# Patient Record
Sex: FEMALE | Race: WHITE | NOT HISPANIC OR LATINO | URBAN - METROPOLITAN AREA
[De-identification: names, ages, dates, MRNs, and addresses within clinical notes are randomized per-mention and may not be internally consistent; named-entity substitution may affect disease eponyms.]

---

## 2017-05-25 ENCOUNTER — OUTPATIENT (OUTPATIENT)
Dept: OUTPATIENT SERVICES | Facility: HOSPITAL | Age: 52
LOS: 1 days | Discharge: HOME | End: 2017-05-25

## 2017-06-28 DIAGNOSIS — Z01.21 ENCOUNTER FOR DENTAL EXAMINATION AND CLEANING WITH ABNORMAL FINDINGS: ICD-10-CM

## 2017-07-11 ENCOUNTER — OUTPATIENT (OUTPATIENT)
Dept: OUTPATIENT SERVICES | Facility: HOSPITAL | Age: 52
LOS: 1 days | Discharge: HOME | End: 2017-07-11

## 2017-07-11 DIAGNOSIS — K02.63 DENTAL CARIES ON SMOOTH SURFACE PENETRATING INTO PULP: ICD-10-CM

## 2017-07-13 ENCOUNTER — EMERGENCY (EMERGENCY)
Facility: HOSPITAL | Age: 52
LOS: 0 days | Discharge: HOME | End: 2017-07-13
Admitting: FAMILY MEDICINE

## 2017-07-13 DIAGNOSIS — R00.1 BRADYCARDIA, UNSPECIFIED: ICD-10-CM

## 2017-07-13 DIAGNOSIS — E78.5 HYPERLIPIDEMIA, UNSPECIFIED: ICD-10-CM

## 2017-07-13 DIAGNOSIS — Z79.899 OTHER LONG TERM (CURRENT) DRUG THERAPY: ICD-10-CM

## 2017-07-13 DIAGNOSIS — E03.9 HYPOTHYROIDISM, UNSPECIFIED: ICD-10-CM

## 2017-07-15 ENCOUNTER — EMERGENCY (EMERGENCY)
Facility: HOSPITAL | Age: 52
LOS: 0 days | Discharge: HOME | End: 2017-07-15
Admitting: FAMILY MEDICINE

## 2017-07-15 DIAGNOSIS — R00.1 BRADYCARDIA, UNSPECIFIED: ICD-10-CM

## 2017-07-15 DIAGNOSIS — R53.83 OTHER FATIGUE: ICD-10-CM

## 2017-07-15 DIAGNOSIS — R41.82 ALTERED MENTAL STATUS, UNSPECIFIED: ICD-10-CM

## 2017-07-15 DIAGNOSIS — E78.00 PURE HYPERCHOLESTEROLEMIA, UNSPECIFIED: ICD-10-CM

## 2017-07-15 DIAGNOSIS — Z79.899 OTHER LONG TERM (CURRENT) DRUG THERAPY: ICD-10-CM

## 2017-07-15 DIAGNOSIS — E03.9 HYPOTHYROIDISM, UNSPECIFIED: ICD-10-CM

## 2017-07-26 ENCOUNTER — OUTPATIENT (OUTPATIENT)
Dept: OUTPATIENT SERVICES | Facility: HOSPITAL | Age: 52
LOS: 1 days | Discharge: HOME | End: 2017-07-26

## 2017-07-26 DIAGNOSIS — E07.9 DISORDER OF THYROID, UNSPECIFIED: ICD-10-CM

## 2017-08-18 PROBLEM — Z00.00 ENCOUNTER FOR PREVENTIVE HEALTH EXAMINATION: Status: ACTIVE | Noted: 2017-08-18

## 2017-09-21 ENCOUNTER — OUTPATIENT (OUTPATIENT)
Dept: OUTPATIENT SERVICES | Facility: HOSPITAL | Age: 52
LOS: 1 days | Discharge: HOME | End: 2017-09-21

## 2017-09-21 DIAGNOSIS — E78.00 PURE HYPERCHOLESTEROLEMIA, UNSPECIFIED: ICD-10-CM

## 2017-09-21 DIAGNOSIS — N18.3 CHRONIC KIDNEY DISEASE, STAGE 3 (MODERATE): ICD-10-CM

## 2017-09-21 DIAGNOSIS — E83.52 HYPERCALCEMIA: ICD-10-CM

## 2017-09-21 DIAGNOSIS — D64.9 ANEMIA, UNSPECIFIED: ICD-10-CM

## 2017-09-21 DIAGNOSIS — R80.0 ISOLATED PROTEINURIA: ICD-10-CM

## 2017-10-12 ENCOUNTER — OUTPATIENT (OUTPATIENT)
Dept: OUTPATIENT SERVICES | Facility: HOSPITAL | Age: 52
LOS: 1 days | Discharge: HOME | End: 2017-10-12

## 2017-10-12 DIAGNOSIS — E78.5 HYPERLIPIDEMIA, UNSPECIFIED: ICD-10-CM

## 2017-10-12 DIAGNOSIS — Z00.00 ENCOUNTER FOR GENERAL ADULT MEDICAL EXAMINATION WITHOUT ABNORMAL FINDINGS: ICD-10-CM

## 2017-10-12 DIAGNOSIS — E03.9 HYPOTHYROIDISM, UNSPECIFIED: ICD-10-CM

## 2017-11-02 ENCOUNTER — OUTPATIENT (OUTPATIENT)
Dept: OUTPATIENT SERVICES | Facility: HOSPITAL | Age: 52
LOS: 1 days | Discharge: HOME | End: 2017-11-02

## 2017-11-02 DIAGNOSIS — R10.2 PELVIC AND PERINEAL PAIN: ICD-10-CM

## 2017-11-17 ENCOUNTER — OUTPATIENT (OUTPATIENT)
Dept: OUTPATIENT SERVICES | Facility: HOSPITAL | Age: 52
LOS: 1 days | Discharge: HOME | End: 2017-11-17

## 2017-11-17 DIAGNOSIS — R31.9 HEMATURIA, UNSPECIFIED: ICD-10-CM

## 2017-12-22 ENCOUNTER — OUTPATIENT (OUTPATIENT)
Dept: OUTPATIENT SERVICES | Facility: HOSPITAL | Age: 52
LOS: 1 days | Discharge: HOME | End: 2017-12-22

## 2017-12-22 DIAGNOSIS — N39.0 URINARY TRACT INFECTION, SITE NOT SPECIFIED: ICD-10-CM

## 2018-01-09 ENCOUNTER — TRANSCRIPTION ENCOUNTER (OUTPATIENT)
Age: 53
End: 2018-01-09

## 2018-01-11 ENCOUNTER — APPOINTMENT (OUTPATIENT)
Dept: OTOLARYNGOLOGY | Facility: CLINIC | Age: 53
End: 2018-01-11
Payer: MEDICARE

## 2018-01-11 DIAGNOSIS — F41.9 ANXIETY DISORDER, UNSPECIFIED: ICD-10-CM

## 2018-01-11 DIAGNOSIS — Z87.09 PERSONAL HISTORY OF OTHER DISEASES OF THE RESPIRATORY SYSTEM: ICD-10-CM

## 2018-01-11 DIAGNOSIS — E03.9 HYPOTHYROIDISM, UNSPECIFIED: ICD-10-CM

## 2018-01-11 DIAGNOSIS — Q90.9 DOWN SYNDROME, UNSPECIFIED: ICD-10-CM

## 2018-01-11 PROCEDURE — 99203 OFFICE O/P NEW LOW 30 MIN: CPT

## 2018-01-11 RX ORDER — AMMONIUM LACTATE 12 %
12 CREAM (GRAM) TOPICAL
Refills: 0 | Status: ACTIVE | COMMUNITY

## 2018-01-11 RX ORDER — RANITIDINE HYDROCHLORIDE 150 MG/1
150 CAPSULE ORAL
Refills: 0 | Status: ACTIVE | COMMUNITY

## 2018-01-11 RX ORDER — SIMVASTATIN 20 MG/1
20 TABLET, FILM COATED ORAL
Refills: 0 | Status: ACTIVE | COMMUNITY

## 2018-01-11 RX ORDER — ZIPRASIDONE HYDROCHLORIDE 60 MG/1
60 CAPSULE ORAL
Refills: 0 | Status: ACTIVE | COMMUNITY

## 2018-01-11 RX ORDER — SODIUM FLUORIDE AND POTASSIUM NITRATE 5.8; 57.5 MG/ML; MG/ML
1.1-5 GEL, DENTIFRICE DENTAL
Refills: 0 | Status: ACTIVE | COMMUNITY

## 2018-01-11 RX ORDER — LEVOTHYROXINE SODIUM 0.11 MG/1
112 TABLET ORAL
Refills: 0 | Status: ACTIVE | COMMUNITY

## 2018-01-11 RX ORDER — CIPROFLOXACIN HYDROCHLORIDE 500 MG/1
500 TABLET, FILM COATED ORAL
Refills: 0 | Status: ACTIVE | COMMUNITY

## 2018-01-12 ENCOUNTER — OTHER (OUTPATIENT)
Age: 53
End: 2018-01-12

## 2018-01-12 RX ORDER — AMOXICILLIN AND CLAVULANATE POTASSIUM 875; 125 MG/1; MG/1
875-125 TABLET, COATED ORAL
Qty: 20 | Refills: 0 | Status: DISCONTINUED | COMMUNITY
Start: 2018-01-11 | End: 2018-01-12

## 2018-01-23 ENCOUNTER — OUTPATIENT (OUTPATIENT)
Dept: OUTPATIENT SERVICES | Facility: HOSPITAL | Age: 53
LOS: 1 days | Discharge: HOME | End: 2018-01-23

## 2018-01-23 DIAGNOSIS — E55.9 VITAMIN D DEFICIENCY, UNSPECIFIED: ICD-10-CM

## 2018-01-23 DIAGNOSIS — E78.00 PURE HYPERCHOLESTEROLEMIA, UNSPECIFIED: ICD-10-CM

## 2018-01-23 DIAGNOSIS — D64.9 ANEMIA, UNSPECIFIED: ICD-10-CM

## 2018-01-23 DIAGNOSIS — N18.3 CHRONIC KIDNEY DISEASE, STAGE 3 (MODERATE): ICD-10-CM

## 2018-01-29 ENCOUNTER — APPOINTMENT (OUTPATIENT)
Dept: OTOLARYNGOLOGY | Facility: CLINIC | Age: 53
End: 2018-01-29
Payer: MEDICARE

## 2018-01-29 VITALS — BODY MASS INDEX: 49.09 KG/M2 | WEIGHT: 260 LBS | HEIGHT: 61 IN

## 2018-01-29 DIAGNOSIS — H61.23 IMPACTED CERUMEN, BILATERAL: ICD-10-CM

## 2018-01-29 PROCEDURE — 69210 REMOVE IMPACTED EAR WAX UNI: CPT

## 2018-01-29 RX ORDER — ASPIRIN 81 MG
6.5 TABLET, DELAYED RELEASE (ENTERIC COATED) ORAL
Qty: 5 | Refills: 0 | Status: ACTIVE | COMMUNITY
Start: 2018-01-29 | End: 1900-01-01

## 2018-03-07 ENCOUNTER — OTHER (OUTPATIENT)
Age: 53
End: 2018-03-07

## 2018-03-07 RX ORDER — ASPIRIN 81 MG
6.5 TABLET, DELAYED RELEASE (ENTERIC COATED) ORAL
Qty: 1 | Refills: 1 | Status: ACTIVE | COMMUNITY
Start: 2018-01-11 | End: 1900-01-01

## 2018-03-28 ENCOUNTER — OUTPATIENT (OUTPATIENT)
Dept: OUTPATIENT SERVICES | Facility: HOSPITAL | Age: 53
LOS: 1 days | Discharge: HOME | End: 2018-03-28

## 2018-03-28 DIAGNOSIS — Z01.20 ENCOUNTER FOR DENTAL EXAMINATION AND CLEANING WITHOUT ABNORMAL FINDINGS: ICD-10-CM

## 2018-08-02 ENCOUNTER — OUTPATIENT (OUTPATIENT)
Dept: OUTPATIENT SERVICES | Facility: HOSPITAL | Age: 53
LOS: 1 days | Discharge: HOME | End: 2018-08-02

## 2018-08-13 DIAGNOSIS — Z01.21 ENCOUNTER FOR DENTAL EXAMINATION AND CLEANING WITH ABNORMAL FINDINGS: ICD-10-CM

## 2018-08-14 ENCOUNTER — TRANSCRIPTION ENCOUNTER (OUTPATIENT)
Age: 53
End: 2018-08-14

## 2018-10-05 ENCOUNTER — OUTPATIENT (OUTPATIENT)
Dept: OUTPATIENT SERVICES | Facility: HOSPITAL | Age: 53
LOS: 1 days | Discharge: HOME | End: 2018-10-05

## 2018-11-12 ENCOUNTER — TRANSCRIPTION ENCOUNTER (OUTPATIENT)
Age: 53
End: 2018-11-12

## 2018-11-30 ENCOUNTER — EMERGENCY (EMERGENCY)
Facility: HOSPITAL | Age: 53
LOS: 0 days | Discharge: HOME | End: 2018-12-01
Attending: EMERGENCY MEDICINE | Admitting: EMERGENCY MEDICINE

## 2018-11-30 VITALS
DIASTOLIC BLOOD PRESSURE: 70 MMHG | SYSTOLIC BLOOD PRESSURE: 159 MMHG | TEMPERATURE: 97 F | OXYGEN SATURATION: 95 % | HEIGHT: 59 IN | HEART RATE: 64 BPM | WEIGHT: 210.1 LBS | RESPIRATION RATE: 20 BRPM
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
## 2018-11-30 DIAGNOSIS — Q90.9 DOWN SYNDROME, UNSPECIFIED: ICD-10-CM

## 2018-11-30 DIAGNOSIS — S99.921A UNSPECIFIED INJURY OF RIGHT FOOT, INITIAL ENCOUNTER: ICD-10-CM

## 2018-11-30 DIAGNOSIS — S92.811A OTHER FRACTURE OF RIGHT FOOT, INITIAL ENCOUNTER FOR CLOSED FRACTURE: ICD-10-CM

## 2018-11-30 DIAGNOSIS — S09.8XXA OTHER SPECIFIED INJURIES OF HEAD, INITIAL ENCOUNTER: ICD-10-CM
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
## 2018-11-30 DIAGNOSIS — E03.9 HYPOTHYROIDISM, UNSPECIFIED: ICD-10-CM

## 2018-11-30 NOTE — ED ADULT NURSE NOTE - OBJECTIVE STATEMENT
as per family and resident at assisted facility state patient fell down stairs unwitnessed and now complaining of R ankle pain and L head abrasion.

## 2018-11-30 NOTE — ED PROVIDER NOTE - NS ED ROS FT
Review of Systems:  	•	CONSTITUTIONAL - no fever, no diaphoresis, no chills  	•	SKIN - +abrasion, no rash  	•	HEMATOLOGIC - no bleeding, no bruising  	•	EYES - no eye pain, no blurry vision  	•	ENT - no congestion  	•	RESPIRATORY - no shortness of breath, no cough  	•	CARDIAC - no chest pain, no palpitations  	•	GI - no abd pain, no nausea, no vomiting  	•	GENITO-URINARY - no hematuria  	•	MUSCULOSKELETAL - +foot pain, +ankle pain, no swelling, no redness  	•	NEUROLOGIC - +head injury, no weakness, no headache, no paresthesias  	All other ROS are negative except as documented in HPI.

## 2018-11-30 NOTE — ED PROVIDER NOTE - ATTENDING CONTRIBUTION TO CARE
52 yo f with pmh of mr/cp, down's syndrome, hypothyroidism, no ac or asa, presents with right foot pain and head injury s/p fall.  pt was walking up stairs and had a mechanical fall.  likely fell down 4 steps.  was heard immediately by caretaker and was found awake.  pt is able to walk but with pain.  exam: nad, left frontoparietal linear abrasion, shallow, left scapular abrasion, mildly ttp, perrl, eomi, mmm, rrr, ctab, abd soft, nt,nd aox2, right foot with ttp at the base of the 5th metatarsal imp: pt with fall, foot pain and head injury, will ct head and xray foot

## 2018-11-30 NOTE — ED PROVIDER NOTE - OBJECTIVE STATEMENT
54 yo F with pmhx of Down's Syndrome, hypothyroidism presenting s/p mechanical fall today now c/o right foot/ankle pain. Patient was going up the stairs and fell. Fall was unwitnessed. +Head injury. No blood thinners. No cp, sob, fever, chills, abdominal pain, nausea, vomiting, diarrhea, back pain, urinary symptoms, headache, dizziness, paresthesias, or weakness.

## 2018-11-30 NOTE — ED PROVIDER NOTE - MEDICAL DECISION MAKING DETAILS
pt with base of 5th metatarsal fx, placed in obs for rehab consult due to nonweightbearing restrictions

## 2018-11-30 NOTE — ED PROVIDER NOTE - PHYSICAL EXAMINATION
VITAL SIGNS: I have reviewed nursing notes and confirm.  CONSTITUTIONAL: Well-developed; well-nourished; in no acute distress.  SKIN: +Abrasion to frontal scalp. +Abrasion to upper back. Remainder of skin exam is warm and dry, no acute rash.  HEAD: Normocephalic; atraumatic.  EYES: PERRL, EOM intact; conjunctiva and sclera clear.  ENT: No nasal discharge; airway clear.   NECK: Supple; non tender. No C-spine tenderness.   CARD: S1, S2 normal; no murmurs, gallops, or rubs. Regular rate and rhythm.  RESP: Clear to auscultation bilaterally. No wheezes, rales or rhonchi.  ABD: Normal bowel sounds; soft; non-distended; non-tender.   EXT/MSK: +Diffuse tenderness to ankle and foot. No bony deformities. Normal ROM. No edema. No midline tenderness.   NEURO: Alert, oriented. Grossly unremarkable. No focal deficits.  PSYCH: Cooperative.

## 2018-11-30 NOTE — ED ADULT TRIAGE NOTE - CHIEF COMPLAINT QUOTE
She fell 4 steps and hit her head and her right ankle hurts as per family. Denies LOC, no blood thinners

## 2018-11-30 NOTE — ED PROVIDER NOTE - PROGRESS NOTE DETAILS
Will place in OBS for PT/Rehab Discussed results with Nurse Patton from group home family and nurse unsure of tetanus status will verify in morning.

## 2018-12-01 VITALS — HEART RATE: 55 BPM | DIASTOLIC BLOOD PRESSURE: 60 MMHG | SYSTOLIC BLOOD PRESSURE: 113 MMHG | TEMPERATURE: 98 F

## 2018-12-01 RX ORDER — ZIPRASIDONE HYDROCHLORIDE 20 MG/1
60 CAPSULE ORAL ONCE
Qty: 0 | Refills: 0 | Status: DISCONTINUED | OUTPATIENT
Start: 2018-12-01 | End: 2018-12-01

## 2018-12-01 RX ORDER — IBUPROFEN 200 MG
600 TABLET ORAL ONCE
Qty: 0 | Refills: 0 | Status: COMPLETED | OUTPATIENT
Start: 2018-12-01 | End: 2018-12-01

## 2018-12-01 RX ORDER — ZOLPIDEM TARTRATE 10 MG/1
5 TABLET ORAL AT BEDTIME
Qty: 0 | Refills: 0 | Status: DISCONTINUED | OUTPATIENT
Start: 2018-12-01 | End: 2018-12-01

## 2018-12-01 RX ORDER — FAMOTIDINE 10 MG/ML
20 INJECTION INTRAVENOUS
Qty: 0 | Refills: 0 | Status: DISCONTINUED | OUTPATIENT
Start: 2018-12-01 | End: 2018-12-01

## 2018-12-01 RX ORDER — SIMVASTATIN 20 MG/1
20 TABLET, FILM COATED ORAL AT BEDTIME
Qty: 0 | Refills: 0 | Status: DISCONTINUED | OUTPATIENT
Start: 2018-12-01 | End: 2018-12-01

## 2018-12-01 RX ORDER — LEVOTHYROXINE SODIUM 125 MCG
88 TABLET ORAL DAILY
Qty: 0 | Refills: 0 | Status: DISCONTINUED | OUTPATIENT
Start: 2018-12-01 | End: 2018-12-01

## 2018-12-01 RX ADMIN — Medication 600 MILLIGRAM(S): at 01:02

## 2018-12-01 RX ADMIN — FAMOTIDINE 20 MILLIGRAM(S): 10 INJECTION INTRAVENOUS at 06:43

## 2018-12-01 RX ADMIN — Medication 88 MICROGRAM(S): at 06:43

## 2018-12-01 RX ADMIN — ZOLPIDEM TARTRATE 5 MILLIGRAM(S): 10 TABLET ORAL at 01:40

## 2018-12-01 NOTE — ED CDU PROVIDER INITIAL DAY NOTE - NS ED ROS FT
Constitutional: No fever, chills.  Eyes: No visual changes.  ENT: No hearing changes.  Neck: No neck pain or stiffness.  Cardiovascular: No chest pain, palpitations, edema.  Pulmonary: No SOB, cough. No hemoptysis.  Abdominal:  No nausea, vomiting, diarrhea.  : No dysuria, frequency.  Neuro: No headache, syncope, dizziness.  MS: + Head injury; right foot pain  Psych: No suicidal ideations.

## 2018-12-01 NOTE — ED CDU PROVIDER INITIAL DAY NOTE - ATTENDING CONTRIBUTION TO CARE
Pt placed into observation for rehab evaluation and ortho evaluation. Presented after tripping and fx of the base of the fifth metatarsal.

## 2018-12-01 NOTE — ED ADULT NURSE REASSESSMENT NOTE - NS ED NURSE REASSESS COMMENT FT1
Pt assessed, vss. Aide at bedside. comfort and safety measures in place. no c/o pain or discomfort at this time. Pt mentating at baseline

## 2018-12-01 NOTE — ED CDU PROVIDER DISPOSITION NOTE - CARE PROVIDER_API CALL
Fredis Markham), Orthopaedic Surgery  [facility] [place] [address]  Phone: [phone]  Fax: [phone]

## 2018-12-01 NOTE — ED ADULT NURSE REASSESSMENT NOTE - NS ED NURSE REASSESS COMMENT FT1
VSS, Pt has 1/1 from agency and family at bedside, Pt currently sleeping no s/s distress, CB in [facility], [place] on. will cont to monitor

## 2018-12-01 NOTE — ED CDU PROVIDER INITIAL DAY NOTE - OBJECTIVE STATEMENT
53 yold female to ED Pmhx Down's syndrome, Hypothyroidism s/p trip and fall hitting head wihle going up stairs; pt seen in main ED - head ct negative; right foot xray + Serna fracture; pt place in obs because pt currently lives in group home on second floor - unable to use crutches and ambulate nwb right leg; pt in obs for Rehab consult in am;

## 2018-12-01 NOTE — ED CDU PROVIDER INITIAL DAY NOTE - PHYSICAL EXAMINATION
Constitutional: Well developed, well nourished. NAD  Head: + scalp abrasion; no hematoma;   Eyes: PERRL, EOMI.  ENT: No nasal discharge. Mucous membranes dry.  Neck: Supple. Painless ROM.  Cardiovascular:  Regular rate and rhythm.   Pulmonary:  Lungs clear to auscultation bilaterally.   Abdominal: Soft. Nondistended. No rebound, guarding, rigidity.  Extremities. Pelvis stable. + right ankle/foot swelling and tenderness;   Skin: No rashes, cyanosis.  Neuro: AAOx2. No focal neurological deficits.  Psych: Normal mood. Normal affect.

## 2018-12-01 NOTE — ED CDU PROVIDER INITIAL DAY NOTE - PROGRESS NOTE DETAILS
We spoke to rehab. Will try to see pt. Pt however would do good with walker and partial wt bearing , ortho follow. patient signed out from yves jarquin, famil;donald at bedside, informed of ed plan and management will continue to monitor

## 2018-12-04 ENCOUNTER — INPATIENT (INPATIENT)
Facility: HOSPITAL | Age: 53
LOS: 2 days | Discharge: NURSING, REHAB OR LONG-TERM CARE | End: 2018-12-07
Attending: INTERNAL MEDICINE | Admitting: INTERNAL MEDICINE

## 2018-12-04 VITALS
RESPIRATION RATE: 16 BRPM | OXYGEN SATURATION: 100 % | TEMPERATURE: 98 F | DIASTOLIC BLOOD PRESSURE: 64 MMHG | HEART RATE: 53 BPM | SYSTOLIC BLOOD PRESSURE: 103 MMHG

## 2018-12-04 DIAGNOSIS — E03.9 HYPOTHYROIDISM, UNSPECIFIED: ICD-10-CM

## 2018-12-04 DIAGNOSIS — S82.891A OTHER FRACTURE OF RIGHT LOWER LEG, INITIAL ENCOUNTER FOR CLOSED FRACTURE: ICD-10-CM

## 2018-12-04 DIAGNOSIS — R25.2 CRAMP AND SPASM: ICD-10-CM

## 2018-12-04 DIAGNOSIS — Q90.9 DOWN SYNDROME, UNSPECIFIED: ICD-10-CM

## 2018-12-04 DIAGNOSIS — N39.0 URINARY TRACT INFECTION, SITE NOT SPECIFIED: ICD-10-CM

## 2018-12-04 PROBLEM — F79 UNSPECIFIED INTELLECTUAL DISABILITIES: Chronic | Status: ACTIVE | Noted: 2018-11-30

## 2018-12-04 PROBLEM — F63.9 IMPULSE DISORDER, UNSPECIFIED: Chronic | Status: ACTIVE | Noted: 2018-11-30

## 2018-12-04 LAB
ALBUMIN SERPL ELPH-MCNC: 3.8 G/DL — SIGNIFICANT CHANGE UP (ref 3.5–5.2)
ALBUMIN SERPL ELPH-MCNC: 4.4 G/DL — SIGNIFICANT CHANGE UP (ref 3.5–5.2)
ALP SERPL-CCNC: 80 U/L — SIGNIFICANT CHANGE UP (ref 30–115)
ALP SERPL-CCNC: 95 U/L — SIGNIFICANT CHANGE UP (ref 30–115)
ALT FLD-CCNC: 16 U/L — SIGNIFICANT CHANGE UP (ref 0–41)
ALT FLD-CCNC: 18 U/L — SIGNIFICANT CHANGE UP (ref 0–41)
ANION GAP SERPL CALC-SCNC: 11 MMOL/L — SIGNIFICANT CHANGE UP (ref 7–14)
ANION GAP SERPL CALC-SCNC: 16 MMOL/L — HIGH (ref 7–14)
APPEARANCE UR: ABNORMAL
AST SERPL-CCNC: 22 U/L — SIGNIFICANT CHANGE UP (ref 0–41)
AST SERPL-CCNC: 26 U/L — SIGNIFICANT CHANGE UP (ref 0–41)
BASOPHILS # BLD AUTO: 0.08 K/UL — SIGNIFICANT CHANGE UP (ref 0–0.2)
BASOPHILS # BLD AUTO: 0.08 K/UL — SIGNIFICANT CHANGE UP (ref 0–0.2)
BASOPHILS NFR BLD AUTO: 1 % — SIGNIFICANT CHANGE UP (ref 0–1)
BASOPHILS NFR BLD AUTO: 1.3 % — HIGH (ref 0–1)
BILIRUB SERPL-MCNC: 0.4 MG/DL — SIGNIFICANT CHANGE UP (ref 0.2–1.2)
BILIRUB SERPL-MCNC: 0.8 MG/DL — SIGNIFICANT CHANGE UP (ref 0.2–1.2)
BILIRUB UR-MCNC: NEGATIVE — SIGNIFICANT CHANGE UP
BUN SERPL-MCNC: 23 MG/DL — HIGH (ref 10–20)
BUN SERPL-MCNC: 25 MG/DL — HIGH (ref 10–20)
CALCIUM SERPL-MCNC: 9 MG/DL — SIGNIFICANT CHANGE UP (ref 8.5–10.1)
CALCIUM SERPL-MCNC: 9.8 MG/DL — SIGNIFICANT CHANGE UP (ref 8.5–10.1)
CHLORIDE SERPL-SCNC: 105 MMOL/L — SIGNIFICANT CHANGE UP (ref 98–110)
CHLORIDE SERPL-SCNC: 99 MMOL/L — SIGNIFICANT CHANGE UP (ref 98–110)
CO2 SERPL-SCNC: 27 MMOL/L — SIGNIFICANT CHANGE UP (ref 17–32)
CO2 SERPL-SCNC: 29 MMOL/L — SIGNIFICANT CHANGE UP (ref 17–32)
COLOR SPEC: YELLOW — SIGNIFICANT CHANGE UP
CREAT SERPL-MCNC: 1.4 MG/DL — SIGNIFICANT CHANGE UP (ref 0.7–1.5)
CREAT SERPL-MCNC: 1.4 MG/DL — SIGNIFICANT CHANGE UP (ref 0.7–1.5)
DIFF PNL FLD: NEGATIVE — SIGNIFICANT CHANGE UP
EOSINOPHIL # BLD AUTO: 0.09 K/UL — SIGNIFICANT CHANGE UP (ref 0–0.7)
EOSINOPHIL # BLD AUTO: 0.1 K/UL — SIGNIFICANT CHANGE UP (ref 0–0.7)
EOSINOPHIL NFR BLD AUTO: 1.2 % — SIGNIFICANT CHANGE UP (ref 0–8)
EOSINOPHIL NFR BLD AUTO: 1.6 % — SIGNIFICANT CHANGE UP (ref 0–8)
GLUCOSE SERPL-MCNC: 85 MG/DL — SIGNIFICANT CHANGE UP (ref 70–99)
GLUCOSE SERPL-MCNC: 88 MG/DL — SIGNIFICANT CHANGE UP (ref 70–99)
GLUCOSE UR QL: NEGATIVE MG/DL — SIGNIFICANT CHANGE UP
HCT VFR BLD CALC: 39.6 % — SIGNIFICANT CHANGE UP (ref 37–47)
HCT VFR BLD CALC: 45.6 % — SIGNIFICANT CHANGE UP (ref 37–47)
HGB BLD-MCNC: 13.1 G/DL — SIGNIFICANT CHANGE UP (ref 12–16)
HGB BLD-MCNC: 15.2 G/DL — SIGNIFICANT CHANGE UP (ref 12–16)
IMM GRANULOCYTES NFR BLD AUTO: 0.3 % — SIGNIFICANT CHANGE UP (ref 0.1–0.3)
IMM GRANULOCYTES NFR BLD AUTO: 0.4 % — HIGH (ref 0.1–0.3)
KETONES UR-MCNC: NEGATIVE — SIGNIFICANT CHANGE UP
LACTATE SERPL-SCNC: 0.9 MMOL/L — SIGNIFICANT CHANGE UP (ref 0.5–2.2)
LEUKOCYTE ESTERASE UR-ACNC: ABNORMAL
LIDOCAIN IGE QN: 52 U/L — SIGNIFICANT CHANGE UP (ref 7–60)
LYMPHOCYTES # BLD AUTO: 0.96 K/UL — LOW (ref 1.2–3.4)
LYMPHOCYTES # BLD AUTO: 1.16 K/UL — LOW (ref 1.2–3.4)
LYMPHOCYTES # BLD AUTO: 12.5 % — LOW (ref 20.5–51.1)
LYMPHOCYTES # BLD AUTO: 18.2 % — LOW (ref 20.5–51.1)
MAGNESIUM SERPL-MCNC: 2 MG/DL — SIGNIFICANT CHANGE UP (ref 1.8–2.4)
MAGNESIUM SERPL-MCNC: 2 MG/DL — SIGNIFICANT CHANGE UP (ref 1.8–2.4)
MCHC RBC-ENTMCNC: 31.3 PG — HIGH (ref 27–31)
MCHC RBC-ENTMCNC: 31.3 PG — HIGH (ref 27–31)
MCHC RBC-ENTMCNC: 33.1 G/DL — SIGNIFICANT CHANGE UP (ref 32–37)
MCHC RBC-ENTMCNC: 33.3 G/DL — SIGNIFICANT CHANGE UP (ref 32–37)
MCV RBC AUTO: 93.8 FL — SIGNIFICANT CHANGE UP (ref 81–99)
MCV RBC AUTO: 94.7 FL — SIGNIFICANT CHANGE UP (ref 81–99)
MONOCYTES # BLD AUTO: 0.5 K/UL — SIGNIFICANT CHANGE UP (ref 0.1–0.6)
MONOCYTES # BLD AUTO: 0.55 K/UL — SIGNIFICANT CHANGE UP (ref 0.1–0.6)
MONOCYTES NFR BLD AUTO: 6.5 % — SIGNIFICANT CHANGE UP (ref 1.7–9.3)
MONOCYTES NFR BLD AUTO: 8.6 % — SIGNIFICANT CHANGE UP (ref 1.7–9.3)
NEUTROPHILS # BLD AUTO: 4.47 K/UL — SIGNIFICANT CHANGE UP (ref 1.4–6.5)
NEUTROPHILS # BLD AUTO: 6.04 K/UL — SIGNIFICANT CHANGE UP (ref 1.4–6.5)
NEUTROPHILS NFR BLD AUTO: 70 % — SIGNIFICANT CHANGE UP (ref 42.2–75.2)
NEUTROPHILS NFR BLD AUTO: 78.4 % — HIGH (ref 42.2–75.2)
NITRITE UR-MCNC: POSITIVE
NRBC # BLD: 0 /100 WBCS — SIGNIFICANT CHANGE UP (ref 0–0)
NRBC # BLD: 0 /100 WBCS — SIGNIFICANT CHANGE UP (ref 0–0)
PH UR: 6.5 — SIGNIFICANT CHANGE UP (ref 5–8)
PLATELET # BLD AUTO: 199 K/UL — SIGNIFICANT CHANGE UP (ref 130–400)
PLATELET # BLD AUTO: 203 K/UL — SIGNIFICANT CHANGE UP (ref 130–400)
POTASSIUM SERPL-MCNC: 4.3 MMOL/L — SIGNIFICANT CHANGE UP (ref 3.5–5)
POTASSIUM SERPL-MCNC: 4.5 MMOL/L — SIGNIFICANT CHANGE UP (ref 3.5–5)
POTASSIUM SERPL-SCNC: 4.3 MMOL/L — SIGNIFICANT CHANGE UP (ref 3.5–5)
POTASSIUM SERPL-SCNC: 4.5 MMOL/L — SIGNIFICANT CHANGE UP (ref 3.5–5)
PROT SERPL-MCNC: 6.4 G/DL — SIGNIFICANT CHANGE UP (ref 6–8)
PROT SERPL-MCNC: 7.8 G/DL — SIGNIFICANT CHANGE UP (ref 6–8)
PROT UR-MCNC: 30 MG/DL
RBC # BLD: 4.18 M/UL — LOW (ref 4.2–5.4)
RBC # BLD: 4.86 M/UL — SIGNIFICANT CHANGE UP (ref 4.2–5.4)
RBC # FLD: 13.5 % — SIGNIFICANT CHANGE UP (ref 11.5–14.5)
RBC # FLD: 13.6 % — SIGNIFICANT CHANGE UP (ref 11.5–14.5)
SODIUM SERPL-SCNC: 142 MMOL/L — SIGNIFICANT CHANGE UP (ref 135–146)
SODIUM SERPL-SCNC: 145 MMOL/L — SIGNIFICANT CHANGE UP (ref 135–146)
SP GR SPEC: 1.02 — SIGNIFICANT CHANGE UP (ref 1.01–1.03)
UROBILINOGEN FLD QL: 0.2 MG/DL — SIGNIFICANT CHANGE UP (ref 0.2–0.2)
WBC # BLD: 6.38 K/UL — SIGNIFICANT CHANGE UP (ref 4.8–10.8)
WBC # BLD: 7.7 K/UL — SIGNIFICANT CHANGE UP (ref 4.8–10.8)
WBC # FLD AUTO: 6.38 K/UL — SIGNIFICANT CHANGE UP (ref 4.8–10.8)
WBC # FLD AUTO: 7.7 K/UL — SIGNIFICANT CHANGE UP (ref 4.8–10.8)
WBC UR QL: ABNORMAL /HPF

## 2018-12-04 RX ORDER — LEVOTHYROXINE SODIUM 125 MCG
88 TABLET ORAL DAILY
Qty: 0 | Refills: 0 | Status: DISCONTINUED | OUTPATIENT
Start: 2018-12-04 | End: 2018-12-07

## 2018-12-04 RX ORDER — FAMOTIDINE 10 MG/ML
20 INJECTION INTRAVENOUS EVERY 12 HOURS
Qty: 0 | Refills: 0 | Status: DISCONTINUED | OUTPATIENT
Start: 2018-12-04 | End: 2018-12-07

## 2018-12-04 RX ORDER — CEFTRIAXONE 500 MG/1
1 INJECTION, POWDER, FOR SOLUTION INTRAMUSCULAR; INTRAVENOUS ONCE
Qty: 0 | Refills: 0 | Status: COMPLETED | OUTPATIENT
Start: 2018-12-04 | End: 2018-12-04

## 2018-12-04 RX ORDER — LANOLIN ALCOHOL/MO/W.PET/CERES
3 CREAM (GRAM) TOPICAL AT BEDTIME
Qty: 0 | Refills: 0 | Status: DISCONTINUED | OUTPATIENT
Start: 2018-12-04 | End: 2018-12-07

## 2018-12-04 RX ORDER — ZIPRASIDONE HYDROCHLORIDE 20 MG/1
60 CAPSULE ORAL
Qty: 0 | Refills: 0 | Status: DISCONTINUED | OUTPATIENT
Start: 2018-12-04 | End: 2018-12-07

## 2018-12-04 RX ORDER — CEFTRIAXONE 500 MG/1
1 INJECTION, POWDER, FOR SOLUTION INTRAMUSCULAR; INTRAVENOUS EVERY 24 HOURS
Qty: 0 | Refills: 0 | Status: DISCONTINUED | OUTPATIENT
Start: 2018-12-04 | End: 2018-12-07

## 2018-12-04 RX ORDER — HEPARIN SODIUM 5000 [USP'U]/ML
5000 INJECTION INTRAVENOUS; SUBCUTANEOUS EVERY 12 HOURS
Qty: 0 | Refills: 0 | Status: DISCONTINUED | OUTPATIENT
Start: 2018-12-04 | End: 2018-12-07

## 2018-12-04 RX ORDER — ACETAMINOPHEN 500 MG
650 TABLET ORAL EVERY 6 HOURS
Qty: 0 | Refills: 0 | Status: DISCONTINUED | OUTPATIENT
Start: 2018-12-04 | End: 2018-12-07

## 2018-12-04 RX ORDER — SIMVASTATIN 20 MG/1
20 TABLET, FILM COATED ORAL AT BEDTIME
Qty: 0 | Refills: 0 | Status: DISCONTINUED | OUTPATIENT
Start: 2018-12-04 | End: 2018-12-07

## 2018-12-04 RX ADMIN — CEFTRIAXONE 100 GRAM(S): 500 INJECTION, POWDER, FOR SOLUTION INTRAMUSCULAR; INTRAVENOUS at 15:30

## 2018-12-04 RX ADMIN — SIMVASTATIN 20 MILLIGRAM(S): 20 TABLET, FILM COATED ORAL at 22:48

## 2018-12-04 RX ADMIN — Medication 3 MILLIGRAM(S): at 22:48

## 2018-12-04 NOTE — ED ADULT TRIAGE NOTE - CHIEF COMPLAINT QUOTE
Right foot broken on Friday s/p fall down stairs, pt jumping up this morning and sent in for eval because she did hit her head when she fell on Friday

## 2018-12-04 NOTE — ED PROVIDER NOTE - PHYSICAL EXAMINATION
VITAL SIGNS: I have reviewed nursing notes and confirm.  CONSTITUTIONAL:  well-nourished; in no acute distress. pt comfortable and interacting. happy.  SKIN: skin exam is warm and dry, no acute rash.   HEAD: Normocephalic; atraumatic.  EYES:  EOM intact; conjunctiva and sclera clear.  ENT: No nasal discharge; airway clear. moist oral mucosa; uvula at midline.   NECK: Supple; non tender.  CARD: S1, S2 normal; no murmurs, gallops, or rubs.   RESP: No wheezes, rales or rhonchi. cta b/l. no use of accessory muscles. no retractions  ABD: Normal bowel sounds; soft; non-distended; non-tender; no rebound  EXT: No  cyanosis or edema.  BACK: No cva tenderness  LYMPH: No acute cervical adenopathy.  NEURO: Alert,  grossly unremarkable at baseline as per aid  PSYCH: Cooperative, appropriate.

## 2018-12-04 NOTE — ED PROVIDER NOTE - ATTENDING CONTRIBUTION TO CARE
53 F to ED with h/o Downs Syndrome presents for spasms of her R arm lasting sec x 2 episodes.   No prior h/o sx but + trauma last friday with neg CT head.    AVSS, exam as noted, CTAB, RRR, abdomen soft NTND, (+) bowel sounds, neuro nonfocal

## 2018-12-04 NOTE — H&P ADULT - HISTORY OF PRESENT ILLNESS
54y/o F w/ hx of down syndrome, anxiety, hypothyroidism is brought by aid for repetitive spasms of right arm this morning kelly lasted seconds. Pt has never had episodes like this and no hx of seizures. Pt did however, have a fall on friday - trauma to head, no loc and no blood thinners;  CT head friday which was negative.  As per aid no fevers, cough, congestion, runny nose, no vomiting or diarrhea.

## 2018-12-04 NOTE — ED PROVIDER NOTE - OBJECTIVE STATEMENT
54y/o F w/ hx of down syndrome, anxiety, hypothyroidism is brought by aid for repetitive spasms of r. arm this morning that lasted secs.  pt has never had episodes like this and no hx of seizures.   pt had a fall on friday- trauma to head, no loc and no blood thinners, pt had CT head friday which was negative.  As per aid no fevers, cough, congestion, runny nose, no vomiting or diarrhea.

## 2018-12-04 NOTE — ED PROVIDER NOTE - CARE PLAN
Principal Discharge DX:	Jerking movements of extremities  Secondary Diagnosis:	UTI (urinary tract infection)

## 2018-12-04 NOTE — H&P ADULT - ASSESSMENT
54y/o F w/ hx of down syndrome, anxiety, hypothyroidism is brought by aid for repetitive spasms of right arm this morning kelly lasted seconds. Pt has never had episodes like this and no hx of seizures. Pt did however, have a fall on friday - trauma to head, no loc and no blood thinners;  CT head friday which was negative.  As per aid no fevers, cough, congestion, runny nose, no vomiting or diarrhea.  Additional work up in ED revealed UTI.

## 2018-12-04 NOTE — H&P ADULT - ATTENDING COMMENTS
52yo female is brought to the ER (Due to chronic condition- Down's syndrome, patient cannot provide any details, family no longer present so ER chart is source of information) 54yo female is brought to the ER because of repetitive spasms to right arm which lasted for seconds.  Apparently patient did have a fall 4 days ago but CAT of head done at that time was negative. No reports of fever. Physical shows happy patient smiling and shaking my hand several times during interview and exam. Has "Down's syndrome" facies and appearance. Lungs - cta CV - regular ABDOMEN is soft. EXT - no c/c/e NEURO - no gross focal defects. Labs reviewed, shows evidence of UTI. Assessment is 1)right arm spasmatic movements - concern for seizure (partial?) admit to epilepsy unit 2) UTI - rocephin (Due to chronic condition- Down's syndrome, patient cannot provide any details, family no longer present so ER chart is source of information) 52yo female is brought to the ER because of repetitive spasms to right arm which lasted for seconds.  Apparently patient did have a fall 4 days ago but CAT of head done at that time was negative. No reports of fever. Physical shows happy patient smiling and shaking my hand several times during interview and exam. Has "Down's syndrome" facies and appearance. Lungs - cta CV - regular ABDOMEN is soft. EXT - no c/c/e NEURO - no gross focal defects. Labs reviewed, shows evidence of UTI (positive nitrites, large leuk). Assessment is 1)right arm spasmatic movements - concern for seizure (partial?) admit to epilepsy unit 2) UTI - rocephin

## 2018-12-05 LAB
ALBUMIN SERPL ELPH-MCNC: 3.7 G/DL — SIGNIFICANT CHANGE UP (ref 3.5–5.2)
ALP SERPL-CCNC: 81 U/L — SIGNIFICANT CHANGE UP (ref 30–115)
ALT FLD-CCNC: 20 U/L — SIGNIFICANT CHANGE UP (ref 0–41)
ANION GAP SERPL CALC-SCNC: 13 MMOL/L — SIGNIFICANT CHANGE UP (ref 7–14)
AST SERPL-CCNC: 27 U/L — SIGNIFICANT CHANGE UP (ref 0–41)
BILIRUB SERPL-MCNC: 0.5 MG/DL — SIGNIFICANT CHANGE UP (ref 0.2–1.2)
BUN SERPL-MCNC: 26 MG/DL — HIGH (ref 10–20)
CALCIUM SERPL-MCNC: 8.6 MG/DL — SIGNIFICANT CHANGE UP (ref 8.5–10.1)
CHLORIDE SERPL-SCNC: 105 MMOL/L — SIGNIFICANT CHANGE UP (ref 98–110)
CO2 SERPL-SCNC: 26 MMOL/L — SIGNIFICANT CHANGE UP (ref 17–32)
CREAT SERPL-MCNC: 1.2 MG/DL — SIGNIFICANT CHANGE UP (ref 0.7–1.5)
GLUCOSE SERPL-MCNC: 102 MG/DL — HIGH (ref 70–99)
HCT VFR BLD CALC: 41.1 % — SIGNIFICANT CHANGE UP (ref 37–47)
HGB BLD-MCNC: 13.6 G/DL — SIGNIFICANT CHANGE UP (ref 12–16)
MCHC RBC-ENTMCNC: 31.8 PG — HIGH (ref 27–31)
MCHC RBC-ENTMCNC: 33.1 G/DL — SIGNIFICANT CHANGE UP (ref 32–37)
MCV RBC AUTO: 96 FL — SIGNIFICANT CHANGE UP (ref 81–99)
NRBC # BLD: 0 /100 WBCS — SIGNIFICANT CHANGE UP (ref 0–0)
PLATELET # BLD AUTO: 185 K/UL — SIGNIFICANT CHANGE UP (ref 130–400)
POTASSIUM SERPL-MCNC: 4.5 MMOL/L — SIGNIFICANT CHANGE UP (ref 3.5–5)
POTASSIUM SERPL-SCNC: 4.5 MMOL/L — SIGNIFICANT CHANGE UP (ref 3.5–5)
PROT SERPL-MCNC: 6.6 G/DL — SIGNIFICANT CHANGE UP (ref 6–8)
RBC # BLD: 4.28 M/UL — SIGNIFICANT CHANGE UP (ref 4.2–5.4)
RBC # FLD: 13.5 % — SIGNIFICANT CHANGE UP (ref 11.5–14.5)
SODIUM SERPL-SCNC: 144 MMOL/L — SIGNIFICANT CHANGE UP (ref 135–146)
T3 SERPL-MCNC: 81 NG/DL — SIGNIFICANT CHANGE UP (ref 80–200)
T4 AB SER-ACNC: 7.1 UG/DL — SIGNIFICANT CHANGE UP (ref 4.6–12)
TSH SERPL-MCNC: 4.11 UIU/ML — SIGNIFICANT CHANGE UP (ref 0.27–4.2)
WBC # BLD: 3.65 K/UL — LOW (ref 4.8–10.8)
WBC # FLD AUTO: 3.65 K/UL — LOW (ref 4.8–10.8)

## 2018-12-05 RX ORDER — CHLORHEXIDINE GLUCONATE 213 G/1000ML
1 SOLUTION TOPICAL
Qty: 0 | Refills: 0 | Status: DISCONTINUED | OUTPATIENT
Start: 2018-12-05 | End: 2018-12-07

## 2018-12-05 RX ORDER — LEVETIRACETAM 250 MG/1
250 TABLET, FILM COATED ORAL EVERY 12 HOURS
Qty: 0 | Refills: 0 | Status: DISCONTINUED | OUTPATIENT
Start: 2018-12-05 | End: 2018-12-06

## 2018-12-05 RX ORDER — LEVETIRACETAM 250 MG/1
125 TABLET, FILM COATED ORAL EVERY 12 HOURS
Qty: 0 | Refills: 0 | Status: DISCONTINUED | OUTPATIENT
Start: 2018-12-05 | End: 2018-12-05

## 2018-12-05 RX ORDER — SODIUM CHLORIDE 9 MG/ML
1000 INJECTION, SOLUTION INTRAVENOUS
Qty: 0 | Refills: 0 | Status: DISCONTINUED | OUTPATIENT
Start: 2018-12-05 | End: 2018-12-06

## 2018-12-05 RX ORDER — LEVETIRACETAM 250 MG/1
125 TABLET, FILM COATED ORAL ONCE
Qty: 0 | Refills: 0 | Status: COMPLETED | OUTPATIENT
Start: 2018-12-05 | End: 2018-12-05

## 2018-12-05 RX ADMIN — CEFTRIAXONE 100 GRAM(S): 500 INJECTION, POWDER, FOR SOLUTION INTRAMUSCULAR; INTRAVENOUS at 15:12

## 2018-12-05 RX ADMIN — Medication 1 TABLET(S): at 21:28

## 2018-12-05 RX ADMIN — SIMVASTATIN 20 MILLIGRAM(S): 20 TABLET, FILM COATED ORAL at 21:29

## 2018-12-05 RX ADMIN — FAMOTIDINE 20 MILLIGRAM(S): 10 INJECTION INTRAVENOUS at 06:03

## 2018-12-05 RX ADMIN — SODIUM CHLORIDE 75 MILLILITER(S): 9 INJECTION, SOLUTION INTRAVENOUS at 15:11

## 2018-12-05 RX ADMIN — HEPARIN SODIUM 5000 UNIT(S): 5000 INJECTION INTRAVENOUS; SUBCUTANEOUS at 17:58

## 2018-12-05 RX ADMIN — HEPARIN SODIUM 5000 UNIT(S): 5000 INJECTION INTRAVENOUS; SUBCUTANEOUS at 06:03

## 2018-12-05 RX ADMIN — Medication 88 MICROGRAM(S): at 06:03

## 2018-12-05 RX ADMIN — LEVETIRACETAM 400 MILLIGRAM(S): 250 TABLET, FILM COATED ORAL at 21:28

## 2018-12-05 RX ADMIN — FAMOTIDINE 20 MILLIGRAM(S): 10 INJECTION INTRAVENOUS at 17:52

## 2018-12-05 RX ADMIN — ZIPRASIDONE HYDROCHLORIDE 60 MILLIGRAM(S): 20 CAPSULE ORAL at 06:04

## 2018-12-05 RX ADMIN — Medication 1 TABLET(S): at 15:12

## 2018-12-05 RX ADMIN — LEVETIRACETAM 405 MILLIGRAM(S): 250 TABLET, FILM COATED ORAL at 14:25

## 2018-12-05 NOTE — CONSULT NOTE ADULT - ATTENDING COMMENTS
Agree with the history   Sister also reports decline in cognitive function for the last 18 months  and she also reports excessive daytime sleepy ness  She does have higher risk for seizure/epileps and also sleep disorder  will start the monitoring  Seizure precaution

## 2018-12-05 NOTE — PROGRESS NOTE ADULT - SUBJECTIVE AND OBJECTIVE BOX
SALVATORE STEELE  53y  Female    Patient is a 53y old  Female who presents with a chief complaint of Spasms of right upper arm (04 Dec 2018 21:13)      INTERVAL HPI/OVERNIGHT EVENTS:  No interval events but RN concerned that patient appears more lethargic.      REVIEW OF SYSTEMS: UTO due to MR  Per Family and Aide  CONSTITUTIONAL: No fever, weight loss, or fatigue  EYES: No eye pain, visual disturbances, or discharge  ENMT:  No difficulty hearing, tinnitus, vertigo; No sinus or throat pain  RESPIRATORY: No cough, wheezing, shortness of breath  GASTROINTESTINAL: No nausea, vomiting, or hematemesis; No diarrhea or constipation. No melena or hematochezia.  GENITOURINARY: No hematuria  NEUROLOGICAL: No loss of strength, numbness, or tremors  SKIN: No itching, rashes, or lesions   MUSCULOSKELETAL: No joint pain or swelling; No muscle, back, or extremity pain      VITALS:  T(F): 96.7 (18 @ 05:40), Max: 97.6 (18 @ 19:02)  HR: 53 (18 @ 05:40) (50 - 59)  BP: 126/55 (18 @ 05:40) (120/57 - 162/67)  RR: 18 (18 @ 05:40) (18 - 18)  SpO2: 100% (18 @ 16:51) (100% - 100%)      PHYSICAL EXAM:  GENERAL: NAD, facial dysmorphic features  HEAD:  Atraumatic  EYES: conjunctiva and sclera clear  ENMT: No tonsillar erythema, exudates, or enlargement; Moist mucous membranes  NECK: Supple, Normal thyroid  NERVOUS SYSTEM:  Alert & Awake, Moves all extremities  CHEST/LUNG: Clear to auscultation bilaterally; No rales, rhonchi, wheezing, or rubs  HEART: Regular rate and rhythm; No murmurs, rubs, or gallops  ABDOMEN: Soft, Nontender, Nondistended; Bowel sounds present  EXTREMITIES:  2+ Peripheral Pulses, No clubbing, cyanosis, or edema  LYMPH: No lymphadenopathy noted  SKIN: No rashes or lesions    Consultant(s) Notes Reviewed:  [x ] YES  [ ] NO  Care Discussed with Consultants/Other Providers [ x] YES  [ ] NO    LABS:                        13.1   6.38  )-----------( 199      ( 04 Dec 2018 21:46 )             39.6     12-    145  |  105  |  25<H>  ----------------------------<  88  4.3   |  29  |  1.4    Ca    9.0      04 Dec 2018 21:46  Mg     2.0     12-    TPro  6.4  /  Alb  3.8  /  TBili  0.4  /  DBili  x   /  AST  22  /  ALT  16  /  AlkPhos  80  12      Urinalysis Basic - ( 04 Dec 2018 13:25 )    Color: Yellow / Appearance: Turbid / S.025 / pH: x  Gluc: x / Ketone: Negative  / Bili: Negative / Urobili: 0.2 mg/dL   Blood: x / Protein: 30 mg/dL / Nitrite: Positive   Leuk Esterase: Large / RBC: x / WBC 10-25 /HPF   Sq Epi: x / Non Sq Epi: x / Bacteria: x      MICROBIOLOGY: None      RADIOLOGY & ADDITIONAL TESTS:  CT Head No Cont (18 @ 12:06)   The ventricles and cortical sulci are mildly widening compatible mild   parenchymal volume loss, stable.    Gray-white matter differentiation is maintained.     There is no acute intracranial hemorrhage, extra-axial fluid collection   or midline shift.      The osseous structures are unremarkable. The visualized paranasal sinuses   and mastoids are well-aerated.    IMPRESSION:     No evidence of acute intracranial pathology, stable exam.    Xray Chest 2 Views PA/Lat (18 @ 11:27)   No radiographic evidence of acute cardiopulmonary disease.    Unchanged.    Imaging Personally Reviewed:  [x] YES  [ ] NO    MEDICATIONS  (STANDING):  calcium carbonate 1250 mG  + Vitamin D (OsCal 500 + D) 1 Tablet(s) Oral <User Schedule>  cefTRIAXone   IVPB 1 Gram(s) IV Intermittent every 24 hours  chlorhexidine 4% Liquid 1 Application(s) Topical <User Schedule>  famotidine Injectable 20 milliGRAM(s) IV Push every 12 hours  heparin  Injectable 5000 Unit(s) SubCutaneous every 12 hours  levothyroxine 88 MICROGram(s) Oral daily  simvastatin 20 milliGRAM(s) Oral at bedtime  ziprasidone 60 milliGRAM(s) Oral <User Schedule>    MEDICATIONS  (PRN):  acetaminophen   Tablet .. 650 milliGRAM(s) Oral every 6 hours PRN Temp greater or equal to 38C (100.4F), Mild Pain (1 - 3)  LORazepam   Injectable 2 milliGRAM(s) IV Push once PRN generalized tonic-clonic seizure lasting longer than 2 minutes  melatonin 3 milliGRAM(s) Oral at bedtime PRN Insomnia      HEALTH ISSUES - PROBLEM Dx:  Ankle fracture, right  Hypothyroid  Down's syndrome  UTI (urinary tract infection)  Jerking movements of extremities

## 2018-12-05 NOTE — CONSULT NOTE ADULT - SUBJECTIVE AND OBJECTIVE BOX
Neurology/Epilepsy Consult:    SALVATORE STEELE 53y Female  MRN-[id number]    Patient is a 53y old  Female who presents with a chief complaint of Spasms of possible new onset seizure      HPI: History obtained from group home and sister at the bedside. EMR reviewed.  Patient was brought to ED yesterday morning from group home for possible seizure. Reportedly while sitting for breakfast patient had 2 episodes of rhythmic RUE jerking movements about 5 minutes apart, each lasting few seconds. There was no alteration in the level of consciousness, and patient was back to baseline after each episode. Patient has no prior history of seizures. Not being followed by neuro as out-patient.  In the ED patient was found to have UTI, started on IV Abx prior to being [facility].  On 2018 patient was evaluated in the ED after a fall. Reportedly while walking up the stairs patient tripped and fell 4 steps, hit her head, but no LOC. CT head was negative, found to have R foot 5th metatarsal Fx. Since the fall uses walker. Patient's sister reports more noticeable cognitive decline and unsteadiness in the last year.      PAST MEDICAL & SURGICAL HISTORY:  Intellectual disability  Hypothyroid  Impulse control disorder   Down syndrome  DLD  No significant past surgical history        FAMILY HISTORY:  No pertinent family history in first degree relatives        Social History:   Group home resident        Allergy:  No Known Allergies        Home Medications:  levothyroxine 88 mcg (0.088 mg) oral tablet: 1 tab(s) orally once a day (2018 21:39)  raNITIdine 150 mg oral capsule: 1 cap(s) orally 2 times a day (2018 21:39)  simvastatin 20 mg oral tablet: 1 tab(s) orally once a day (at bedtime) (2018 21:39)  ziprasidone 60 mg oral capsule: 1 cap(s) orally once a day in the evening  Ca with vitamin D twice daily  Naproxen 500mg every 12 hrs as needed      MEDICATIONS  (STANDING):  calcium carbonate 1250 mG  + Vitamin D (OsCal 500 + D) 1 Tablet(s) Oral <User Schedule>  cefTRIAXone   IVPB 1 Gram(s) IV Intermittent every 24 hours  chlorhexidine 4% Liquid 1 Application(s) Topical <User Schedule>  famotidine Injectable 20 milliGRAM(s) IV Push every 12 hours  heparin  Injectable 5000 Unit(s) SubCutaneous every 12 hours  lactated ringers. 1000 milliLiter(s) (75 mL/Hr) IV Continuous <Continuous>  levETIRAcetam  IVPB 125 milliGRAM(s) IV Intermittent every 12 hours  levothyroxine 88 MICROGram(s) Oral daily  simvastatin 20 milliGRAM(s) Oral at bedtime  ziprasidone 60 milliGRAM(s) Oral <User Schedule>    MEDICATIONS  (PRN):  acetaminophen   Tablet .. 650 milliGRAM(s) Oral every 6 hours PRN Temp greater or equal to 38C (100.4F), Mild Pain (1 - 3)  LORazepam   Injectable 2 milliGRAM(s) IV Push once PRN generalized tonic-clonic seizure lasting longer than 2 minutes  melatonin 3 milliGRAM(s) Oral at bedtime PRN Insomnia          T(F): 97 (18 @ 13:00), Max: 97.6 (18 @ 19:02)  HR: 57 (18 @ 13:00) (50 - 59)  BP: 135/105 (18 @ 13:00) (120/57 - 162/67)  RR: 18 (18 @ 13:00) (18 - 18)  SpO2: 100% (18 @ 16:51) (100% - 100%)      Neurologic Examination:   General: The patient is currently awake and alert.  Follows some 1-step directions. Makes spontaneous eye contact. Uses single words. + dysarthria.    Cranial nerves: EOMI w/o nystagmus. PERRL.  No ptosis/weakness of eyelid closure. No facial asymmetry. Tongue midline.  Motor examination:   Moves all extremities purposely.  No tenderness, twitching, tremors or involuntary movements.  Formal Muscle Strength Testing: No noticeable focal weakness.  Reflexes:   2+ b/l pectoralis, biceps, triceps, brachioradialis  Sensory examination: unable to assess  Cerebellum:  Ambulating to the bathroom with walker, needs 1-person assistance with turns      Labs:                         13.1   6.38  )-----------( 199      ( 04 Dec 2018 21:46 )             39.6         145  |  105  |  25<H>  ----------------------------<  88  4.3   |  29  |  1.4    Ca    9.0      04 Dec 2018 21:46  Mg     2.0     -    TPro  6.4  /  Alb  3.8  /  TBili  0.4  /  DBili  x   /  AST  22  /  ALT  16  /  AlkPhos  80      LIVER FUNCTIONS - ( 04 Dec 2018 21:46 )  Alb: 3.8 g/dL / Pro: 6.4 g/dL / ALK PHOS: 80 U/L / ALT: 16 U/L / AST: 22 U/L / GGT: x             Urinalysis Basic - ( 04 Dec 2018 13:25 )    Color: Yellow / Appearance: Turbid / S.025 / pH: x  Gluc: x / Ketone: Negative  / Bili: Negative / Urobili: 0.2 mg/dL   Blood: x / Protein: 30 mg/dL / Nitrite: Positive   Leuk Esterase: Large / RBC: x / WBC 10-25 /HPF   Sq Epi: x / Non Sq Epi: x / Bacteria: x          Neuroimaging:  NCHCT: CT Head No Cont:   EXAM:  CT BRAIN            PROCEDURE DATE:  2018      INTERPRETATION:  Clinical History / Reason for exam: Jurking movement.   Questionable seizure.    Technique: Noncontrast head CT.  Contiguous unenhanced CT axial images of   the headfrom the base to the vertex.    Comparison:  Head CT 2018.    Findings:    The ventricles and cortical sulci are mildly widening compatible mild   parenchymal volume loss, stable.    Gray-white matter differentiation is maintained.     There isno acute intracranial hemorrhage, extra-axial fluid collection   or midline shift.      The osseous structures are unremarkable. The visualized paranasal sinuses   and mastoids are well-aerated.    IMPRESSION:     No evidence of acute intracranial pathology, stable exam.    TY BRADSHAW M.D., ATTENDING RADIOLOGIST  This document has been electronically signed. Dec  4 2018 12:15PM  (18 @ 12:06)          Assessment:  This is a 53y Female with h/o Down syndrome, hypothyroidism, mood disorder, recent fall with head trauma, admitted for 2 episodes of rhythmic RUE jerking without LOC. Patient found to have UTI, started on IV Abx. VEEG so far is abnormal, suggestive of left temporal focus.      Discussed with Dr. Marie      Plan:   - Continue VEEG monitoring for better characterization and assessment (started this morning)  - Seizure precautions  - Start Keppra 125mg q12hrs (ordered)  - Ativan 2mg IV PRN for generalized tonic-clonic seizure lasting longer than 2 minutes, or two consecutive seizures without return to baseline in-between (ordered)  - CBC, CMP, Mg  - Keep Mg above 2    Plan discussed with patient's sister in details, all questions answered    Discussed with hospitalist Dr. Otero Neurology/Epilepsy Consult:    SALVATORE STEELE 53y Female  MRN-[id number]    Patient is a 53y old  Female who presents with a chief complaint of possible new onset seizure      HPI: History obtained from group home and sister at the bedside. EMR reviewed.  Patient was brought to ED yesterday morning from group [place] for possible seizure. Reportedly while sitting for breakfast patient had 2 episodes of rhythmic RUE jerking movements about 5 minutes apart, each lasting few seconds. There was no alteration in the level of consciousness, and patient was back to baseline after each episode. Patient has no prior history of seizures. Not being followed by neuro as out-patient.  In the ED patient was found to have UTI, started on IV Abx prior to being transferred to [facility].  On 2018 patient was evaluated in the ED after a fall. Reportedly while walking up the stairs patient tripped and fell 4 steps, hit her head, but no LOC. CT head was negative, found to have R foot 5th metatarsal Fx. Since the fall uses walker. Patient's sister reports more noticeable cognitive decline and unsteadiness in the last year.      PAST MEDICAL & SURGICAL HISTORY:  Intellectual disability  Hypothyroid  Impulse control disorder   Down syndrome  DLD  No significant past surgical history        FAMILY HISTORY:  No pertinent family history in first degree relatives        Social History:   Group home resident        Allergy:  No Known Allergies        Home Medications:  levothyroxine 88 mcg (0.088 mg) oral tablet: 1 tab(s) orally once a day (2018 21:39)  raNITIdine 150 mg oral capsule: 1 cap(s) orally 2 times a day (2018 21:39)  simvastatin 20 mg oral tablet: 1 tab(s) orally once a day (at bedtime) (2018 21:39)  ziprasidone 60 mg oral capsule: 1 cap(s) orally once a day in the evening  Ca with vitamin D twice daily  Naproxen 500mg every 12 hrs as needed      MEDICATIONS  (STANDING):  calcium carbonate 1250 mG  + Vitamin D (OsCal 500 + D) 1 Tablet(s) Oral <User Schedule>  cefTRIAXone   IVPB 1 Gram(s) IV Intermittent every 24 hours  chlorhexidine 4% Liquid 1 Application(s) Topical <User Schedule>  famotidine Injectable 20 milliGRAM(s) IV Push every 12 hours  heparin  Injectable 5000 Unit(s) SubCutaneous every 12 hours  lactated ringers. 1000 milliLiter(s) (75 mL/Hr) IV Continuous <Continuous>  levETIRAcetam  IVPB 125 milliGRAM(s) IV Intermittent every 12 hours  levothyroxine 88 MICROGram(s) Oral daily  simvastatin 20 milliGRAM(s) Oral at bedtime  ziprasidone 60 milliGRAM(s) Oral <User Schedule>    MEDICATIONS  (PRN):  acetaminophen   Tablet .. 650 milliGRAM(s) Oral every 6 hours PRN Temp greater or equal to 38C (100.4F), Mild Pain (1 - 3)  LORazepam   Injectable 2 milliGRAM(s) IV Push once PRN generalized tonic-clonic seizure lasting longer than 2 minutes  melatonin 3 milliGRAM(s) Oral at bedtime PRN Insomnia          T(F): 97 (18 @ 13:00), Max: 97.6 (18 @ 19:02)  HR: 57 (18 @ 13:00) (50 - 59)  BP: 135/105 (18 @ 13:00) (120/57 - 162/67)  RR: 18 (18 @ 13:00) (18 - 18)  SpO2: 100% (18 @ 16:51) (100% - 100%)      Neurologic Examination:   General: The patient is currently awake and alert.  Follows some 1-step directions. Makes spontaneous eye contact. Uses single words. + dysarthria.    Cranial nerves: EOMI w/o nystagmus. PERRL.  No ptosis/weakness of eyelid closure. No facial asymmetry. Tongue midline.  Motor examination:   Moves all extremities purposely.  No tenderness, twitching, tremors or involuntary movements.  Formal Muscle Strength Testing: No noticeable focal weakness.  Reflexes:   2+ b/l pectoralis, biceps, triceps, brachioradialis  Sensory examination: unable to assess  Cerebellum:  Ambulating to the bathroom with walker, needs 1-person assistance with turns      Labs:                         13.1   6.38  )-----------( 199      ( 04 Dec 2018 21:46 )             39.6         145  |  105  |  25<H>  ----------------------------<  88  4.3   |  29  |  1.4    Ca    9.0      04 Dec 2018 21:46  Mg     2.0     -    TPro  6.4  /  Alb  3.8  /  TBili  0.4  /  DBili  x   /  AST  22  /  ALT  16  /  AlkPhos  80  -    LIVER FUNCTIONS - ( 04 Dec 2018 21:46 )  Alb: 3.8 g/dL / Pro: 6.4 g/dL / ALK PHOS: 80 U/L / ALT: 16 U/L / AST: 22 U/L / GGT: x             Urinalysis Basic - ( 04 Dec 2018 13:25 )    Color: Yellow / Appearance: Turbid / S.025 / pH: x  Gluc: x / Ketone: Negative  / Bili: Negative / Urobili: 0.2 mg/dL   Blood: x / Protein: 30 mg/dL / Nitrite: Positive   Leuk Esterase: Large / RBC: x / WBC 10-25 /HPF   Sq Epi: x / Non Sq Epi: x / Bacteria: x          Neuroimaging:  NCHCT: CT Head No Cont:   EXAM:  CT BRAIN            PROCEDURE DATE:  2018      INTERPRETATION:  Clinical History / Reason for exam: Jurking movement.   Questionable seizure.    Technique: Noncontrast head CT.  Contiguous unenhanced CT axial images of   the headfrom the base to the vertex.    Comparison:  Head CT 2018.    Findings:    The ventricles and cortical sulci are mildly widening compatible mild   parenchymal volume loss, stable.    Gray-white matter differentiation is maintained.     There isno acute intracranial hemorrhage, extra-axial fluid collection   or midline shift.      The osseous structures are unremarkable. The visualized paranasal sinuses   and mastoids are well-aerated.    IMPRESSION:     No evidence of acute intracranial pathology, stable exam.    TY BRADSHAW M.D., ATTENDING RADIOLOGIST  This document has been electronically signed. Dec  4 2018 12:15PM  (18 @ 12:06)          Assessment:  This is a 53y Female with h/o Down syndrome, hypothyroidism, mood disorder, recent fall with head trauma, admitted for 2 episodes of rhythmic RUE jerking without LOC. Patient found to have UTI, started on IV Abx. VEEG so far is abnormal, suggestive of left temporal focus.      Discussed with Dr. Marie      Plan:   - Continue VEEG monitoring for better characterization and assessment (started this morning)  - Seizure precautions  - Start Keppra 125mg q12hrs (ordered)  - Ativan 2mg IV PRN for generalized tonic-clonic seizure lasting longer than 2 minutes, or two consecutive seizures without return to baseline in-between (ordered)  - CBC, CMP, Mg  - Keep Mg above 2    Plan discussed with patient's sister in details, all questions answered    Discussed with hospitalist Dr. Otero Neurology/Epilepsy Consult:    SALVATORE STEELE 53y Female  MRN-[id number]    Patient is a 53y old  Female who presents with a chief complaint of possible new onset seizure      HPI: History obtained from group home and sister at the bedside. EMR reviewed.  Patient was brought to ED yesterday morning from group [place] for possible seizure. Reportedly while sitting for breakfast patient had 2 episodes of rhythmic RUE jerking movements about 5 minutes apart, each lasting few seconds. There was no alteration in the level of consciousness, and patient was back to baseline after each episode. Patient has no prior history of seizures. Not being followed by neuro as out-patient.  In the ED patient was found to have UTI, started on IV Abx prior to being transferred to [facility].  On 2018 patient was evaluated in the ED after a fall. Reportedly while walking up the stairs patient tripped and fell 4 steps, hit her head, but no LOC. CT head was negative, found to have R foot 5th metatarsal Fx. Since the fall uses walker. Patient's sister reports more noticeable cognitive decline and unsteadiness in the last year.      PAST MEDICAL & SURGICAL HISTORY:  Intellectual disability  Hypothyroid  Impulse control disorder   Down syndrome  DLD  No significant past surgical history        FAMILY HISTORY:  No pertinent family history in first degree relatives        Social History:   Group home resident        Allergy:  No Known Allergies        Home Medications:  levothyroxine 88 mcg (0.088 mg) oral tablet: 1 tab(s) orally once a day (2018 21:39)  raNITIdine 150 mg oral capsule: 1 cap(s) orally 2 times a day (2018 21:39)  simvastatin 20 mg oral tablet: 1 tab(s) orally once a day (at bedtime) (2018 21:39)  ziprasidone 60 mg oral capsule: 1 cap(s) orally once a day in the evening  Ca with vitamin D twice daily  Naproxen 500mg every 12 hrs as needed      MEDICATIONS  (STANDING):  calcium carbonate 1250 mG  + Vitamin D (OsCal 500 + D) 1 Tablet(s) Oral <User Schedule>  cefTRIAXone   IVPB 1 Gram(s) IV Intermittent every 24 hours  chlorhexidine 4% Liquid 1 Application(s) Topical <User Schedule>  famotidine Injectable 20 milliGRAM(s) IV Push every 12 hours  heparin  Injectable 5000 Unit(s) SubCutaneous every 12 hours  lactated ringers. 1000 milliLiter(s) (75 mL/Hr) IV Continuous <Continuous>  levETIRAcetam  IVPB 125 milliGRAM(s) IV Intermittent every 12 hours  levothyroxine 88 MICROGram(s) Oral daily  simvastatin 20 milliGRAM(s) Oral at bedtime  ziprasidone 60 milliGRAM(s) Oral <User Schedule>    MEDICATIONS  (PRN):  acetaminophen   Tablet .. 650 milliGRAM(s) Oral every 6 hours PRN Temp greater or equal to 38C (100.4F), Mild Pain (1 - 3)  LORazepam   Injectable 2 milliGRAM(s) IV Push once PRN generalized tonic-clonic seizure lasting longer than 2 minutes  melatonin 3 milliGRAM(s) Oral at bedtime PRN Insomnia          T(F): 97 (18 @ 13:00), Max: 97.6 (18 @ 19:02)  HR: 57 (18 @ 13:00) (50 - 59)  BP: 135/105 (18 @ 13:00) (120/57 - 162/67)  RR: 18 (18 @ 13:00) (18 - 18)  SpO2: 100% (18 @ 16:51) (100% - 100%)      Neurologic Examination:   General: The patient is currently awake and alert.  Follows some 1-step directions. Makes spontaneous eye contact. Uses single words. + dysarthria.    Cranial nerves: EOMI w/o nystagmus. PERRL.  No ptosis/weakness of eyelid closure. No facial asymmetry. Tongue midline.  Motor examination:   Moves all extremities purposely.  No tenderness, twitching, tremors or involuntary movements.  Formal Muscle Strength Testing: No noticeable focal weakness.  Reflexes:   2+ b/l pectoralis, biceps, triceps, brachioradialis  Sensory examination: unable to assess  Cerebellum:  Ambulating to the bathroom with walker, needs 1-person assistance with turns      Labs:                         13.1   6.38  )-----------( 199      ( 04 Dec 2018 21:46 )             39.6         145  |  105  |  25<H>  ----------------------------<  88  4.3   |  29  |  1.4    Ca    9.0      04 Dec 2018 21:46  Mg     2.0     -    TPro  6.4  /  Alb  3.8  /  TBili  0.4  /  DBili  x   /  AST  22  /  ALT  16  /  AlkPhos  80      LIVER FUNCTIONS - ( 04 Dec 2018 21:46 )  Alb: 3.8 g/dL / Pro: 6.4 g/dL / ALK PHOS: 80 U/L / ALT: 16 U/L / AST: 22 U/L / GGT: x             Urinalysis Basic - ( 04 Dec 2018 13:25 )    Color: Yellow / Appearance: Turbid / S.025 / pH: x  Gluc: x / Ketone: Negative  / Bili: Negative / Urobili: 0.2 mg/dL   Blood: x / Protein: 30 mg/dL / Nitrite: Positive   Leuk Esterase: Large / RBC: x / WBC 10-25 /HPF   Sq Epi: x / Non Sq Epi: x / Bacteria: x          Neuroimaging:  NCHCT: CT Head No Cont:   EXAM:  CT BRAIN            PROCEDURE DATE:  2018      INTERPRETATION:  Clinical History / Reason for exam: Jurking movement.   Questionable seizure.    Technique: Noncontrast head CT.  Contiguous unenhanced CT axial images of   the headfrom the base to the vertex.    Comparison:  Head CT 2018.    Findings:    The ventricles and cortical sulci are mildly widening compatible mild   parenchymal volume loss, stable.    Gray-white matter differentiation is maintained.     There isno acute intracranial hemorrhage, extra-axial fluid collection   or midline shift.      The osseous structures are unremarkable. The visualized paranasal sinuses   and mastoids are well-aerated.    IMPRESSION:     No evidence of acute intracranial pathology, stable exam.    TY BRADSHAW M.D., ATTENDING RADIOLOGIST  This document has been electronically signed. Dec  4 2018 12:15PM  (18 @ 12:06)          Assessment:  This is a 53y Female with h/o Down syndrome, hypothyroidism, mood disorder, recent fall with head trauma, admitted for 2 episodes of rhythmic RUE jerking without LOC. Patient found to have UTI, started on IV Abx. VEEG so far is abnormal, suggestive of left temporal focus.      Discussed with Dr. Marie      Plan:   - Continue VEEG monitoring for better characterization and assessment (started this morning)  - Seizure precautions  - Start Keppra 125mg x1 (was ordered and given)  - Keppra 250mg q12hrs starting tonight  - Ativan 2mg IV PRN for generalized tonic-clonic seizure lasting longer than 2 minutes, or two consecutive seizures without return to baseline in-between (ordered)  - CBC, CMP, Mg  - Keep Mg above 2    Plan discussed with patient's sister in details, all questions answered    Discussed with hospitalist Dr. Otero

## 2018-12-05 NOTE — PROGRESS NOTE ADULT - ASSESSMENT
Patient is a 54 y/o Female with h/o Down's syndrome, anxiety, hypothyroidism is brought by aid for repetitive spasms/jerking movements of right arm the morning of presentation which lasted seconds. Patient has no history of seizures and never had any such episodes in the past. Patient however did however, have a fall on Friday with head Trauma but no LOC. CT head done was negative and as per there had not been any other related symptoms. Patient was admitted for VEEG to r/o seizures and further  work up in ED revealed UTI.        Assessment and Plan:    1. Jerking movements of upper extremities:  r/o seizures.  Neurology following: continue VEEG. Started on Keppra due to abnormal VEEG.  Seizure precautions. Ativan prn for GTC seizures.        2. UTI (urinary tract infection):  Continue Rocephin. Follow up cultures.        3. Hypothyroid:  Continue Synthroid.  Follow up TSH.        4. Ankle fracture, right:  .  Plan: right ankle fracture  continue with boot and walker.         DVT prophylaxis: Heparin  Disposition: Discharge to Group Home following completion of VEEG. Patient is a 54 y/o Female with h/o Down's syndrome, anxiety, hypothyroidism is brought by aid for repetitive spasms/jerking movements of right arm the morning of presentation which lasted seconds. Patient has no history of seizures and never had any such episodes in the past. Patient however did however, have a fall on Friday with head Trauma but no LOC. CT head done was negative and as per there had not been any other related symptoms. Patient was admitted for VEEG to r/o seizures and further  work up in ED revealed UTI.        Assessment and Plan:    1. Jerking movements of upper extremities:  r/o seizures.  Neurology following: continue VEEG. Started on Keppra due to abnormal VEEG.  Seizure precautions. Ativan prn for GTC seizures.        2. UTI (urinary tract infection):  Continue Rocephin. Follow up cultures.        3. Hypothyroid:  Continue Synthroid.  Follow up TSH.        4. Ankle fracture, right:  Continue with boot and walker.   PT evaluation. Follow up with Orthopedics out patient.      5. CKD 3: vs. REENA (POA)  Follow up BMP.  IV hydration.        DVT prophylaxis: Heparin  Disposition: Discharge to Group Home following completion of VEEG.

## 2018-12-06 LAB
-  AMIKACIN: SIGNIFICANT CHANGE UP
-  AMOXICILLIN/CLAVULANIC ACID: SIGNIFICANT CHANGE UP
-  AMPICILLIN/SULBACTAM: SIGNIFICANT CHANGE UP
-  AMPICILLIN: SIGNIFICANT CHANGE UP
-  AZTREONAM: SIGNIFICANT CHANGE UP
-  CEFAZOLIN: SIGNIFICANT CHANGE UP
-  CEFEPIME: SIGNIFICANT CHANGE UP
-  CEFOXITIN: SIGNIFICANT CHANGE UP
-  CEFTRIAXONE: SIGNIFICANT CHANGE UP
-  CIPROFLOXACIN: SIGNIFICANT CHANGE UP
-  ERTAPENEM: SIGNIFICANT CHANGE UP
-  GENTAMICIN: SIGNIFICANT CHANGE UP
-  IMIPENEM: SIGNIFICANT CHANGE UP
-  LEVOFLOXACIN: SIGNIFICANT CHANGE UP
-  MEROPENEM: SIGNIFICANT CHANGE UP
-  NITROFURANTOIN: SIGNIFICANT CHANGE UP
-  PIPERACILLIN/TAZOBACTAM: SIGNIFICANT CHANGE UP
-  TIGECYCLINE: SIGNIFICANT CHANGE UP
-  TOBRAMYCIN: SIGNIFICANT CHANGE UP
-  TRIMETHOPRIM/SULFAMETHOXAZOLE: SIGNIFICANT CHANGE UP
ALBUMIN SERPL ELPH-MCNC: 3.4 G/DL — LOW (ref 3.5–5.2)
ALP SERPL-CCNC: 84 U/L — SIGNIFICANT CHANGE UP (ref 30–115)
ALT FLD-CCNC: 21 U/L — SIGNIFICANT CHANGE UP (ref 0–41)
ANION GAP SERPL CALC-SCNC: 9 MMOL/L — SIGNIFICANT CHANGE UP (ref 7–14)
AST SERPL-CCNC: 24 U/L — SIGNIFICANT CHANGE UP (ref 0–41)
BASOPHILS # BLD AUTO: 0.07 K/UL — SIGNIFICANT CHANGE UP (ref 0–0.2)
BASOPHILS NFR BLD AUTO: 2.1 % — HIGH (ref 0–1)
BILIRUB SERPL-MCNC: 0.5 MG/DL — SIGNIFICANT CHANGE UP (ref 0.2–1.2)
BUN SERPL-MCNC: 21 MG/DL — HIGH (ref 10–20)
CALCIUM SERPL-MCNC: 9.2 MG/DL — SIGNIFICANT CHANGE UP (ref 8.5–10.1)
CHLORIDE SERPL-SCNC: 105 MMOL/L — SIGNIFICANT CHANGE UP (ref 98–110)
CO2 SERPL-SCNC: 29 MMOL/L — SIGNIFICANT CHANGE UP (ref 17–32)
CREAT SERPL-MCNC: 1.2 MG/DL — SIGNIFICANT CHANGE UP (ref 0.7–1.5)
CULTURE RESULTS: SIGNIFICANT CHANGE UP
EOSINOPHIL # BLD AUTO: 0.11 K/UL — SIGNIFICANT CHANGE UP (ref 0–0.7)
EOSINOPHIL NFR BLD AUTO: 3.2 % — SIGNIFICANT CHANGE UP (ref 0–8)
GLUCOSE BLDC GLUCOMTR-MCNC: 85 MG/DL — SIGNIFICANT CHANGE UP (ref 70–99)
GLUCOSE SERPL-MCNC: 88 MG/DL — SIGNIFICANT CHANGE UP (ref 70–99)
HCT VFR BLD CALC: 39.8 % — SIGNIFICANT CHANGE UP (ref 37–47)
HGB BLD-MCNC: 13.3 G/DL — SIGNIFICANT CHANGE UP (ref 12–16)
IMM GRANULOCYTES NFR BLD AUTO: 0.3 % — SIGNIFICANT CHANGE UP (ref 0.1–0.3)
LYMPHOCYTES # BLD AUTO: 1.13 K/UL — LOW (ref 1.2–3.4)
LYMPHOCYTES # BLD AUTO: 33.2 % — SIGNIFICANT CHANGE UP (ref 20.5–51.1)
MAGNESIUM SERPL-MCNC: 2.2 MG/DL — SIGNIFICANT CHANGE UP (ref 1.8–2.4)
MCHC RBC-ENTMCNC: 31.4 PG — HIGH (ref 27–31)
MCHC RBC-ENTMCNC: 33.4 G/DL — SIGNIFICANT CHANGE UP (ref 32–37)
MCV RBC AUTO: 93.9 FL — SIGNIFICANT CHANGE UP (ref 81–99)
METHOD TYPE: SIGNIFICANT CHANGE UP
MONOCYTES # BLD AUTO: 0.39 K/UL — SIGNIFICANT CHANGE UP (ref 0.1–0.6)
MONOCYTES NFR BLD AUTO: 11.5 % — HIGH (ref 1.7–9.3)
NEUTROPHILS # BLD AUTO: 1.69 K/UL — SIGNIFICANT CHANGE UP (ref 1.4–6.5)
NEUTROPHILS NFR BLD AUTO: 49.7 % — SIGNIFICANT CHANGE UP (ref 42.2–75.2)
NRBC # BLD: 0 /100 WBCS — SIGNIFICANT CHANGE UP (ref 0–0)
ORGANISM # SPEC MICROSCOPIC CNT: SIGNIFICANT CHANGE UP
ORGANISM # SPEC MICROSCOPIC CNT: SIGNIFICANT CHANGE UP
PHOSPHATE SERPL-MCNC: 3.1 MG/DL — SIGNIFICANT CHANGE UP (ref 2.1–4.9)
PLATELET # BLD AUTO: 177 K/UL — SIGNIFICANT CHANGE UP (ref 130–400)
POTASSIUM SERPL-MCNC: 4.6 MMOL/L — SIGNIFICANT CHANGE UP (ref 3.5–5)
POTASSIUM SERPL-SCNC: 4.6 MMOL/L — SIGNIFICANT CHANGE UP (ref 3.5–5)
PROT SERPL-MCNC: 6.4 G/DL — SIGNIFICANT CHANGE UP (ref 6–8)
RBC # BLD: 4.24 M/UL — SIGNIFICANT CHANGE UP (ref 4.2–5.4)
RBC # FLD: 13.3 % — SIGNIFICANT CHANGE UP (ref 11.5–14.5)
SODIUM SERPL-SCNC: 143 MMOL/L — SIGNIFICANT CHANGE UP (ref 135–146)
SPECIMEN SOURCE: SIGNIFICANT CHANGE UP
WBC # BLD: 3.4 K/UL — LOW (ref 4.8–10.8)
WBC # FLD AUTO: 3.4 K/UL — LOW (ref 4.8–10.8)

## 2018-12-06 RX ORDER — LEVETIRACETAM 250 MG/1
250 TABLET, FILM COATED ORAL EVERY 12 HOURS
Qty: 0 | Refills: 0 | Status: DISCONTINUED | OUTPATIENT
Start: 2018-12-06 | End: 2018-12-07

## 2018-12-06 RX ADMIN — LEVETIRACETAM 400 MILLIGRAM(S): 250 TABLET, FILM COATED ORAL at 09:21

## 2018-12-06 RX ADMIN — Medication 88 MICROGRAM(S): at 05:24

## 2018-12-06 RX ADMIN — LEVETIRACETAM 250 MILLIGRAM(S): 250 TABLET, FILM COATED ORAL at 21:06

## 2018-12-06 RX ADMIN — ZIPRASIDONE HYDROCHLORIDE 60 MILLIGRAM(S): 20 CAPSULE ORAL at 05:23

## 2018-12-06 RX ADMIN — Medication 1 TABLET(S): at 21:06

## 2018-12-06 RX ADMIN — SIMVASTATIN 20 MILLIGRAM(S): 20 TABLET, FILM COATED ORAL at 21:06

## 2018-12-06 RX ADMIN — CEFTRIAXONE 100 GRAM(S): 500 INJECTION, POWDER, FOR SOLUTION INTRAMUSCULAR; INTRAVENOUS at 14:17

## 2018-12-06 RX ADMIN — FAMOTIDINE 20 MILLIGRAM(S): 10 INJECTION INTRAVENOUS at 06:45

## 2018-12-06 RX ADMIN — HEPARIN SODIUM 5000 UNIT(S): 5000 INJECTION INTRAVENOUS; SUBCUTANEOUS at 05:23

## 2018-12-06 RX ADMIN — Medication 1 TABLET(S): at 17:29

## 2018-12-06 RX ADMIN — FAMOTIDINE 20 MILLIGRAM(S): 10 INJECTION INTRAVENOUS at 21:06

## 2018-12-06 NOTE — PROGRESS NOTE ADULT - SUBJECTIVE AND OBJECTIVE BOX
Neurology/Epilepsy NP:    EEG findings so far and recommendations discussed by me with group home nurse Pino [phone].    Patient pharmacy is [address]    As a part of seizure precautions, we suggested a body pillow, to prevent patient from facing the pillow/rolling on her stomach during sleep. The group home will get the pillow by tomorrow.

## 2018-12-06 NOTE — PROGRESS NOTE ADULT - SUBJECTIVE AND OBJECTIVE BOX
SALVATORE STEELE  53y  Female    Patient is a 53y old  Female who presents with a chief complaint of Spasms of right upper arm (04 Dec 2018 21:13)      INTERVAL HPI/OVERNIGHT EVENTS:  No interval events.  Family and Aide at bedside with no complaints.      REVIEW OF SYSTEMS: UTO due to MR  Per Family and Aide  CONSTITUTIONAL: No fever, weight loss, or fatigue  EYES: No eye pain, visual disturbances, or discharge  ENMT:  No difficulty hearing, tinnitus, vertigo; No sinus or throat pain  RESPIRATORY: No cough, wheezing, shortness of breath  GASTROINTESTINAL: No nausea, vomiting, or hematemesis; No diarrhea or constipation. No melena or hematochezia.  GENITOURINARY: No hematuria  NEUROLOGICAL: No loss of strength, numbness, or tremors  SKIN: No itching, rashes, or lesions   MUSCULOSKELETAL: No joint pain or swelling; No muscle, back, or extremity pain      VITALS:  T(C): 36 (06 Dec 2018 05:14), Max: 36.1 (05 Dec 2018 13:00)  T(F): 96.8 (06 Dec 2018 05:14), Max: 97 (05 Dec 2018 13:00)  HR: 52 (06 Dec 2018 05:14) (44 - 57)  BP: 127/60 (06 Dec 2018 05:14) (123/53 - 138/57)  BP(mean): --  RR: 18 (06 Dec 2018 05:14) (18 - 18)  SpO2: --      PHYSICAL EXAM:  GENERAL: NAD, facial dysmorphic features  HEAD:  Atraumatic  EYES: conjunctiva and sclera clear  ENMT: No tonsillar erythema, exudates, or enlargement; Moist mucous membranes  NECK: Supple, Normal thyroid  NERVOUS SYSTEM:  Alert & Awake, Moves all extremities  CHEST/LUNG: Clear to auscultation bilaterally; No rales, rhonchi, wheezing, or rubs  HEART: Regular rate and rhythm; No murmurs, rubs, or gallops  ABDOMEN: Soft, Nontender, Nondistended; Bowel sounds present  EXTREMITIES:  2+ Peripheral Pulses, No clubbing, cyanosis, or edema  LYMPH: No lymphadenopathy noted  SKIN: No rashes or lesions    Consultant(s) Notes Reviewed:  [x ] YES  [ ] NO  Care Discussed with Consultants/Other Providers [ x] YES  [ ] NO    LABS:                                   13.3   3.40  )-----------( 177      ( 06 Dec 2018 07:34 )             39.8     12-    143  |  105  |  21<H>  ----------------------------<  88  4.6   |  29  |  1.2    Ca    9.2      06 Dec 2018 07:34  Phos  3.1       Mg     2.2         TPro  6.4  /  Alb  3.4<L>  /  TBili  0.5  /  DBili  x   /  AST  24  /  ALT  21  /  AlkPhos  84          Urinalysis Basic - ( 04 Dec 2018 13:25 )    Color: Yellow / Appearance: Turbid / S.025 / pH: x  Gluc: x / Ketone: Negative  / Bili: Negative / Urobili: 0.2 mg/dL   Blood: x / Protein: 30 mg/dL / Nitrite: Positive   Leuk Esterase: Large / RBC: x / WBC 10-25 /HPF   Sq Epi: x / Non Sq Epi: x / Bacteria: x      MICROBIOLOGY:   Culture - Urine (18 @ 13:25)    Specimen Source: .Urine Clean Catch (Midstream)    Culture Results:   >100,000 CFU/ml Escherichia coli        RADIOLOGY & ADDITIONAL TESTS:  CT Head No Cont (18 @ 12:06)   The ventricles and cortical sulci are mildly widening compatible mild   parenchymal volume loss, stable.    Gray-white matter differentiation is maintained.     There is no acute intracranial hemorrhage, extra-axial fluid collection   or midline shift.      The osseous structures are unremarkable. The visualized paranasal sinuses   and mastoids are well-aerated.    IMPRESSION:     No evidence of acute intracranial pathology, stable exam.    X-ray Chest 2 Views PA/Lat (18 @ 11:27)   No radiographic evidence of acute cardiopulmonary disease.    Unchanged.      VEEG in the last 24 hours:    Background------7-8 hz . reactive and less than optimally organized    Focal and generalized slowing-------1) bifrontal slowing                                                       2) left posterior  quadrants(TO) focal slowing    Interictal activity::  small number of generalized spikes that appear epileptic in nature                            Events:  no events reported or recorded    Seizures------none    Impression: abnormal due to the above    Imaging Personally Reviewed:  [x] YES  [ ] NO      MEDICATIONS  (STANDING):  calcium carbonate 1250 mG  + Vitamin D (OsCal 500 + D) 1 Tablet(s) Oral <User Schedule>  cefTRIAXone   IVPB 1 Gram(s) IV Intermittent every 24 hours  chlorhexidine 4% Liquid 1 Application(s) Topical <User Schedule>  famotidine Injectable 20 milliGRAM(s) IV Push every 12 hours  heparin  Injectable 5000 Unit(s) SubCutaneous every 12 hours  levETIRAcetam  IVPB 250 milliGRAM(s) IV Intermittent every 12 hours  levothyroxine 88 MICROGram(s) Oral daily  simvastatin 20 milliGRAM(s) Oral at bedtime  ziprasidone 60 milliGRAM(s) Oral <User Schedule>    MEDICATIONS  (PRN):  acetaminophen   Tablet .. 650 milliGRAM(s) Oral every 6 hours PRN Temp greater or equal to 38C (100.4F), Mild Pain (1 - 3)  LORazepam   Injectable 2 milliGRAM(s) IV Push once PRN generalized tonic-clonic seizure lasting longer than 2 minutes  melatonin 3 milliGRAM(s) Oral at bedtime PRN Insomnia        HEALTH ISSUES - PROBLEM Dx:  Ankle fracture, right  Hypothyroid  Down's syndrome  UTI (urinary tract infection)  Jerking movements of extremities

## 2018-12-06 NOTE — CONSULT NOTE ADULT - SUBJECTIVE AND OBJECTIVE BOX
HPI:  52y/o F w/ hx of down syndrome, anxiety, hypothyroidism is brought by aid for repetitive spasms of right arm this morning that lasted seconds. Pt has never had episodes like this and no hx of seizures. Pt did however, have a fall on friday - trauma to head, no loc and no blood thinners;  CT head friday which was negative.  As per aid no fevers, cough, congestion, runny nose, no vomiting or diarrhea.   PTN  REFERRED TO ACUTE  REHAB  FOR  EVAL AND  TX   PAST MEDICAL & SURGICAL HISTORY:  Intellectual disability mr  Hypothyroid  Impulse control disorder in adult  Down's syndrome  No significant past surgical history      Hospital Course:    TODAY'S SUBJECTIVE & REVIEW OF SYMPTOMS:     Constitutional WNL   Cardio WNL   Resp WNL   GI WNL  Heme WNL  Endo WNL  Skin WNL  MSK WNL  Neuro WNL  Cognitive WNL  Psych WNL      MEDICATIONS  (STANDING):  calcium carbonate 1250 mG  + Vitamin D (OsCal 500 + D) 1 Tablet(s) Oral <User Schedule>  cefTRIAXone   IVPB 1 Gram(s) IV Intermittent every 24 hours  chlorhexidine 4% Liquid 1 Application(s) Topical <User Schedule>  famotidine Injectable 20 milliGRAM(s) IV Push every 12 hours  heparin  Injectable 5000 Unit(s) SubCutaneous every 12 hours  levETIRAcetam 250 milliGRAM(s) Oral every 12 hours  levothyroxine 88 MICROGram(s) Oral daily  simvastatin 20 milliGRAM(s) Oral at bedtime  ziprasidone 60 milliGRAM(s) Oral <User Schedule>    MEDICATIONS  (PRN):  acetaminophen   Tablet .. 650 milliGRAM(s) Oral every 6 hours PRN Temp greater or equal to 38C (100.4F), Mild Pain (1 - 3)  LORazepam   Injectable 2 milliGRAM(s) IV Push once PRN generalized tonic-clonic seizure lasting longer than 2 minutes  melatonin 3 milliGRAM(s) Oral at bedtime PRN Insomnia      FAMILY HISTORY:  No pertinent family history in first degree relatives      Allergies    No Known Allergies    Intolerances        SOCIAL HISTORY:    [  ] Etoh  [  ] Smoking  [  ] Substance abuse     Home Environment:  [  ] Home Alone  [  ] Lives with Family  [  x] Home Health Aid gp  home     Dwelling:  [  ] Apartment  [  ] Private House  [ xx ] Adult Home  [  ] Skilled Nursing Facility      [  ] Short Term  [  ] Long Term  [  ] Stairs       Elevator [  ]    FUNCTIONAL STATUS PTA: (Check all that apply)  Ambulation: [ x  ]Independent    [  ] Dependent     [  ] Non-Ambulatory  Assistive Device: [  ] SA Cane  [  ]  Q Cane  [  ] Walker  [  ]  Wheelchair  ADL : [ x ] Independent  [  ]  Dependent     need some asst    Vital Signs Last 24 Hrs  T(C): 35.2 (06 Dec 2018 14:20), Max: 36.1 (05 Dec 2018 22:09)  T(F): 95.3 (06 Dec 2018 14:20), Max: 96.9 (05 Dec 2018 22:09)  HR: 48 (06 Dec 2018 14:20) (44 - 52)  BP: 115/57 (06 Dec 2018 14:20) (115/57 - 138/57)  BP(mean): --  RR: 16 (06 Dec 2018 14:20) (16 - 18)  SpO2: --      PHYSICAL EXAM: Alert & Oriented X1  GENERAL: NAD, well-groomed, well-developed  HEAD:  Atraumatic, Normocephalic  EYES: EOMI, PERRLA, conjunctiva and sclera clear  NECK: Supple, No JVD, Normal thyroid  CHEST/LUNG: Clear to percussion bilaterally; No rales, rhonchi, wheezing, or rubs  HEART: Regular rate and rhythm; No murmurs, rubs, or gallops  ABDOMEN: Soft, Nontender, Nondistended; Bowel sounds present  EXTREMITIES:  2+ Peripheral Pulses, No clubbing, cyanosis, or edema    NERVOUS SYSTEM:  Cranial Nerves 2-12 intact [  ] Abnormal  [ x]  ROM: WFL all extremities [  ]  Abnormal [x  ]  Motor Strength: WFL all extremities  [  ]  Abnormal [x  ]  Sensation: intact to light touch [  ] Abnormal [ x ]  Reflexes: Symmetric [  ]  Abnormal [ x ]    FUNCTIONAL STATUS:  Bed Mobility: Independent [  ]  Supervision [  ]  Needs Assistance [ x ]  N/A [  ]  Transfers: Independent [  ]  Supervision [  ]  Needs Assistance [ x ]  N/A [  ]   Ambulation: Independent [  ]  Supervision [  ]  Needs Assistance [x  ]  N/A [  ]  ADL: Independent [ x ] Requires Assistance [  ] N/A [  ]    see  pt ie  note    LABS:                        13.3   3.40  )-----------( 177      ( 06 Dec 2018 07:34 )             39.8     12-06    143  |  105  |  21<H>  ----------------------------<  88  4.6   |  29  |  1.2    Ca    9.2      06 Dec 2018 07:34  Phos  3.1     12-06  Mg     2.2     12-06    TPro  6.4  /  Alb  3.4<L>  /  TBili  0.5  /  DBili  x   /  AST  24  /  ALT  21  /  AlkPhos  84  12-06          RADIOLOGY & ADDITIONAL STUDIES:    Assesment:

## 2018-12-06 NOTE — CONSULT NOTE ADULT - ASSESSMENT
IMPRESSION: Rehab of 52 y/o  f  down syndrome  gd      PRECAUTIONS: [  ] Cardiac  [  ] Respiratory  [  ] Seizures [  ] Contact Isolation  [  ] Droplet Isolation  [fall  ] Other    Weight Bearing Status:     RECOMMENDATION:    Out of Bed to Chair     DVT/Decubiti Prophylaxis    REHAB PLAN:     [ xxx  ] Bedside P/T 3-5 times a week   [   ]   Bedside O/T  2-3 times a week             [   ] No Rehab Therapy Indicated                   [   ]  Speech Therapy   Conditioning/ROM                                    ADL  Bed Mobility                                               Conditioning/ROM  Transfers                                                     Bed Mobility  Sitting /Standing Balance                         Transfers                                        Gait Training                                               Sitting/Standing Balance  Stair Training [   ]Applicable                    Home equipment Eval                                                                        Splinting  [   ] Only      GOALS:   ADL   [ x  ]   Independent                    Transfers  [  x ] Independent                          Ambulation  [ x  ] Independent     [ x   ] With device                            [   ]  CG                                                         [   ]  CG                                                                  [   ] CG                            [    ] Min A                                                   [   ] Min A                                                              [   ] Min  A          DISCHARGE PLAN:   [   ]  Good candidate for Intensive Rehabilitation/Hospital based-[facility]A [facility]                                             Will tolerate 3hrs Intensive Rehab Daily                                       [    ]  Short Term Rehab in Skilled Nursing Facility                                       [ xx  ]  Home with Outpatient or VN services as  per family  request                                          [    ]  Possible Candidate for Intensive Hospital based Rehab

## 2018-12-06 NOTE — PROGRESS NOTE ADULT - SUBJECTIVE AND OBJECTIVE BOX
Epilepsy Attending Note:     SALVATORE STEELE    53y Female  MRN [id number]    Vital Signs Last 24 Hrs  T(C): 36 (06 Dec 2018 05:14), Max: 36.1 (05 Dec 2018 13:00)  T(F): 96.8 (06 Dec 2018 05:14), Max: 97 (05 Dec 2018 13:00)  HR: 52 (06 Dec 2018 05:14) (44 - 57)  BP: 127/60 (06 Dec 2018 05:14) (123/53 - 138/57)  BP(mean): --  RR: 18 (06 Dec 2018 05:14) (18 - 18)  SpO2: --                          13.3   3.40  )-----------( 177      ( 06 Dec 2018 07:34 )             39.8       12-06    143  |  105  |  21<H>  ----------------------------<  88  4.6   |  29  |  1.2    Ca    9.2      06 Dec 2018 07:34  Phos  3.1     12-06  Mg     2.2     12-06    TPro  6.4  /  Alb  3.4<L>  /  TBili  0.5  /  DBili  x   /  AST  24  /  ALT  21  /  AlkPhos  84  12-06      MEDICATIONS  (STANDING):  calcium carbonate 1250 mG  + Vitamin D (OsCal 500 + D) 1 Tablet(s) Oral <User Schedule>  cefTRIAXone   IVPB 1 Gram(s) IV Intermittent every 24 hours  chlorhexidine 4% Liquid 1 Application(s) Topical <User Schedule>  famotidine Injectable 20 milliGRAM(s) IV Push every 12 hours  heparin  Injectable 5000 Unit(s) SubCutaneous every 12 hours  levETIRAcetam  IVPB 250 milliGRAM(s) IV Intermittent every 12 hours  levothyroxine 88 MICROGram(s) Oral daily  simvastatin 20 milliGRAM(s) Oral at bedtime  ziprasidone 60 milliGRAM(s) Oral <User Schedule>    MEDICATIONS  (PRN):  acetaminophen   Tablet .. 650 milliGRAM(s) Oral every 6 hours PRN Temp greater or equal to 38C (100.4F), Mild Pain (1 - 3)  LORazepam   Injectable 2 milliGRAM(s) IV Push once PRN generalized tonic-clonic seizure lasting longer than 2 minutes  melatonin 3 milliGRAM(s) Oral at bedtime PRN Insomnia            VEEG in the last 24 hours:    Background------7-8 hz . reactive and less than optimally organized    Focal and generalized slowing-------1) bifrontal slowing                                                       2) left posterior  quadrants(TO) focal slowing    Interictal activity::  small number of generalized spikes that appear epileptic in nature                                  Events:  no events reported or recorded    Seizures------none    Impression: abnormal due to the above    Plan - discussed with the sister            will start Keppra at 125 am and 250 hs            The sleep issues were also discussed             He should not be sleeping on her face            seizure precaution

## 2018-12-06 NOTE — PROGRESS NOTE ADULT - ASSESSMENT
Patient is a 54 y/o Female with h/o Down's syndrome, anxiety, hypothyroidism is brought by aid for repetitive spasms/jerking movements of right arm the morning of presentation which lasted seconds. Patient has no history of seizures and never had any such episodes in the past. Patient however did however, have a fall on Friday with head Trauma but no LOC. CT head done was negative and as per there had not been any other related symptoms. Patient was admitted for VEEG to r/o seizures and further  work up in ED revealed UTI.        Assessment and Plan:    1. Jerking movements of upper extremities:  r/o seizures.  Neurology following: continue VEEG. Started on Keppra due to abnormal VEEG.  Seizure precautions. Ativan prn for GTC seizures.        2. E. coli UTI (urinary tract infection):  Continue Rocephin.   Sensitivities pending.        3. Hypothyroid:  Continue Synthroid.  Thyroid Stimulating Hormone, Serum: 4.11 uIU/mL (12.04.18 @ 21:46)      4. Ankle fracture, right:  Continue with boot and walker. Patient seen by a Podiatrist outpatient prior to admission.  No need for inpatient follow up.  PT evaluation. Follow up with Orthopedics out patient.      5. CKD 3: vs. REENA (POA)  ? Baseline Creatinine. Follow up BMP.  Ensure adequate PO fluid intake.        DVT prophylaxis: Heparin  Disposition: Discharge to Group Home following completion of VEEG.

## 2018-12-07 ENCOUNTER — TRANSCRIPTION ENCOUNTER (OUTPATIENT)
Age: 53
End: 2018-12-07

## 2018-12-07 VITALS
RESPIRATION RATE: 16 BRPM | DIASTOLIC BLOOD PRESSURE: 64 MMHG | TEMPERATURE: 96 F | SYSTOLIC BLOOD PRESSURE: 146 MMHG | HEART RATE: 52 BPM

## 2018-12-07 LAB — TSH SERPL-MCNC: 4.7 UIU/ML — HIGH (ref 0.27–4.2)

## 2018-12-07 RX ORDER — LEVETIRACETAM 250 MG/1
1 TABLET, FILM COATED ORAL
Qty: 60 | Refills: 2
Start: 2018-12-07 | End: 2019-03-06

## 2018-12-07 RX ORDER — ACETAMINOPHEN 500 MG
2 TABLET ORAL
Qty: 0 | Refills: 0 | COMMUNITY
Start: 2018-12-07

## 2018-12-07 RX ORDER — CEFPODOXIME PROXETIL 100 MG
1 TABLET ORAL
Qty: 6 | Refills: 0 | OUTPATIENT
Start: 2018-12-07 | End: 2018-12-09

## 2018-12-07 RX ADMIN — Medication 88 MICROGRAM(S): at 05:42

## 2018-12-07 RX ADMIN — FAMOTIDINE 20 MILLIGRAM(S): 10 INJECTION INTRAVENOUS at 05:42

## 2018-12-07 RX ADMIN — LEVETIRACETAM 250 MILLIGRAM(S): 250 TABLET, FILM COATED ORAL at 11:38

## 2018-12-07 RX ADMIN — ZIPRASIDONE HYDROCHLORIDE 60 MILLIGRAM(S): 20 CAPSULE ORAL at 05:42

## 2018-12-07 RX ADMIN — CEFTRIAXONE 100 GRAM(S): 500 INJECTION, POWDER, FOR SOLUTION INTRAMUSCULAR; INTRAVENOUS at 11:41

## 2018-12-07 NOTE — PROGRESS NOTE ADULT - SUBJECTIVE AND OBJECTIVE BOX
Neurology/Epilepsy NP:    VEEG monitoring completed.  Patient is cleared for discharge from neurology standpoint.     Discharge plan was discussed with patient's sister and group home RN.    Rx for Keppra 250mg q12hrs sent to the pharmacy.    Patient will see group home neurologist Dr. Brooke in 1-2 weeks.    Body pillow for sleep will be available for patient at the group home today.    Discussed with Dr. Otero.

## 2018-12-07 NOTE — DISCHARGE NOTE ADULT - MEDICATION SUMMARY - MEDICATIONS TO TAKE
I will START or STAY ON the medications listed below when I get home from the hospital:    acetaminophen 325 mg oral tablet  -- 2 tab(s) by mouth every 6 hours, As needed, Temp greater or equal to 38C (100.4F), Mild Pain (1 - 3)  -- Indication: For Pain or fever    Keppra 250 mg oral tablet  -- 1 tab(s) by mouth every 12 hours x 30 days   -- Check with your doctor before becoming pregnant.  It is very important that you take or use this exactly as directed.  Do not skip doses or discontinue unless directed by your doctor.  May cause drowsiness or dizziness.  Obtain medical advice before taking any non-prescription drugs as some may affect the action of this medication.  Swallow whole.  Do not crush.  This drug may impair the ability to drive or operate machinery.  Use care until you become familiar with its effects.    -- Indication: For Seizures    simvastatin 20 mg oral tablet  -- 1 tab(s) by mouth once a day (at bedtime)  -- Indication: For Hyperlipidemia    ziprasidone 60 mg oral capsule  -- 1 cap(s) by mouth once a day  -- Indication: For Agitation/Bipolar    cefpodoxime 100 mg oral tablet  -- 1 tab(s) by mouth every 12 hours   -- Finish all this medication unless otherwise directed by prescriber.  Take with food or milk.    -- Indication: For UTI (urinary tract infection)    raNITIdine 150 mg oral capsule  -- 1 cap(s) by mouth 2 times a day  -- Indication: For GERD    levothyroxine 88 mcg (0.088 mg) oral tablet  -- 1 tab(s) by mouth once a day  -- Indication: For Hypothyroidism

## 2018-12-07 NOTE — DISCHARGE NOTE ADULT - PROVIDER TOKENS
TOKERICK:'[id number]',TOKEN:'[id number]' TOKEN:'[id number]:'[id number]',FREE:[LAST:[Dr. Brooke],PHONE:[(   )    -],FAX:[(   )    -]]

## 2018-12-07 NOTE — PHYSICAL THERAPY INITIAL EVALUATION ADULT - MANUAL MUSCLE TESTING RESULTS, REHAB EVAL
Both upper extremites/Both lower extremities muscle strength grossly graded 3 to 3+/5 except for (R) foot/ankle kept  in boot

## 2018-12-07 NOTE — PROGRESS NOTE ADULT - REASON FOR ADMISSION
Spasms of right upper arm

## 2018-12-07 NOTE — DISCHARGE NOTE ADULT - OTHER SIGNIFICANT FINDINGS
LABS:                                   13.3   3.40  )-----------( 177      ( 06 Dec 2018 07:34 )             39.8     12-    143  |  105  |  21<H>  ----------------------------<  88  4.6   |  29  |  1.2    Ca    9.2      06 Dec 2018 07:34  Phos  3.1     12  Mg     2.2         TPro  6.4  /  Alb  3.4<L>  /  TBili  0.5  /  DBili  x   /  AST  24  /  ALT  21  /  AlkPhos  84  12               Urinalysis Basic - ( 04 Dec 2018 13:25 )    Color: Yellow / Appearance: Turbid / S.025 / pH: x  Gluc: x / Ketone: Negative  / Bili: Negative / Urobili: 0.2 mg/dL   Blood: x / Protein: 30 mg/dL / Nitrite: Positive   Leuk Esterase: Large / RBC: x / WBC 10-25 /HPF   Sq Epi: x / Non Sq Epi: x / Bacteria: x      MICROBIOLOGY: Culture - Urine (18 @ 13:25)    -  Amikacin: S <=8    -  Amoxicillin/Clavulanic Acid: S <=8/4    -  Ampicillin: S <=2 These ampicillin results predict results for amoxicillin    -  Ampicillin/Sulbactam: S <=4/2    -  Aztreonam: S <=4    -  Cefazolin: S <=2 For uncomplicated UTI with K. pneumoniae, E. coli, or P. mirablis: DREW <=16 is sensitive and DREW >=32 is resistant. This also predicts results for oral agents cefaclor, cefdinir, cefpodoxime, cefprozil, cefuroxime axetil, cephalexin and locarbef for uncomplicated UTI. Note that some isolates may be susceptible to these agents while testing resistant to cefazolin.    -  Cefepime: S <=2    -  Cefoxitin: S <=4    -  Ceftriaxone: S <=1 Enterobacter, Citrobacter, and Serratia may develop resistance during prolonged therapy    -  Ciprofloxacin: R >2    -  Ertapenem: S <=0.5    -  Gentamicin: S <=1    -  Imipenem: S <=1    -  Levofloxacin: R >4    -  Meropenem: S <=1    -  Nitrofurantoin: S <=32 Should not be used to treat pyelonephritis    -  Piperacillin/Tazobactam: S <=8    -  Tigecycline: S <=1    -  Tobramycin: S <=2    -  Trimethoprim/Sulfamethoxazole: S <=0.5/9.5    Specimen Source: .Urine Clean Catch (Midstream)    Culture Results:   >100,000 CFU/ml Escherichia coli    Organism Identification: Escherichia coli    Organism: Escherichia coli    Method Type: [facility]      RADIOLOGY & ADDITIONAL TESTS:  CT Head No Cont (18 @ 12:06)   The ventricles and cortical sulci are mildly widening compatible mild   parenchymal volume loss, stable.    Gray-white matter differentiation is maintained.     There is no acute intracranial hemorrhage, extra-axial fluid collection   or midline shift.      The osseous structures are unremarkable. The visualized paranasal sinuses   and mastoids are well-aerated.    IMPRESSION:     No evidence of acute intracranial pathology, stable exam.    X-ray Chest 2 Views PA/Lat (18 @ 11:27)   No radiographic evidence of acute cardiopulmonary disease.    Unchanged.      VEEG in the last 24 hours:    Background------7-8 hz . reactive and less than optimally organized    Focal and generalized slowing-------1) bifrontal slowing                                                       2) left posterior  quadrants(TO) focal slowing    Interictal activity::  small number of generalized spikes that appear epileptic in nature                            Events:  no events reported or recorded    Seizures------none    Impression: abnormal due to the above

## 2018-12-07 NOTE — DISCHARGE NOTE ADULT - CARE PROVIDER_API CALL
Fredis Markham), Orthopaedic Surgery  [address] [place] [address]  Phone: [phone]  Fax: [phone]    Chan, Yeoman K (MD), Family Medicine  [address] [place] [address]  [address]  [place] [address]  Phone: [phone]  Fax: [phone] Fredis Markham), Orthopaedic Surgery  [address] [place] [address]  Phone: [phone]  Fax: [phone]    Chan, Yeoman K (MD), Family Medicine  [address] [place] [address]  [address] 1  [place] [address]  Phone: [phone]  Fax: [phone]    Dr. Brooke,   Phone: (   )    -  Fax: (   )    -

## 2018-12-07 NOTE — PHYSICAL THERAPY INITIAL EVALUATION ADULT - GENERAL OBSERVATIONS, REHAB EVAL
10:28-10:58 Chart reviewed. Pt encountered sitting in chair, may be seen by Physical Therapist as confirmed with Nurse. Patient denied  pain at rest and would like to walk, donned RLE Pneumatic Boot

## 2018-12-07 NOTE — PROGRESS NOTE ADULT - SUBJECTIVE AND OBJECTIVE BOX
SALVATORE STEELE  53y  Female    Patient is a 53y old  Female who presents with a chief complaint of Spasms of right upper arm (04 Dec 2018 21:13)      INTERVAL HPI/OVERNIGHT EVENTS:  No interval events.  Family and Aide at bedside with no complaints.      REVIEW OF SYSTEMS: UTO due to MR  Per Family and Aide  CONSTITUTIONAL: No fever, weight loss, or fatigue  EYES: No eye pain, visual disturbances, or discharge  ENMT:  No difficulty hearing, tinnitus, vertigo; No sinus or throat pain  RESPIRATORY: No cough, wheezing, shortness of breath  GASTROINTESTINAL: No nausea, vomiting, or hematemesis; No diarrhea or constipation. No melena or hematochezia.  GENITOURINARY: No hematuria  NEUROLOGICAL: No loss of strength, numbness, or tremors  SKIN: No itching, rashes, or lesions   MUSCULOSKELETAL: No joint pain or swelling; No muscle, back, or extremity pain      VITALS:  T(C): 35.6 (07 Dec 2018 05:09), Max: 35.7 (06 Dec 2018 22:06)  T(F): 96 (07 Dec 2018 05:09), Max: 96.2 (06 Dec 2018 22:06)  HR: 52 (07 Dec 2018 05:09) (48 - 52)  BP: 146/64 (07 Dec 2018 05:09) (115/57 - 146/64)  BP(mean): --  RR: 16 (07 Dec 2018 05:09) (16 - 16)  SpO2: --      PHYSICAL EXAM:  GENERAL: NAD, facial dysmorphic features  HEAD:  Atraumatic  EYES: conjunctiva and sclera clear  ENMT: No tonsillar erythema, exudates, or enlargement; Moist mucous membranes  NECK: Supple, Normal thyroid  NERVOUS SYSTEM:  Alert & Awake, Moves all extremities  CHEST/LUNG: Clear to auscultation bilaterally; No rales, rhonchi, wheezing, or rubs  HEART: Regular rate and rhythm; No murmurs, rubs, or gallops  ABDOMEN: Soft, Nontender, Nondistended; Bowel sounds present  EXTREMITIES:  2+ Peripheral Pulses, No clubbing, cyanosis, or edema  LYMPH: No lymphadenopathy noted  SKIN: No rashes or lesions    Consultant(s) Notes Reviewed:  [x ] YES  [ ] NO  Care Discussed with Consultants/Other Providers [ x] YES  [ ] NO    LABS:                                   13.3   3.40  )-----------( 177      ( 06 Dec 2018 07:34 )             39.8     12-    143  |  105  |  21<H>  ----------------------------<  88  4.6   |  29  |  1.2    Ca    9.2      06 Dec 2018 07:34  Phos  3.1     12  Mg     2.2         TPro  6.4  /  Alb  3.4<L>  /  TBili  0.5  /  DBili  x   /  AST  24  /  ALT  21  /  AlkPhos  84  12               Urinalysis Basic - ( 04 Dec 2018 13:25 )    Color: Yellow / Appearance: Turbid / S.025 / pH: x  Gluc: x / Ketone: Negative  / Bili: Negative / Urobili: 0.2 mg/dL   Blood: x / Protein: 30 mg/dL / Nitrite: Positive   Leuk Esterase: Large / RBC: x / WBC 10-25 /HPF   Sq Epi: x / Non Sq Epi: x / Bacteria: x      MICROBIOLOGY: Culture - Urine (18 @ 13:25)    -  Amikacin: S <=8    -  Amoxicillin/Clavulanic Acid: S <=8/4    -  Ampicillin: S <=2 These ampicillin results predict results for amoxicillin    -  Ampicillin/Sulbactam: S <=4/2    -  Aztreonam: S <=4    -  Cefazolin: S <=2 For uncomplicated UTI with K. pneumoniae, E. coli, or P. mirablis: DREW <=16 is sensitive and DREW >=32 is resistant. This also predicts results for oral agents cefaclor, cefdinir, cefpodoxime, cefprozil, cefuroxime axetil, cephalexin and locarbef for uncomplicated UTI. Note that some isolates may be susceptible to these agents while testing resistant to cefazolin.    -  Cefepime: S <=2    -  Cefoxitin: S <=4    -  Ceftriaxone: S <=1 Enterobacter, Citrobacter, and Serratia may develop resistance during prolonged therapy    -  Ciprofloxacin: R >2    -  Ertapenem: S <=0.5    -  Gentamicin: S <=1    -  Imipenem: S <=1    -  Levofloxacin: R >4    -  Meropenem: S <=1    -  Nitrofurantoin: S <=32 Should not be used to treat pyelonephritis    -  Piperacillin/Tazobactam: S <=8    -  Tigecycline: S <=1    -  Tobramycin: S <=2    -  Trimethoprim/Sulfamethoxazole: S <=0.5/9.5    Specimen Source: .Urine Clean Catch (Midstream)    Culture Results:   >100,000 CFU/ml Escherichia coli    Organism Identification: Escherichia coli    Organism: Escherichia coli    Method Type: DREW      RADIOLOGY & ADDITIONAL TESTS:  CT Head No Cont (18 @ 12:06)   The ventricles and cortical sulci are mildly widening compatible mild   parenchymal volume loss, stable.    Gray-white matter differentiation is maintained.     There is no acute intracranial hemorrhage, extra-axial fluid collection   or midline shift.      The osseous structures are unremarkable. The visualized paranasal sinuses   and mastoids are well-aerated.    IMPRESSION:     No evidence of acute intracranial pathology, stable exam.    X-ray Chest 2 Views PA/Lat (18 @ 11:27)   No radiographic evidence of acute cardiopulmonary disease.    Unchanged.      VEEG in the last 24 hours:    Background------7-8 hz . reactive and less than optimally organized    Focal and generalized slowing-------1) bifrontal slowing                                                       2) left posterior  quadrants(TO) focal slowing    Interictal activity::  small number of generalized spikes that appear epileptic in nature                            Events:  no events reported or recorded    Seizures------none    Impression: abnormal due to the above    Imaging Personally Reviewed:  [x] YES  [ ] NO      MEDICATIONS  (STANDING):  calcium carbonate 1250 mG  + Vitamin D (OsCal 500 + D) 1 Tablet(s) Oral <User Schedule>  cefTRIAXone   IVPB 1 Gram(s) IV Intermittent every 24 hours  chlorhexidine 4% Liquid 1 Application(s) Topical <User Schedule>  famotidine Injectable 20 milliGRAM(s) IV Push every 12 hours  heparin  Injectable 5000 Unit(s) SubCutaneous every 12 hours  levETIRAcetam 250 milliGRAM(s) Oral every 12 hours  levothyroxine 88 MICROGram(s) Oral daily  simvastatin 20 milliGRAM(s) Oral at bedtime  ziprasidone 60 milliGRAM(s) Oral <User Schedule>    MEDICATIONS  (PRN):  acetaminophen   Tablet .. 650 milliGRAM(s) Oral every 6 hours PRN Temp greater or equal to 38C (100.4F), Mild Pain (1 - 3)  LORazepam   Injectable 2 milliGRAM(s) IV Push once PRN generalized tonic-clonic seizure lasting longer than 2 minutes  melatonin 3 milliGRAM(s) Oral at bedtime PRN Insomnia          HEALTH ISSUES - PROBLEM Dx:  Ankle fracture, right  Hypothyroid  Down's syndrome  UTI (urinary tract infection)  Jerking movements of extremities

## 2018-12-07 NOTE — DISCHARGE NOTE ADULT - HOSPITAL COURSE
Patient is a 52 y/o Female with h/o Down's syndrome, anxiety, hypothyroidism is brought by aid for repetitive spasms/jerking movements of right arm the morning of presentation which lasted seconds. Patient has no history of seizures and never had any such episodes in the past. Patient however did however, have a fall on Friday with head Trauma but no LOC. CT head done was negative and as per there had not been any other related symptoms. Patient was admitted for VEEG to r/o seizures and further  work up in ED revealed UTI.        Assessment and Plan:    1. Jerking movements of upper extremities:  r/o seizures.  Neurology following: continued VEEG until 12/7/18.   Plan is to continue Keppra with titration and follow up with Neurologist outpatient.   Seizure precautions. Ativan prn for GTC seizures.        2. E. coli UTI (urinary tract infection):  Continued Rocephin.   Will be discharged home on Vantin for 3 more days.        3. Hypothyroid:  Continued Synthroid.  Thyroid Stimulating Hormone, Serum: 4.11 uIU/mL (12.04.18 @ 21:46)      4. Ankle fracture, right:  Continue with boot and walker. Patient seen by a Podiatrist outpatient prior to admission.  No need for inpatient follow up.  PT evaluation. Follow up with Orthopedics out patient.  X-ray Ankle Complete 3 Views, Right (11.30.18 @ 22:05): Nondisplaced fracture, base of fifth metatarsal bone, right foot.        5. CKD 3: vs. REENA (POA)  ? Baseline Creatinine. Followed up BMP with stable SCr.  Ensure adequate PO fluid intake.

## 2018-12-07 NOTE — DISCHARGE NOTE ADULT - CARE PROVIDERS DIRECT ADDRESSES
,[email or web address],DirectAddress_Unknown ,[email or web address],DirectAddress_Unknown,DirectAddress_Unknown

## 2018-12-07 NOTE — PROGRESS NOTE ADULT - SUBJECTIVE AND OBJECTIVE BOX
Epilepsy Attending Note:     SALVATORE STEELE    53y Female  MRN [id number]    Vital Signs Last 24 Hrs  T(C): 35.6 (07 Dec 2018 05:09), Max: 35.7 (06 Dec 2018 22:06)  T(F): 96 (07 Dec 2018 05:09), Max: 96.2 (06 Dec 2018 22:06)  HR: 52 (07 Dec 2018 05:09) (48 - 52)  BP: 146/64 (07 Dec 2018 05:09) (115/57 - 146/64)  BP(mean): --  RR: 16 (07 Dec 2018 05:09) (16 - 16)  SpO2: --                          13.3   3.40  )-----------( 177      ( 06 Dec 2018 07:34 )             39.8       12-06    143  |  105  |  21<H>  ----------------------------<  88  4.6   |  29  |  1.2    Ca    9.2      06 Dec 2018 07:34  Phos  3.1     12-06  Mg     2.2     12-06    TPro  6.4  /  Alb  3.4<L>  /  TBili  0.5  /  DBili  x   /  AST  24  /  ALT  21  /  AlkPhos  84  12-06      MEDICATIONS  (STANDING):  calcium carbonate 1250 mG  + Vitamin D (OsCal 500 + D) 1 Tablet(s) Oral <User Schedule>  cefTRIAXone   IVPB 1 Gram(s) IV Intermittent every 24 hours  chlorhexidine 4% Liquid 1 Application(s) Topical <User Schedule>  famotidine Injectable 20 milliGRAM(s) IV Push every 12 hours  heparin  Injectable 5000 Unit(s) SubCutaneous every 12 hours  levETIRAcetam 250 milliGRAM(s) Oral every 12 hours  levothyroxine 88 MICROGram(s) Oral daily  simvastatin 20 milliGRAM(s) Oral at bedtime  ziprasidone 60 milliGRAM(s) Oral <User Schedule>    MEDICATIONS  (PRN):  acetaminophen   Tablet .. 650 milliGRAM(s) Oral every 6 hours PRN Temp greater or equal to 38C (100.4F), Mild Pain (1 - 3)  LORazepam   Injectable 2 milliGRAM(s) IV Push once PRN generalized tonic-clonic seizure lasting longer than 2 minutes  melatonin 3 milliGRAM(s) Oral at bedtime PRN Insomnia            VEEG in the last 24 hours:    Background------reaching frequencies in the range of 7-8 Hz superimposed by moderate amount of  lower theta activity    Focal and generalized slowing   as/Yesterday    Interictal activity------no generalized spikes                                 left posterior quadrants sharps    Events------none    Seizures--none    Impression:  abnormal BG and potential for partial myoclonic seizure  Plan - continue Keppra at250 bid  F/U with neurology      Dc the monitoring

## 2018-12-07 NOTE — PHYSICAL THERAPY INITIAL EVALUATION ADULT - GAIT DEVIATIONS NOTED, PT EVAL
decreased weight-shifting ability/decreased kristen/decreased step length/stooped posture, dec heel strike/pushoff

## 2018-12-07 NOTE — PHYSICAL THERAPY INITIAL EVALUATION ADULT - NS ASR WT BEARING DETAIL RLE
partial weight-bearing/Per Podiatrist Aamir Hilton,"limited/partial weightbearing on (R) foot "with Pneumatic boot only

## 2018-12-07 NOTE — DISCHARGE NOTE ADULT - PLAN OF CARE
Prevent recurrence Please Take all medications as prescribed. Adequate healing Follow up with Dr. Markham as recommended. Resolution Complete prescribed antibiotics. Please Take all medications as prescribed.  Follow up with Dr. YANEZ in 1 -2 weeks.

## 2018-12-07 NOTE — DISCHARGE NOTE ADULT - PATIENT PORTAL LINK FT
You can access the [facility], offered by [facility], by registering with the following website: [email or web address]

## 2018-12-07 NOTE — PROGRESS NOTE ADULT - ASSESSMENT
Patient is a 52 y/o Female with h/o Down's syndrome, anxiety, hypothyroidism is brought by aid for repetitive spasms/jerking movements of right arm the morning of presentation which lasted seconds. Patient has no history of seizures and never had any such episodes in the past. Patient however did however, have a fall on Friday with head Trauma but no LOC. CT head done was negative and as per there had not been any other related symptoms. Patient was admitted for VEEG to r/o seizures and further  work up in ED revealed UTI.        Assessment and Plan:    1. Jerking movements of upper extremities:  r/o seizures.  Neurology following: continued VEEG until 12/7/18.   Plan is to continue Keppra with titration and follow up with Neurologist outpatient.   Seizure precautions. Ativan prn for GTC seizures.        2. E. coli UTI (urinary tract infection):  Continued Rocephin.   Will be discharged home on Vantin for 3 more days.        3. Hypothyroid:  Continued Synthroid.  Thyroid Stimulating Hormone, Serum: 4.11 uIU/mL (12.04.18 @ 21:46)      4. Ankle fracture, right:  Continue with boot and walker. Patient seen by a Podiatrist outpatient prior to admission.  No need for inpatient follow up.  PT evaluation. Follow up with Orthopedics out patient.      5. CKD 3: vs. REENA (POA)  ? Baseline Creatinine. Followed up BMP with stable SCr.  Ensure adequate PO fluid intake.        DVT prophylaxis: Heparin  Disposition: Discharge to Group Home today.

## 2018-12-07 NOTE — DISCHARGE NOTE ADULT - CARE PLAN
Principal Discharge DX:	Seizure  Goal:	Prevent recurrence  Assessment and plan of treatment:	Please Take all medications as prescribed.  Secondary Diagnosis:	Ankle fracture, right  Goal:	Adequate healing  Assessment and plan of treatment:	Follow up with Dr. Markham as recommended.  Secondary Diagnosis:	UTI (urinary tract infection)  Goal:	Resolution  Assessment and plan of treatment:	Complete prescribed antibiotics. Principal Discharge DX:	Seizure  Goal:	Prevent recurrence  Assessment and plan of treatment:	Please Take all medications as prescribed.  Follow up with Dr. YANEZ in 1 -2 weeks.  Secondary Diagnosis:	Ankle fracture, right  Goal:	Adequate healing  Assessment and plan of treatment:	Follow up with Dr. Markham as recommended.  Secondary Diagnosis:	UTI (urinary tract infection)  Goal:	Resolution  Assessment and plan of treatment:	Complete prescribed antibiotics.

## 2018-12-11 DIAGNOSIS — F41.1 GENERALIZED ANXIETY DISORDER: ICD-10-CM

## 2018-12-11 DIAGNOSIS — G40.909 EPILEPSY, UNSPECIFIED, NOT INTRACTABLE, WITHOUT STATUS EPILEPTICUS: ICD-10-CM

## 2018-12-11 DIAGNOSIS — F63.9 IMPULSE DISORDER, UNSPECIFIED: ICD-10-CM

## 2018-12-11 DIAGNOSIS — Z91.81 HISTORY OF FALLING: ICD-10-CM

## 2018-12-11 DIAGNOSIS — E03.9 HYPOTHYROIDISM, UNSPECIFIED: ICD-10-CM

## 2018-12-11 DIAGNOSIS — Z28.82 IMMUNIZATION NOT CARRIED OUT BECAUSE OF CAREGIVER REFUSAL: ICD-10-CM

## 2018-12-11 DIAGNOSIS — N39.0 URINARY TRACT INFECTION, SITE NOT SPECIFIED: ICD-10-CM

## 2018-12-11 DIAGNOSIS — B96.20 UNSPECIFIED ESCHERICHIA COLI [E. COLI] AS THE CAUSE OF DISEASES CLASSIFIED ELSEWHERE: ICD-10-CM

## 2018-12-11 DIAGNOSIS — Q90.9 DOWN SYNDROME, UNSPECIFIED: ICD-10-CM

## 2018-12-11 DIAGNOSIS — S82.891D OTHER FRACTURE OF RIGHT LOWER LEG, SUBSEQUENT ENCOUNTER FOR CLOSED FRACTURE WITH ROUTINE HEALING: ICD-10-CM

## 2018-12-11 DIAGNOSIS — R56.9 UNSPECIFIED CONVULSIONS: ICD-10-CM

## 2018-12-11 DIAGNOSIS — N18.3 CHRONIC KIDNEY DISEASE, STAGE 3 (MODERATE): ICD-10-CM

## 2018-12-11 DIAGNOSIS — F79 UNSPECIFIED INTELLECTUAL DISABILITIES: ICD-10-CM
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
## 2018-12-11 LAB
CULTURE RESULTS: SIGNIFICANT CHANGE UP
SPECIMEN SOURCE: SIGNIFICANT CHANGE UP

## 2019-03-05 ENCOUNTER — EMERGENCY (EMERGENCY)
Facility: HOSPITAL | Age: 54
LOS: 0 days | Discharge: HOME | End: 2019-03-05
Attending: EMERGENCY MEDICINE | Admitting: EMERGENCY MEDICINE
Payer: MEDICARE

## 2019-03-05 VITALS
TEMPERATURE: 97 F | SYSTOLIC BLOOD PRESSURE: 124 MMHG | RESPIRATION RATE: 18 BRPM | DIASTOLIC BLOOD PRESSURE: 60 MMHG | WEIGHT: 199.96 LBS | OXYGEN SATURATION: 99 % | HEART RATE: 65 BPM

## 2019-03-05 DIAGNOSIS — S00.01XA ABRASION OF SCALP, INITIAL ENCOUNTER: ICD-10-CM

## 2019-03-05 DIAGNOSIS — S00.212A ABRASION OF LEFT EYELID AND PERIOCULAR AREA, INITIAL ENCOUNTER: ICD-10-CM

## 2019-03-05 DIAGNOSIS — Z79.1 LONG TERM (CURRENT) USE OF NON-STEROIDAL ANTI-INFLAMMATORIES (NSAID): ICD-10-CM
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
## 2019-03-05 DIAGNOSIS — Z79.2 LONG TERM (CURRENT) USE OF ANTIBIOTICS: ICD-10-CM
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
## 2019-03-05 DIAGNOSIS — E03.9 HYPOTHYROIDISM, UNSPECIFIED: ICD-10-CM

## 2019-03-05 DIAGNOSIS — Z79.3 LONG TERM (CURRENT) USE OF HORMONAL CONTRACEPTIVES: ICD-10-CM

## 2019-03-05 DIAGNOSIS — Z79.899 OTHER LONG TERM (CURRENT) DRUG THERAPY: ICD-10-CM

## 2019-03-05 PROCEDURE — 93010 ELECTROCARDIOGRAM REPORT: CPT

## 2019-03-05 NOTE — ED ADULT NURSE NOTE - OBJECTIVE STATEMENT
Pt aox3, mental status and speech at baseline, pt fell to ground while speaking to someone, no LOC, no use fo blood thinners, pt denies pain, aide at bedside.

## 2019-03-05 NOTE — ED PROVIDER NOTE - OBJECTIVE STATEMENT
54 y.o. F with PMH of seizure disorder diagnosed in December of last year for a fall 2 hours ago. Fall was whitnessed, no blood thinners, no LOC, no seizure activity. They said it was a sudden fall from ground height and she was talking to someone, took a deep breathe and fell. She denies urinary retention, any mid line tenderness, no recent infection.

## 2019-03-05 NOTE — ED PROVIDER NOTE - NSFOLLOWUPINSTRUCTIONS_ED_ALL_ED_FT
WHAT YOU NEED TO KNOW:    Fall prevention includes ways to make your home and other areas safer. It also includes ways you can move more carefully to prevent a fall. Health conditions that cause changes in your blood pressure, vision, or muscle strength and coordination may increase your risk for falls. Medicines may also increase your risk for falls if they make you dizzy, weak, or sleepy.    DISCHARGE INSTRUCTIONS:  Call 911 or have someone else call if:  You have fallen and are unconscious.  You have fallen and cannot move part of your body.  Contact your healthcare provider if:  You have fallen and have pain or a headache.  You have questions or concerns about your condition or care.  Fall prevention tips:  Stand or sit up slowly. This may help you keep your balance and prevent falls.  Use assistive devices as directed. Your healthcare provider may suggest that you use a cane or walker to help you keep your balance. You may need to have grab bars put in your bathroom near the toilet or in the shower.  Wear shoes that fit well and have soles that . Wear shoes both inside and outside. Use slippers with good . Do not wear shoes with high heels.  Wear a personal alarm. This is a device that allows you to call 911 if you fall and need help. Ask your healthcare provider for more information.  Stay active. Exercise can help strengthen your muscles and improve your balance. Your healthcare provider may recommend water aerobics or walking. He or she may also recommend physical therapy to improve your coordination. Never start an exercise program without talking to your healthcare provider first.  Manage your medical conditions. Keep all appointments with your healthcare providers. Visit your eye doctor as directed.

## 2019-03-05 NOTE — ED PROVIDER NOTE - NS ED ROS FT
Eyes:  No visual changes, eye pain or discharge.  ENMT:  No hearing changes, pain, no sore throat or runny nose, no difficulty swallowing  Cardiac:  No chest pain, SOB or edema. No chest pain with exertion.  Respiratory:  No cough or respiratory distress. No hemoptysis. No history of asthma or RAD.  GI:  No nausea, vomiting, diarrhea or abdominal pain.  :  No dysuria, frequency or burning.  MS:  No myalgia, muscle weakness, joint pain or back pain.  Neuro:  +fall, No headache or weakness.  No LOC.  Skin: +scalp abrasian, No skin rash.   Endocrine: No history of thyroid disease or diabetes.

## 2019-03-05 NOTE — ED PROVIDER NOTE - CLINICAL SUMMARY MEDICAL DECISION MAKING FREE TEXT BOX
55 yo woman with Down's syndrome with slip and fall.  No LOC.  No evidence of injury.  At baseline.  Ambulating without difficulty.  No tenderness anywhere noted.  No complaints.  EKG normal.  Stable for discharge.

## 2019-03-05 NOTE — ED PROVIDER NOTE - PROGRESS NOTE DETAILS
54F pmh MR p/f group home after having witness fall, pt struck side of face on table. Per staff pt was having a conversation with another resident when she took a deep breath and fell. Staff denies LOC, AMS, pt currently at baseline with no complaints. Minor abrasion to L orbital area, nttp, no cspine ttp, exam otherwise wnl. Plan EKG and d/c. Pt baseline, EKG reviewed, Pt agrees and understands plan, Pt ready for DC.

## 2019-03-05 NOTE — ED PROVIDER NOTE - ATTENDING CONTRIBUTION TO CARE
I personally evaluated the patient. I reviewed the Resident’s or Physician Assistant’s note (as assigned above), and agree with the findings and plan except as documented in my note.  53 yo woman with Down's Syndrome.   As per aid who witnessed the entire event, she took a deep breath and then fell to the ground.  No LOC at any point.  Immediately attempted to stand back to her feet.  Without any complaints.  Ambulating without difficulty. No evidence of trauma.  EKG normal.  Stable for discharge.

## 2019-03-05 NOTE — ED ADULT TRIAGE NOTE - CHIEF COMPLAINT QUOTE
s/p witnessed fall in group home, aide denies any LOC. small abrasion noted tot he left eye and top of the head. patient at baseline in triage as per aide at bedside

## 2019-03-05 NOTE — ED ADULT NURSE NOTE - NSIMPLEMENTINTERV_GEN_ALL_ED
Implemented All Fall Risk Interventions:  [place] to call system. Call bell, personal items and telephone within reach. Instruct patient to call for assistance. Room bathroom lighting operational. Non-slip footwear when patient is off stretcher. Physically safe environment: no spills, clutter or unnecessary equipment. Stretcher in lowest position, wheels locked, appropriate side rails in place. Provide visual cue, wrist band, yellow gown, etc. Monitor gait and stability. Monitor for mental status changes and reorient to person, place, and time. Review medications for side effects contributing to fall risk. Reinforce activity limits and safety measures with patient and family.

## 2019-03-05 NOTE — ED PROVIDER NOTE - PHYSICAL EXAMINATION
CONSTITUTIONAL: Well-developed; well-nourished; in no acute distress.   SKIN: warm, dry  HEAD: Normocephalic; atraumatic.  EYES: PERRL, EOMI, no conjunctival erythema  ENT: No nasal discharge; airway clear.  NECK: Supple; non tender.  CARD: S1, S2 normal; no murmurs, gallops, or rubs. Regular rate and rhythm.   RESP: No wheezes, rales or rhonchi.  ABD: soft ntnd  EXT: Normal ROM.  No clubbing, cyanosis or edema.   LYMPH: No acute cervical adenopathy.  NEURO: Alert, oriented, grossly unremarkable, Muscle strenght 5/5, no nystagmus, able to ambulate  PSYCH: Cooperative, appropriate.

## 2019-03-07 ENCOUNTER — OUTPATIENT (OUTPATIENT)
Dept: OUTPATIENT SERVICES | Facility: HOSPITAL | Age: 54
LOS: 1 days | Discharge: HOME | End: 2019-03-07

## 2019-04-09 ENCOUNTER — EMERGENCY (EMERGENCY)
Facility: HOSPITAL | Age: 54
LOS: 0 days | Discharge: HOME | End: 2019-04-09
Attending: EMERGENCY MEDICINE
Payer: MEDICARE

## 2019-04-09 VITALS
SYSTOLIC BLOOD PRESSURE: 124 MMHG | DIASTOLIC BLOOD PRESSURE: 60 MMHG | RESPIRATION RATE: 18 BRPM | OXYGEN SATURATION: 96 % | HEART RATE: 48 BPM | TEMPERATURE: 97 F

## 2019-04-09 DIAGNOSIS — Z02.9 ENCOUNTER FOR ADMINISTRATIVE EXAMINATIONS, UNSPECIFIED: ICD-10-CM

## 2019-04-09 LAB
ALBUMIN SERPL ELPH-MCNC: 3.6 G/DL — SIGNIFICANT CHANGE UP (ref 3.5–5.2)
ALP SERPL-CCNC: 77 U/L — SIGNIFICANT CHANGE UP (ref 30–115)
ALT FLD-CCNC: 21 U/L — SIGNIFICANT CHANGE UP (ref 0–41)
ANION GAP SERPL CALC-SCNC: 10 MMOL/L — SIGNIFICANT CHANGE UP (ref 7–14)
AST SERPL-CCNC: 60 U/L — HIGH (ref 0–41)
BASOPHILS # BLD AUTO: 0.07 K/UL — SIGNIFICANT CHANGE UP (ref 0–0.2)
BASOPHILS NFR BLD AUTO: 1.2 % — HIGH (ref 0–1)
BILIRUB DIRECT SERPL-MCNC: <0.2 MG/DL — SIGNIFICANT CHANGE UP (ref 0–0.2)
BILIRUB INDIRECT FLD-MCNC: >0.5 MG/DL — SIGNIFICANT CHANGE UP (ref 0.2–1.2)
BILIRUB SERPL-MCNC: 0.7 MG/DL — SIGNIFICANT CHANGE UP (ref 0.2–1.2)
BUN SERPL-MCNC: 20 MG/DL — SIGNIFICANT CHANGE UP (ref 10–20)
CALCIUM SERPL-MCNC: 9.4 MG/DL — SIGNIFICANT CHANGE UP (ref 8.5–10.1)
CHLORIDE SERPL-SCNC: 105 MMOL/L — SIGNIFICANT CHANGE UP (ref 98–110)
CO2 SERPL-SCNC: 27 MMOL/L — SIGNIFICANT CHANGE UP (ref 17–32)
CREAT SERPL-MCNC: 1.4 MG/DL — SIGNIFICANT CHANGE UP (ref 0.7–1.5)
EOSINOPHIL # BLD AUTO: 0.01 K/UL — SIGNIFICANT CHANGE UP (ref 0–0.7)
EOSINOPHIL NFR BLD AUTO: 0.2 % — SIGNIFICANT CHANGE UP (ref 0–8)
GLUCOSE SERPL-MCNC: 124 MG/DL — HIGH (ref 70–99)
HCT VFR BLD CALC: 37.6 % — SIGNIFICANT CHANGE UP (ref 37–47)
HGB BLD-MCNC: 12.5 G/DL — SIGNIFICANT CHANGE UP (ref 12–16)
IMM GRANULOCYTES NFR BLD AUTO: 0.2 % — SIGNIFICANT CHANGE UP (ref 0.1–0.3)
LYMPHOCYTES # BLD AUTO: 0.97 K/UL — LOW (ref 1.2–3.4)
LYMPHOCYTES # BLD AUTO: 16.4 % — LOW (ref 20.5–51.1)
MCHC RBC-ENTMCNC: 31.8 PG — HIGH (ref 27–31)
MCHC RBC-ENTMCNC: 33.2 G/DL — SIGNIFICANT CHANGE UP (ref 32–37)
MCV RBC AUTO: 95.7 FL — SIGNIFICANT CHANGE UP (ref 81–99)
MONOCYTES # BLD AUTO: 0.43 K/UL — SIGNIFICANT CHANGE UP (ref 0.1–0.6)
MONOCYTES NFR BLD AUTO: 7.3 % — SIGNIFICANT CHANGE UP (ref 1.7–9.3)
NEUTROPHILS # BLD AUTO: 4.41 K/UL — SIGNIFICANT CHANGE UP (ref 1.4–6.5)
NEUTROPHILS NFR BLD AUTO: 74.7 % — SIGNIFICANT CHANGE UP (ref 42.2–75.2)
NRBC # BLD: 0 /100 WBCS — SIGNIFICANT CHANGE UP (ref 0–0)
PLATELET # BLD AUTO: 175 K/UL — SIGNIFICANT CHANGE UP (ref 130–400)
POTASSIUM SERPL-MCNC: 4.1 MMOL/L — SIGNIFICANT CHANGE UP (ref 3.5–5)
POTASSIUM SERPL-SCNC: 4.1 MMOL/L — SIGNIFICANT CHANGE UP (ref 3.5–5)
PROT SERPL-MCNC: 6.3 G/DL — SIGNIFICANT CHANGE UP (ref 6–8)
RBC # BLD: 3.93 M/UL — LOW (ref 4.2–5.4)
RBC # FLD: 13.1 % — SIGNIFICANT CHANGE UP (ref 11.5–14.5)
SODIUM SERPL-SCNC: 142 MMOL/L — SIGNIFICANT CHANGE UP (ref 135–146)
WBC # BLD: 5.9 K/UL — SIGNIFICANT CHANGE UP (ref 4.8–10.8)
WBC # FLD AUTO: 5.9 K/UL — SIGNIFICANT CHANGE UP (ref 4.8–10.8)

## 2019-04-09 PROCEDURE — 70450 CT HEAD/BRAIN W/O DYE: CPT | Mod: 26

## 2019-04-09 PROCEDURE — 99284 EMERGENCY DEPT VISIT MOD MDM: CPT

## 2019-04-09 RX ORDER — SODIUM CHLORIDE 9 MG/ML
1000 INJECTION INTRAMUSCULAR; INTRAVENOUS; SUBCUTANEOUS
Qty: 0 | Refills: 0 | Status: DISCONTINUED | OUTPATIENT
Start: 2019-04-09 | End: 2019-04-09

## 2019-04-09 RX ORDER — SODIUM CHLORIDE 9 MG/ML
3 INJECTION INTRAMUSCULAR; INTRAVENOUS; SUBCUTANEOUS ONCE
Qty: 0 | Refills: 0 | Status: COMPLETED | OUTPATIENT
Start: 2019-04-09 | End: 2019-04-09

## 2019-04-09 RX ORDER — DIPHENHYDRAMINE HCL 50 MG
1 CAPSULE ORAL
Qty: 12 | Refills: 0 | OUTPATIENT
Start: 2019-04-09 | End: 2019-04-11

## 2019-04-09 RX ORDER — MIDAZOLAM HYDROCHLORIDE 1 MG/ML
1 INJECTION, SOLUTION INTRAMUSCULAR; INTRAVENOUS ONCE
Qty: 0 | Refills: 0 | Status: DISCONTINUED | OUTPATIENT
Start: 2019-04-09 | End: 2019-04-09

## 2019-04-09 RX ADMIN — SODIUM CHLORIDE 125 MILLILITER(S): 9 INJECTION INTRAMUSCULAR; INTRAVENOUS; SUBCUTANEOUS at 22:38

## 2019-04-09 RX ADMIN — SODIUM CHLORIDE 3 MILLILITER(S): 9 INJECTION INTRAMUSCULAR; INTRAVENOUS; SUBCUTANEOUS at 21:57

## 2019-04-09 RX ADMIN — SODIUM CHLORIDE 1000 MILLILITER(S): 9 INJECTION INTRAMUSCULAR; INTRAVENOUS; SUBCUTANEOUS at 23:23

## 2019-04-09 RX ADMIN — MIDAZOLAM HYDROCHLORIDE 1 MILLIGRAM(S): 1 INJECTION, SOLUTION INTRAMUSCULAR; INTRAVENOUS at 21:57

## 2019-04-09 RX ADMIN — Medication 2 MILLIGRAM(S): at 19:20

## 2019-04-09 NOTE — ED ADULT NURSE NOTE - NSIMPLEMENTINTERV_GEN_ALL_ED
Implemented All Universal Safety Interventions:  [place] to call system. Call bell, personal items and telephone within reach. Instruct patient to call for assistance. Room bathroom lighting operational. Non-slip footwear when patient is off stretcher. Physically safe environment: no spills, clutter or unnecessary equipment. Stretcher in lowest position, wheels locked, appropriate side rails in place.

## 2019-04-09 NOTE — ED PROVIDER NOTE - OBJECTIVE STATEMENT
53yo F h/o down syndrome, sz in the pst. recently had her dose from 500 to 1000mg of Kepra after breakthrough tonic clonic sz. neurologist Dr. Torres. Pt sister states over the last few weeks, pt having increasing anxiety, agitation and being more withdrawn (not wanting to eat, shower or got to program). Pt today started to scream, shake and became stiff, staff became concerned for a sz and brought to ER 53yo F h/o down syndrome, sz in the pst. recently had her dose from 500 to 1000mg of Kepra after breakthrough tonic clonic sz. neurologist Dr. Torres. Pt sister states over the last few weeks, pt having increasing anxiety, agitation and being more withdrawn (not wanting to eat, shower or got to program). Pt today started to scream, shake and became stiff, staff became concerned for a sz and brought to ER.

## 2019-04-09 NOTE — ED ADULT TRIAGE NOTE - CHIEF COMPLAINT QUOTE
pt from group home, as per staff patient began shaking but was responsive, did not lose consciousness. reporting a change in mental status.

## 2019-04-09 NOTE — ED BEHAVIORAL HEALTH NOTE - BEHAVIORAL HEALTH NOTE
CC: Pt is unable to verbalise. Had an anxiety attack which was the reason for the ED visit.    HPI: 53yo WW with Down syndrome BIBA from "[facility]" accompanied by staff member and sister, after she had an episode with screaming, shaking and "getting stiff" at her residence during the dinner. Pt is minimally verbal due to cognitive deficit, and therefore all Hx was obtained from collaterals: sister and legal guardian Iris, and "[facility]" worker Leanna. Since about 12/2018, when Pt was hospitalized to [facility] for SZs and her Keppra dose was increased, Pt has been doing progressively worse- not getting showers, not interested in eating, with episodes when she looked frightened for no obvious reason. Pt never was self-mutilating or overtly aggressive towards others, although she can push a person back at times while highly anxious.    PPH: no h/o SAs, no IPP. Followed up by a psychiatrist DR. Das at "[facility]", has been on Geodon "for years" which "does not do much" as per her sister. Next appointment is on 4/15.    PMH: SZ d/o.     FH: no FH of mental illness    Drug Hx: none    SH: a resident of "[facility]" for 11 years.    MSE: Not oriented, fairly groomed, minimally verbal, slightly restless, speech dysarthric and not understandable, unable to evaluate mood or T/C due to poverty of thought.    A/P 53yo with intellectual disability, Down syndrome, SZ d/o, BIBA due to increase in agitation, likely anxiety. Increase in agitation coincided with the increase in Keppra dose and can be attributed to it. Recommend Benadryl 25mg q6h PRN for anxiety or agitation, follow up with Dr. Das, consider switch from Geodone to Seroquel (more sedating).  Discuss with neuro the alternative anticonvulsant options as Keppra likely increases agitation and anxiety.  No need for IPP.    Dx intellectual disability, medication-induced anxiety disorder.    Case discussed with MERYL Dennis and Dr Dougherty.

## 2019-04-09 NOTE — ED PROVIDER NOTE - CLINICAL SUMMARY MEDICAL DECISION MAKING FREE TEXT BOX
pt  here for increased agitation  hx of seizures on keppra  recently  increased to 500mg BID  -  seen by neuro for agitation - plan d/w family by neuro was to  decrease keppra to once a day starting today - pt had another  episod eof   panic and  anxiety agitation -  -  labs in ED wnl  no leukocytosis,   labs wnl,  ct head negative  for acute pathology -  family refused  urine sample - given no fever and leukocytosis will forego urine sampling.  Pt  received ativan IM for iv placement and CT - pt responded well, was alert  but calm , cooperative -   psychiatry was consulted  who recommends  benadryl  at   until pts  psychiatry appointment this week .  etiology of agitation likely from keppra -  family and  aware of decrease in  Keppra  to once a day -   and follow up with psychiatry outpt . pt dcd in stable condition well appearing,  Patient to be discharged from ED. Any available test results were discussed with and printed  for patient.  Verbal instructions given, including instructions to return to ED immediately for any new, worsening, or concerning symptoms. Patient endorsed understanding. Written discharge instructions additionally given, including follow-up plan. pt  here for increased agitation  hx of seizures on keppra  recently  increased to 500mg BID  -  seen by neuro for agitation - plan d/w family by neuro was to  decrease keppra to once a day starting today - pt had another  episod eof   panic and  anxiety agitation -  -  labs in ED wnl  no leukocytosis,   labs wnl,  ct head negative  for acute pathology -  family refused  urine sample - given no fever and leukocytosis will forego urine sampling.  Pt  received ativan IM for iv placement and CT - pt responded well, was alert  but calm , cooperative -   psychiatry was consulted  who recommends  benadryl  at   until pts  psychiatry appointment this week .  etiology of agitation likely from keppra -  family and  aware of decrease in  Keppra  to once a day -   and follow up with psychiatry outpt . pt dcd in stable condition well appearing,   discussed all above with  staff at    [facility]  ( [phone])  Patient to be discharged from ED. Any available test results were discussed with and printed  for patient.  Verbal instructions given, including instructions to return to ED immediately for any new, worsening, or concerning symptoms. Patient endorsed understanding. Written discharge instructions additionally given, including follow-up plan.

## 2019-04-09 NOTE — ED PROVIDER NOTE - ATTENDING CONTRIBUTION TO CARE
I personally evaluated the patient. I reviewed the Resident’s or Physician Assistant’s note (as assigned above), and agree with the findings and plan except as documented in my note.  54yF  pmhx seizures dxd this year - followed by Dr erazo,  hypothyroid,   behavior problems on geodon,  with recent  change in  seizures meds   Dec keppra  250 BID, increased to 500BID a few weeks ago,  pt with increased agitation per sister  over past few weeks -  seen by neuro and plan to  decrease keppra  to  500 once  a day  -  starting today - brought in  today  for  increased agitation and "panic attacks",    not leaving house no showering,   shaky, alert,  anxious -  occurs in episodes.   no fall no trauma.  pe Alert nontoxic, perrl eomi, no pallor  no jaundice  CVS RRR, Resp CTA no tachypnea no hyperpnea, Abd soft nontender   nondistended , No cva tenderness, No le edema, no skin lesions.

## 2019-04-11 ENCOUNTER — INPATIENT (INPATIENT)
Facility: HOSPITAL | Age: 54
LOS: 3 days | Discharge: NURSING, REHAB OR LONG-TERM CARE | End: 2019-04-15
Attending: HOSPITALIST | Admitting: HOSPITALIST
Payer: MEDICARE

## 2019-04-11 VITALS
OXYGEN SATURATION: 99 % | TEMPERATURE: 99 F | DIASTOLIC BLOOD PRESSURE: 90 MMHG | SYSTOLIC BLOOD PRESSURE: 130 MMHG | HEART RATE: 76 BPM | RESPIRATION RATE: 18 BRPM

## 2019-04-11 DIAGNOSIS — F63.9 IMPULSE DISORDER, UNSPECIFIED: ICD-10-CM

## 2019-04-11 DIAGNOSIS — Q90.9 DOWN SYNDROME, UNSPECIFIED: ICD-10-CM

## 2019-04-11 LAB
ALBUMIN SERPL ELPH-MCNC: 4 G/DL — SIGNIFICANT CHANGE UP (ref 3.5–5.2)
ALP SERPL-CCNC: 88 U/L — SIGNIFICANT CHANGE UP (ref 30–115)
ALT FLD-CCNC: 24 U/L — SIGNIFICANT CHANGE UP (ref 0–41)
ANION GAP SERPL CALC-SCNC: 12 MMOL/L — SIGNIFICANT CHANGE UP (ref 7–14)
AST SERPL-CCNC: 56 U/L — HIGH (ref 0–41)
BILIRUB SERPL-MCNC: 0.6 MG/DL — SIGNIFICANT CHANGE UP (ref 0.2–1.2)
BUN SERPL-MCNC: 17 MG/DL — SIGNIFICANT CHANGE UP (ref 10–20)
CALCIUM SERPL-MCNC: 9.1 MG/DL — SIGNIFICANT CHANGE UP (ref 8.5–10.1)
CHLORIDE SERPL-SCNC: 107 MMOL/L — SIGNIFICANT CHANGE UP (ref 98–110)
CO2 SERPL-SCNC: 24 MMOL/L — SIGNIFICANT CHANGE UP (ref 17–32)
CREAT SERPL-MCNC: 1.2 MG/DL — SIGNIFICANT CHANGE UP (ref 0.7–1.5)
GLUCOSE SERPL-MCNC: 113 MG/DL — HIGH (ref 70–99)
HCT VFR BLD CALC: 42.8 % — SIGNIFICANT CHANGE UP (ref 37–47)
HGB BLD-MCNC: 14.3 G/DL — SIGNIFICANT CHANGE UP (ref 12–16)
MCHC RBC-ENTMCNC: 31.8 PG — HIGH (ref 27–31)
MCHC RBC-ENTMCNC: 33.4 G/DL — SIGNIFICANT CHANGE UP (ref 32–37)
MCV RBC AUTO: 95.3 FL — SIGNIFICANT CHANGE UP (ref 81–99)
NRBC # BLD: 0 /100 WBCS — SIGNIFICANT CHANGE UP (ref 0–0)
PLATELET # BLD AUTO: 195 K/UL — SIGNIFICANT CHANGE UP (ref 130–400)
POTASSIUM SERPL-MCNC: 5.1 MMOL/L — HIGH (ref 3.5–5)
POTASSIUM SERPL-SCNC: 5.1 MMOL/L — HIGH (ref 3.5–5)
PROT SERPL-MCNC: 7.1 G/DL — SIGNIFICANT CHANGE UP (ref 6–8)
RBC # BLD: 4.49 M/UL — SIGNIFICANT CHANGE UP (ref 4.2–5.4)
RBC # FLD: 13.2 % — SIGNIFICANT CHANGE UP (ref 11.5–14.5)
SODIUM SERPL-SCNC: 143 MMOL/L — SIGNIFICANT CHANGE UP (ref 135–146)
WBC # BLD: 6.42 K/UL — SIGNIFICANT CHANGE UP (ref 4.8–10.8)
WBC # FLD AUTO: 6.42 K/UL — SIGNIFICANT CHANGE UP (ref 4.8–10.8)

## 2019-04-11 PROCEDURE — 71045 X-RAY EXAM CHEST 1 VIEW: CPT | Mod: 26

## 2019-04-11 PROCEDURE — 99285 EMERGENCY DEPT VISIT HI MDM: CPT | Mod: GC

## 2019-04-11 RX ORDER — ZIPRASIDONE HYDROCHLORIDE 20 MG/1
60 CAPSULE ORAL
Qty: 0 | Refills: 0 | Status: DISCONTINUED | OUTPATIENT
Start: 2019-04-11 | End: 2019-04-15

## 2019-04-11 RX ORDER — HEPARIN SODIUM 5000 [USP'U]/ML
5000 INJECTION INTRAVENOUS; SUBCUTANEOUS EVERY 8 HOURS
Qty: 0 | Refills: 0 | Status: DISCONTINUED | OUTPATIENT
Start: 2019-04-11 | End: 2019-04-15

## 2019-04-11 RX ORDER — LEVETIRACETAM 250 MG/1
250 TABLET, FILM COATED ORAL
Qty: 0 | Refills: 0 | Status: DISCONTINUED | OUTPATIENT
Start: 2019-04-11 | End: 2019-04-15

## 2019-04-11 RX ORDER — SIMVASTATIN 20 MG/1
20 TABLET, FILM COATED ORAL AT BEDTIME
Qty: 0 | Refills: 0 | Status: DISCONTINUED | OUTPATIENT
Start: 2019-04-11 | End: 2019-04-15

## 2019-04-11 RX ORDER — LEVOTHYROXINE SODIUM 125 MCG
112 TABLET ORAL DAILY
Qty: 0 | Refills: 0 | Status: DISCONTINUED | OUTPATIENT
Start: 2019-04-11 | End: 2019-04-15

## 2019-04-11 RX ADMIN — SIMVASTATIN 20 MILLIGRAM(S): 20 TABLET, FILM COATED ORAL at 21:58

## 2019-04-11 RX ADMIN — HEPARIN SODIUM 5000 UNIT(S): 5000 INJECTION INTRAVENOUS; SUBCUTANEOUS at 21:58

## 2019-04-11 NOTE — ED PROVIDER NOTE - CLINICAL SUMMARY MEDICAL DECISION MAKING FREE TEXT BOX
Labs ok, seen by psych, no acute intervention. As pt can not do ADL's with facility staff, a departure from baseline, will admit for neuro eval.

## 2019-04-11 NOTE — ED ADULT NURSE NOTE - OBJECTIVE STATEMENT
pt sent from group home [facility] for change in behavior x 1 week. As per staff at bed side, pt hasn't been cooperative at the group home, refusing ton go to programs, and sleep at night stating she was very agitated last night and didn't sleep until 4 am , refusing to sleep in her room stating that she "scared". Staff unable to know the reason for behaviors, pt takes her prescribed meds, no recent fall or head trauma. pt is calm and cooperative in the ED.

## 2019-04-11 NOTE — ED PROVIDER NOTE - CARE PLAN
Principal Discharge DX:	Inability to perform activities of daily living  Secondary Diagnosis:	Impulse control disorder in adult

## 2019-04-11 NOTE — ED BEHAVIORAL HEALTH ASSESSMENT NOTE - RISK ASSESSMENT
Chronic elevated risk for aggression/self injury due to hx of impulse control disorder, anxiety and intellectual impairment from down's syndrome, which would not be modified by IPP admission at this time. Chronic risk is also mitigated by the fact that patient denies sucidality, has no prior suicide attempts, has not demonstrated aggressive behavior or self injurious behavior, has outpatient psychiatrist, lives in a structured facility and has access to ER should she demonstrate dangerous behavior.

## 2019-04-11 NOTE — ED PROVIDER NOTE - PHYSICAL EXAMINATION
Down syndrome patient, Non toxic appearing, NAD, watching moving on iPad.  Skin - warm and dry, no acute rash.   Head - normocephalic, atraumatic.   Eyes - PERRLA/EOMI, conjunctiva and sclera clear.   ENT- MM moist, no nasal discharge.  Pharynx unremarkable.  No mastoid or temporal ttp.   Neck - supple nt, no meningeal signs.   Heart - RRR s1s2 nl, no rub/murmur.   Lungs- No retractions, BS equal, CTAB.   Abdomen - soft ntnd no r/g.   Extremities- moves all, +equal distal pulses, brisk cap refill, sensation wnl, normal ROM. No LE edema, calves nttp b/l.

## 2019-04-11 NOTE — ED ADULT NURSE NOTE - CHPI ED NUR SYMPTOMS NEG
no change in level of consciousness/no confusion/no vomiting/no weakness/no dizziness/no blurred vision/no fever/no nausea/no numbness/no loss of consciousness

## 2019-04-11 NOTE — ED PROVIDER NOTE - OBJECTIVE STATEMENT
55 yo F with a hx of down syndrome, seizure disorder on Keppra (neurologist Dr. Torres), presents with changes in behavior. Over the last few weeks, pt having increasing anxiety, agitation and being more withdrawn (not wanting to eat, shower or got to program). Brought in by aid at group home who confirms story by sister, who brought patient here 2 days ago for the same complaint and was seen by psych at that time and recommend to f/u with outpatient psych and neuro. According to aid, no significant changes since 2 days ago. No concerns for self harm or harm to others.

## 2019-04-11 NOTE — ED BEHAVIORAL HEALTH ASSESSMENT NOTE - OTHER PAST PSYCHIATRIC HISTORY (INCLUDE DETAILS REGARDING ONSET, COURSE OF ILLNESS, INPATIENT/OUTPATIENT TREATMENT)
Patient has been diagnosed with Down's syndrome and impulse control disorder. NO prior IPP admission. NO prior suicide attempts per collateral. Currently prescribed Ziprasidone 60mg daily for impulse control disorder. No known history of psychosis or arleth

## 2019-04-11 NOTE — H&P ADULT - HISTORY OF PRESENT ILLNESS
54 Year Old  Female with a PMH of down syndrome, seizure disorder on Keppra (neurologist Dr. Torres), presents with changes in behavior. Over the last few weeks, pt having increasing anxiety, agitation and being more withdrawn (not wanting to eat, shower or got to program). Brought in by aid at group home who confirms story by sister, who brought patient here 2 days ago for the same complaint and was seen by psych at that time and recommend to f/u with outpatient psych and neuro. According to aid, no significant changes since 2 days ago. No concerns for self harm or harm to others.

## 2019-04-11 NOTE — H&P ADULT - NSHPPHYSICALEXAM_GEN_ALL_CORE
PHYSICAL EXAM:  GENERAL: NAD, no signs of respiratory distress  HEAD:  Atraumatic, Normocephalic  EYES: EOMI, PERRLA, conjunctiva and sclera clear  NECK: Supple, No JVD  CHEST/LUNG: Clear to auscultation bilaterally; No wheeze; No crackles; No accessory muscles used  HEART: Regular rate and rhythm; No murmurs;  ABDOMEN: Soft, Nontender, Nondistended; Bowel sounds present; No guarding  EXTREMITIES:  2+ Peripheral Pulses, No cyanosis or edema  PSYCH: awake and alert. she has down syndrome and cannot be assessed fully as she was uncooperative.   NEUROLOGY: non-focal

## 2019-04-11 NOTE — ED ADULT TRIAGE NOTE - CHIEF COMPLAINT QUOTE
pt bibems for behavioral change frrom group home , hx of down syndrom. at baseline pt is ax0x3 & verbal

## 2019-04-11 NOTE — ED BEHAVIORAL HEALTH ASSESSMENT NOTE - OTHER
unable to assess 2/2 intellectual impairment resulting in poor cognition/ communication Outpatient psychiatrist,  at [facility]  [facility] Domiciled in group home.

## 2019-04-11 NOTE — H&P ADULT - ASSESSMENT
54 Year Old  Female with a PMH of down syndrome, seizure disorder on Keppra (neurologist Dr. Torres), presents with changes in behavior. Over the last few weeks, pt having increasing anxiety, agitation and being more withdrawn (not wanting to eat, shower or got to program). Brought in by aid at group home who confirms story by sister, who brought patient here 2 days ago for the same complaint and was seen by psych at that time and recommend to f/u with outpatient psych and neuro. According to aid, no significant changes since 2 days ago. No concerns for self harm or harm to others.       1) Down Syndrome With new Personality changes and aggression  Likely secondary to Progression of Dementia vs Medication adjustment   Continue with Current Rx regimen   Follow with Psych on further recommendations and medication adjustment   Patient needs placement as the facility she is currently residing at cannot tend to her increasing needs.     2) Seizure Disorder   Stable at this time   Continue with Keppra 250 twice daily     3) Hypothyroidism   Continue with Synthroid 112 was taking 88 in the past.   Follow with TSH     4) DVT Prophylaxis   Heparin Sub Q     5) Disposition   Admitted to medicine for new onset personality and aggressive behavior changes.   Patient needs placement   Full Code    Contact me at [phone] for any further questions or concerns

## 2019-04-11 NOTE — H&P ADULT - NSICDXPASTMEDICALHX_GEN_ALL_CORE_FT
PAST MEDICAL HISTORY:  Down's syndrome     Hypothyroid     Impulse control disorder in adult     Intellectual disability

## 2019-04-11 NOTE — ED BEHAVIORAL HEALTH ASSESSMENT NOTE - SUMMARY
54F with down's syndrome, impulse control disorder and anxiety, in outpatient treatment, domiciled at group home presents in ED BIB EMS due to behavioral change at group home. Patient was seen by behavioral health on 4/9/2019 for verbal agitaition and discharged with recommendation to follow up with outpatient psychiatrist. Her behavior was thought to have possibly changed to Keppra.    Patient's behavior is likely secondary to intellectual impairment from down's syndrome. Although she has a history of impulse control disorder, she has maintained good impulse control in ED and since visit to ED 2 days ago. Agree with prior recommendation to consult neurology and reconsider Keppra as anticonvulsant due to its potential for inciting irritability. Agree with Dr. Das's recommendation to create behavioral plan to address patient's behavior.

## 2019-04-11 NOTE — ED PROVIDER NOTE - ATTENDING CONTRIBUTION TO CARE
55yo woman h/o Down Syndrome, sz d/o on Keppra BIB sister and staff from living facility [facility] where pt has lived for 10+ years, due to change in behavior. Pt is minimally verbal at baseline, has some impulse control issues but is usually engaged, cheerful, and compliant with meds and ADLs, some with assistance. She had a sz a few weeks ago and keppra was increased, and snce then pt has had a dramatic change in behavior; she has been irritable and withdrawn, refusing to bath, eat, walk, use the bathroom (she has been urinating in pullup which is unusual for her). No trauma, fever, chills, vomiting but pt can not verbalize what is wrong. She was seen in the ED a few days ago, labs and head CT were normal, psychiatry was consulted and rec med changes but no effects yet. VS, exam as noted, pt is well appearing and alert, but flat affect which is atypical per sister. Lungs CTA, CVS1S2 RRR abd soft, NT, neuro grossly intact/baseline. As pt is having difficulty with compliance and not doing adls at residence, will likely need admission; uncertain if sx are due to behavior, meds, neuro issue. Family and agency comfortable with plan.

## 2019-04-11 NOTE — H&P ADULT - ATTENDING COMMENTS
Agree with resident's note, HPI, PE, assessment and plan.  Pt was seen and examined independently.   Hx obtained form sister and aid from group home.  54 Year Old  Female with a PMH of Down syndrome, seizure disorder on Keppra (started in December 2018, dose decreased to 250 BID a month ago), presents with changes in behavior. Over the last few weeks, pt having increasing anxiety, agitation and being more withdrawn (not wanting to eat, shower or got to program). Pt was brought here 2 days ago for the same complaint and was seen by psych at that time and recommend to f/u with outpatient psych and neuro. According to aid, no significant changes since 2 days ago. No concerns for self harm or harm to others.  Pt does not have complaints. Denies pain anywhere.    ROS: unable to obtain, pt has cognitive decline  T(C): 36.4  HR: 58  BP: 114/73  RR: 19  SpO2: 99%    Physical exam:  NAD, pt is intermittently agitated, Down syndrome facial features  HEENT: PERRL  NECK: supple, no JVD  RESP: CTA b/l, no crackles, rhonchi  CVS: S1S2, RRR  GI: abdomen soft NT, ND  Extremities: no c/c/edema  NEURO: awake, follows simple commands  H/L: no enlarged LN noted     LABS:                       14.5   5.43  )-----------( 191      ( 12 Apr 2019 06:16 )             43.8   04-12    144  |  105  |  19  ----------------------------<  101<H>  4.6   |  26  |  1.2    Ca    8.9      12 Apr 2019 06:16    TPro  6.9  /  Alb  4.0  /  TBili  0.5  /  DBili  x   /  AST  33  /  ALT  21  /  AlkPhos  88  04-12    CXR: No radiographic evidence of acute pulmonary disease.    CT head: IMPRESSION:  Motion degraded exam without definite large intracranial hemorrhage or   midline shift    EKG: NSR @66    A/P: # Down syndrome with behavioral changes.  # Seizure disorder  # Hypothyroidism  - check Keppra level  - TSH 0.68 << 4.70  - will clarify if Synthroid was increased recently  - Neuro eval  - psychiatry eval appreciated  - Ativan 1 mg PO Q 6 hrs PRN  - Haldol 0.5 mg PO Q 8 hrs PRN if pt is very agitated, monitor QTC if Haldol given    DVT ppx

## 2019-04-11 NOTE — ED BEHAVIORAL HEALTH ASSESSMENT NOTE - HPI (INCLUDE ILLNESS QUALITY, SEVERITY, DURATION, TIMING, CONTEXT, MODIFYING FACTORS, ASSOCIATED SIGNS AND SYMPTOMS)
54F with down's syndrome, impulse control disorder and anxiety, in outpatient treatment, domiciled at group home presents in ED BIB EMS due to behavioral change at group home. Patient was seen by behavioral health on 4/9/2019 for verbal agitaition and discharged with recommendation to follow up with outpatient psychiatrist. Her behavior was thought to have possibly changed to Keppra.    ON interview, patient acknowledges her name. She does not communicate well and is poorly related. She states, "no" when asked about depression or anxiety symptoms. States, "no" when asked about suicidality. Does not answer questions about orientation and instead grabs writer's badge and states, "you're the nga in the picture"     Spoke to : Patient has not been engaging in ADLs as she once was marked by refusing to bathe, also refusing to go to day program and follow instruction. This has been ongoing for a few weeks. Patient has not been aggressive. Patient has not engaged in self injurious behavior    Spoke with Dr. Das (outpatient psychiatrist): She states that she is unaware of patient's current presentation. States that patient would benefit from behavioral plan and continue meds. No safety concerns elicited. She will follow up with patient.,

## 2019-04-11 NOTE — ED ADULT NURSE NOTE - NSIMPLEMENTINTERV_GEN_ALL_ED
Implemented All Universal Safety Interventions:  Girdler to call system. Call bell, personal items and telephone within reach. Instruct patient to call for assistance. Room bathroom lighting operational. Non-slip footwear when patient is off stretcher. Physically safe environment: no spills, clutter or unnecessary equipment. Stretcher in lowest position, wheels locked, appropriate side rails in place.

## 2019-04-12 LAB
ALBUMIN SERPL ELPH-MCNC: 4 G/DL — SIGNIFICANT CHANGE UP (ref 3.5–5.2)
ALP SERPL-CCNC: 88 U/L — SIGNIFICANT CHANGE UP (ref 30–115)
ALT FLD-CCNC: 21 U/L — SIGNIFICANT CHANGE UP (ref 0–41)
ANION GAP SERPL CALC-SCNC: 13 MMOL/L — SIGNIFICANT CHANGE UP (ref 7–14)
AST SERPL-CCNC: 33 U/L — SIGNIFICANT CHANGE UP (ref 0–41)
BASOPHILS # BLD AUTO: 0.08 K/UL — SIGNIFICANT CHANGE UP (ref 0–0.2)
BASOPHILS NFR BLD AUTO: 1.5 % — HIGH (ref 0–1)
BILIRUB SERPL-MCNC: 0.5 MG/DL — SIGNIFICANT CHANGE UP (ref 0.2–1.2)
BUN SERPL-MCNC: 19 MG/DL — SIGNIFICANT CHANGE UP (ref 10–20)
CALCIUM SERPL-MCNC: 8.9 MG/DL — SIGNIFICANT CHANGE UP (ref 8.5–10.1)
CHLORIDE SERPL-SCNC: 105 MMOL/L — SIGNIFICANT CHANGE UP (ref 98–110)
CO2 SERPL-SCNC: 26 MMOL/L — SIGNIFICANT CHANGE UP (ref 17–32)
CREAT SERPL-MCNC: 1.2 MG/DL — SIGNIFICANT CHANGE UP (ref 0.7–1.5)
EOSINOPHIL # BLD AUTO: 0.06 K/UL — SIGNIFICANT CHANGE UP (ref 0–0.7)
EOSINOPHIL NFR BLD AUTO: 1.1 % — SIGNIFICANT CHANGE UP (ref 0–8)
GLUCOSE SERPL-MCNC: 101 MG/DL — HIGH (ref 70–99)
HCT VFR BLD CALC: 43.8 % — SIGNIFICANT CHANGE UP (ref 37–47)
HGB BLD-MCNC: 14.5 G/DL — SIGNIFICANT CHANGE UP (ref 12–16)
IMM GRANULOCYTES NFR BLD AUTO: 0.4 % — HIGH (ref 0.1–0.3)
LEVETIRACETAM SERPL-MCNC: 16.8 MCG/ML — SIGNIFICANT CHANGE UP (ref 12–46)
LYMPHOCYTES # BLD AUTO: 1.72 K/UL — SIGNIFICANT CHANGE UP (ref 1.2–3.4)
LYMPHOCYTES # BLD AUTO: 31.7 % — SIGNIFICANT CHANGE UP (ref 20.5–51.1)
MCHC RBC-ENTMCNC: 31.5 PG — HIGH (ref 27–31)
MCHC RBC-ENTMCNC: 33.1 G/DL — SIGNIFICANT CHANGE UP (ref 32–37)
MCV RBC AUTO: 95.2 FL — SIGNIFICANT CHANGE UP (ref 81–99)
MONOCYTES # BLD AUTO: 0.56 K/UL — SIGNIFICANT CHANGE UP (ref 0.1–0.6)
MONOCYTES NFR BLD AUTO: 10.3 % — HIGH (ref 1.7–9.3)
NEUTROPHILS # BLD AUTO: 2.99 K/UL — SIGNIFICANT CHANGE UP (ref 1.4–6.5)
NEUTROPHILS NFR BLD AUTO: 55 % — SIGNIFICANT CHANGE UP (ref 42.2–75.2)
NRBC # BLD: 0 /100 WBCS — SIGNIFICANT CHANGE UP (ref 0–0)
PLATELET # BLD AUTO: 191 K/UL — SIGNIFICANT CHANGE UP (ref 130–400)
POTASSIUM SERPL-MCNC: 4.6 MMOL/L — SIGNIFICANT CHANGE UP (ref 3.5–5)
POTASSIUM SERPL-SCNC: 4.6 MMOL/L — SIGNIFICANT CHANGE UP (ref 3.5–5)
PROT SERPL-MCNC: 6.9 G/DL — SIGNIFICANT CHANGE UP (ref 6–8)
RBC # BLD: 4.6 M/UL — SIGNIFICANT CHANGE UP (ref 4.2–5.4)
RBC # FLD: 13.3 % — SIGNIFICANT CHANGE UP (ref 11.5–14.5)
SODIUM SERPL-SCNC: 144 MMOL/L — SIGNIFICANT CHANGE UP (ref 135–146)
TSH SERPL-MCNC: 0.68 UIU/ML — SIGNIFICANT CHANGE UP (ref 0.27–4.2)
WBC # BLD: 5.43 K/UL — SIGNIFICANT CHANGE UP (ref 4.8–10.8)
WBC # FLD AUTO: 5.43 K/UL — SIGNIFICANT CHANGE UP (ref 4.8–10.8)

## 2019-04-12 RX ORDER — HALOPERIDOL DECANOATE 100 MG/ML
0.5 INJECTION INTRAMUSCULAR EVERY 8 HOURS
Qty: 0 | Refills: 0 | Status: DISCONTINUED | OUTPATIENT
Start: 2019-04-12 | End: 2019-04-15

## 2019-04-12 RX ORDER — CHLORHEXIDINE GLUCONATE 213 G/1000ML
1 SOLUTION TOPICAL
Qty: 0 | Refills: 0 | Status: DISCONTINUED | OUTPATIENT
Start: 2019-04-12 | End: 2019-04-15

## 2019-04-12 RX ORDER — PETROLATUM,WHITE
1 JELLY (GRAM) TOPICAL
Qty: 0 | Refills: 0 | Status: DISCONTINUED | OUTPATIENT
Start: 2019-04-12 | End: 2019-04-15

## 2019-04-12 RX ADMIN — Medication 112 MICROGRAM(S): at 05:47

## 2019-04-12 RX ADMIN — ZIPRASIDONE HYDROCHLORIDE 60 MILLIGRAM(S): 20 CAPSULE ORAL at 05:49

## 2019-04-12 RX ADMIN — HEPARIN SODIUM 5000 UNIT(S): 5000 INJECTION INTRAVENOUS; SUBCUTANEOUS at 05:48

## 2019-04-12 RX ADMIN — HEPARIN SODIUM 5000 UNIT(S): 5000 INJECTION INTRAVENOUS; SUBCUTANEOUS at 14:31

## 2019-04-12 RX ADMIN — LEVETIRACETAM 250 MILLIGRAM(S): 250 TABLET, FILM COATED ORAL at 18:19

## 2019-04-12 RX ADMIN — HEPARIN SODIUM 5000 UNIT(S): 5000 INJECTION INTRAVENOUS; SUBCUTANEOUS at 21:57

## 2019-04-12 RX ADMIN — Medication 1 MILLIGRAM(S): at 14:31

## 2019-04-12 RX ADMIN — LEVETIRACETAM 250 MILLIGRAM(S): 250 TABLET, FILM COATED ORAL at 05:48

## 2019-04-12 RX ADMIN — Medication 1 APPLICATION(S): at 18:19

## 2019-04-12 RX ADMIN — SIMVASTATIN 20 MILLIGRAM(S): 20 TABLET, FILM COATED ORAL at 21:57

## 2019-04-13 LAB
ANION GAP SERPL CALC-SCNC: 9 MMOL/L — SIGNIFICANT CHANGE UP (ref 7–14)
BASOPHILS # BLD AUTO: 0.08 K/UL — SIGNIFICANT CHANGE UP (ref 0–0.2)
BASOPHILS NFR BLD AUTO: 2.4 % — HIGH (ref 0–1)
BUN SERPL-MCNC: 16 MG/DL — SIGNIFICANT CHANGE UP (ref 10–20)
CALCIUM SERPL-MCNC: 8.8 MG/DL — SIGNIFICANT CHANGE UP (ref 8.5–10.1)
CHLORIDE SERPL-SCNC: 107 MMOL/L — SIGNIFICANT CHANGE UP (ref 98–110)
CO2 SERPL-SCNC: 26 MMOL/L — SIGNIFICANT CHANGE UP (ref 17–32)
CREAT SERPL-MCNC: 1.2 MG/DL — SIGNIFICANT CHANGE UP (ref 0.7–1.5)
EOSINOPHIL # BLD AUTO: 0.04 K/UL — SIGNIFICANT CHANGE UP (ref 0–0.7)
EOSINOPHIL NFR BLD AUTO: 1.2 % — SIGNIFICANT CHANGE UP (ref 0–8)
FOLATE SERPL-MCNC: 9.3 NG/ML — SIGNIFICANT CHANGE UP
GLUCOSE SERPL-MCNC: 90 MG/DL — SIGNIFICANT CHANGE UP (ref 70–99)
HCT VFR BLD CALC: 41.7 % — SIGNIFICANT CHANGE UP (ref 37–47)
HGB BLD-MCNC: 13.8 G/DL — SIGNIFICANT CHANGE UP (ref 12–16)
IMM GRANULOCYTES NFR BLD AUTO: 0.3 % — SIGNIFICANT CHANGE UP (ref 0.1–0.3)
LYMPHOCYTES # BLD AUTO: 0.97 K/UL — LOW (ref 1.2–3.4)
LYMPHOCYTES # BLD AUTO: 28.9 % — SIGNIFICANT CHANGE UP (ref 20.5–51.1)
MAGNESIUM SERPL-MCNC: 2.1 MG/DL — SIGNIFICANT CHANGE UP (ref 1.8–2.4)
MCHC RBC-ENTMCNC: 31.6 PG — HIGH (ref 27–31)
MCHC RBC-ENTMCNC: 33.1 G/DL — SIGNIFICANT CHANGE UP (ref 32–37)
MCV RBC AUTO: 95.4 FL — SIGNIFICANT CHANGE UP (ref 81–99)
MONOCYTES # BLD AUTO: 0.33 K/UL — SIGNIFICANT CHANGE UP (ref 0.1–0.6)
MONOCYTES NFR BLD AUTO: 9.8 % — HIGH (ref 1.7–9.3)
NEUTROPHILS # BLD AUTO: 1.93 K/UL — SIGNIFICANT CHANGE UP (ref 1.4–6.5)
NEUTROPHILS NFR BLD AUTO: 57.4 % — SIGNIFICANT CHANGE UP (ref 42.2–75.2)
NRBC # BLD: 0 /100 WBCS — SIGNIFICANT CHANGE UP (ref 0–0)
PLATELET # BLD AUTO: 175 K/UL — SIGNIFICANT CHANGE UP (ref 130–400)
POTASSIUM SERPL-MCNC: 4.4 MMOL/L — SIGNIFICANT CHANGE UP (ref 3.5–5)
POTASSIUM SERPL-SCNC: 4.4 MMOL/L — SIGNIFICANT CHANGE UP (ref 3.5–5)
RBC # BLD: 4.37 M/UL — SIGNIFICANT CHANGE UP (ref 4.2–5.4)
RBC # FLD: 13.3 % — SIGNIFICANT CHANGE UP (ref 11.5–14.5)
SODIUM SERPL-SCNC: 142 MMOL/L — SIGNIFICANT CHANGE UP (ref 135–146)
VIT B12 SERPL-MCNC: 353 PG/ML — SIGNIFICANT CHANGE UP (ref 232–1245)
WBC # BLD: 3.36 K/UL — LOW (ref 4.8–10.8)
WBC # FLD AUTO: 3.36 K/UL — LOW (ref 4.8–10.8)

## 2019-04-13 PROCEDURE — 93010 ELECTROCARDIOGRAM REPORT: CPT

## 2019-04-13 RX ORDER — ALPRAZOLAM 0.25 MG
0.25 TABLET ORAL
Qty: 0 | Refills: 0 | Status: DISCONTINUED | OUTPATIENT
Start: 2019-04-13 | End: 2019-04-15

## 2019-04-13 RX ADMIN — HEPARIN SODIUM 5000 UNIT(S): 5000 INJECTION INTRAVENOUS; SUBCUTANEOUS at 21:12

## 2019-04-13 RX ADMIN — SIMVASTATIN 20 MILLIGRAM(S): 20 TABLET, FILM COATED ORAL at 21:12

## 2019-04-13 RX ADMIN — Medication 0.25 MILLIGRAM(S): at 18:47

## 2019-04-13 RX ADMIN — Medication 1 APPLICATION(S): at 18:48

## 2019-04-13 RX ADMIN — LEVETIRACETAM 250 MILLIGRAM(S): 250 TABLET, FILM COATED ORAL at 18:47

## 2019-04-13 RX ADMIN — HEPARIN SODIUM 5000 UNIT(S): 5000 INJECTION INTRAVENOUS; SUBCUTANEOUS at 06:22

## 2019-04-13 RX ADMIN — CHLORHEXIDINE GLUCONATE 1 APPLICATION(S): 213 SOLUTION TOPICAL at 06:23

## 2019-04-13 RX ADMIN — Medication 1 APPLICATION(S): at 06:22

## 2019-04-13 RX ADMIN — ZIPRASIDONE HYDROCHLORIDE 60 MILLIGRAM(S): 20 CAPSULE ORAL at 06:22

## 2019-04-13 RX ADMIN — Medication 112 MICROGRAM(S): at 06:22

## 2019-04-13 RX ADMIN — Medication 1 MILLIGRAM(S): at 10:22

## 2019-04-13 RX ADMIN — HEPARIN SODIUM 5000 UNIT(S): 5000 INJECTION INTRAVENOUS; SUBCUTANEOUS at 14:09

## 2019-04-13 RX ADMIN — LEVETIRACETAM 250 MILLIGRAM(S): 250 TABLET, FILM COATED ORAL at 06:22

## 2019-04-13 NOTE — PROGRESS NOTE ADULT - SUBJECTIVE AND OBJECTIVE BOX
SALVATORE STEELE    Patient is a 54y old  Female who presents with a chief complaint of Placement social admission (12 Apr 2019 13:22)    HPI:  54 Year Old  Female with a PMH of down syndrome, seizure disorder on Keppra (neurologist Dr. Torres), presents with changes in behavior. Over the last few weeks, pt having increasing anxiety, agitation and being more withdrawn (not wanting to eat, shower or got to program). Brought in by aid at group home who confirms story by sister, who brought patient here 2 days ago for the same complaint and was seen by psych at that time and recommend to f/u with outpatient psych and neuro. According to aid, no significant changes since 2 days ago. No concerns for self harm or harm to others. (11 Apr 2019 18:42)    INTERVAL HPI/OVERNIGHT EVENTS: no events overnight, pt was given Ativan overnight and this morning, reported by nurse and also sister states pt is much calmer now, was taking a shower and walked in a hallway with a walker.     ROS: unable to obtain    PHYSICAL EXAM:  T(C): 35.8, Max: 36.6 (04-13-19 @ 08:33)  HR: 50 (42 - 51)  BP: 110/53 (102/46 - 123/58)  RR: 18 (18 - 18)  SpO2: --    NAD, pt is calm, NAD, Down syndrome facial features  HEENT: PERRL  NECK: supple, no JVD  RESP: CTA b/l, no crackles, rhonchi  CVS: S1S2, RRR  GI: abdomen soft NT, ND  Extremities: no c/c/edema  NEURO: awake, follows simple commands  H/L: no enlarged LN noted     LABS:                        13.8   3.36  )-----------( 175      ( 13 Apr 2019 08:02 )             41.7     04-13    142  |  107  |  16  ----------------------------<  90  4.4   |  26  |  1.2    Ca    8.8      13 Apr 2019 08:02  Mg     2.1     04-13    TPro  6.9  /  Alb  4.0  /  TBili  0.5  /  DBili  x   /  AST  33  /  ALT  21  /  AlkPhos  88  04-12      MEDICATIONS  (STANDING):  ALPRAZolam 0.25 milliGRAM(s) Oral two times a day  chlorhexidine 4% Liquid 1 Application(s) Topical <User Schedule>  heparin  Injectable 5000 Unit(s) SubCutaneous every 8 hours  levETIRAcetam 250 milliGRAM(s) Oral two times a day  levothyroxine 112 MICROGram(s) Oral daily  petrolatum white Ointment 1 Application(s) Topical two times a day  simvastatin 20 milliGRAM(s) Oral at bedtime  ziprasidone 60 milliGRAM(s) Oral <User Schedule>    MEDICATIONS  (PRN):  haloperidol     Tablet 0.5 milliGRAM(s) Oral every 8 hours PRN agitation

## 2019-04-14 LAB
ANION GAP SERPL CALC-SCNC: 13 MMOL/L — SIGNIFICANT CHANGE UP (ref 7–14)
BASOPHILS # BLD AUTO: 0.09 K/UL — SIGNIFICANT CHANGE UP (ref 0–0.2)
BASOPHILS NFR BLD AUTO: 2.3 % — HIGH (ref 0–1)
BUN SERPL-MCNC: 19 MG/DL — SIGNIFICANT CHANGE UP (ref 10–20)
CALCIUM SERPL-MCNC: 8.8 MG/DL — SIGNIFICANT CHANGE UP (ref 8.5–10.1)
CHLORIDE SERPL-SCNC: 105 MMOL/L — SIGNIFICANT CHANGE UP (ref 98–110)
CO2 SERPL-SCNC: 24 MMOL/L — SIGNIFICANT CHANGE UP (ref 17–32)
CREAT SERPL-MCNC: 1.1 MG/DL — SIGNIFICANT CHANGE UP (ref 0.7–1.5)
EOSINOPHIL # BLD AUTO: 0.06 K/UL — SIGNIFICANT CHANGE UP (ref 0–0.7)
EOSINOPHIL NFR BLD AUTO: 1.5 % — SIGNIFICANT CHANGE UP (ref 0–8)
GLUCOSE SERPL-MCNC: 92 MG/DL — SIGNIFICANT CHANGE UP (ref 70–99)
HCT VFR BLD CALC: 39.6 % — SIGNIFICANT CHANGE UP (ref 37–47)
HGB BLD-MCNC: 13.3 G/DL — SIGNIFICANT CHANGE UP (ref 12–16)
IMM GRANULOCYTES NFR BLD AUTO: 0.3 % — SIGNIFICANT CHANGE UP (ref 0.1–0.3)
LYMPHOCYTES # BLD AUTO: 1.22 K/UL — SIGNIFICANT CHANGE UP (ref 1.2–3.4)
LYMPHOCYTES # BLD AUTO: 30.6 % — SIGNIFICANT CHANGE UP (ref 20.5–51.1)
MAGNESIUM SERPL-MCNC: 2 MG/DL — SIGNIFICANT CHANGE UP (ref 1.8–2.4)
MCHC RBC-ENTMCNC: 31.6 PG — HIGH (ref 27–31)
MCHC RBC-ENTMCNC: 33.6 G/DL — SIGNIFICANT CHANGE UP (ref 32–37)
MCV RBC AUTO: 94.1 FL — SIGNIFICANT CHANGE UP (ref 81–99)
MONOCYTES # BLD AUTO: 0.38 K/UL — SIGNIFICANT CHANGE UP (ref 0.1–0.6)
MONOCYTES NFR BLD AUTO: 9.5 % — HIGH (ref 1.7–9.3)
NEUTROPHILS # BLD AUTO: 2.23 K/UL — SIGNIFICANT CHANGE UP (ref 1.4–6.5)
NEUTROPHILS NFR BLD AUTO: 55.8 % — SIGNIFICANT CHANGE UP (ref 42.2–75.2)
NRBC # BLD: 0 /100 WBCS — SIGNIFICANT CHANGE UP (ref 0–0)
PLATELET # BLD AUTO: 187 K/UL — SIGNIFICANT CHANGE UP (ref 130–400)
POTASSIUM SERPL-MCNC: 4.4 MMOL/L — SIGNIFICANT CHANGE UP (ref 3.5–5)
POTASSIUM SERPL-SCNC: 4.4 MMOL/L — SIGNIFICANT CHANGE UP (ref 3.5–5)
RBC # BLD: 4.21 M/UL — SIGNIFICANT CHANGE UP (ref 4.2–5.4)
RBC # FLD: 13.2 % — SIGNIFICANT CHANGE UP (ref 11.5–14.5)
SODIUM SERPL-SCNC: 142 MMOL/L — SIGNIFICANT CHANGE UP (ref 135–146)
WBC # BLD: 3.99 K/UL — LOW (ref 4.8–10.8)
WBC # FLD AUTO: 3.99 K/UL — LOW (ref 4.8–10.8)

## 2019-04-14 RX ADMIN — Medication 0.25 MILLIGRAM(S): at 07:25

## 2019-04-14 RX ADMIN — Medication 1 APPLICATION(S): at 07:26

## 2019-04-14 RX ADMIN — LEVETIRACETAM 250 MILLIGRAM(S): 250 TABLET, FILM COATED ORAL at 19:48

## 2019-04-14 RX ADMIN — ZIPRASIDONE HYDROCHLORIDE 60 MILLIGRAM(S): 20 CAPSULE ORAL at 08:53

## 2019-04-14 RX ADMIN — HEPARIN SODIUM 5000 UNIT(S): 5000 INJECTION INTRAVENOUS; SUBCUTANEOUS at 07:25

## 2019-04-14 RX ADMIN — Medication 0.25 MILLIGRAM(S): at 19:48

## 2019-04-14 RX ADMIN — CHLORHEXIDINE GLUCONATE 1 APPLICATION(S): 213 SOLUTION TOPICAL at 07:26

## 2019-04-14 RX ADMIN — HEPARIN SODIUM 5000 UNIT(S): 5000 INJECTION INTRAVENOUS; SUBCUTANEOUS at 21:20

## 2019-04-14 RX ADMIN — HEPARIN SODIUM 5000 UNIT(S): 5000 INJECTION INTRAVENOUS; SUBCUTANEOUS at 14:28

## 2019-04-14 RX ADMIN — SIMVASTATIN 20 MILLIGRAM(S): 20 TABLET, FILM COATED ORAL at 21:20

## 2019-04-14 RX ADMIN — Medication 112 MICROGRAM(S): at 07:25

## 2019-04-14 RX ADMIN — LEVETIRACETAM 250 MILLIGRAM(S): 250 TABLET, FILM COATED ORAL at 07:25

## 2019-04-14 RX ADMIN — Medication 1 APPLICATION(S): at 19:48

## 2019-04-15 ENCOUNTER — TRANSCRIPTION ENCOUNTER (OUTPATIENT)
Age: 54
End: 2019-04-15

## 2019-04-15 VITALS
RESPIRATION RATE: 18 BRPM | DIASTOLIC BLOOD PRESSURE: 76 MMHG | SYSTOLIC BLOOD PRESSURE: 132 MMHG | TEMPERATURE: 98 F | HEART RATE: 78 BPM

## 2019-04-15 LAB
ANION GAP SERPL CALC-SCNC: 14 MMOL/L — SIGNIFICANT CHANGE UP (ref 7–14)
BASOPHILS # BLD AUTO: 0.05 K/UL — SIGNIFICANT CHANGE UP (ref 0–0.2)
BASOPHILS NFR BLD AUTO: 1.4 % — HIGH (ref 0–1)
BUN SERPL-MCNC: 18 MG/DL — SIGNIFICANT CHANGE UP (ref 10–20)
CALCIUM SERPL-MCNC: 9.1 MG/DL — SIGNIFICANT CHANGE UP (ref 8.5–10.1)
CHLORIDE SERPL-SCNC: 107 MMOL/L — SIGNIFICANT CHANGE UP (ref 98–110)
CO2 SERPL-SCNC: 26 MMOL/L — SIGNIFICANT CHANGE UP (ref 17–32)
CREAT SERPL-MCNC: 1.2 MG/DL — SIGNIFICANT CHANGE UP (ref 0.7–1.5)
EOSINOPHIL # BLD AUTO: 0.06 K/UL — SIGNIFICANT CHANGE UP (ref 0–0.7)
EOSINOPHIL NFR BLD AUTO: 1.7 % — SIGNIFICANT CHANGE UP (ref 0–8)
GLUCOSE SERPL-MCNC: 90 MG/DL — SIGNIFICANT CHANGE UP (ref 70–99)
HCT VFR BLD CALC: 43.5 % — SIGNIFICANT CHANGE UP (ref 37–47)
HGB BLD-MCNC: 14.4 G/DL — SIGNIFICANT CHANGE UP (ref 12–16)
IMM GRANULOCYTES NFR BLD AUTO: 0.3 % — SIGNIFICANT CHANGE UP (ref 0.1–0.3)
LEVETIRACETAM SERPL-MCNC: 9.6 MCG/ML — LOW (ref 12–46)
LYMPHOCYTES # BLD AUTO: 0.52 K/UL — LOW (ref 1.2–3.4)
LYMPHOCYTES # BLD AUTO: 14.7 % — LOW (ref 20.5–51.1)
MAGNESIUM SERPL-MCNC: 2.1 MG/DL — SIGNIFICANT CHANGE UP (ref 1.8–2.4)
MCHC RBC-ENTMCNC: 31.9 PG — HIGH (ref 27–31)
MCHC RBC-ENTMCNC: 33.1 G/DL — SIGNIFICANT CHANGE UP (ref 32–37)
MCV RBC AUTO: 96.2 FL — SIGNIFICANT CHANGE UP (ref 81–99)
MONOCYTES # BLD AUTO: 0.36 K/UL — SIGNIFICANT CHANGE UP (ref 0.1–0.6)
MONOCYTES NFR BLD AUTO: 10.2 % — HIGH (ref 1.7–9.3)
NEUTROPHILS # BLD AUTO: 2.54 K/UL — SIGNIFICANT CHANGE UP (ref 1.4–6.5)
NEUTROPHILS NFR BLD AUTO: 71.7 % — SIGNIFICANT CHANGE UP (ref 42.2–75.2)
NRBC # BLD: 0 /100 WBCS — SIGNIFICANT CHANGE UP (ref 0–0)
PLATELET # BLD AUTO: 164 K/UL — SIGNIFICANT CHANGE UP (ref 130–400)
POTASSIUM SERPL-MCNC: 4.5 MMOL/L — SIGNIFICANT CHANGE UP (ref 3.5–5)
POTASSIUM SERPL-SCNC: 4.5 MMOL/L — SIGNIFICANT CHANGE UP (ref 3.5–5)
RBC # BLD: 4.52 M/UL — SIGNIFICANT CHANGE UP (ref 4.2–5.4)
RBC # FLD: 13.5 % — SIGNIFICANT CHANGE UP (ref 11.5–14.5)
SODIUM SERPL-SCNC: 147 MMOL/L — HIGH (ref 135–146)
WBC # BLD: 3.54 K/UL — LOW (ref 4.8–10.8)
WBC # FLD AUTO: 3.54 K/UL — LOW (ref 4.8–10.8)

## 2019-04-15 RX ORDER — DIPHENHYDRAMINE HCL 50 MG
1 CAPSULE ORAL
Qty: 30 | Refills: 0
Start: 2019-04-15 | End: 2019-05-14

## 2019-04-15 RX ORDER — LEVOTHYROXINE SODIUM 125 MCG
1 TABLET ORAL
Qty: 0 | Refills: 0 | COMMUNITY

## 2019-04-15 RX ORDER — ALPRAZOLAM 0.25 MG
1 TABLET ORAL
Qty: 20 | Refills: 0
Start: 2019-04-15 | End: 2019-04-24

## 2019-04-15 RX ORDER — DIPHENHYDRAMINE HCL 50 MG
1 CAPSULE ORAL
Qty: 30 | Refills: 0 | OUTPATIENT
Start: 2019-04-15 | End: 2019-05-14

## 2019-04-15 RX ORDER — LEVOTHYROXINE SODIUM 125 MCG
1 TABLET ORAL
Qty: 30 | Refills: 0
Start: 2019-04-15 | End: 2019-05-14

## 2019-04-15 RX ORDER — HALOPERIDOL DECANOATE 100 MG/ML
1 INJECTION INTRAMUSCULAR
Qty: 0 | Refills: 0 | COMMUNITY
Start: 2019-04-15

## 2019-04-15 RX ORDER — ALPRAZOLAM 0.25 MG
1 TABLET ORAL
Qty: 0 | Refills: 0 | COMMUNITY
Start: 2019-04-15

## 2019-04-15 RX ORDER — ACETAMINOPHEN 500 MG
1 TABLET ORAL
Qty: 120 | Refills: 0
Start: 2019-04-15 | End: 2019-05-14

## 2019-04-15 RX ADMIN — Medication 112 MICROGRAM(S): at 07:22

## 2019-04-15 RX ADMIN — LEVETIRACETAM 250 MILLIGRAM(S): 250 TABLET, FILM COATED ORAL at 07:23

## 2019-04-15 RX ADMIN — Medication 0.25 MILLIGRAM(S): at 07:23

## 2019-04-15 RX ADMIN — ZIPRASIDONE HYDROCHLORIDE 60 MILLIGRAM(S): 20 CAPSULE ORAL at 10:02

## 2019-04-15 RX ADMIN — CHLORHEXIDINE GLUCONATE 1 APPLICATION(S): 213 SOLUTION TOPICAL at 07:24

## 2019-04-15 RX ADMIN — HEPARIN SODIUM 5000 UNIT(S): 5000 INJECTION INTRAVENOUS; SUBCUTANEOUS at 14:00

## 2019-04-15 RX ADMIN — HEPARIN SODIUM 5000 UNIT(S): 5000 INJECTION INTRAVENOUS; SUBCUTANEOUS at 07:23

## 2019-04-15 RX ADMIN — Medication 1 APPLICATION(S): at 07:24

## 2019-04-15 NOTE — DISCHARGE NOTE PROVIDER - PROVIDER TOKENS
FREE:[LAST:[Dr. Berg],PHONE:[(   )    -],FAX:[(   )    -]],FREE:[LAST:[Dr. Brooke],PHONE:[(   )    -],FAX:[(   )    -]]

## 2019-04-15 NOTE — DISCHARGE NOTE PROVIDER - CARE PROVIDER_API CALL
Dr. Berg,   Phone: (   )    -  Fax: (   )    -  Follow Up Time:     Dr. Brooke,   Phone: (   )    -  Fax: (   )    -  Follow Up Time:

## 2019-04-15 NOTE — PROGRESS NOTE ADULT - ASSESSMENT
SUBJECTIVE:    Patient is a 54y old  Female who presents with a chief complaint of Placement social admission (11 Apr 2019 18:42)    Currently admitted to medicine with the primary diagnosis of Inability to perform activities of daily living     Today is hospital day 1d. This morning she is resting comfortably in bed and reports no new issues or overnight events.     PAST MEDICAL & SURGICAL HISTORY  PAST MEDICAL & SURGICAL HISTORY:  Intellectual disability  Hypothyroid  Impulse control disorder in adult  Down's syndrome  No significant past surgical history      ALLERGIES:  No Known Allergies    MEDICATIONS:  STANDING MEDICATIONS  chlorhexidine 4% Liquid 1 Application(s) Topical <User Schedule>  heparin  Injectable 5000 Unit(s) SubCutaneous every 8 hours  levETIRAcetam 250 milliGRAM(s) Oral two times a day  levothyroxine 112 MICROGram(s) Oral daily  petrolatum white Ointment 1 Application(s) Topical two times a day  simvastatin 20 milliGRAM(s) Oral at bedtime  ziprasidone 60 milliGRAM(s) Oral <User Schedule>    PRN MEDICATIONS    VITALS:   T(F): 97.5  HR: 58  BP: 114/73  RR: 19  SpO2: 99%    LABS:                        14.5   5.43  )-----------( 191      ( 12 Apr 2019 06:16 )             43.8     04-12    144  |  105  |  19  ----------------------------<  101<H>  4.6   |  26  |  1.2    Ca    8.9      12 Apr 2019 06:16    TPro  6.9  /  Alb  4.0  /  TBili  0.5  /  DBili  x   /  AST  33  /  ALT  21  /  AlkPhos  88  04-12                  RADIOLOGY:    PHYSICAL EXAM:  GEN: No acute distress  HEENT: NCAT. down syndrome appearance  LUNGS: Clear to auscultation bilaterally   HEART: RRR. no murmurs  ABD: Soft, non-tender, non-distended  EXT: no edema  NEURO: AAOX1    Assessment and Plan:  54 Year Old  Female with a PMH of down syndrome, seizure disorder on Keppra (neurologist Dr. Torres), presents with changes in behavior.     #) Down Syndrome With new Personality changes and aggression  - progression of down syndrome vs. metabolic causes vs. drug induced  - psych saw pt in ED but did not give any recommendations to change medication regimen  - TSH normal, check folate and B12 (were normal in January)  - consider neuro consult? group home staff requesting  - continue ziprasidone 60 qd    #) Seizure Disorder   - continue with Keppra 250 twice daily   - check keppra level (may cause agitation, dose was recently adjusted)    #) Hypothyroidism   - TSH normal  - continue synthroid 112 mcg    #) DVT Prophylaxis   - Heparin Sub Q     Full Code
# Down syndrome with behavioral changes - anxiety well controlled on Benzo's, will start Xanax low dose, discussed with sister, pt will need Op follow up next week with primary psych for medications adjustment, sister agreed  - Keppra level pending  - TSH 0.68 << 4.70, Synthroid was increased about a month ago, recommended to f/u with PMD.for repeat TSH in couple of weeks, if trending down to decrease dose of Synthroid.  - Neuro eval pending, if not done today, can be done as OP  - psychiatry eval appreciated  - Haldol 0.5 mg PO Q 8 hrs PRN if pt is very agitated, monitor QTC if Haldol given    # Dementia - continue Ziprasidone 60 mg QD  # Seizure disorder - cont Keppra 250 mg BID  # Hypothyroidism - OP f/u with PMD     DVT ppx    #Progress Note Handoff  Pending  Consults: neuro  Test Results: Keppra level  Family Discussion: sister wants to send pt back to group home, CM has to find out if  accepts pt on weekend.  Future Disposition: , anticipated DC tomorrow if  accepts pt over the weekend
55yo F with Past Medical History Down Syndrome and Seizure Disorder on Keppra (neurologist Dr. Torres) admitted for changes in behavior/increased agitation    Down's Syndrome/increased agitation: pt was brought to [facility] to undergo evaluation for increased agitation.  She was evaluated by Psychiatry who advised continuation of Xanax 0.25mg PO q12h.  Continue Haldol PRN for increased agitation (not required last night).  Seizure Disorder: continue Keppra 250mg PO q12  Hypothyroidism: continue Synthroid 112mcg q24h  GI/DVT prophylaxis    #Progress Note Handoff    Pending:  Placement at Group Home vs. SNF    Family Discussion: case discussed with sister (HC proxy)  Future Disposition: meeting with group home nurse scheduled for this morning; Hospitalist & family prefer that patient return to group home

## 2019-04-15 NOTE — DISCHARGE NOTE PROVIDER - NSDCCPCAREPLAN_GEN_ALL_CORE_FT
PRINCIPAL DISCHARGE DIAGNOSIS  Diagnosis: Inability to perform activities of daily living  Assessment and Plan of Treatment:       SECONDARY DISCHARGE DIAGNOSES  Diagnosis: Impulse control disorder in adult  Assessment and Plan of Treatment:

## 2019-04-15 NOTE — DISCHARGE NOTE PROVIDER - HOSPITAL COURSE
53yo F with Past Medical History Down Syndrome and Seizure Disorder on Keppra (neurologist Dr. Torres) admitted for changes in behavior/increased agitation pt was brought to [facility] to undergo evaluation for increased agitation.  She was evaluated by Psychiatry who advised continuation of Xanax 0.25mg PO q12h.  Continue Haldol PRN for increased agitation  For Seizure Disorder: continue Keppra 250mg PO q12 snd for her Hypothyroidism: continue Synthroid 112mcg q24h    This case was discussed with sister (HC proxy) and agree would like to be dc back to group home. medically stable for discharge. 55yo F with Past Medical History Down Syndrome and Seizure Disorder on Keppra (neurologist Dr. Torres) admitted for changes in behavior/increased agitation pt was brought to [facility] to undergo evaluation for increased agitation.  She was evaluated by Psychiatry who advised continuation of Xanax 0.25mg PO q12h.  For Seizure Disorder: continue Keppra 250mg PO q12 and close follow up with her Neuroplogist. snd for her Hypothyroidism: continue Synthroid 112mcg q24h    This case was discussed with sister (HC proxy) and agree would like to be dc back to group home. medically stable for discharge. Benadryl 25 QHS for anxiety agitation and sleep.

## 2019-04-15 NOTE — PROGRESS NOTE ADULT - SUBJECTIVE AND OBJECTIVE BOX
SALVATORE STEELE MRN-[id number]    Hospitalist Note  55yo F with Past Medical History Down Syndrome and Seizure Disorder on Keppra (neurologist Dr. Torres) admitted for changes in behavior/increased agitation    Overnight events/Updates: No acute events reported overnight.  No additional doses of BZD or antipsychotics were administered.    Vital Signs Last 24 Hrs  T(C): 36.6 (15 Apr 2019 07:50), Max: 36.6 (15 Apr 2019 07:50)  T(F): 97.8 (15 Apr 2019 07:50), Max: 97.8 (15 Apr 2019 07:50)  HR: 78 (15 Apr 2019 07:50) (42 - 78)  BP: 132/76 (15 Apr 2019 07:50) (91/46 - 138/60)  BP(mean): --  RR: 18 (15 Apr 2019 07:50) (18 - 19)  SpO2: --    Physical Examination:  General: AAO x 1, pleasant, at baseline  HEENT: PERRLA, EOMI, hyperglossia  CV= S1 & S2 appreciated  Lungs= CTA BL  Abdominal Examination= + BS, Soft, NT/ND  Extremity Examination= No C/C/E    ROS: No chest pain, no shortness of breath.  All other systems reviewed and are within normal limits except for the complaints in the HPI.    MEDICATIONS  (STANDING):  ALPRAZolam 0.25 milliGRAM(s) Oral two times a day  chlorhexidine 4% Liquid 1 Application(s) Topical <User Schedule>  heparin  Injectable 5000 Unit(s) SubCutaneous every 8 hours  levETIRAcetam 250 milliGRAM(s) Oral two times a day  levothyroxine 112 MICROGram(s) Oral daily  petrolatum white Ointment 1 Application(s) Topical two times a day  simvastatin 20 milliGRAM(s) Oral at bedtime  ziprasidone 60 milliGRAM(s) Oral <User Schedule>    MEDICATIONS  (PRN):  haloperidol     Tablet 0.5 milliGRAM(s) Oral every 8 hours PRN agitation                            14.4   3.54  )-----------( 164      ( 15 Apr 2019 07:32 )             43.5     04-15    147<H>  |  107  |  18  ----------------------------<  90  4.5   |  26  |  1.2    Ca    9.1      15 Apr 2019 07:32  Mg     2.1     04-15        Case discussed with housestaff & family  LEXIE Vyas [phone]

## 2019-04-15 NOTE — DISCHARGE NOTE NURSING/CASE MANAGEMENT/SOCIAL WORK - NSDCDPATPORTLINK_GEN_ALL_CORE
You can access the [facility] Patient Portal, offered by [facility], by registering with the following website: [email or web address]

## 2019-04-17 DIAGNOSIS — F41.9 ANXIETY DISORDER, UNSPECIFIED: ICD-10-CM

## 2019-04-17 DIAGNOSIS — G47.09 OTHER INSOMNIA: ICD-10-CM

## 2019-04-17 DIAGNOSIS — F07.89 OTHER PERSONALITY AND BEHAVIORAL DISORDERS DUE TO KNOWN PHYSIOLOGICAL CONDITION: ICD-10-CM

## 2019-04-17 DIAGNOSIS — F63.9 IMPULSE DISORDER, UNSPECIFIED: ICD-10-CM

## 2019-04-17 DIAGNOSIS — F79 UNSPECIFIED INTELLECTUAL DISABILITIES: ICD-10-CM

## 2019-04-17 DIAGNOSIS — F03.91 UNSPECIFIED DEMENTIA WITH BEHAVIORAL DISTURBANCE: ICD-10-CM

## 2019-04-17 DIAGNOSIS — G40.909 EPILEPSY, UNSPECIFIED, NOT INTRACTABLE, WITHOUT STATUS EPILEPTICUS: ICD-10-CM

## 2019-04-17 DIAGNOSIS — Z73.6 LIMITATION OF ACTIVITIES DUE TO DISABILITY: ICD-10-CM

## 2019-04-17 DIAGNOSIS — E03.9 HYPOTHYROIDISM, UNSPECIFIED: ICD-10-CM

## 2019-04-17 DIAGNOSIS — Z79.52 LONG TERM (CURRENT) USE OF SYSTEMIC STEROIDS: ICD-10-CM

## 2019-04-17 DIAGNOSIS — Q90.9 DOWN SYNDROME, UNSPECIFIED: ICD-10-CM

## 2019-06-27 ENCOUNTER — APPOINTMENT (OUTPATIENT)
Dept: VASCULAR SURGERY | Facility: CLINIC | Age: 54
End: 2019-06-27
Payer: MEDICARE

## 2019-06-27 DIAGNOSIS — L97.529 NON-PRESSURE CHRONIC ULCER OF OTHER PART OF LEFT FOOT WITH UNSPECIFIED SEVERITY: ICD-10-CM

## 2019-06-27 PROCEDURE — 93926 LOWER EXTREMITY STUDY: CPT

## 2019-06-27 PROCEDURE — 99203 OFFICE O/P NEW LOW 30 MIN: CPT

## 2019-06-27 NOTE — DATA REVIEWED
[FreeTextEntry1] : I performed an arterial duplex which was medically necessary to evaluate for arterial disease. It showed patent femoral, popliteal and tibial arteries in the left lower extremity, incidental finding of superficial phlebitis in the GSV.\par

## 2019-06-27 NOTE — HISTORY OF PRESENT ILLNESS
[FreeTextEntry1] : 53 y/o female with h/o Down's syndrome presents for evaluation of a left heel wound. As per group home staff, she has a thumb sucking behavior and first developed blister like wounds on thumbs and second digits bilaterally and then on left heel.

## 2019-06-27 NOTE — ASSESSMENT
[FreeTextEntry1] : 53 y/o female with h/o Down's syndrome presents for evaluation of a left heel wound. As per group home staff, she has a thumb sucking behavior and first developed blister like wounds on thumbs and second digits bilaterally and then on left heel.\par I performed an arterial duplex which was medically necessary to evaluate for arterial disease. It showed patent femoral, popliteal and tibial arteries in the left lower extremity, incidental finding of superficial phlebitis in the GSV.\par No surgical intervention is needed. If she develops pain over the left GSV, she should do perform warm compresses and NSAIDs. She should also seek a Dermatology consultation.

## 2019-07-13 ENCOUNTER — EMERGENCY (EMERGENCY)
Facility: HOSPITAL | Age: 54
LOS: 0 days | Discharge: HOME | End: 2019-07-13
Attending: EMERGENCY MEDICINE | Admitting: EMERGENCY MEDICINE
Payer: MEDICARE

## 2019-07-13 VITALS
TEMPERATURE: 98 F | RESPIRATION RATE: 18 BRPM | HEART RATE: 62 BPM | DIASTOLIC BLOOD PRESSURE: 60 MMHG | OXYGEN SATURATION: 100 % | SYSTOLIC BLOOD PRESSURE: 153 MMHG

## 2019-07-13 DIAGNOSIS — R56.9 UNSPECIFIED CONVULSIONS: ICD-10-CM

## 2019-07-13 DIAGNOSIS — Z79.899 OTHER LONG TERM (CURRENT) DRUG THERAPY: ICD-10-CM

## 2019-07-13 DIAGNOSIS — E03.9 HYPOTHYROIDISM, UNSPECIFIED: ICD-10-CM

## 2019-07-13 DIAGNOSIS — R35.8 OTHER POLYURIA: ICD-10-CM

## 2019-07-13 DIAGNOSIS — G40.909 EPILEPSY, UNSPECIFIED, NOT INTRACTABLE, WITHOUT STATUS EPILEPTICUS: ICD-10-CM

## 2019-07-13 LAB
ALBUMIN SERPL ELPH-MCNC: 3.8 G/DL — SIGNIFICANT CHANGE UP (ref 3.5–5.2)
ALP SERPL-CCNC: 113 U/L — SIGNIFICANT CHANGE UP (ref 30–115)
ALT FLD-CCNC: 37 U/L — SIGNIFICANT CHANGE UP (ref 0–41)
ANION GAP SERPL CALC-SCNC: 9 MMOL/L — SIGNIFICANT CHANGE UP (ref 7–14)
APPEARANCE UR: ABNORMAL
AST SERPL-CCNC: 34 U/L — SIGNIFICANT CHANGE UP (ref 0–41)
BACTERIA # UR AUTO: NEGATIVE — SIGNIFICANT CHANGE UP
BASOPHILS # BLD AUTO: 0.09 K/UL — SIGNIFICANT CHANGE UP (ref 0–0.2)
BASOPHILS NFR BLD AUTO: 2.5 % — HIGH (ref 0–1)
BILIRUB SERPL-MCNC: 0.4 MG/DL — SIGNIFICANT CHANGE UP (ref 0.2–1.2)
BILIRUB UR-MCNC: NEGATIVE — SIGNIFICANT CHANGE UP
BUN SERPL-MCNC: 23 MG/DL — HIGH (ref 10–20)
CALCIUM SERPL-MCNC: 8.9 MG/DL — SIGNIFICANT CHANGE UP (ref 8.5–10.1)
CHLORIDE SERPL-SCNC: 105 MMOL/L — SIGNIFICANT CHANGE UP (ref 98–110)
CO2 SERPL-SCNC: 29 MMOL/L — SIGNIFICANT CHANGE UP (ref 17–32)
COLOR SPEC: SIGNIFICANT CHANGE UP
CREAT SERPL-MCNC: 1.2 MG/DL — SIGNIFICANT CHANGE UP (ref 0.7–1.5)
DIFF PNL FLD: NEGATIVE — SIGNIFICANT CHANGE UP
EOSINOPHIL # BLD AUTO: 0.06 K/UL — SIGNIFICANT CHANGE UP (ref 0–0.7)
EOSINOPHIL NFR BLD AUTO: 1.7 % — SIGNIFICANT CHANGE UP (ref 0–8)
EPI CELLS # UR: 2 /HPF — SIGNIFICANT CHANGE UP (ref 0–5)
GLUCOSE SERPL-MCNC: 106 MG/DL — HIGH (ref 70–99)
GLUCOSE UR QL: NEGATIVE — SIGNIFICANT CHANGE UP
HCT VFR BLD CALC: 42.4 % — SIGNIFICANT CHANGE UP (ref 37–47)
HGB BLD-MCNC: 13.9 G/DL — SIGNIFICANT CHANGE UP (ref 12–16)
HYALINE CASTS # UR AUTO: 0 /LPF — SIGNIFICANT CHANGE UP (ref 0–7)
IMM GRANULOCYTES NFR BLD AUTO: 0.6 % — HIGH (ref 0.1–0.3)
KETONES UR-MCNC: NEGATIVE — SIGNIFICANT CHANGE UP
LEUKOCYTE ESTERASE UR-ACNC: NEGATIVE — SIGNIFICANT CHANGE UP
LYMPHOCYTES # BLD AUTO: 0.99 K/UL — LOW (ref 1.2–3.4)
LYMPHOCYTES # BLD AUTO: 27.7 % — SIGNIFICANT CHANGE UP (ref 20.5–51.1)
MAGNESIUM SERPL-MCNC: 2.1 MG/DL — SIGNIFICANT CHANGE UP (ref 1.8–2.4)
MCHC RBC-ENTMCNC: 32.2 PG — HIGH (ref 27–31)
MCHC RBC-ENTMCNC: 32.8 G/DL — SIGNIFICANT CHANGE UP (ref 32–37)
MCV RBC AUTO: 98.1 FL — SIGNIFICANT CHANGE UP (ref 81–99)
MONOCYTES # BLD AUTO: 0.38 K/UL — SIGNIFICANT CHANGE UP (ref 0.1–0.6)
MONOCYTES NFR BLD AUTO: 10.6 % — HIGH (ref 1.7–9.3)
NEUTROPHILS # BLD AUTO: 2.04 K/UL — SIGNIFICANT CHANGE UP (ref 1.4–6.5)
NEUTROPHILS NFR BLD AUTO: 56.9 % — SIGNIFICANT CHANGE UP (ref 42.2–75.2)
NITRITE UR-MCNC: NEGATIVE — SIGNIFICANT CHANGE UP
NRBC # BLD: 0 /100 WBCS — SIGNIFICANT CHANGE UP (ref 0–0)
PH UR: 7.5 — SIGNIFICANT CHANGE UP (ref 5–8)
PLATELET # BLD AUTO: 185 K/UL — SIGNIFICANT CHANGE UP (ref 130–400)
POTASSIUM SERPL-MCNC: 4.8 MMOL/L — SIGNIFICANT CHANGE UP (ref 3.5–5)
POTASSIUM SERPL-SCNC: 4.8 MMOL/L — SIGNIFICANT CHANGE UP (ref 3.5–5)
PROT SERPL-MCNC: 7 G/DL — SIGNIFICANT CHANGE UP (ref 6–8)
PROT UR-MCNC: NEGATIVE — SIGNIFICANT CHANGE UP
RBC # BLD: 4.32 M/UL — SIGNIFICANT CHANGE UP (ref 4.2–5.4)
RBC # FLD: 14.2 % — SIGNIFICANT CHANGE UP (ref 11.5–14.5)
RBC CASTS # UR COMP ASSIST: 22 /HPF — HIGH (ref 0–4)
SODIUM SERPL-SCNC: 143 MMOL/L — SIGNIFICANT CHANGE UP (ref 135–146)
SP GR SPEC: 1.01 — SIGNIFICANT CHANGE UP (ref 1.01–1.02)
UROBILINOGEN FLD QL: SIGNIFICANT CHANGE UP
WBC # BLD: 3.58 K/UL — LOW (ref 4.8–10.8)
WBC # FLD AUTO: 3.58 K/UL — LOW (ref 4.8–10.8)
WBC UR QL: 2 /HPF — SIGNIFICANT CHANGE UP (ref 0–5)

## 2019-07-13 PROCEDURE — 99284 EMERGENCY DEPT VISIT MOD MDM: CPT

## 2019-07-13 NOTE — ED PROVIDER NOTE - OBJECTIVE STATEMENT
54 year old female, pmhx dementia, Down's syndrome, seizure disorder on brovia 100mg QD (with breakthrough seizures at baseline), presenting s/p seizure. Per aid and sister at bedside, patient lives in group home and has episodic breakthrough seizures which is typical of most prior episode - she stares into space and then twitches diffusely with arching her back. Per aid, episode lasted a few minutes and then resolved and patient is now at baseline. Otherwise aid states patient has been in usual state of health except for polyuria over the past few days. Patient denies pain or any current complaints. Denies fevers, headache, vision changes, weakness/numbness, confusion, URI symptoms, neck pain, chest pain, back pain, dyspnea, cough, palpitations, nausea, vomiting, abdominal pain, diarrhea, constipation, blood in stool/dark stools, vaginal bleeding/discharge, leg swelling, rash, recent travel or sick contacts.

## 2019-07-13 NOTE — ED ADULT TRIAGE NOTE - CHIEF COMPLAINT QUOTE
[facility]IBA from [facility] , as per aide pt. arched her back and starred straight ahead but was responsive during the episode, hx of seizures compliant with meds

## 2019-07-13 NOTE — ED PROVIDER NOTE - PMH
Down's syndrome    Hypothyroid    Impulse control disorder in adult    Intellectual disability    Seizures

## 2019-07-13 NOTE — ED PROVIDER NOTE - NSFOLLOWUPINSTRUCTIONS_ED_ALL_ED_FT
Seizure, Adult  A seizure is a sudden burst of abnormal electrical activity in the brain. The abnormal activity temporarily interrupts normal brain function, causing a person to experience any of the following:    Involuntary movements.  Changes in awareness or consciousness.  Uncontrollable shaking (convulsions).    Seizures usually last from 30 seconds to 2 minutes. They usually do not cause permanent brain damage unless they are prolonged.    What can cause a seizure to happen?     Seizures can happen for many reasons including:    A fever.  Low blood sugar.  A medicine.  An illnesses.  A brain injury.    Some people who have a seizure never have another one. People who have repeated seizures have a condition called epilepsy.    What are the symptoms of a seizure?     Symptoms of a seizure vary greatly from person to person. They include:    Convulsions.  Stiffening of the body.  Involuntary movements of the arms or legs.  Loss of consciousness.  Breathing problems.  Falling suddenly.  Confusion.  Head nodding.  Eye blinking or fluttering.  Lip smacking.  Drooling.  Rapid eye movements.  Grunting.  Loss of bladder control and bowel control.  Staring.  Unresponsiveness.    Some people have symptoms right before a seizure happens (aura) and right after a seizure happens. Symptoms of an aura include:    Fear or anxiety.  Nausea.  Feeling like the room is spinning (vertigo).  A feeling of having seen or heard something before (eric vu).  Odd tastes or smells.  Changes in vision, such as seeing flashing lights or spots.    Symptoms that may follow a seizure include:    Confusion.  Sleepiness.  Headache.  Weakness of one side of the body.    Follow these instructions at home:  Medicines     Image   Take over-the-counter and prescription medicines only as told by your health care provider.  Avoid any substances that may prevent your medicine from working properly, such as alcohol.  Activity     Do not drive, swim, or do any other activities that would be dangerous if you had another seizure. Wait until your health care provider approves.  If you live in the [place] check with your local DMV (department of motor vehicles) to find out about the local driving laws. Each state has specific rules about when you can legally return to driving.  Get enough rest. Lack of sleep can make seizures more likely to occur.  Educating others     Teach friends and family what to do if you have a seizure. They should:    Lay you on the ground to prevent a fall.  Cushion your head and body.  Loosen any tight clothing around your neck.  Turn you on your side. If vomiting occurs, this helps keep your airway clear.  Stay with you until you recover.  Not hold you down. Holding you down will not stop the seizure.  Not put anything in your mouth.  Know whether or not you need emergency care.    General instructions     Contact your health care provider each time you have a seizure.  Avoid anything that has ever triggered a seizure for you.  Keep a seizure diary. Record what you remember about each seizure, especially anything that might have triggered the seizure.  Keep all follow-up visits as told by your health care provider. This is important.  Contact a health care provider if:  You have another seizure.  You have seizures more often.  Your seizure symptoms change.  You continue to have seizures with treatment.  You have symptoms of an infection or illness. They might increase your risk of having a seizure.  Get help right away if:  You have a seizure:    That lasts longer than 5 minutes.  That is different than previous seizures.  That leaves you unable to speak or use a part of your body.  That makes it harder to breathe.  After a head injury.    You have:    Multiple seizures in a row.  Confusion or a severe headache right after a seizure.    You are having seizures more often.  You do not wake up immediately after a seizure.  You injure yourself during a seizure.  These symptoms may represent a serious problem that is an emergency. Do not wait to see if the symptoms will go away. Get medical help right away. Call your local emergency services (911 in the [place] Do not drive yourself to the hospital.     This information is not intended to replace advice given to you by your health care provider. Make sure you discuss any questions you have with your health care provider.

## 2019-07-13 NOTE — ED PROVIDER NOTE - PROGRESS NOTE DETAILS
Patient seen and evaluated by me. Labs ordered. Will check labs, r/o UTI, re-eval. Patient with good outpatient neuro follow up if work up negative. Labs back and results discussed with aid and sister at bedside. No recurrence of seizure activity in ED. Will discharge home with upcoming outpatient neuro follow up. Agreeable with plan. Spoke with RN at [facility] - agrees with plan to discharge.

## 2019-07-13 NOTE — ED PROVIDER NOTE - PHYSICAL EXAMINATION
Vital Signs: I have reviewed the initial vital signs.  Constitutional: NAD, well-nourished, appears stated age, no acute distress.  HEENT: Airway patent, moist MM, no erythema/swelling/deformity of oral structures. EOMI, PERRLA.  CV: regular rate, regular rhythm, well-perfused extremities, 2+ b/l DP and radial pulses equal.  Lungs: BCTA, no increased WOB.  ABD: NTND, no guarding or rebound, no pulsatile mass, no hernias.   MSK: Neck supple, nontender, nl ROM, no stepoff. Chest nontender. Back nontender in TLS spine or to b/l bony structures or flanks. Ext nontender, nl rom, no deformity.   INTEG: Skin warm, dry, no rash.  NEURO: A&Ox2 to person and place but not time (baseline), normal strength, nl sensation throughout, normal speech.   PSYCH: Calm, cooperative, normal affect and interaction.

## 2019-07-13 NOTE — ED ADULT NURSE NOTE - OBJECTIVE STATEMENT
pt special needs from group home c.o. seizures. as per staff "pt had 3 seizures while sitting in the chair her eyes became dilated, did not fall"

## 2019-07-13 NOTE — ED PROVIDER NOTE - CLINICAL SUMMARY MEDICAL DECISION MAKING FREE TEXT BOX
Patient presented with breakthrough seizure x 1 today (has these at baseline), otherwise afebrile, HD stable, at baseline mental status during ED eval. Fully neuro intact. Also c/o polyuria x several days as well. Obtained labs which were grossly unremarkable. UA negative for infection. Patient observed in ED without recurrence of symptoms. Has upcoming neurology follow up and aid and sister agree to have patient follow up. Patient ambulatory and tolerates PO. Agrees to return to ED for any new or worsening symptoms.

## 2019-07-14 LAB
CULTURE RESULTS: SIGNIFICANT CHANGE UP
SPECIMEN SOURCE: SIGNIFICANT CHANGE UP

## 2019-08-08 ENCOUNTER — TRANSCRIPTION ENCOUNTER (OUTPATIENT)
Age: 54
End: 2019-08-08

## 2019-09-26 NOTE — ED PROVIDER NOTE - PSH
Patient:   ROGELIO KATZ            MRN: [id number]            FIN: [id number]               Age:   81 years     Sex:  FEMALE     :  36   Associated Diagnoses:   None   Author:   RONAL, PADMA      Hospitalist Discharge Summary    DISCHARGE DIAGNOSIS: Right distal femur fracture, KEYANA on CKD4, Nausea, vomiting, leukocytosis, anemia, metabolic acidosis, hyperparathyroidism    HOSPITAL COURSE: Patient admitted aftr a fall at home with right distal femur fracture. She underwent intramedullary pinning with orthopedics. Post op course c/b KEYANA on CKD with creatinine peak at 4.26. She had urinary retention likely from anesthesia/narcotic/immobility which improved.     # Femur fracture right, distal  -ortho consulted, s/p IMN  - PT/OT    # KEYANA on CKD 4  - creatinine up to 4.26 and stable, suspect due to urinary retention, medications  - renal consulted, sees Dr. Calderon as an outpatient  - will have BMP recheck on Monday  - d/w vero Mcbride for DC with BMP on Monday    # Nausea and vomiting - resolved  - suspect due to Norco use and urinary retention, DCed and started tramadol as patient reports pain isn't that bad  - Zofran, Reglan    # Urinary retention - resolved  - likely from narcotic use and immobility  - bladder scan an dstraight cath    # Leukocytosis: resolved, likely reactive from fracture     # Anemia of chronic kidney disease and acute blood loss  - hgb 8.7 pre op, down to 7.0 following  - 6.1 today, s/p 1 UPRBC    # Metabolic acidosis  - increased sodium bicarb tablets    # Hyperparathyroidism  - restart D3 and calcium    # osteopenia  - on vitamin D, calcium    Vitals:   Vitals between:   10-MAY-2018 13:08:43   TO   11-MAY-2018 13:08:43                   LAST RESULT MINIMUM MAXIMUM  Temperature 37.1 36.9 37.1  Heart Rate 102 102 107  Respiratory Rate 18 18 18  NISBP           110 108 111  NIDBP           65 63 65  SpO2                    95 95 96    GENERAL:  in no apparent distress.  CHEST:  Chest  with clear breath sounds bilaterally. No wheezes, rales, or rhonchi.  CARDIAC:  Regular rate and rhythm. S1 and S2, without murmurs, gallops, or rubs.  ABDOMEN:  Soft, without detectable tenderness.  No sign of distention.  No rebound or guarding, and no masses palpated. Bowel Sounds normal.  MUSCULOSKELETAL:  right knee in ACE wrap  PSYCH: Normal mood and affect.    Labs:   Labs between:  10-MAY-2018 13:08 to 11-MAY-2018 13:08    CBC:                 WBC  HgB  Hct  Plt  MCV  RDW   11-MAY-2018 7.7  (L) 7.6  (L) 23.0  165  95.8  (H) 16.4   10-MAY-2018   (L) 7.5  (L) 23.2           BMP:                 Na  Cl  BUN  Glu   11-MAY-2018 136  105  (H) 45  (H) 129                              K  CO2  Cr  Ca                              4.0  21  (H) 4.26  (L) 8.1   BMP:                 Na  Cl  BUN  Glu   10-MAY-2018 (L) 134  103  (H) 48  (H) 193                              K  CO2  Cr  Ca                              4.9  22  (H) 4.28  (L) 8.1                    Radiology results:  right knee XR post op  1.   No acute fracture.  2.   Mild osteopenia with mild degenerative change.    XR right knee  Comminuted, impacted distal right femoral metadiaphyseal fracture with significant angulation of fracture fragments.  Hemorrhagic suprapatellar effusion noted.    Echocardiogram Results    No Results Have Been Found    PROCEDURE HISTORY:  No qualifying data available.    Pending results: none     DISCHARGE MEDICATION LIST   Allergies: No known allergies     MEDICATION  DOSE  ROUTE  FREQUENCY  SPECIAL INSTRUCTIONS   apixaBAN (apixaBAN oral 2.5 mg tablet)  2.5 mg=1 tab  Oral  Every 12 hours     calcitriol (calcitriol (vitamin D3) oral 0.25 mcg capsule)  0.25 mcg=1 cap  Oral  Every Sunday and Wednesday     calcitriol (Rocaltrol oral 0.25 mcg capsule)  0.25 mcg=1 cap  Oral  Every Friday     cholecalciferol (Vitamin D3 oral 1,000 unit tablet)  5,000 unit=5 tab  Oral  Daily     docusate-senna (docusate-senna oral 50-8.6 mg tablet)  1 tab   Oral  Nightly at bedtime     sodium bicarbonate (sodium bicarbonate (Na Bicarb) oral 650 mg tablet)  1,300 mg=2 tab  Oral  Twice daily     traMADol (traMADol oral 50 mg tablet)  25 mg=0.5 tab  Oral  Every 4 hours As Needed: pain moderate           Primary Care Physician      Physician Name:  JERAMIE, PHYSICIAN [facility]  Specialty :  NONE    Consulting Physicians     Physician Name:  WILFRED BOOTHE Speciality:  NEPHROLOGY Consult Reason:  KEYANA on CKD post op   Physician Name:  EBEN PALOMINO Speciality:  ORTHOPEDIC SURGERY Consult Reason:  distal femur fx   Physician Name:  JANIS JAIMES Speciality:  SURGERY Consult Reason:  odontoid fx   Physician Name:  CHANDA CASILLAS Speciality:  SURGERY Consult Reason:  odontoid fx   Physician Name:  GWENDOLYN RUBIO Speciality:  INTERNAL MEDICINE Consult Reason:  femur fx   Physician Name:  MENDEZ FIELDS Speciality:  ORTHOPEDIC SURGERY Consult Reason:  R femur fx     Follow-up instructions:  [facility] Dr. Sharma in 1-2 weeks (notified via PS), Dr. Calderon in 2 weeks, Dr. Berman in 3 weeks.    I spent 35 minutes completing this patient's discharge.                  Electronically Signed On 05/11/2018 13:18  __________________________________________________   PADMA VILLEDA     No significant past surgical history

## 2019-10-04 PROBLEM — R56.9 UNSPECIFIED CONVULSIONS: Chronic | Status: ACTIVE | Noted: 2019-07-13

## 2019-10-05 ENCOUNTER — EMERGENCY (EMERGENCY)
Facility: HOSPITAL | Age: 54
LOS: 0 days | Discharge: HOME | End: 2019-10-05
Attending: EMERGENCY MEDICINE | Admitting: EMERGENCY MEDICINE
Payer: MEDICARE

## 2019-10-05 VITALS
HEART RATE: 76 BPM | RESPIRATION RATE: 18 BRPM | DIASTOLIC BLOOD PRESSURE: 74 MMHG | SYSTOLIC BLOOD PRESSURE: 176 MMHG | OXYGEN SATURATION: 96 % | TEMPERATURE: 98 F

## 2019-10-05 VITALS — WEIGHT: 164.91 LBS | HEIGHT: 60 IN

## 2019-10-05 DIAGNOSIS — R25.8 OTHER ABNORMAL INVOLUNTARY MOVEMENTS: ICD-10-CM

## 2019-10-05 LAB
ALBUMIN SERPL ELPH-MCNC: 4.1 G/DL — SIGNIFICANT CHANGE UP (ref 3.5–5.2)
ALP SERPL-CCNC: 107 U/L — SIGNIFICANT CHANGE UP (ref 30–115)
ALT FLD-CCNC: 53 U/L — HIGH (ref 0–41)
ANION GAP SERPL CALC-SCNC: 9 MMOL/L — SIGNIFICANT CHANGE UP (ref 7–14)
AST SERPL-CCNC: 43 U/L — HIGH (ref 0–41)
BASOPHILS # BLD AUTO: 0.08 K/UL — SIGNIFICANT CHANGE UP (ref 0–0.2)
BASOPHILS NFR BLD AUTO: 1.4 % — HIGH (ref 0–1)
BILIRUB SERPL-MCNC: 0.3 MG/DL — SIGNIFICANT CHANGE UP (ref 0.2–1.2)
BUN SERPL-MCNC: 21 MG/DL — HIGH (ref 10–20)
CALCIUM SERPL-MCNC: 9.4 MG/DL — SIGNIFICANT CHANGE UP (ref 8.5–10.1)
CHLORIDE SERPL-SCNC: 103 MMOL/L — SIGNIFICANT CHANGE UP (ref 98–110)
CO2 SERPL-SCNC: 29 MMOL/L — SIGNIFICANT CHANGE UP (ref 17–32)
CREAT SERPL-MCNC: 1.4 MG/DL — SIGNIFICANT CHANGE UP (ref 0.7–1.5)
EOSINOPHIL # BLD AUTO: 0.02 K/UL — SIGNIFICANT CHANGE UP (ref 0–0.7)
EOSINOPHIL NFR BLD AUTO: 0.3 % — SIGNIFICANT CHANGE UP (ref 0–8)
GLUCOSE SERPL-MCNC: 108 MG/DL — HIGH (ref 70–99)
HCT VFR BLD CALC: 41.3 % — SIGNIFICANT CHANGE UP (ref 37–47)
HGB BLD-MCNC: 13.5 G/DL — SIGNIFICANT CHANGE UP (ref 12–16)
IMM GRANULOCYTES NFR BLD AUTO: 0.5 % — HIGH (ref 0.1–0.3)
LYMPHOCYTES # BLD AUTO: 0.74 K/UL — LOW (ref 1.2–3.4)
LYMPHOCYTES # BLD AUTO: 12.5 % — LOW (ref 20.5–51.1)
MAGNESIUM SERPL-MCNC: 2.1 MG/DL — SIGNIFICANT CHANGE UP (ref 1.8–2.4)
MCHC RBC-ENTMCNC: 31.4 PG — HIGH (ref 27–31)
MCHC RBC-ENTMCNC: 32.7 G/DL — SIGNIFICANT CHANGE UP (ref 32–37)
MCV RBC AUTO: 96 FL — SIGNIFICANT CHANGE UP (ref 81–99)
MONOCYTES # BLD AUTO: 0.35 K/UL — SIGNIFICANT CHANGE UP (ref 0.1–0.6)
MONOCYTES NFR BLD AUTO: 5.9 % — SIGNIFICANT CHANGE UP (ref 1.7–9.3)
NEUTROPHILS # BLD AUTO: 4.7 K/UL — SIGNIFICANT CHANGE UP (ref 1.4–6.5)
NEUTROPHILS NFR BLD AUTO: 79.4 % — HIGH (ref 42.2–75.2)
NRBC # BLD: 0 /100 WBCS — SIGNIFICANT CHANGE UP (ref 0–0)
PLATELET # BLD AUTO: 196 K/UL — SIGNIFICANT CHANGE UP (ref 130–400)
POTASSIUM SERPL-MCNC: 5 MMOL/L — SIGNIFICANT CHANGE UP (ref 3.5–5)
POTASSIUM SERPL-SCNC: 5 MMOL/L — SIGNIFICANT CHANGE UP (ref 3.5–5)
PROT SERPL-MCNC: 7 G/DL — SIGNIFICANT CHANGE UP (ref 6–8)
RBC # BLD: 4.3 M/UL — SIGNIFICANT CHANGE UP (ref 4.2–5.4)
RBC # FLD: 13.4 % — SIGNIFICANT CHANGE UP (ref 11.5–14.5)
SODIUM SERPL-SCNC: 141 MMOL/L — SIGNIFICANT CHANGE UP (ref 135–146)
WBC # BLD: 5.92 K/UL — SIGNIFICANT CHANGE UP (ref 4.8–10.8)
WBC # FLD AUTO: 5.92 K/UL — SIGNIFICANT CHANGE UP (ref 4.8–10.8)

## 2019-10-05 PROCEDURE — 99284 EMERGENCY DEPT VISIT MOD MDM: CPT

## 2019-10-05 RX ORDER — ALPRAZOLAM 0.25 MG
0.5 TABLET ORAL ONCE
Refills: 0 | Status: DISCONTINUED | OUTPATIENT
Start: 2019-10-05 | End: 2019-10-05

## 2019-10-05 RX ADMIN — Medication 0.5 MILLIGRAM(S): at 11:41

## 2019-10-05 NOTE — ED PROVIDER NOTE - CLINICAL SUMMARY MEDICAL DECISION MAKING FREE TEXT BOX
Labs ok. No jerking since xanax given. Pt alert and cooperative now but could not tolerate CT scan. I think the risk of sedation far outweighs the likelihood that there will be a finding on CT given pt's essentially nonfocal neuro exam. I spoke with RN covering the facility where pt lives. We went over results and agreed that next step would be to decrease zyprexa dose to prior and observe, and reassess if sx persist. Of not, "jerking" is much more prominent when pt is agigated, almost nonexistent when she is calm; after xanax, no further jerks were noted. RN understands that pt needs close observation for behavior patterns and will send her back for reassessment should anything in her presentation change.

## 2019-10-05 NOTE — ED PROVIDER NOTE - PATIENT PORTAL LINK FT
You can access the FollowMyHealth Patient Portal offered by [facility] by registering at the following website: [email or web address]. By joining [employer]’s FollowMyHealth portal, you will also be able to view your health information using other applications (apps) compatible with our system.

## 2019-10-05 NOTE — ED PROVIDER NOTE - NS ED ROS FT
Review of Systems:  	•	CONSTITUTIONAL - no fever, no diaphoresis  	•	SKIN - no rash, no lesions  	•	HEMATOLOGIC - no bleeding, no bruising  	•	EYES - no discharge, no injection  	•	ENT - no otorrhea, no rhinorrhea  	•	RESPIRATORY - no shortness of breath, no cough  	•	CARDIAC - no chest pain, no palpitations  	•	GI - no abd pain, no nausea, no vomiting, no diarrhea  	•	GENITO-URINARY - no discharge, no dysuria, no hematuria  	•	MUSCULOSKELETAL - no joint paint, no swelling, no redness  	•	NEUROLOGIC - +jerky movements, no headache, no anesthesia, no paresthesias

## 2019-10-05 NOTE — ED ADULT NURSE NOTE - OBJECTIVE STATEMENT
patient denies pain, [facility] and staff states she has been more jerky since medication changes from seizures new dx seizure 4 months ago. Pt is having jerking motions and jumpy motions. Pt alert

## 2019-10-05 NOTE — ED PROVIDER NOTE - PROGRESS NOTE DETAILS
No jerking since xanax given. Pt alert and cooperative now but could not tolerate CT scan. I think the risk of sedation far outweighs the liklihood that there will be a finding on CT given pt's essentially nonfocal neuro exam. I spoke with RN covering the facility where pt lives. We went over results and agreed that next step would be to decrease zyprexa dose to prior and observe, and reassess if sx persist. Of not, "jerking" is much more prominent when pt is agigated, almost nonexistent when she is calm; after xanax, no further jerks were noted. RN understands that pt needs close observation for behavior patterns and will send her back for reassessment should anything in her presentation change.

## 2019-10-05 NOTE — ED ADULT NURSE NOTE - NSIMPLEMENTINTERV_GEN_ALL_ED
Implemented All Fall with Harm Risk Interventions:  Parks to call system. Call bell, personal items and telephone within reach. Instruct patient to call for assistance. Room bathroom lighting operational. Non-slip footwear when patient is off stretcher. Physically safe environment: no spills, clutter or unnecessary equipment. Stretcher in lowest position, wheels locked, appropriate side rails in place. Provide visual cue, wrist band, yellow gown, etc. Monitor gait and stability. Monitor for mental status changes and reorient to person, place, and time. Review medications for side effects contributing to fall risk. Reinforce activity limits and safety measures with patient and family. Provide visual clues: red socks.

## 2019-10-05 NOTE — ED PROVIDER NOTE - ATTENDING CONTRIBUTION TO CARE
53yo h/o Down Syndrome, dementia, anxiety, sz d/o sent in from [facility] due to jerking movements of her arms and legs over the past day. Normally ambulates without assistance but seems to have difficulty today. No constitutional sx, not typical of her prior sz, otherwise she is at baseline mental/behavioral status. Of note pt recently started on zyprexa, and dose was increased a few days ago from 5mg to 7.5mg. On exam, pt is alert, minimally verbal, but responds to some simple questions. She does get agitated easily and screams, which per aide is normal for her. She is nontoxic appearing and afebrile, lungs CTA, CVS1S2 RRR abd soft, NT, no peripheral edema. Neuro exam is difficult due to pt's variable cooperation but she is able to walk on my eval. There are occasional jerking movements, not clearly myoclonic, of either arm, which seem to increase when pt is agitated, but otherwise she looks very well. Will check basic labs, Head CT if pt tolerates. Will try PO anxiolytic (pt has taken alprazolam in the past per MAR aide has with her), reassess.

## 2019-10-05 NOTE — ED PROVIDER NOTE - PHYSICAL EXAMINATION
Vital Signs: Reviewed  GEN: alert, NAD, intermittently follows commands  HEAD:  normocephalic, atraumatic  EYES:  PERRLA; conjunctivae without injection, drainage or discharge  ENMT:  nasal mucosa moist; mouth moist without ulcerations or lesions; throat moist without erythema, exudate, ulcerations or lesions  NECK:  supple, no masses  CARDIAC:  regular rate, normal S1 and S2, no murmurs, rubs or gallops  RESP:  respiratory rate and effort appear normal for age; lungs are clear to auscultation bilaterally; no rales or wheezes  ABDOMEN:  soft, nontender, nondistended, no masses  MUSCULOSKELETAL/NEURO: no tremor, ambulates w/ assistance; normal tone  SKIN:  normal skin color for age and race, well-perfused; warm and dry

## 2019-10-05 NOTE — ED PROVIDER NOTE - CCCP TRG CHIEF CMPLNT
HPI:    Patient ID: Gail Starks is a 9year old female. Urinary   This is a chronic problem. The current episode started more than 1 year ago. The problem has been waxing and waning. Associated symptoms include urinary symptoms.  The symptoms are agg
medical evaluation

## 2019-10-05 NOTE — ED PROVIDER NOTE - OBJECTIVE STATEMENT
54yF pmhx Down syndrome, alzheimer's, anxiety, seizures accompanied by nursing home aid who states pt has been experiencing jerky motions for the past day; pt ambulates independently, but also has had problems walking the past day; has been having seizures the past 4mos but current sx are different. Aide reports pt was recently started on olanzapine, and zyprexa was recently increased from 5-7.5mg. Denies fever/chills, n/v/d.

## 2019-10-23 ENCOUNTER — EMERGENCY (EMERGENCY)
Facility: HOSPITAL | Age: 54
LOS: 0 days | Discharge: HOME | End: 2019-10-23
Attending: EMERGENCY MEDICINE | Admitting: EMERGENCY MEDICINE
Payer: MEDICARE

## 2019-10-23 VITALS
SYSTOLIC BLOOD PRESSURE: 142 MMHG | TEMPERATURE: 96 F | OXYGEN SATURATION: 97 % | WEIGHT: 149.91 LBS | DIASTOLIC BLOOD PRESSURE: 62 MMHG | RESPIRATION RATE: 20 BRPM | HEART RATE: 64 BPM

## 2019-10-23 VITALS
RESPIRATION RATE: 16 BRPM | DIASTOLIC BLOOD PRESSURE: 60 MMHG | HEART RATE: 59 BPM | SYSTOLIC BLOOD PRESSURE: 115 MMHG | TEMPERATURE: 97 F | OXYGEN SATURATION: 99 %
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
## 2019-10-23 DIAGNOSIS — S09.90XA UNSPECIFIED INJURY OF HEAD, INITIAL ENCOUNTER: ICD-10-CM
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
## 2019-10-23 DIAGNOSIS — S00.81XA ABRASION OF OTHER PART OF HEAD, INITIAL ENCOUNTER: ICD-10-CM
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
## 2019-10-23 DIAGNOSIS — F02.80 DEMENTIA IN OTHER DISEASES CLASSIFIED ELSEWHERE, UNSPECIFIED SEVERITY, WITHOUT BEHAVIORAL DISTURBANCE, PSYCHOTIC DISTURBANCE, MOOD DISTURBANCE, AND ANXIETY: ICD-10-CM

## 2019-10-23 PROCEDURE — 74176 CT ABD & PELVIS W/O CONTRAST: CPT | Mod: 26

## 2019-10-23 PROCEDURE — 71250 CT THORAX DX C-: CPT | Mod: 26

## 2019-10-23 PROCEDURE — 70450 CT HEAD/BRAIN W/O DYE: CPT | Mod: 26

## 2019-10-23 PROCEDURE — 70486 CT MAXILLOFACIAL W/O DYE: CPT | Mod: 26

## 2019-10-23 PROCEDURE — 99284 EMERGENCY DEPT VISIT MOD MDM: CPT

## 2019-10-23 PROCEDURE — 72125 CT NECK SPINE W/O DYE: CPT | Mod: 26

## 2019-10-23 RX ORDER — MIDAZOLAM HYDROCHLORIDE 1 MG/ML
2 INJECTION, SOLUTION INTRAMUSCULAR; INTRAVENOUS ONCE
Refills: 0 | Status: DISCONTINUED | OUTPATIENT
Start: 2019-10-23 | End: 2019-10-23

## 2019-10-23 RX ADMIN — MIDAZOLAM HYDROCHLORIDE 2 MILLIGRAM(S): 1 INJECTION, SOLUTION INTRAMUSCULAR; INTRAVENOUS at 04:34

## 2019-10-23 NOTE — ED PROVIDER NOTE - PATIENT PORTAL LINK FT
You can access the FollowMyHealth Patient Portal offered by [facility] by registering at the following website: [email or web address]. By joining [facility]’s FollowMyHealth portal, you will also be able to view your health information using other applications (apps) compatible with our system.

## 2019-10-23 NOTE — ED ADULT NURSE NOTE - NSIMPLEMENTINTERV_GEN_ALL_ED
Implemented All Universal Safety Interventions:  [place] to call system. Call bell, personal items and telephone within reach. Instruct patient to call for assistance. Room bathroom lighting operational. Non-slip footwear when patient is off stretcher. Physically safe environment: no spills, clutter or unnecessary equipment. Stretcher in lowest position, wheels locked, appropriate side rails in place.

## 2019-10-23 NOTE — ED ADULT NURSE NOTE - CHIEF COMPLAINT QUOTE
pt gloria from [facility] home with aide; pt had unwitnessed fall out of bed; pt with abrasions to face, no indication of other injuries or pain; as per aide, pt is at her baseline

## 2019-10-23 NOTE — ED ADULT NURSE NOTE - OBJECTIVE STATEMENT
pt gl[facility] from [facility] with abrasions to the face. as per group home attendant patient was sitting on the floor next to her bed.  patient has down syndrome, is alert but has very limited verbal communication , therefore unable to state whether she fell out of the bed  or sat herself down.

## 2019-10-23 NOTE — ED PROVIDER NOTE - ATTENDING CONTRIBUTION TO CARE
55 yo F, history of Down's syndrome, seizure disorder, hypothyroidism here for assessment sp unwitnessed fall. Patient was found by group home aide sitting on the side of her bed with abrasions to L side of face. The patient is unable to provide any history.    VS normal. Patient has abrasions to L side of face, no other traumatic injuries notes, PERRL, EOMI, moving limbs and speaking spontaneously and in response to questions and simple commands. Patient is intermittently yelling, which, per aide, is her response to being in concerning situations. RRR, clear lungs, stable pelvis.     Patient appears to have isolated facial injuries however, she is unable to provide any history and is high risk for occult injury. Will give anxiolytic dose of benzo, get pan CT scan and reassess

## 2019-10-23 NOTE — ED PROVIDER NOTE - OBJECTIVE STATEMENT
55 yo F with PMHx of Down's syndrome, hypothyroidism, and seizure disorder presents to the ED for evaluation of facial injury. Pt was found by aid sitting on the floor by her bed with left sided facial injury. Pt herself unable to provided good hx. According to aid pt is acting at baseline. Pt is up to date with tetanus.

## 2019-10-23 NOTE — ED ADULT TRIAGE NOTE - CHIEF COMPLAINT QUOTE
pt gloria from [facility] home with aide; pt had unwitnessed fall out of bed; pt with abrasions to face, no indication of other injuries or pain; as per aide, pt is at her baseline

## 2019-10-23 NOTE — ED PROVIDER NOTE - CLINICAL SUMMARY MEDICAL DECISION MAKING FREE TEXT BOX
Imaging negative for acute injuries, wounds cleaned, no closure required, is UTD on Tdap.     Patient recovered from anxiolytic dose of medication well with no complications and per aide is still at baseline.    Will dc back home with wound care instructions, return precautions and close follow up.

## 2019-10-23 NOTE — ED PROVIDER NOTE - CARE PROVIDER_API CALL
Mitchell Christian)  Internal Medicine; Nephrology  [address] [place] [address]  Phone: [phone]  Fax: [phone]  Follow Up Time: 1-3 Days

## 2019-10-23 NOTE — ED ADULT NURSE NOTE - CAS DISCH BELONGINGS RETURNED
"Diabetes Education    Received consult request to see this 61 year old male for diabetes education; reason for request states \"Pt with longstanding diabetes, poor control and poor insight to this, would greatly benefit from additional education/reinforcement\".    Attempted to meet with patient, but he was busy with other providers.    If unable to see before discharge, would recommend referral to an outpatient diabetes educator.    Kristel Pierre MS, APRN, CNS, CDE, CDTC  [phone]          " Not applicable

## 2019-10-23 NOTE — ED PROVIDER NOTE - PHYSICAL EXAMINATION
VITAL SIGNS: I have reviewed nursing notes and confirm.  CONSTITUTIONAL: Well-developed; well-nourished; in no acute distress.  SKIN: Skin exam is warm and dry, no acute rash.  HEAD: Normocephalic; (+) abrasion to left frontal region and maxillary region with no active bleeding. No crepitus.   EYES: PERRL, EOM intact; conjunctiva and sclera clear.  ENT: No nasal discharge; airway clear.   NECK: Supple; non tender.  CARD: S1, S2 normal; no murmurs, gallops, or rubs. Regular rate and rhythm.  RESP: No wheezes, rales or rhonchi. Speaking in full sentences.   ABD: Normal bowel sounds; soft; non-distended; non-tender; No rebound or guarding. No CVA tenderness.  BACK: No midline or paraspinal TTP.   EXT: Pelvis stable. Radial and DP 2+ B/L. No joint deformity or TTP.   NEURO: Alert. Grossly unremarkable. No focal deficits.

## 2019-11-25 ENCOUNTER — INPATIENT (INPATIENT)
Facility: HOSPITAL | Age: 54
LOS: 20 days | Discharge: NURSING, REHAB OR LONG-TERM CARE | End: 2019-12-16
Attending: HOSPITALIST | Admitting: HOSPITALIST
Payer: MEDICARE

## 2019-11-25 VITALS
DIASTOLIC BLOOD PRESSURE: 72 MMHG | HEART RATE: 78 BPM | RESPIRATION RATE: 18 BRPM | OXYGEN SATURATION: 98 % | SYSTOLIC BLOOD PRESSURE: 126 MMHG | TEMPERATURE: 99 F

## 2019-11-25 LAB
ALBUMIN SERPL ELPH-MCNC: 3.9 G/DL — SIGNIFICANT CHANGE UP (ref 3.5–5.2)
ALP SERPL-CCNC: 107 U/L — SIGNIFICANT CHANGE UP (ref 30–115)
ALT FLD-CCNC: 50 U/L — HIGH (ref 0–41)
ANION GAP SERPL CALC-SCNC: 15 MMOL/L — HIGH (ref 7–14)
APPEARANCE UR: ABNORMAL
AST SERPL-CCNC: 36 U/L — SIGNIFICANT CHANGE UP (ref 0–41)
BACTERIA # UR AUTO: NEGATIVE — SIGNIFICANT CHANGE UP
BASOPHILS # BLD AUTO: 0.09 K/UL — SIGNIFICANT CHANGE UP (ref 0–0.2)
BASOPHILS NFR BLD AUTO: 2.3 % — HIGH (ref 0–1)
BILIRUB SERPL-MCNC: 0.5 MG/DL — SIGNIFICANT CHANGE UP (ref 0.2–1.2)
BILIRUB UR-MCNC: NEGATIVE — SIGNIFICANT CHANGE UP
BUN SERPL-MCNC: 26 MG/DL — HIGH (ref 10–20)
CALCIUM SERPL-MCNC: 9.3 MG/DL — SIGNIFICANT CHANGE UP (ref 8.5–10.1)
CHLORIDE SERPL-SCNC: 102 MMOL/L — SIGNIFICANT CHANGE UP (ref 98–110)
CO2 SERPL-SCNC: 25 MMOL/L — SIGNIFICANT CHANGE UP (ref 17–32)
COLOR SPEC: YELLOW — SIGNIFICANT CHANGE UP
CREAT SERPL-MCNC: 1.4 MG/DL — SIGNIFICANT CHANGE UP (ref 0.7–1.5)
DIFF PNL FLD: NEGATIVE — SIGNIFICANT CHANGE UP
EOSINOPHIL # BLD AUTO: 0.05 K/UL — SIGNIFICANT CHANGE UP (ref 0–0.7)
EOSINOPHIL NFR BLD AUTO: 1.3 % — SIGNIFICANT CHANGE UP (ref 0–8)
EPI CELLS # UR: 1 /HPF — SIGNIFICANT CHANGE UP (ref 0–5)
FLU A RESULT: NEGATIVE — SIGNIFICANT CHANGE UP
FLU A RESULT: NEGATIVE — SIGNIFICANT CHANGE UP
FLUAV AG NPH QL: NEGATIVE — SIGNIFICANT CHANGE UP
FLUBV AG NPH QL: NEGATIVE — SIGNIFICANT CHANGE UP
GLUCOSE SERPL-MCNC: 92 MG/DL — SIGNIFICANT CHANGE UP (ref 70–99)
GLUCOSE UR QL: NEGATIVE — SIGNIFICANT CHANGE UP
HCT VFR BLD CALC: 41.3 % — SIGNIFICANT CHANGE UP (ref 37–47)
HGB BLD-MCNC: 13.7 G/DL — SIGNIFICANT CHANGE UP (ref 12–16)
HYALINE CASTS # UR AUTO: 4 /LPF — SIGNIFICANT CHANGE UP (ref 0–7)
IMM GRANULOCYTES NFR BLD AUTO: 0.5 % — HIGH (ref 0.1–0.3)
KETONES UR-MCNC: NEGATIVE — SIGNIFICANT CHANGE UP
LACTATE SERPL-SCNC: 1.4 MMOL/L — SIGNIFICANT CHANGE UP (ref 0.5–2.2)
LEUKOCYTE ESTERASE UR-ACNC: NEGATIVE — SIGNIFICANT CHANGE UP
LIDOCAIN IGE QN: 63 U/L — HIGH (ref 7–60)
LYMPHOCYTES # BLD AUTO: 0.77 K/UL — LOW (ref 1.2–3.4)
LYMPHOCYTES # BLD AUTO: 19.3 % — LOW (ref 20.5–51.1)
MAGNESIUM SERPL-MCNC: 2 MG/DL — SIGNIFICANT CHANGE UP (ref 1.8–2.4)
MCHC RBC-ENTMCNC: 31.6 PG — HIGH (ref 27–31)
MCHC RBC-ENTMCNC: 33.2 G/DL — SIGNIFICANT CHANGE UP (ref 32–37)
MCV RBC AUTO: 95.4 FL — SIGNIFICANT CHANGE UP (ref 81–99)
MONOCYTES # BLD AUTO: 0.3 K/UL — SIGNIFICANT CHANGE UP (ref 0.1–0.6)
MONOCYTES NFR BLD AUTO: 7.5 % — SIGNIFICANT CHANGE UP (ref 1.7–9.3)
NEUTROPHILS # BLD AUTO: 2.75 K/UL — SIGNIFICANT CHANGE UP (ref 1.4–6.5)
NEUTROPHILS NFR BLD AUTO: 69.1 % — SIGNIFICANT CHANGE UP (ref 42.2–75.2)
NITRITE UR-MCNC: NEGATIVE — SIGNIFICANT CHANGE UP
NRBC # BLD: 0 /100 WBCS — SIGNIFICANT CHANGE UP (ref 0–0)
NT-PROBNP SERPL-SCNC: 13 PG/ML — SIGNIFICANT CHANGE UP (ref 0–300)
PH UR: 6 — SIGNIFICANT CHANGE UP (ref 5–8)
PLATELET # BLD AUTO: 205 K/UL — SIGNIFICANT CHANGE UP (ref 130–400)
POTASSIUM SERPL-MCNC: 4.5 MMOL/L — SIGNIFICANT CHANGE UP (ref 3.5–5)
POTASSIUM SERPL-SCNC: 4.5 MMOL/L — SIGNIFICANT CHANGE UP (ref 3.5–5)
PROT SERPL-MCNC: 7.1 G/DL — SIGNIFICANT CHANGE UP (ref 6–8)
PROT UR-MCNC: SIGNIFICANT CHANGE UP
RBC # BLD: 4.33 M/UL — SIGNIFICANT CHANGE UP (ref 4.2–5.4)
RBC # FLD: 13.9 % — SIGNIFICANT CHANGE UP (ref 11.5–14.5)
RBC CASTS # UR COMP ASSIST: 28 /HPF — HIGH (ref 0–4)
RSV RESULT: NEGATIVE — SIGNIFICANT CHANGE UP
RSV RNA RESP QL NAA+PROBE: NEGATIVE — SIGNIFICANT CHANGE UP
SODIUM SERPL-SCNC: 142 MMOL/L — SIGNIFICANT CHANGE UP (ref 135–146)
SP GR SPEC: 1.03 — HIGH (ref 1.01–1.02)
TROPONIN T SERPL-MCNC: <0.01 NG/ML — SIGNIFICANT CHANGE UP
UROBILINOGEN FLD QL: SIGNIFICANT CHANGE UP
WBC # BLD: 3.98 K/UL — LOW (ref 4.8–10.8)
WBC # FLD AUTO: 3.98 K/UL — LOW (ref 4.8–10.8)
WBC UR QL: 1 /HPF — SIGNIFICANT CHANGE UP (ref 0–5)

## 2019-11-25 PROCEDURE — 93010 ELECTROCARDIOGRAM REPORT: CPT

## 2019-11-25 PROCEDURE — 71045 X-RAY EXAM CHEST 1 VIEW: CPT | Mod: 26

## 2019-11-25 PROCEDURE — 99285 EMERGENCY DEPT VISIT HI MDM: CPT | Mod: GC

## 2019-11-25 RX ORDER — RANITIDINE HYDROCHLORIDE 150 MG/1
1 TABLET, FILM COATED ORAL
Qty: 0 | Refills: 0 | DISCHARGE

## 2019-11-25 RX ORDER — LAMOTRIGINE 25 MG/1
100 TABLET, ORALLY DISINTEGRATING ORAL
Refills: 0 | Status: DISCONTINUED | OUTPATIENT
Start: 2019-11-25 | End: 2019-11-26

## 2019-11-25 RX ORDER — OLANZAPINE 15 MG/1
2.5 TABLET, FILM COATED ORAL DAILY
Refills: 0 | Status: DISCONTINUED | OUTPATIENT
Start: 2019-11-25 | End: 2019-11-26

## 2019-11-25 RX ORDER — LAMOTRIGINE 25 MG/1
75 TABLET, ORALLY DISINTEGRATING ORAL
Refills: 0 | Status: DISCONTINUED | OUTPATIENT
Start: 2019-11-25 | End: 2019-11-26

## 2019-11-25 RX ORDER — LANOLIN ALCOHOL/MO/W.PET/CERES
3 CREAM (GRAM) TOPICAL AT BEDTIME
Refills: 0 | Status: DISCONTINUED | OUTPATIENT
Start: 2019-11-25 | End: 2019-11-26

## 2019-11-25 RX ORDER — CHLORHEXIDINE GLUCONATE 213 G/1000ML
1 SOLUTION TOPICAL
Refills: 0 | Status: DISCONTINUED | OUTPATIENT
Start: 2019-11-25 | End: 2019-12-16

## 2019-11-25 RX ORDER — SENNA PLUS 8.6 MG/1
1 TABLET ORAL AT BEDTIME
Refills: 0 | Status: DISCONTINUED | OUTPATIENT
Start: 2019-11-25 | End: 2019-12-16

## 2019-11-25 RX ORDER — LEVOTHYROXINE SODIUM 125 MCG
112 TABLET ORAL DAILY
Refills: 0 | Status: DISCONTINUED | OUTPATIENT
Start: 2019-11-25 | End: 2019-12-16

## 2019-11-25 RX ORDER — ALPRAZOLAM 0.25 MG
0.5 TABLET ORAL
Refills: 0 | Status: DISCONTINUED | OUTPATIENT
Start: 2019-11-25 | End: 2019-11-26

## 2019-11-25 RX ORDER — SIMVASTATIN 20 MG/1
20 TABLET, FILM COATED ORAL AT BEDTIME
Refills: 0 | Status: DISCONTINUED | OUTPATIENT
Start: 2019-11-25 | End: 2019-12-16

## 2019-11-25 RX ORDER — SODIUM CHLORIDE 9 MG/ML
1000 INJECTION, SOLUTION INTRAVENOUS ONCE
Refills: 0 | Status: COMPLETED | OUTPATIENT
Start: 2019-11-25 | End: 2019-11-25

## 2019-11-25 RX ORDER — ENOXAPARIN SODIUM 100 MG/ML
40 INJECTION SUBCUTANEOUS DAILY
Refills: 0 | Status: DISCONTINUED | OUTPATIENT
Start: 2019-11-25 | End: 2019-12-16

## 2019-11-25 RX ORDER — ZIPRASIDONE HYDROCHLORIDE 20 MG/1
1 CAPSULE ORAL
Qty: 0 | Refills: 0 | DISCHARGE

## 2019-11-25 RX ADMIN — SENNA PLUS 1 TABLET(S): 8.6 TABLET ORAL at 22:03

## 2019-11-25 RX ADMIN — SIMVASTATIN 20 MILLIGRAM(S): 20 TABLET, FILM COATED ORAL at 22:03

## 2019-11-25 RX ADMIN — LAMOTRIGINE 100 MILLIGRAM(S): 25 TABLET, ORALLY DISINTEGRATING ORAL at 22:08

## 2019-11-25 RX ADMIN — SODIUM CHLORIDE 1000 MILLILITER(S): 9 INJECTION, SOLUTION INTRAVENOUS at 19:45

## 2019-11-25 RX ADMIN — SODIUM CHLORIDE 1000 MILLILITER(S): 9 INJECTION, SOLUTION INTRAVENOUS at 15:04

## 2019-11-25 RX ADMIN — Medication 3 MILLIGRAM(S): at 22:03

## 2019-11-25 RX ADMIN — Medication 0.5 MILLIGRAM(S): at 16:32

## 2019-11-25 NOTE — H&P ADULT - ATTENDING COMMENTS
Patient seen and examined. Sister and group home aide at bedside to provide history. Patient does not provide any history. She was sent for failure to thrive due to decrease PO intake. Patient has had a rapid decline in functional status according to the sister. She has essentially become nonverbal and spends most of her time screaming. No recent fever, chills.   Physical exam limited due to patient's condition, however is unremarkable.   Neuro consult for medication adjustment. Speech and swallow eval to rule out oropharyngeal dysphagia. If dysphagia is ruled out, monitor caloric intake with 1:1 feed to see patient meets caloric requirements. If she does not meet her needs she might require a feeding tube. C/w doherty for acute urinary retention.     #Progress Note Handoff  Pending (specify):  speech/swallow eval, calorie count, neuro consult   Family discussion: dw sister at bedside plan of care   Disposition: Group home

## 2019-11-25 NOTE — ED PROVIDER NOTE - ATTENDING CONTRIBUTION TO CARE
54F PMH Down's nonverbal, hypothyroid, sz on lamictal, HL, bedbound/unable to feed  herself in the last 1mo per sister at bedside since last sz, sent from [facility] po, no urinary output, rising/toxic lamictal level, cough (especially when being fed). no fever. no vomiting. no trauma.     on exam, AFVSS, well tristen nad, ncat, eomi, perrla, mmm, lctab, rrr nl s1s2 no mrg, abd soft ntnd, aaox3, no focal deficits, no le edema or calf ttp, a/p; 54F PMH Down's nonverbal, hypothyroid, sz on lamictal, HL, bedbound/unable to feed  herself in the last 1mo per sister at bedside since last sz, sent from [facility] po, no urinary output, rising/toxic lamictal level, cough (especially when being fed). no fever. no vomiting. no trauma.     on exam, AFVSS, well tristen nad, ncat, eomi, perrla, DRY mm, lctab, rrr nl s1s2 no mrg, abd soft ntnd, alert, moaning with IV placement, moving all extremities w good strength, not speaking or following commands, consolable w sister, no focal deficits, no le edema or calf ttp,     a/p;  Dec po/uop, cough, rising lamictal level- appears dehydrated, r/o belinda, uti, pna, electrolyte abnl, acs, lacmictal toxicity, will do labs, ekg/trop, CXR, UA, ivf, re-eval.

## 2019-11-25 NOTE — H&P ADULT - HISTORY OF PRESENT ILLNESS
54 Year Old  Female with a PMH of down syndrome, seizure disorder on Keppra (neurologist Dr. Torres), presents with changes in behavior. Over the last few weeks, pt having increasing anxiety, agitation and being more withdrawn (not wanting to eat, shower or got to program). Brought in by aid at group home who confirms story by sister, who brought patient here 2 days ago for the same complaint and was seen by psych at that time and recommend to f/u with outpatient psych and neuro. According to aid, no significant changes since 2 days ago. No concerns for self harm or harm to others. 54 Year Old Female with a PMH of down syndrome, dementia ( non verbal at baseline) and seizure disorder (neurologist Dr. Torres presents from the group home for evaluation of failure of thrive and decrease po intake. collateral history was obtained from sister / Aid at the bedside. also from Hernan on call RN at the group home at [phone] who reported decreased po intake, decreased urine output and dehydration. rest of ROS is negative.     in the ED, pt is hemodynamically stable.

## 2019-11-25 NOTE — ED PROVIDER NOTE - PHYSICAL EXAMINATION
m: GENERAL: NAD, lying in bed comfortably, non verbal at baseline as per family.  	CHEST/LUNG: Clear to auscultation bilaterally; No rales, rhonchi, wheezing, or rubs.   HEART: Regular rate and rhythm; No murmurs, rubs, or gallops  	ABDOMEN: Bowel sounds present; Soft, Nontender, Nondistended.   	EXTREMITIES:  2+ Peripheral Pulses, brisk capillary refill. No clubbing, cyanosis, or edema  NERVOUS SYSTEM:  No deficits , mental status can not be assessed at baseline as per family.   MSK: FROM all 4 extremities, full and equal strength

## 2019-11-25 NOTE — PATIENT PROFILE ADULT - INFORMATION COULD NOT BE OBTAINED DETAILS
----- Message from Elida Reese MD sent at 9/2/2018  9:27 AM EDT -----  Let patient know her vaginal culture was negative for infection   pt non verbal

## 2019-11-25 NOTE — ED ADULT NURSE NOTE - OBJECTIVE STATEMENT
Pt presents to ER from group home with sister at bedside as historian, c/o inadequate po intake, decreased hydration and dry diapers. Pt poor historian, iv inserted. Pt sister denies any fever or cough or shorntess of breath, states she has mild cough after eating puree diet. Safety maintained and hourly rounding performed. Will continue with plan of care.

## 2019-11-25 NOTE — H&P ADULT - NSHPPHYSICALEXAM_GEN_ALL_CORE
PHYSICAL EXAM:  GENERAL: NAD, lying in bed comfortably  HEAD:  Atraumatic, Normocephalic  EYES: EOMI, PERRLA, conjunctiva and sclera clear  ENT: Moist mucous membranes  NECK: Supple, No JVD  CHEST/LUNG: Clear to auscultation bilaterally; No rales, rhonchi, wheezing, or rubs. Unlabored respirations  HEART: Regular rate and rhythm; No murmurs, rubs, or gallops  ABDOMEN: Bowel sounds present; Soft, Nontender, Nondistended. No hepatomegally  EXTREMITIES:  2+ Peripheral Pulses, brisk capillary refill. No clubbing, cyanosis, or edema  NERVOUS SYSTEM:  Alert & Oriented X3, speech clear. No deficits   MSK: FROM all 4 extremities, full and equal strength  SKIN: No rashes or lesions GENERAL: NAD, lying in bed comfortably, non verbal   CHEST/LUNG: Clear to auscultation bilaterally; No rales, rhonchi, wheezing, or rubs.   HEART: Regular rate and rhythm; No murmurs, rubs, or gallops  ABDOMEN: Bowel sounds present; Soft, Nontender, Nondistended.   EXTREMITIES:  2+ Peripheral Pulses, brisk capillary refill. No clubbing, cyanosis, or edema  NERVOUS SYSTEM:  No deficits , mental status can not be assessed   MSK: FROM all 4 extremities, full and equal strength

## 2019-11-25 NOTE — H&P ADULT - NSICDXPASTMEDICALHX_GEN_ALL_CORE_FT
PAST MEDICAL HISTORY:  Down's syndrome     Hypothyroid     Impulse control disorder in adult     Intellectual disability     Seizures

## 2019-11-25 NOTE — H&P ADULT - NSHPOUTPATIENTPROVIDERS_GEN_ALL_CORE
Chart(s), Home Health Aide or Other Non-Family Caregiver  PCP: Dr. Berg   Neurologist: Dr. Torres  Pharmacy: [facility]

## 2019-11-25 NOTE — ED PROVIDER NOTE - CLINICAL SUMMARY MEDICAL DECISION MAKING FREE TEXT BOX
ed work up negative, pt stil unable to tolerate po, mental status has been declining rapidly x 1mo per sister and has been aspirating food, pending lamictal level, admit for further work up /treatment FTT

## 2019-11-25 NOTE — ED PROVIDER NOTE - OBJECTIVE STATEMENT
54 Year Old Female with a PMH of down syndrome, dementia ( non verbal at baseline) and seizure disorder (neurologist Dr. Torres presents from the group home for evaluation of failure of thrive and decrease po intake. collateral history was obtained from sister / Aid at the bedside. also from Hernan on call RN at the group home at [phone] who reported decreased po intake, decreased urine output and dehydration.

## 2019-11-25 NOTE — H&P ADULT - ASSESSMENT
54 Year Old  Female with a PMH of down syndrome, seizure disorder on Keppra (neurologist Dr. Torres), presents with changes in behavior. Over the last few weeks, pt having increasing anxiety, agitation and being more withdrawn (not wanting to eat, shower or got to program). Brought in by aid at group home who confirms story by sister, who brought patient here 2 days ago for the same complaint and was seen by psych at that time and recommend to f/u with outpatient psych and neuro. According to aid, no significant changes since 2 days ago. No concerns for self harm or harm to others.       1) Down Syndrome With new Personality changes and aggression  Likely secondary to Progression of Dementia vs Medication adjustment   Continue with Current Rx regimen   Follow with Psych on further recommendations and medication adjustment   Patient needs placement as the facility she is currently residing at cannot tend to her increasing needs.     2) Seizure Disorder   Stable at this time   Continue with Keppra 250 twice daily     3) Hypothyroidism   Continue with Synthroid 112 was taking 88 in the past.   Follow with TSH     4) DVT Prophylaxis   Heparin Sub Q     5) Disposition   Admitted to medicine for new onset personality and aggressive behavior changes.   Patient needs placement   Full Code    Contact me at [phone] for any further questions or concerns 54 Year Old Female with a PMH of down syndrome, dementia ( non verbal at baseline) and seizure disorder (neurologist Dr. Torres presents from the group home for evaluation of failure of thrive and decrease po intake.    # Decreased po intake:  - no apparent metabolic encephalopathy  - blood work is unremarkable, cxr no pathology.  - received 2 L of LR in the ED   - UA is negative, will monitor urine out put, straight cath if needed   - follow up urine and blood cultures     # Down Syndrome With Dementia, seziure disroder:  - non verbal at baseline  - Continue with Lamictal, olanzapine, and ativan   - f/u lamictal level    # Hypothyroidism:- Continue with Synthroid 112 , Follow with TSH     DVT Prophylaxis: lovenox  GI ppx: not indicated  Soft diet   Disposition: from group home  HCP and legal guardian [phone] Madisyn pearl (Sister) 54 Year Old Female with a PMH of down syndrome, dementia ( non verbal at baseline) and seizure disorder (neurologist Dr. Torres presents from the group home for evaluation of failure of thrive and decrease po intake.    # Decreased po intake:  - no apparent metabolic encephalopathy  - blood work is unremarkable, cxr no pathology, FLU swab is negative  - received 2 L of LR in the ED   - UA is negative, will monitor urine out put, straight cath if needed   - follow up urine and blood cultures     # Down Syndrome With Dementia, seziure disroder:  - non verbal at baseline  - Continue with Lamictal, olanzapine, and ativan   - f/u lamictal level    # Hypothyroidism:- Continue with Synthroid 112 , Follow with TSH     DVT Prophylaxis: lovenox  GI ppx: not indicated  Soft diet   Disposition: from group home  HCP and legal guardian [phone] Madisyn pearl (Sister)

## 2019-11-26 LAB
ANION GAP SERPL CALC-SCNC: 12 MMOL/L — SIGNIFICANT CHANGE UP (ref 7–14)
BASOPHILS # BLD AUTO: 0.08 K/UL — SIGNIFICANT CHANGE UP (ref 0–0.2)
BASOPHILS NFR BLD AUTO: 2.3 % — HIGH (ref 0–1)
BUN SERPL-MCNC: 23 MG/DL — HIGH (ref 10–20)
CALCIUM SERPL-MCNC: 8.8 MG/DL — SIGNIFICANT CHANGE UP (ref 8.5–10.1)
CHLORIDE SERPL-SCNC: 108 MMOL/L — SIGNIFICANT CHANGE UP (ref 98–110)
CO2 SERPL-SCNC: 25 MMOL/L — SIGNIFICANT CHANGE UP (ref 17–32)
CREAT SERPL-MCNC: 1.4 MG/DL — SIGNIFICANT CHANGE UP (ref 0.7–1.5)
EOSINOPHIL # BLD AUTO: 0.06 K/UL — SIGNIFICANT CHANGE UP (ref 0–0.7)
EOSINOPHIL NFR BLD AUTO: 1.7 % — SIGNIFICANT CHANGE UP (ref 0–8)
GLUCOSE SERPL-MCNC: 107 MG/DL — HIGH (ref 70–99)
HCT VFR BLD CALC: 38.8 % — SIGNIFICANT CHANGE UP (ref 37–47)
HCV AB S/CO SERPL IA: 0.25 S/CO — SIGNIFICANT CHANGE UP (ref 0–0.99)
HCV AB SERPL-IMP: SIGNIFICANT CHANGE UP
HGB BLD-MCNC: 12.5 G/DL — SIGNIFICANT CHANGE UP (ref 12–16)
IMM GRANULOCYTES NFR BLD AUTO: 0.6 % — HIGH (ref 0.1–0.3)
LYMPHOCYTES # BLD AUTO: 0.59 K/UL — LOW (ref 1.2–3.4)
LYMPHOCYTES # BLD AUTO: 17.1 % — LOW (ref 20.5–51.1)
MAGNESIUM SERPL-MCNC: 1.9 MG/DL — SIGNIFICANT CHANGE UP (ref 1.8–2.4)
MCHC RBC-ENTMCNC: 31.6 PG — HIGH (ref 27–31)
MCHC RBC-ENTMCNC: 32.2 G/DL — SIGNIFICANT CHANGE UP (ref 32–37)
MCV RBC AUTO: 98.2 FL — SIGNIFICANT CHANGE UP (ref 81–99)
MONOCYTES # BLD AUTO: 0.3 K/UL — SIGNIFICANT CHANGE UP (ref 0.1–0.6)
MONOCYTES NFR BLD AUTO: 8.7 % — SIGNIFICANT CHANGE UP (ref 1.7–9.3)
NEUTROPHILS # BLD AUTO: 2.4 K/UL — SIGNIFICANT CHANGE UP (ref 1.4–6.5)
NEUTROPHILS NFR BLD AUTO: 69.6 % — SIGNIFICANT CHANGE UP (ref 42.2–75.2)
NRBC # BLD: 0 /100 WBCS — SIGNIFICANT CHANGE UP (ref 0–0)
PLATELET # BLD AUTO: 161 K/UL — SIGNIFICANT CHANGE UP (ref 130–400)
POTASSIUM SERPL-MCNC: 4.2 MMOL/L — SIGNIFICANT CHANGE UP (ref 3.5–5)
POTASSIUM SERPL-SCNC: 4.2 MMOL/L — SIGNIFICANT CHANGE UP (ref 3.5–5)
RBC # BLD: 3.95 M/UL — LOW (ref 4.2–5.4)
RBC # FLD: 13.7 % — SIGNIFICANT CHANGE UP (ref 11.5–14.5)
SODIUM SERPL-SCNC: 145 MMOL/L — SIGNIFICANT CHANGE UP (ref 135–146)
TSH SERPL-MCNC: 0.25 UIU/ML — LOW (ref 0.27–4.2)
WBC # BLD: 3.45 K/UL — LOW (ref 4.8–10.8)
WBC # FLD AUTO: 3.45 K/UL — LOW (ref 4.8–10.8)

## 2019-11-26 PROCEDURE — 99222 1ST HOSP IP/OBS MODERATE 55: CPT

## 2019-11-26 PROCEDURE — 99232 SBSQ HOSP IP/OBS MODERATE 35: CPT

## 2019-11-26 RX ORDER — QUETIAPINE FUMARATE 200 MG/1
25 TABLET, FILM COATED ORAL AT BEDTIME
Refills: 0 | Status: DISCONTINUED | OUTPATIENT
Start: 2019-11-26 | End: 2019-12-07

## 2019-11-26 RX ORDER — POLYETHYLENE GLYCOL 3350 17 G/17G
17 POWDER, FOR SOLUTION ORAL DAILY
Refills: 0 | Status: DISCONTINUED | OUTPATIENT
Start: 2019-11-26 | End: 2019-12-10

## 2019-11-26 RX ORDER — LAMOTRIGINE 25 MG/1
75 TABLET, ORALLY DISINTEGRATING ORAL
Refills: 0 | Status: DISCONTINUED | OUTPATIENT
Start: 2019-11-26 | End: 2019-11-26

## 2019-11-26 RX ORDER — SOD,AMMONIUM,POTASSIUM LACTATE
1 CREAM (GRAM) TOPICAL
Refills: 0 | Status: DISCONTINUED | OUTPATIENT
Start: 2019-11-26 | End: 2019-12-16

## 2019-11-26 RX ORDER — LAMOTRIGINE 25 MG/1
100 TABLET, ORALLY DISINTEGRATING ORAL
Refills: 0 | Status: DISCONTINUED | OUTPATIENT
Start: 2019-11-26 | End: 2019-11-27

## 2019-11-26 RX ORDER — SODIUM CHLORIDE 9 MG/ML
1000 INJECTION, SOLUTION INTRAVENOUS
Refills: 0 | Status: DISCONTINUED | OUTPATIENT
Start: 2019-11-26 | End: 2019-11-27

## 2019-11-26 RX ADMIN — CHLORHEXIDINE GLUCONATE 1 APPLICATION(S): 213 SOLUTION TOPICAL at 06:26

## 2019-11-26 RX ADMIN — Medication 1 APPLICATION(S): at 18:02

## 2019-11-26 RX ADMIN — QUETIAPINE FUMARATE 25 MILLIGRAM(S): 200 TABLET, FILM COATED ORAL at 22:19

## 2019-11-26 RX ADMIN — Medication 0.5 MILLIGRAM(S): at 06:25

## 2019-11-26 RX ADMIN — LAMOTRIGINE 100 MILLIGRAM(S): 25 TABLET, ORALLY DISINTEGRATING ORAL at 18:03

## 2019-11-26 RX ADMIN — SODIUM CHLORIDE 75 MILLILITER(S): 9 INJECTION, SOLUTION INTRAVENOUS at 10:25

## 2019-11-26 RX ADMIN — LAMOTRIGINE 75 MILLIGRAM(S): 25 TABLET, ORALLY DISINTEGRATING ORAL at 06:27

## 2019-11-26 RX ADMIN — POLYETHYLENE GLYCOL 3350 17 GRAM(S): 17 POWDER, FOR SOLUTION ORAL at 12:16

## 2019-11-26 RX ADMIN — Medication 1 TABLET(S): at 12:16

## 2019-11-26 RX ADMIN — OLANZAPINE 2.5 MILLIGRAM(S): 15 TABLET, FILM COATED ORAL at 12:16

## 2019-11-26 RX ADMIN — Medication 112 MICROGRAM(S): at 06:26

## 2019-11-26 RX ADMIN — SENNA PLUS 1 TABLET(S): 8.6 TABLET ORAL at 22:19

## 2019-11-26 RX ADMIN — SIMVASTATIN 20 MILLIGRAM(S): 20 TABLET, FILM COATED ORAL at 22:19

## 2019-11-26 RX ADMIN — ENOXAPARIN SODIUM 40 MILLIGRAM(S): 100 INJECTION SUBCUTANEOUS at 12:15

## 2019-11-26 NOTE — SWALLOW BEDSIDE ASSESSMENT ADULT - SWALLOW EVAL: DIAGNOSIS
+ toleration observed without overt symptoms of penetration/aspiration for thins; mod oral dysphagia for thins; minimally acceptance of trials however sister and caregiver at bedside report positive tolerance this morning; pt may benefit from thickened liquids, pt and caregiver in agreement for the temporary change in modified diet + toleration observed without overt symptoms of penetration/aspiration for thins; mod oral dysphagia for thins; minimal acceptance of trials however sister and caregiver at bedside report positive tolerance this morning; pt may benefit from thickened liquids, pt and caregiver in agreement for the temporary change in modified diet

## 2019-11-26 NOTE — PROGRESS NOTE ADULT - SUBJECTIVE AND OBJECTIVE BOX
SALVATORE STEELE 54y Female  MRN#: [id number]     SUBJECTIVE  Patient is a 54y old Female who presents with a chief complaint of decreased po intake (2019 15:47)      Today is hospital day 1d, and this morning she is lying in bed without distress. Spoke with caregiver at bedside, spoke with sister/legal guardian, also called outside neurologist Dr Hood and spoke with him.   No acute overnight events.     OBJECTIVE  PAST MEDICAL & SURGICAL HISTORY  Seizures  Intellectual disability  Hypothyroid  Impulse control disorder in adult  Down's syndrome  No significant past surgical history    ALLERGIES:  No Known Allergies    MEDICATIONS:  STANDING MEDICATIONS  ammonium lactate 12% Lotion 1 Application(s) Topical two times a day  calcium carbonate 1250 mG  + Vitamin D (OsCal 500 + D) 1 Tablet(s) Oral daily  chlorhexidine 4% Liquid 1 Application(s) Topical <User Schedule>  clotrimazole 1% Cream 1 Application(s) Topical two times a day  enoxaparin Injectable 40 milliGRAM(s) SubCutaneous daily  lactated ringers. 1000 milliLiter(s) IV Continuous <Continuous>  lamoTRIgine 100 milliGRAM(s) Oral two times a day  levothyroxine 112 MICROGram(s) Oral daily  polyethylene glycol 3350 17 Gram(s) Oral daily  QUEtiapine 25 milliGRAM(s) Oral at bedtime  senna 8.6 milliGRAM(s) Oral Tablet - Peds 1 Tablet(s) Oral at bedtime  simvastatin 20 milliGRAM(s) Oral at bedtime    PRN MEDICATIONS    HOME MEDICATIONS  Home Medications:  ALPRAZolam 0.5 mg oral tablet: 1 tab(s) orally 2 times a day (2019 20:09)  LaMICtal 100 mg oral tablet: 1 tab(s) orally once a day (2019 20:10)  LaMICtal 150 mg oral tablet: 0.5 tab(s) orally once a day (2019 20:10)  Melatonin 3 mg oral tablet: 1 tab(s) orally once a day (at bedtime) (2019 20:11)  OLANZapine 2.5 mg oral tablet: 1 tab(s) orally once a day (2019 20:11)  Senna 8.6 mg oral tablet: 1 tab(s) orally once a day (at bedtime) (2019 20:10)  simvastatin 20 mg oral tablet: 1 tab(s) orally once a day (at bedtime) (2018 21:39)      LABS:                        12.5   3.45  )-----------( 161      ( 2019 07:49 )             38.8         145  |  108  |  23<H>  ----------------------------<  107<H>  4.2   |  25  |  1.4    Ca    8.8      2019 07:49  Mg     1.9         TPro  7.1  /  Alb  3.9  /  TBili  0.5  /  DBili  x   /  AST  36  /  ALT  50<H>  /  AlkPhos  107      LIVER FUNCTIONS - ( 2019 14:24 )  Alb: 3.9 g/dL / Pro: 7.1 g/dL / ALK PHOS: 107 U/L / ALT: 50 U/L / AST: 36 U/L / GGT: x             Urinalysis Basic - ( 2019 14:24 )    Color: Yellow / Appearance: Slightly Turbid / S.027 / pH: x  Gluc: x / Ketone: Negative  / Bili: Negative / Urobili: <2 mg/dL   Blood: x / Protein: Trace / Nitrite: Negative   Leuk Esterase: Negative / RBC: 28 /HPF / WBC 1 /HPF   Sq Epi: x / Non Sq Epi: 1 /HPF / Bacteria: Negative            CARDIAC MARKERS ( 2019 14:24 )  x     / <0.01 ng/mL / x     / x     / x          PHYSICAL EXAM:  T(C): 36.1 (19 @ 16:31), Max: 36.1 (19 @ 16:31)  HR: 58 (19 @ 16:31) (46 - 59)  BP: 135/63 (19 @ 16:31) (116/56 - 135/63)  RR: 18 (19 @ 05:12) (16 - 18)  SpO2: 97% (19 @ 16:31) (97% - 97%)    GENERAL: NAD, well-developed, 54y lying in bed, lethargic  EENT: EOMI, conjunctiva and sclera clear, No nasal obstruction or discharge  RESPIRATORY: Anterior fields Clear to auscultation bilaterally; No wheeze or crackles  CARDIOVASCULAR: Regular rate and rhythm; No murmurs, rubs, or gallops, no LE edema  GASTROINTESTINAL: Abdomen Soft, Nontender, Nondistended; Bowel sounds present  MUSCULOSKELETAL:  No cyanosis, extremities grossly symmetrical. +erythematous rash on bilateral anterior thighs, no discharge  PSYCH: Non-verbal, affect appropriate  NEUROLOGY: non-focal, MAEE and withdraws to pain    ADMISSION SUMMARY  Patient is a 54y old Female who presents with a chief complaint of decreased po intake (2019 15:47)

## 2019-11-26 NOTE — CONSULT NOTE ADULT - SUBJECTIVE AND OBJECTIVE BOX
Neurology Consult    Patient is a 54y old  Female who presents with a chief complaint of decreased po intake.      HPI:  54 Year Old Female with a PMH of down syndrome, dementia ( non verbal at baseline) and seizure disorder (neurologist Dr. Torres presents from the group home for evaluation of failure of thrive and decrease po intake. collateral history was obtained from sister / Aid at the bedside. also from Cazy on call RN at the group home at [phone] who reported decreased po intake, decreased urine output and dehydration. rest of ROS is negative.     PAST MEDICAL & SURGICAL HISTORY:  Seizures  Intellectual disability  Hypothyroid  Impulse control disorder in adult  Down's syndrome  No significant past surgical history      FAMILY HISTORY:      Social History: (-) x 3    Allergies    No Known Allergies    Intolerances        MEDICATIONS  (STANDING):  ammonium lactate 12% Lotion 1 Application(s) Topical two times a day  calcium carbonate 1250 mG  + Vitamin D (OsCal 500 + D) 1 Tablet(s) Oral daily  chlorhexidine 4% Liquid 1 Application(s) Topical <User Schedule>  clotrimazole 1% Cream 1 Application(s) Topical two times a day  enoxaparin Injectable 40 milliGRAM(s) SubCutaneous daily  lactated ringers. 1000 milliLiter(s) (75 mL/Hr) IV Continuous <Continuous>  lamoTRIgine 100 milliGRAM(s) Oral two times a day  levothyroxine 112 MICROGram(s) Oral daily  polyethylene glycol 3350 17 Gram(s) Oral daily  QUEtiapine 25 milliGRAM(s) Oral at bedtime  senna 8.6 milliGRAM(s) Oral Tablet - Peds 1 Tablet(s) Oral at bedtime  simvastatin 20 milliGRAM(s) Oral at bedtime    MEDICATIONS  (PRN):      Review of systems:    Constitutional: as per HPI  Eyes: No eye pain or discharge  ENMT:  No difficulty hearing; No sinus or throat pain  Neck: No pain or stiffness  Respiratory: No cough, wheezing, chills or hemoptysis  Cardiovascular: No chest pain, palpitations, shortness of breath, dyspnea on exertion  Gastrointestinal: No abdominal pain, nausea, vomiting or hematemesis; No diarrhea or constipation.   Genitourinary: No dysuria, frequency, hematuria or incontinence  Neurological: As per HPI  Skin: No rashes or lesions   Endocrine: No heat or cold intolerance; No hair loss  Musculoskeletal: No joint pain or swelling  Psychiatric: No depression, anxiety, mood swings  Heme/Lymph: No easy bruising or bleeding gums    Vital Signs Last 24 Hrs  T(C): 35.2 (2019 05:12), Max: 36.4 (2019 16:51)  T(F): 95.3 (2019 05:12), Max: 97.5 (2019 16:51)  HR: 46 (2019 05:12) (46 - 59)  BP: 135/62 (2019 05:12) (116/56 - 135/62)  BP(mean): --  RR: 18 (2019 05:12) (16 - 18)  SpO2: 97% (2019 22:29) (97% - 97%)    Examination:  At baseline she is minimally verbal.   Now is less verbal than baseline.  Moves all extremities weakly but spontaneous and symmetric.   Not following commands.     Labs:   CBC Full  -  ( 2019 07:49 )  WBC Count : 3.45 K/uL  RBC Count : 3.95 M/uL  Hemoglobin : 12.5 g/dL  Hematocrit : 38.8 %  Platelet Count - Automated : 161 K/uL  Mean Cell Volume : 98.2 fL  Mean Cell Hemoglobin : 31.6 pg  Mean Cell Hemoglobin Concentration : 32.2 g/dL  Auto Neutrophil # : 2.40 K/uL  Auto Lymphocyte # : 0.59 K/uL  Auto Monocyte # : 0.30 K/uL  Auto Eosinophil # : 0.06 K/uL  Auto Basophil # : 0.08 K/uL  Auto Neutrophil % : 69.6 %  Auto Lymphocyte % : 17.1 %  Auto Monocyte % : 8.7 %  Auto Eosinophil % : 1.7 %  Auto Basophil % : 2.3 %        145  |  108  |  23<H>  ----------------------------<  107<H>  4.2   |  25  |  1.4    Ca    8.8      2019 07:49  Mg     1.9         TPro  7.1  /  Alb  3.9  /  TBili  0.5  /  DBili  x   /  AST  36  /  ALT  50<H>  /  AlkPhos  107      LIVER FUNCTIONS - ( 2019 14:24 )  Alb: 3.9 g/dL / Pro: 7.1 g/dL / ALK PHOS: 107 U/L / ALT: 50 U/L / AST: 36 U/L / GGT: x             Urinalysis Basic - ( 2019 14:24 )    Color: Yellow / Appearance: Slightly Turbid / S.027 / pH: x  Gluc: x / Ketone: Negative  / Bili: Negative / Urobili: <2 mg/dL   Blood: x / Protein: Trace / Nitrite: Negative   Leuk Esterase: Negative / RBC: 28 /HPF / WBC 1 /HPF   Sq Epi: x / Non Sq Epi: 1 /HPF / Bacteria: Negative Neurology Consult    Patient is a 54y old  Female who presents with a chief complaint of decreased po intake.    HPI:  54 Year Old Female with a PMH of down syndrome, dementia ( non verbal at baseline) and seizure disorder (neurologist Dr. Torres presents from the group home for evaluation of failure of thrive and decrease po intake. collateral history was obtained from sister / Aid at the bedside. also from Cazy on call RN at the group home at [phone] who reported decreased po intake, decreased urine output and dehydration. rest of ROS is negative.  Per family had been tapering down on olanzapine due to excessive drowsiness.  also tapering lamictal due to "lamictal toxicity" that was described as low levels but no rash or organ damage.  Takes ativan 1mg BID for agitation in addition to lamictal and olanzapine which has made her more calm but also lethargic.      PAST MEDICAL & SURGICAL HISTORY:  Seizures  Intellectual disability  Hypothyroid  Impulse control disorder in adult  Down's syndrome  No significant past surgical history    FAMILY HISTORY    Social History: (-) x 3    Allergies    No Known Allergies    Intolerances    MEDICATIONS  (STANDING):  ammonium lactate 12% Lotion 1 Application(s) Topical two times a day  calcium carbonate 1250 mG  + Vitamin D (OsCal 500 + D) 1 Tablet(s) Oral daily  chlorhexidine 4% Liquid 1 Application(s) Topical <User Schedule>  clotrimazole 1% Cream 1 Application(s) Topical two times a day  enoxaparin Injectable 40 milliGRAM(s) SubCutaneous daily  lactated ringers. 1000 milliLiter(s) (75 mL/Hr) IV Continuous <Continuous>  lamoTRIgine 100 milliGRAM(s) Oral two times a day  levothyroxine 112 MICROGram(s) Oral daily  polyethylene glycol 3350 17 Gram(s) Oral daily  QUEtiapine 25 milliGRAM(s) Oral at bedtime  senna 8.6 milliGRAM(s) Oral Tablet - Peds 1 Tablet(s) Oral at bedtime  simvastatin 20 milliGRAM(s) Oral at bedtime    MEDICATIONS  (PRN):    Review of systems:    Constitutional: as per HPI  Eyes: No eye pain or discharge  ENMT:  No difficulty hearing; No sinus or throat pain  Neck: No pain or stiffness  Respiratory: No cough, wheezing, chills or hemoptysis  Cardiovascular: No chest pain, palpitations, shortness of breath, dyspnea on exertion  Gastrointestinal: No abdominal pain, nausea, vomiting or hematemesis; No diarrhea or constipation.   Genitourinary: No dysuria, frequency, hematuria or incontinence  Neurological: As per HPI  Skin: No rashes or lesions   Endocrine: No heat or cold intolerance; No hair loss  Musculoskeletal: No joint pain or swelling  Psychiatric: No depression, anxiety, mood swings  Heme/Lymph: No easy bruising or bleeding gums    Vital Signs Last 24 Hrs  T(C): 35.2 (2019 05:12), Max: 36.4 (2019 16:51)  T(F): 95.3 (2019 05:12), Max: 97.5 (2019 16:51)  HR: 46 (2019 05:12) (46 - 59)  BP: 135/62 (2019 05:12) (116/56 - 135/62)  BP(mean): --  RR: 18 (2019 05:12) (16 - 18)  SpO2: 97% (2019 22:29) (97% - 97%)    Examination:  At baseline she is minimally verbal.  yelling and uncooperative at times.  alert but not oriented, nonverbal currently.  baseline per sister verbalizes 1 - 2 words with good remote recall.  poor recent memory.  Moves all extremities spontaneous and symmetric.   Not following commands.   no stereotyped movements or rigidity.  No convulsions.     Labs:   CBC Full  -  ( 2019 07:49 )  WBC Count : 3.45 K/uL  RBC Count : 3.95 M/uL  Hemoglobin : 12.5 g/dL  Hematocrit : 38.8 %  Platelet Count - Automated : 161 K/uL  Mean Cell Volume : 98.2 fL  Mean Cell Hemoglobin : 31.6 pg  Mean Cell Hemoglobin Concentration : 32.2 g/dL  Auto Neutrophil # : 2.40 K/uL  Auto Lymphocyte # : 0.59 K/uL  Auto Monocyte # : 0.30 K/uL  Auto Eosinophil # : 0.06 K/uL  Auto Basophil # : 0.08 K/uL  Auto Neutrophil % : 69.6 %  Auto Lymphocyte % : 17.1 %  Auto Monocyte % : 8.7 %  Auto Eosinophil % : 1.7 %  Auto Basophil % : 2.3 %        145  |  108  |  23<H>  ----------------------------<  107<H>  4.2   |  25  |  1.4    Ca    8.8      2019 07:49  Mg     1.9         TPro  7.1  /  Alb  3.9  /  TBili  0.5  /  DBili  x   /  AST  36  /  ALT  50<H>  /  AlkPhos  107      LIVER FUNCTIONS - ( 2019 14:24 )  Alb: 3.9 g/dL / Pro: 7.1 g/dL / ALK PHOS: 107 U/L / ALT: 50 U/L / AST: 36 U/L / GGT: x             Urinalysis Basic - ( 2019 14:24 )    Color: Yellow / Appearance: Slightly Turbid / S.027 / pH: x  Gluc: x / Ketone: Negative  / Bili: Negative / Urobili: <2 mg/dL   Blood: x / Protein: Trace / Nitrite: Negative   Leuk Esterase: Negative / RBC: 28 /HPF / WBC 1 /HPF   Sq Epi: x / Non Sq Epi: 1 /HPF / Bacteria: Negative

## 2019-11-26 NOTE — SWALLOW BEDSIDE ASSESSMENT ADULT - SLP PERTINENT HISTORY OF CURRENT PROBLEM
PMH of down syndrome, dementia ( non verbal at baseline) and seizure disorder presents from the group home for evaluation of failure of thrive and decrease po intake. Previous history was obtained from sister/aide at the bedside.

## 2019-11-26 NOTE — CONSULT NOTE ADULT - ASSESSMENT
Impression:  54 Year Old Female with a PMH of down syndrome, dementia ( non verbal at baseline) and seizure disorder. Presents from the group home for evaluation of failure of thrive and decrease po intake. collateral history was obtained from sister / Aid at the bedside. RN at the group home at [phone] who reported decreased po intake, decreased urine output and dehydration. Etiology of lethargy may be due to polypharmacy vs toxic metabolic cause although UA and CXR is negative.     Suggestion:  Discontinue Ativan.  Discontinue Olanzapine.   Suggest making Lamictal 100 mg q12 hours.   Would suggest starting Seroquel 25 mg QHS.     Fly Banks NP  [phone] Impression:  54 Year Old Female with a PMH of down syndrome, dementia ( non verbal at baseline) and seizure disorder. Presents from the group home for evaluation of failure of thrive and decrease po intake with behavioral changes.  Suspect underlying Dementia Alzheimers with behavioral issues possibly with overmedication with benzodiazepines.        Suggestion:  Discontinue Ativan.  Discontinue Olanzapine.   continue Lamictal 75 AM, 100 PM  Recommend starting Seroquel 25 mg QHS and titrate pending response  continue medical w/u for encephalopathy

## 2019-11-26 NOTE — PROGRESS NOTE ADULT - ASSESSMENT
54 Year Old Female with a PMH of down syndrome, dementia ( non verbal at baseline) and seizure disorder (neurologist Dr. Torres presents from the group home for evaluation of failure of thrive and decrease po intake.    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 He changed her medications, most recently her lamictal level was high so he lowered the dose; he added ativan for agitation.   -- follow up urine and blood cultures   --Gallo placed for retention  --Medication adjustments per neuro--stopped benzos and olanzapine, decreased lamictal to 100 BID, added seroquel--if agitated, use seroquel 25, avoid benzo  --Lamictal level pending  --When lethargy resolves, plan for calorie count     # Hypothyroidism:  - Continue with Synthroid 112 , Follow with TSH     DVT Prophylaxis: lovenox  GI ppx: not indicated  Soft diet --speech and swallow eval--while she is not eating, IV fluids   Disposition: from group home, [facility]  HCP and legal guardian [phone] Madisyn Enamorado (Sister) 54 Year Old Female with a PMH of down syndrome, dementia (alert but non verbal at baseline) and seizure disorder (neurologist outside, Dr. Hood) presents from the group home for evaluation of failure of thrive and decrease po intake.    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 He changed her medications, most recently her lamictal level was high so he lowered the dose; he added ativan for agitation.   -- follow up urine and blood cultures   --Gallo placed for retention  --Medication adjustments per neuro--stopped benzos and olanzapine, decreased lamictal to 100 BID, added seroquel--if agitated, use seroquel 25, avoid benzo  --Lamictal level pending  --When lethargy resolves, plan for calorie count     # Hypothyroidism:  - Continue with Synthroid 112 , Follow with TSH     DVT Prophylaxis: lovenox  GI ppx: not indicated  Soft diet --speech and swallow eval--while she is not eating, IV fluids   Disposition: from group home, [facility]  HCP and legal guardian [phone] Madisyn Fei (Sister)

## 2019-11-27 LAB
ANION GAP SERPL CALC-SCNC: 10 MMOL/L — SIGNIFICANT CHANGE UP (ref 7–14)
BUN SERPL-MCNC: 17 MG/DL — SIGNIFICANT CHANGE UP (ref 10–20)
CALCIUM SERPL-MCNC: 8.5 MG/DL — SIGNIFICANT CHANGE UP (ref 8.5–10.1)
CHLORIDE SERPL-SCNC: 109 MMOL/L — SIGNIFICANT CHANGE UP (ref 98–110)
CO2 SERPL-SCNC: 26 MMOL/L — SIGNIFICANT CHANGE UP (ref 17–32)
CREAT SERPL-MCNC: 1.3 MG/DL — SIGNIFICANT CHANGE UP (ref 0.7–1.5)
CULTURE RESULTS: NO GROWTH — SIGNIFICANT CHANGE UP
GLUCOSE SERPL-MCNC: 98 MG/DL — SIGNIFICANT CHANGE UP (ref 70–99)
HCT VFR BLD CALC: 34.7 % — LOW (ref 37–47)
HGB BLD-MCNC: 11.3 G/DL — LOW (ref 12–16)
LAMOTRIGINE SERPL-MCNC: 22.1 MCG/ML — SIGNIFICANT CHANGE UP (ref 2.5–15)
MAGNESIUM SERPL-MCNC: 1.8 MG/DL — SIGNIFICANT CHANGE UP (ref 1.8–2.4)
MCHC RBC-ENTMCNC: 31.3 PG — HIGH (ref 27–31)
MCHC RBC-ENTMCNC: 32.6 G/DL — SIGNIFICANT CHANGE UP (ref 32–37)
MCV RBC AUTO: 96.1 FL — SIGNIFICANT CHANGE UP (ref 81–99)
NRBC # BLD: 0 /100 WBCS — SIGNIFICANT CHANGE UP (ref 0–0)
PLATELET # BLD AUTO: 155 K/UL — SIGNIFICANT CHANGE UP (ref 130–400)
POTASSIUM SERPL-MCNC: 3.6 MMOL/L — SIGNIFICANT CHANGE UP (ref 3.5–5)
POTASSIUM SERPL-SCNC: 3.6 MMOL/L — SIGNIFICANT CHANGE UP (ref 3.5–5)
RBC # BLD: 3.61 M/UL — LOW (ref 4.2–5.4)
RBC # FLD: 13.8 % — SIGNIFICANT CHANGE UP (ref 11.5–14.5)
SODIUM SERPL-SCNC: 145 MMOL/L — SIGNIFICANT CHANGE UP (ref 135–146)
SPECIMEN SOURCE: SIGNIFICANT CHANGE UP
WBC # BLD: 3 K/UL — LOW (ref 4.8–10.8)
WBC # FLD AUTO: 3 K/UL — LOW (ref 4.8–10.8)

## 2019-11-27 PROCEDURE — 99233 SBSQ HOSP IP/OBS HIGH 50: CPT

## 2019-11-27 RX ORDER — LAMOTRIGINE 25 MG/1
100 TABLET, ORALLY DISINTEGRATING ORAL AT BEDTIME
Refills: 0 | Status: DISCONTINUED | OUTPATIENT
Start: 2019-11-27 | End: 2019-12-16

## 2019-11-27 RX ORDER — LAMOTRIGINE 25 MG/1
75 TABLET, ORALLY DISINTEGRATING ORAL DAILY
Refills: 0 | Status: DISCONTINUED | OUTPATIENT
Start: 2019-11-27 | End: 2019-12-16

## 2019-11-27 RX ORDER — TAMSULOSIN HYDROCHLORIDE 0.4 MG/1
0.4 CAPSULE ORAL AT BEDTIME
Refills: 0 | Status: DISCONTINUED | OUTPATIENT
Start: 2019-11-27 | End: 2019-12-04

## 2019-11-27 RX ORDER — SODIUM CHLORIDE 9 MG/ML
1000 INJECTION, SOLUTION INTRAVENOUS
Refills: 0 | Status: DISCONTINUED | OUTPATIENT
Start: 2019-11-27 | End: 2019-12-02

## 2019-11-27 RX ADMIN — Medication 1 APPLICATION(S): at 17:40

## 2019-11-27 RX ADMIN — Medication 1 APPLICATION(S): at 06:26

## 2019-11-27 RX ADMIN — LAMOTRIGINE 100 MILLIGRAM(S): 25 TABLET, ORALLY DISINTEGRATING ORAL at 22:09

## 2019-11-27 RX ADMIN — ENOXAPARIN SODIUM 40 MILLIGRAM(S): 100 INJECTION SUBCUTANEOUS at 11:53

## 2019-11-27 RX ADMIN — TAMSULOSIN HYDROCHLORIDE 0.4 MILLIGRAM(S): 0.4 CAPSULE ORAL at 22:08

## 2019-11-27 RX ADMIN — LAMOTRIGINE 100 MILLIGRAM(S): 25 TABLET, ORALLY DISINTEGRATING ORAL at 06:25

## 2019-11-27 RX ADMIN — SODIUM CHLORIDE 75 MILLILITER(S): 9 INJECTION, SOLUTION INTRAVENOUS at 09:33

## 2019-11-27 RX ADMIN — QUETIAPINE FUMARATE 25 MILLIGRAM(S): 200 TABLET, FILM COATED ORAL at 22:08

## 2019-11-27 RX ADMIN — SENNA PLUS 1 TABLET(S): 8.6 TABLET ORAL at 22:08

## 2019-11-27 RX ADMIN — Medication 1 APPLICATION(S): at 17:39

## 2019-11-27 RX ADMIN — Medication 1 APPLICATION(S): at 06:25

## 2019-11-27 RX ADMIN — SODIUM CHLORIDE 50 MILLILITER(S): 9 INJECTION, SOLUTION INTRAVENOUS at 11:52

## 2019-11-27 RX ADMIN — Medication 112 MICROGRAM(S): at 06:25

## 2019-11-27 RX ADMIN — POLYETHYLENE GLYCOL 3350 17 GRAM(S): 17 POWDER, FOR SOLUTION ORAL at 11:53

## 2019-11-27 RX ADMIN — SIMVASTATIN 20 MILLIGRAM(S): 20 TABLET, FILM COATED ORAL at 22:08

## 2019-11-27 RX ADMIN — Medication 1 TABLET(S): at 11:52

## 2019-11-27 NOTE — PROGRESS NOTE ADULT - SUBJECTIVE AND OBJECTIVE BOX
SUBJECTIVE:    Patient is a 54y old Female who presents with a chief complaint of decreased po intake (2019 17:06)    Currently admitted to medicine with the primary diagnosis of Failure to thrive in adult     Today is hospital day 2d. This morning she is resting comfortably in bed and reports no new issues or overnight events.     INTERVAL EVENTS: meds changed by neurology, calorie count when more alert    PAST MEDICAL & SURGICAL HISTORY  Seizures  Intellectual disability  Hypothyroid  Impulse control disorder in adult  Down's syndrome  No significant past surgical history      ALLERGIES:  No Known Allergies    MEDICATIONS:  STANDING MEDICATIONS  ammonium lactate 12% Lotion 1 Application(s) Topical two times a day  calcium carbonate 1250 mG  + Vitamin D (OsCal 500 + D) 1 Tablet(s) Oral daily  chlorhexidine 4% Liquid 1 Application(s) Topical <User Schedule>  clotrimazole 1% Cream 1 Application(s) Topical two times a day  enoxaparin Injectable 40 milliGRAM(s) SubCutaneous daily  lactated ringers. 1000 milliLiter(s) IV Continuous <Continuous>  lamoTRIgine 100 milliGRAM(s) Oral two times a day  levothyroxine 112 MICROGram(s) Oral daily  polyethylene glycol 3350 17 Gram(s) Oral daily  QUEtiapine 25 milliGRAM(s) Oral at bedtime  senna 8.6 milliGRAM(s) Oral Tablet - Peds 1 Tablet(s) Oral at bedtime  simvastatin 20 milliGRAM(s) Oral at bedtime    PRN MEDICATIONS    VITALS:   T(F): 96.9  HR: 59  BP: 123/60  RR: 18  SpO2: 96%    LABS:                        12.5   3.45  )-----------( 161      ( 2019 07:49 )             38.8         145  |  108  |  23<H>  ----------------------------<  107<H>  4.2   |  25  |  1.4    Ca    8.8      2019 07:49  Mg     1.9         TPro  7.1  /  Alb  3.9  /  TBili  0.5  /  DBili  x   /  AST  36  /  ALT  50<H>  /  AlkPhos  107        Urinalysis Basic - ( 2019 14:24 )    Color: Yellow / Appearance: Slightly Turbid / S.027 / pH: x  Gluc: x / Ketone: Negative  / Bili: Negative / Urobili: <2 mg/dL   Blood: x / Protein: Trace / Nitrite: Negative   Leuk Esterase: Negative / RBC: 28 /HPF / WBC 1 /HPF   Sq Epi: x / Non Sq Epi: 1 /HPF / Bacteria: Negative            Culture - Urine (collected 2019 14:24)  Source: .Urine Clean Catch (Midstream)  Final Report (2019 00:14):    No growth    Culture - Blood (collected 2019 14:24)  Source: .Blood Blood  Preliminary Report (2019 23:01):    No growth to date.    Culture - Blood (collected 2019 14:24)  Source: .Blood Blood  Preliminary Report (2019 23:01):    No growth to date.      CARDIAC MARKERS ( 2019 14:24 )  x     / <0.01 ng/mL / x     / x     / x          RADIOLOGY:    PHYSICAL EXAM:  GEN: No acute distress  PULM/CHEST: Clear to auscultation bilaterally, no rales, rhonchi or wheezes   CVS: Regular rate and rhythm, S1-S2, no murmurs  ABD: Soft, non-tender, non-distended, +BS  EXT: No edema  NEURO: AAOx3    Gallo Catheter:   Indwelling Urethral Catheter:     Connect To:  Straight Drainage/[place]    Indication:  Urinary Retention / Obstruction (19 @ 17:16) (not performed) [active] SUBJECTIVE:    Patient is a 54y old Female who presents with a chief complaint of decreased po intake (2019 17:06)    Currently admitted to medicine with the primary diagnosis of Failure to thrive in adult     Today is hospital day 2d. This morning she is resting comfortably in bed and reports no new issues or overnight events.     INTERVAL EVENTS: meds changed by neurology, calorie count when more alert  will monitor left arm likely swolen from infiltrated Iv    PAST MEDICAL & SURGICAL HISTORY  Seizures  Intellectual disability  Hypothyroid  Impulse control disorder in adult  Down's syndrome  No significant past surgical history      ALLERGIES:  No Known Allergies    MEDICATIONS:  STANDING MEDICATIONS  ammonium lactate 12% Lotion 1 Application(s) Topical two times a day  calcium carbonate 1250 mG  + Vitamin D (OsCal 500 + D) 1 Tablet(s) Oral daily  chlorhexidine 4% Liquid 1 Application(s) Topical <User Schedule>  clotrimazole 1% Cream 1 Application(s) Topical two times a day  enoxaparin Injectable 40 milliGRAM(s) SubCutaneous daily  lactated ringers. 1000 milliLiter(s) IV Continuous <Continuous>  lamoTRIgine 100 milliGRAM(s) Oral two times a day  levothyroxine 112 MICROGram(s) Oral daily  polyethylene glycol 3350 17 Gram(s) Oral daily  QUEtiapine 25 milliGRAM(s) Oral at bedtime  senna 8.6 milliGRAM(s) Oral Tablet - Peds 1 Tablet(s) Oral at bedtime  simvastatin 20 milliGRAM(s) Oral at bedtime    PRN MEDICATIONS    VITALS:   T(F): 96.9  HR: 59  BP: 123/60  RR: 18  SpO2: 96%    LABS:                        12.5   3.45  )-----------( 161      ( 2019 07:49 )             38.8         145  |  108  |  23<H>  ----------------------------<  107<H>  4.2   |  25  |  1.4    Ca    8.8      2019 07:49  Mg     1.9         TPro  7.1  /  Alb  3.9  /  TBili  0.5  /  DBili  x   /  AST  36  /  ALT  50<H>  /  AlkPhos  107        Urinalysis Basic - ( 2019 14:24 )    Color: Yellow / Appearance: Slightly Turbid / S.027 / pH: x  Gluc: x / Ketone: Negative  / Bili: Negative / Urobili: <2 mg/dL   Blood: x / Protein: Trace / Nitrite: Negative   Leuk Esterase: Negative / RBC: 28 /HPF / WBC 1 /HPF   Sq Epi: x / Non Sq Epi: 1 /HPF / Bacteria: Negative            Culture - Urine (collected 2019 14:24)  Source: .Urine Clean Catch (Midstream)  Final Report (2019 00:14):    No growth    Culture - Blood (collected 2019 14:24)  Source: .Blood Blood  Preliminary Report (2019 23:01):    No growth to date.    Culture - Blood (collected 2019 14:24)  Source: .Blood Blood  Preliminary Report (2019 23:01):    No growth to date.      CARDIAC MARKERS ( 2019 14:24 )  x     / <0.01 ng/mL / x     / x     / x          RADIOLOGY:    PHYSICAL EXAM:  GEN: No acute distress  PULM/CHEST: Clear to auscultation bilaterally, no rales, rhonchi or wheezes   CVS: Regular rate and rhythm, S1-S2, no murmurs  ABD: Soft, non-tender, non-distended, +BS  EXT: No edema  NEURO: AAOx3    Gallo Catheter:   Indwelling Urethral Catheter:     Connect To:  Straight Drainage/[place]    Indication:  Urinary Retention / Obstruction (19 @ 17:16) (not performed) [active]

## 2019-11-27 NOTE — SWALLOW BEDSIDE ASSESSMENT ADULT - SWALLOW EVAL: RECOMMENDED DIET
Dysphagia diet I puree consistency and nectar thick liquids
continue to trial puree and nectar diet as tolerated/accepted, consider non-oral means of nutrition and hydration if this is consistent w/ family wishes

## 2019-11-27 NOTE — PROGRESS NOTE ADULT - ASSESSMENT
54 Year Old Female with a PMH of down syndrome, dementia (alert but non verbal at baseline) and seizure disorder (neurologist outside, Dr. Hood) presents from the group home for evaluation of failure of thrive and decrease po intake.    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 He changed her medications, most recently her lamictal level was high so he lowered the dose; he added ativan for agitation.   -- follow up urine and blood cultures   --Gallo placed for retention  --Medication adjustments per neuro--stopped benzos and olanzapine, decreased lamictal to 100 BID, added seroquel--if agitated, use seroquel 25, avoid benzo  --Lamictal level pending  --When lethargy resolves, plan for calorie count if low possible peg    # Hypothyroidism:  - Continue with Synthroid 112 , Follow with TSH     DVT Prophylaxis: lovenox  GI ppx: not indicated  Soft diet --speech and swallow eval--while she is not eating, IV fluids   Disposition: from group home, [facility]  HCP and legal guardian [phone] Madisyn Enamorado (Sister) 54 Year Old Female with a PMH of down syndrome, dementia (alert but non verbal at baseline) and seizure disorder (neurologist outside, Dr. Hood) presents from the group home for evaluation of failure of thrive and decrease po intake.    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 He changed her medications, most recently her lamictal level was high so he lowered the dose; he added ativan for agitation.   -- UA negative   --Gallo was placed for retention will d/c and try trial of void  --Medication adjustments per neuro--stopped benzos and olanzapine, decreased lamictal to 100 BID, added seroquel--if agitated, use seroquel 25, avoid benzo  --Lamictal level 22 will f/u with neuro for dosing adjustment  --When lethargy resolves, plan for calorie count if low possible peg    # Hypothyroidism:  - Continue with Synthroid 112 , Follow with TSH     DVT Prophylaxis: lovenox  GI ppx: not indicated  Soft diet --speech and swallow eval--while she is not eating, IV fluids   Disposition: from group [place], [facility]  HCP and legal guardian [phone] Madisyn Enamorado (Sister) 54 Year Old Female with a PMH of down syndrome, dementia (alert but non verbal at baseline) and seizure disorder (neurologist outside, Dr. Hood) presents from the group home for evaluation of failure of thrive and decrease po intake.  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 He changed her medications, most recently her lamictal level was high so he lowered the dose; he added ativan for agitation.   -- UA negative   --Gallo was placed for retention will d/c and try trial of void  --Medication adjustments per neuro--stopped benzos and olanzapine, decreased lamictal to 100 BID, added seroquel--if agitated, use seroquel 25, avoid benzo  --Lamictal level 22 per neuro 75 in am and 100 in pm  --When lethargy resolves, plan for calorie count if low possible peg  -added glucose to normal saline for nutrition, pt remains with poor oral intake discussed possibility of peg tube if no imporrvement with sister     # Hypothyroidism:  - Continue with Synthroid 112 , Follow with TSH     DVT Prophylaxis: lovenox  GI ppx: not indicated  Soft diet --speech and swallow eval--while she is not eating, IV fluids   Disposition: from group home, [facility]  HCP and legal guardian [phone] Madisyn Enamorado (Sister)

## 2019-11-27 NOTE — SWALLOW BEDSIDE ASSESSMENT ADULT - SLP GENERAL OBSERVATIONS
Pt received in bed, increased lethargy as per sister and aide at bedside. Pt on room air.
Pt received in bed sister michael at b/s. Pt would not accept any po trials. Decreased intake/refusal of po likely related to advancing dementia

## 2019-11-28 PROCEDURE — 99233 SBSQ HOSP IP/OBS HIGH 50: CPT

## 2019-11-28 RX ADMIN — ENOXAPARIN SODIUM 40 MILLIGRAM(S): 100 INJECTION SUBCUTANEOUS at 11:46

## 2019-11-28 RX ADMIN — CHLORHEXIDINE GLUCONATE 1 APPLICATION(S): 213 SOLUTION TOPICAL at 05:48

## 2019-11-28 RX ADMIN — Medication 1 DROP(S): at 17:29

## 2019-11-28 RX ADMIN — Medication 1 APPLICATION(S): at 17:28

## 2019-11-28 RX ADMIN — SODIUM CHLORIDE 50 MILLILITER(S): 9 INJECTION, SOLUTION INTRAVENOUS at 20:00

## 2019-11-28 RX ADMIN — Medication 1 APPLICATION(S): at 05:47

## 2019-11-28 RX ADMIN — SIMVASTATIN 20 MILLIGRAM(S): 20 TABLET, FILM COATED ORAL at 21:47

## 2019-11-28 RX ADMIN — TAMSULOSIN HYDROCHLORIDE 0.4 MILLIGRAM(S): 0.4 CAPSULE ORAL at 21:47

## 2019-11-28 RX ADMIN — Medication 1 APPLICATION(S): at 05:46

## 2019-11-28 RX ADMIN — LAMOTRIGINE 100 MILLIGRAM(S): 25 TABLET, ORALLY DISINTEGRATING ORAL at 21:47

## 2019-11-28 RX ADMIN — Medication 112 MICROGRAM(S): at 05:46

## 2019-11-28 RX ADMIN — POLYETHYLENE GLYCOL 3350 17 GRAM(S): 17 POWDER, FOR SOLUTION ORAL at 11:46

## 2019-11-28 RX ADMIN — SENNA PLUS 1 TABLET(S): 8.6 TABLET ORAL at 21:47

## 2019-11-28 RX ADMIN — LAMOTRIGINE 75 MILLIGRAM(S): 25 TABLET, ORALLY DISINTEGRATING ORAL at 11:46

## 2019-11-28 RX ADMIN — Medication 1 TABLET(S): at 11:46

## 2019-11-28 RX ADMIN — QUETIAPINE FUMARATE 25 MILLIGRAM(S): 200 TABLET, FILM COATED ORAL at 21:47

## 2019-11-28 RX ADMIN — SODIUM CHLORIDE 50 MILLILITER(S): 9 INJECTION, SOLUTION INTRAVENOUS at 02:09

## 2019-11-28 NOTE — PROGRESS NOTE ADULT - ASSESSMENT
54 Year Old Female with a PMH of down syndrome, dementia (alert but non verbal at baseline) and seizure disorder (neurologist outside, Dr. Hood) presents from the group home for evaluation of failure of thrive and decrease po intake.    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 He changed her medications, most recently her lamictal level was high so he lowered the dose; he added ativan for agitation.   -- UA negative   --Doherty was placed for retention will d/c and try trial of void on 11-27 - on 11-28 doherty replaced as pt failed trial of void  --Medication adjustments per neuro--stopped benzos and olanzapine, decreased lamictal to 75 in am [id number] in pm, added seroquel--if agitated, use seroquel 25, avoid benzo  --Lamictal level 22 per neuro 75 in am and 100 in pm  --When lethargy resolves, plan for calorie count if low possible peg  -added glucose to normal saline for nutrition, pt remains with poor oral intake discussed possibility of peg tube if no imporrvement with sister     # Hypothyroidism:  - Continue with Synthroid 112 , Follow with TSH     DVT Prophylaxis: lovenox  GI ppx: not indicated  Soft diet --speech and swallow eval--while she is not eating, IV fluids   Disposition: from group home, [facility]  HCP and legal guardian [phone] Madisyn Enamorado (Sister)

## 2019-11-28 NOTE — PROGRESS NOTE ADULT - SUBJECTIVE AND OBJECTIVE BOX
SUBJECTIVE:    Patient is a 54y old Female who presents with a chief complaint of decreased po intake (27 Nov 2019 07:44)    Currently admitted to medicine with the primary diagnosis of Failure to thrive in adult     Today is hospital day 3d. This morning she is resting comfortably in bed and reports no new issues or overnight events.     INTERVAL EVENTS: failed trial of void, will reorder doherty, per aid pt apetite improved   pt has body twitches and cries intermittenly    PAST MEDICAL & SURGICAL HISTORY  Seizures  Intellectual disability  Hypothyroid  Impulse control disorder in adult  Down's syndrome  No significant past surgical history      ALLERGIES:  No Known Allergies    MEDICATIONS:  STANDING MEDICATIONS  ammonium lactate 12% Lotion 1 Application(s) Topical two times a day  calcium carbonate 1250 mG  + Vitamin D (OsCal 500 + D) 1 Tablet(s) Oral daily  chlorhexidine 4% Liquid 1 Application(s) Topical <User Schedule>  clotrimazole 1% Cream 1 Application(s) Topical two times a day  dextrose 5% + sodium chloride 0.9%. 1000 milliLiter(s) IV Continuous <Continuous>  enoxaparin Injectable 40 milliGRAM(s) SubCutaneous daily  lamoTRIgine 100 milliGRAM(s) Oral at bedtime  lamoTRIgine 75 milliGRAM(s) Oral daily  levothyroxine 112 MICROGram(s) Oral daily  polyethylene glycol 3350 17 Gram(s) Oral daily  QUEtiapine 25 milliGRAM(s) Oral at bedtime  senna 8.6 milliGRAM(s) Oral Tablet - Peds 1 Tablet(s) Oral at bedtime  simvastatin 20 milliGRAM(s) Oral at bedtime  tamsulosin 0.4 milliGRAM(s) Oral at bedtime    PRN MEDICATIONS    VITALS:   T(F): 96.1  HR: 57  BP: 105/59  RR: 18  SpO2: 95%    LABS:                        11.3   3.00  )-----------( 155      ( 27 Nov 2019 07:27 )             34.7     11-27    145  |  109  |  17  ----------------------------<  98  3.6   |  26  |  1.3    Ca    8.5      27 Nov 2019 07:27  Mg     1.8     11-27                Culture - Urine (collected 25 Nov 2019 14:24)  Source: .Urine Clean Catch (Midstream)  Final Report (27 Nov 2019 00:14):    No growth    Culture - Blood (collected 25 Nov 2019 14:24)  Source: .Blood Blood  Preliminary Report (26 Nov 2019 23:01):    No growth to date.    Culture - Blood (collected 25 Nov 2019 14:24)  Source: .Blood Blood  Preliminary Report (26 Nov 2019 23:01):    No growth to date.          RADIOLOGY:    PHYSICAL EXAM:  GEN: non verbal at baseline, pt has body twitches and cries intermittently  PULM/CHEST: Clear to auscultation bilaterally, no rales, rhonchi or wheezes   CVS: Regular rate and rhythm, S1-S2, no murmurs  ABD: Soft, non-tender, non-distended, +BS  EXT: No edema

## 2019-11-29 LAB
ANION GAP SERPL CALC-SCNC: 11 MMOL/L — SIGNIFICANT CHANGE UP (ref 7–14)
BUN SERPL-MCNC: 12 MG/DL — SIGNIFICANT CHANGE UP (ref 10–20)
CALCIUM SERPL-MCNC: 8.3 MG/DL — LOW (ref 8.5–10.1)
CHLORIDE SERPL-SCNC: 109 MMOL/L — SIGNIFICANT CHANGE UP (ref 98–110)
CO2 SERPL-SCNC: 23 MMOL/L — SIGNIFICANT CHANGE UP (ref 17–32)
CREAT SERPL-MCNC: 1.2 MG/DL — SIGNIFICANT CHANGE UP (ref 0.7–1.5)
GLUCOSE SERPL-MCNC: 102 MG/DL — HIGH (ref 70–99)
HCT VFR BLD CALC: 34.6 % — LOW (ref 37–47)
HGB BLD-MCNC: 11.3 G/DL — LOW (ref 12–16)
MCHC RBC-ENTMCNC: 31.4 PG — HIGH (ref 27–31)
MCHC RBC-ENTMCNC: 32.7 G/DL — SIGNIFICANT CHANGE UP (ref 32–37)
MCV RBC AUTO: 96.1 FL — SIGNIFICANT CHANGE UP (ref 81–99)
NRBC # BLD: 0 /100 WBCS — SIGNIFICANT CHANGE UP (ref 0–0)
PLATELET # BLD AUTO: 162 K/UL — SIGNIFICANT CHANGE UP (ref 130–400)
POTASSIUM SERPL-MCNC: 4.1 MMOL/L — SIGNIFICANT CHANGE UP (ref 3.5–5)
POTASSIUM SERPL-SCNC: 4.1 MMOL/L — SIGNIFICANT CHANGE UP (ref 3.5–5)
RBC # BLD: 3.6 M/UL — LOW (ref 4.2–5.4)
RBC # FLD: 14 % — SIGNIFICANT CHANGE UP (ref 11.5–14.5)
SODIUM SERPL-SCNC: 143 MMOL/L — SIGNIFICANT CHANGE UP (ref 135–146)
WBC # BLD: 3.48 K/UL — LOW (ref 4.8–10.8)
WBC # FLD AUTO: 3.48 K/UL — LOW (ref 4.8–10.8)

## 2019-11-29 PROCEDURE — 99233 SBSQ HOSP IP/OBS HIGH 50: CPT

## 2019-11-29 RX ORDER — QUETIAPINE FUMARATE 200 MG/1
25 TABLET, FILM COATED ORAL ONCE
Refills: 0 | Status: COMPLETED | OUTPATIENT
Start: 2019-11-29 | End: 2019-11-29

## 2019-11-29 RX ADMIN — LAMOTRIGINE 75 MILLIGRAM(S): 25 TABLET, ORALLY DISINTEGRATING ORAL at 13:25

## 2019-11-29 RX ADMIN — Medication 1 MILLIGRAM(S): at 15:08

## 2019-11-29 RX ADMIN — Medication 1 APPLICATION(S): at 18:15

## 2019-11-29 RX ADMIN — Medication 112 MICROGRAM(S): at 05:53

## 2019-11-29 RX ADMIN — Medication 1 DROP(S): at 18:16

## 2019-11-29 RX ADMIN — SODIUM CHLORIDE 50 MILLILITER(S): 9 INJECTION, SOLUTION INTRAVENOUS at 18:14

## 2019-11-29 RX ADMIN — Medication 1 DROP(S): at 05:53

## 2019-11-29 RX ADMIN — ENOXAPARIN SODIUM 40 MILLIGRAM(S): 100 INJECTION SUBCUTANEOUS at 13:26

## 2019-11-29 RX ADMIN — Medication 1 TABLET(S): at 13:26

## 2019-11-29 RX ADMIN — Medication 1 APPLICATION(S): at 06:31

## 2019-11-29 RX ADMIN — CHLORHEXIDINE GLUCONATE 1 APPLICATION(S): 213 SOLUTION TOPICAL at 06:31

## 2019-11-29 NOTE — DIETITIAN INITIAL EVALUATION ADULT. - FACTORS AFF FOOD INTAKE
SLP 11/27 recs: dysphagia 1 pureed, nectar thick liquids. no GI abnormalities reported. LBM 11/29/difficulty feeding self/difficulty swallowing

## 2019-11-29 NOTE — DIETITIAN INITIAL EVALUATION ADULT. - ENERGY INTAKE
Observed breakfast tray with 100% meal intake today. As per pt sister and bedside aid, also consumed 100% of both lunch and dinner yesterday. Intake improving to baseline as pt is less lethargic. If adequate intake remains, do not suspect need for PEG tube at this time. Does not always eat pureed meat so would benefit from protein supplement. Recs d/w LIP (Gold). Good (>75%)

## 2019-11-29 NOTE — PROGRESS NOTE ADULT - ASSESSMENT
54 Year Old Female with a PMH of down syndrome, dementia (alert but non verbal at baseline) and seizure disorder (neurologist outside, Dr. Hood) presents from the group home for evaluation of failure of thrive and decrease po intake.    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 He changed her medications, most recently her lamictal level was high so he lowered the dose; he added ativan for agitation.   -- UA negative   --Doherty was placed for retention will d/c and try trial of void on 11-27 - on 11-28 doherty replaced as pt failed trial of void  --Medication adjustments per neuro--stopped benzos and olanzapine, decreased lamictal to 75 in am [id number] in pm, added seroquel--if agitated, use seroquel 25, avoid benzo  --Lamictal level 22 per neuro 75 in am and 100 in pm  --When lethargy resolves, plan for calorie count if low possible peg  -added glucose to normal saline for nutrition, pt remains with poor oral intake discussed possibility of peg tube if no imporrvement with sister     # Hypothyroidism:  - Continue with Synthroid 112 , Follow with TSH     DVT Prophylaxis: lovenox  GI ppx: not indicated  Soft diet --speech and swallow eval--while she is not eating, IV fluids   Disposition: from group home, [facility]  HCP and legal guardian [phone] Madisyn Enamorado (Sister) 54 Year Old Female with a PMH of down syndrome, dementia (alert but non verbal at baseline) and seizure disorder (neurologist outside, Dr. Hood) presents from the group home for evaluation of failure of thrive and decrease po intake.    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 He changed her medications, most recently her lamictal level was high so he lowered the dose; he added ativan for agitation.   -- UA negative   --Doherty was placed for retention will d/c and try trial of void on 11-27 - on 11-28 doherty replaced as pt failed trial of void  --Medication adjustments per neuro--stopped benzos and olanzapine, decreased lamictal to 75 in am [id number] in pm, added seroquel--if agitated, use seroquel 25, avoid benzo  --Lamictal level 22 per neuro 75 in am and 100 in pm  --When lethargy resolves, plan for calorie count if low possible peg  -added glucose to normal saline for nutrition, pt remains with poor oral intake discussed possibility of peg tube if no imporrvement with sister     # Hypothyroidism:  - Continue with Synthroid 112 , Follow with TSH     DVT Prophylaxis: lovenox  GI ppx: not indicated  Soft diet --speech and swallow eval--while she is not eating, IV fluids   Disposition: from group home, [facility]  HCP and legal guardian [phone] Madisyn Enamorado (Sister)

## 2019-11-29 NOTE — PROGRESS NOTE ADULT - ASSESSMENT
54 Year Old Female with a PMH of down syndrome, dementia (alert but non verbal at baseline) and seizure disorder (neurologist outside, Dr. Hood) presents from the group home for evaluation of failure of thrive and decrease po intake.    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 He changed her medications, most recently her lamictal level was high so he lowered the dose; he added ativan for agitation.   -- UA negative   --Doherty was placed for retention will d/c and try trial of void on 11-27 - on 11-28 doherty replaced as pt failed trial of void  --Medication adjustments per neuro--stopped benzos and olanzapine, decreased lamictal to 75 in am [id number] in pm, added seroquel--if agitated, use seroquel 25, avoid benzo  --Lamictal level 22 per neuro 75 in am and 100 in pm  --When lethargy resolves, plan for calorie count if low possible peg  -added glucose to normal saline for nutrition, pt remains with poor oral intake discussed possibility of peg tube if no imporrvement with sister     # Hypothyroidism:  - Continue with Synthroid 112 , Follow with TSH     DVT Prophylaxis: lovenox  GI ppx: not indicated  Soft diet --speech and swallow eval--while she is not eating, IV fluids   Disposition: from group home, [facility]  HCP and legal guardian [phone] Madisyn Enamorado (Sister) 54 Year Old Female with a PMH of down syndrome, dementia (alert but non verbal at baseline) and seizure disorder (neurologist outside, Dr. Hood) presents from the group home for evaluation of failure of thrive and decrease po intake.      A/P     # Decreased po intake  - pt is mentally challenged, she needs assistance with meals, consumes 100 % of her meals with one to one feedings  - will start calorie count over the weekend, if pt'll have low intake start Dronabinol 2.5 mg BID   - pt has no sings of malnutrition     # Down syndrome with mental disability and agitation  -supportive care   - Haldol PRN   - prevent falls and aspiration     # Seizure disorder  - seizure precautions   - keep Magnesium above 2.0  - get REEG  - neurology follow up to revise meds   - off  benzo and olanzapine, decreased   - Lamictal  was decreased to 75 in am [id number] in pm,  - started on Seroquel 25 mg   - check Lamictal  level     # Urinary retention   - c/w Gallo cath, monitor urine output   -  follow up after discharge   - started on Flomax   - failed TOV     # Hypothyroidism:  - Continue with Synthroid 112 , Follow with TSH     DVT Prophylaxis: lovenox  GI ppx: not indicated  Disposition: from group home, [facility]  HCP and legal guardian [phone] Madisyn Enamorado (Sister)    #Progress Note Handoff  Pending (specify):  Calorie count over the weekend, one to one feedings, f/u REEG, neurology follow up   Family discussion: I spoke with an aid at the bedside will contact  group home [facility] [phone]  Disposition: anticipate discharge on Monday

## 2019-11-29 NOTE — DIETITIAN INITIAL EVALUATION ADULT. - FEEDING SKILL
total assistance/Pt with Downs Syndrome and dementia. Spoke to pts sister and GH aid at bedside. Report pt typically follows pureed diet with thin liquids at baseline. Typically eats well but sister reports intake decreased x3-5 days PTA. Sister c/o lethargy and sleeping all the time, causing difficulty eating and swallowing. NKFA. Does not consume nutrition supplements at baseline.

## 2019-11-29 NOTE — PROGRESS NOTE ADULT - SUBJECTIVE AND OBJECTIVE BOX
SUBJECTIVE:    Patient is a 54y old Female who presents with a chief complaint of decreased po intake (28 Nov 2019 09:04)    Currently admitted to medicine with the primary diagnosis of Failure to thrive in adult     Today is hospital day 4d. This morning she is resting comfortably in bed and reports no new issues or overnight events.     INTERVAL EVENTS: apetite improved, has some shaking will get neuro for any meds adjusment    PAST MEDICAL & SURGICAL HISTORY  Seizures  Intellectual disability  Hypothyroid  Impulse control disorder in adult  Down's syndrome  No significant past surgical history      ALLERGIES:  No Known Allergies    MEDICATIONS:  STANDING MEDICATIONS  ammonium lactate 12% Lotion 1 Application(s) Topical two times a day  artificial  tears Solution 1 Drop(s) Both EYES two times a day  calcium carbonate 1250 mG  + Vitamin D (OsCal 500 + D) 1 Tablet(s) Oral daily  chlorhexidine 4% Liquid 1 Application(s) Topical <User Schedule>  clotrimazole 1% Cream 1 Application(s) Topical two times a day  dextrose 5% + sodium chloride 0.9%. 1000 milliLiter(s) IV Continuous <Continuous>  enoxaparin Injectable 40 milliGRAM(s) SubCutaneous daily  lamoTRIgine 100 milliGRAM(s) Oral at bedtime  lamoTRIgine 75 milliGRAM(s) Oral daily  levothyroxine 112 MICROGram(s) Oral daily  polyethylene glycol 3350 17 Gram(s) Oral daily  QUEtiapine 25 milliGRAM(s) Oral at bedtime  senna 8.6 milliGRAM(s) Oral Tablet - Peds 1 Tablet(s) Oral at bedtime  simvastatin 20 milliGRAM(s) Oral at bedtime  tamsulosin 0.4 milliGRAM(s) Oral at bedtime    PRN MEDICATIONS    VITALS:   T(F): 96.7  HR: 52  BP: 115/54  RR: 18  SpO2: 95%    LABS:                        RADIOLOGY:    PHYSICAL EXAM:  GEN: No acute distress  PULM/CHEST: Clear to auscultation bilaterally, no rales, rhonchi or wheezes   CVS: Regular rate and rhythm, S1-S2, no murmurs  ABD: Soft, non-tender, non-distended, +BS  EXT: No edema  NEURO: AAOx3    Gallo Catheter:   Indwelling Urethral Catheter:     Connect To:  Straight Drainage/[place]    Indication:  Urinary Retention / Obstruction (11-28-19 @ 09:03) (not performed) [active] SUBJECTIVE:    Patient is a 54y old Female who presents with a chief complaint of decreased po intake (28 Nov 2019 09:04)    Currently admitted to medicine with the primary diagnosis of Failure to thrive in adult     Today is hospital day 4d. This morning she is resting comfortably in bed and reports no new issues or overnight events.     INTERVAL EVENTS: apetite improved, has some shaking will get neuro for any meds adjusment,     PAST MEDICAL & SURGICAL HISTORY  Seizures  Intellectual disability  Hypothyroid  Impulse control disorder in adult  Down's syndrome  No significant past surgical history      ALLERGIES:  No Known Allergies    MEDICATIONS:  STANDING MEDICATIONS  ammonium lactate 12% Lotion 1 Application(s) Topical two times a day  artificial  tears Solution 1 Drop(s) Both EYES two times a day  calcium carbonate 1250 mG  + Vitamin D (OsCal 500 + D) 1 Tablet(s) Oral daily  chlorhexidine 4% Liquid 1 Application(s) Topical <User Schedule>  clotrimazole 1% Cream 1 Application(s) Topical two times a day  dextrose 5% + sodium chloride 0.9%. 1000 milliLiter(s) IV Continuous <Continuous>  enoxaparin Injectable 40 milliGRAM(s) SubCutaneous daily  lamoTRIgine 100 milliGRAM(s) Oral at bedtime  lamoTRIgine 75 milliGRAM(s) Oral daily  levothyroxine 112 MICROGram(s) Oral daily  polyethylene glycol 3350 17 Gram(s) Oral daily  QUEtiapine 25 milliGRAM(s) Oral at bedtime  senna 8.6 milliGRAM(s) Oral Tablet - Peds 1 Tablet(s) Oral at bedtime  simvastatin 20 milliGRAM(s) Oral at bedtime  tamsulosin 0.4 milliGRAM(s) Oral at bedtime    PRN MEDICATIONS    VITALS:   T(F): 96.7  HR: 52  BP: 115/54  RR: 18  SpO2: 95%    LABS:                        RADIOLOGY:    PHYSICAL EXAM:  GEN: No acute distress  PULM/CHEST: Clear to auscultation bilaterally, no rales, rhonchi or wheezes   CVS: Regular rate and rhythm, S1-S2, no murmurs  ABD: Soft, non-tender, non-distended, +BS  EXT: No edema  NEURO: AAOx3    Gallo Catheter:   Indwelling Urethral Catheter:     Connect To:  Straight Drainage/[place]    Indication:  Urinary Retention / Obstruction (11-28-19 @ 09:03) (not performed) [active]

## 2019-11-29 NOTE — DIETITIAN INITIAL EVALUATION ADULT. - OTHER INFO
Pt p/w decreased intake with primary dx: FTT in adult. s/p CT scan of head, cervical spine, maxillofacial, chest, abdomen, pelvis without acute pathology. Neuro suspects rapidly progressive dementia, adjusted medication. Gallo placed for urinary retention, UA negative.

## 2019-11-29 NOTE — DIETITIAN INITIAL EVALUATION ADULT. - PHYSICAL APPEARANCE
BMI 36.1 (179#, 59in). despite poor intake PTA, sister denies recent wt loss. skin intact. nonpitting edema L arm.

## 2019-11-29 NOTE — DIETITIAN INITIAL EVALUATION ADULT. - ENERGY NEEDS
estimated calorie needs = ~2258-9986 kcal/day (30-35 kcal/kg IBW d/t BMI >30 but IBW significantly <CBW).   estimated protein needs = 53-62 g/day (1.2-1.4 g/kg CBW, reasons mentioned above).   estimated fluid needs = 1mL/kcal or per LIP

## 2019-11-29 NOTE — PROGRESS NOTE ADULT - SUBJECTIVE AND OBJECTIVE BOX
54y old Female who presents with a chief complaint of decreased po intake, pt ha    PAST MEDICAL & SURGICAL HISTORY  Seizures  Intellectual disability  Hypothyroid  Impulse control disorder in adult  Down's syndrome  No significant past surgical history      ALLERGIES:  No Known Allergies    MEDICATIONS:  STANDING MEDICATIONS  ammonium lactate 12% Lotion 1 Application(s) Topical two times a day  artificial  tears Solution 1 Drop(s) Both EYES two times a day  calcium carbonate 1250 mG  + Vitamin D (OsCal 500 + D) 1 Tablet(s) Oral daily  chlorhexidine 4% Liquid 1 Application(s) Topical <User Schedule>  clotrimazole 1% Cream 1 Application(s) Topical two times a day  dextrose 5% + sodium chloride 0.9%. 1000 milliLiter(s) IV Continuous <Continuous>  enoxaparin Injectable 40 milliGRAM(s) SubCutaneous daily  lamoTRIgine 100 milliGRAM(s) Oral at bedtime  lamoTRIgine 75 milliGRAM(s) Oral daily  levothyroxine 112 MICROGram(s) Oral daily  polyethylene glycol 3350 17 Gram(s) Oral daily  QUEtiapine 25 milliGRAM(s) Oral at bedtime  senna 8.6 milliGRAM(s) Oral Tablet - Peds 1 Tablet(s) Oral at bedtime  simvastatin 20 milliGRAM(s) Oral at bedtime  tamsulosin 0.4 milliGRAM(s) Oral at bedtime    PRN MEDICATIONS    VITALS:   T(F): 96.7  HR: 52  BP: 115/54  RR: 18  SpO2: 95%    LABS:                        RADIOLOGY:    PHYSICAL EXAM:  GEN: No acute distress  PULM/CHEST: Clear to auscultation bilaterally, no rales, rhonchi or wheezes   CVS: Regular rate and rhythm, S1-S2, no murmurs  ABD: Soft, non-tender, non-distended, +BS  EXT: No edema  NEURO: AAOx3    Gallo Catheter:   Indwelling Urethral Catheter:     Connect To:  Straight Drainage/[place]    Indication:  Urinary Retention / Obstruction (11-28-19 @ 09:03) (not performed) [active] 54y old Female who presents with a chief complaint of decreased po intake, pt has a Down syndrome, mentally challenged, she needs assistance with meals, pt is able to consume 100% on her meals with assistance.   She failed TOV, Gallo cath was put back.   Today pt was agitated, calmed down after Ativan, looks very comfortable at rest.    PAST MEDICAL & SURGICAL HISTORY  Seizures  Intellectual disability  Hypothyroid  Impulse control disorder in adult  Down's syndrome  No significant past surgical history      ALLERGIES:  No Known Allergies    VITALS:   T(C): 36.7 (29 Nov 2019 14:07), Max: 36.7 (29 Nov 2019 14:07)  T(F): 98.1 (29 Nov 2019 14:07), Max: 98.1 (29 Nov 2019 14:07)  HR: 64 (29 Nov 2019 14:07) (52 - 64)  BP: 103/51 (29 Nov 2019 14:07) (97/46 - 121/57)  BP(mean): --  RR: 18 (29 Nov 2019 14:07) (18 - 18)  SpO2: 95% (28 Nov 2019 20:00) (95% - 95%)    PHYSICAL EXAM:  GEN: No acute distress, mentally challenged   PULM/CHEST: Clear to auscultation bilaterally, no rales, rhonchi or wheezes   CVS: Regular rate and rhythm, S1-S2, no murmurs  ABD: Soft, non-tender, non-distended, +BS  EXT: No edema  NEURO: Awake, nonverbal, does not follow commands     LABS:                        11.3   3.48  )-----------( 162      ( 29 Nov 2019 08:36 )             34.6   11-29    143  |  109  |  12  ----------------------------<  102<H>  4.1   |  23  |  1.2    Ca    8.3<L>      29 Nov 2019 08:36  Specimen Source: .Urine Clean Catch (Midstream) (11.25.19 @ 14:24)    Culture - Urine (11.25.19 @ 14:24)    Specimen Source: .Urine Clean Catch (Midstream)    Culture Results:   No growth    Culture - Blood (11.25.19 @ 14:24)    Specimen Source: .Blood Blood    Culture Results:   No growth to date.    RADIOLOGY:    < from: Xray Chest 1 View- PORTABLE-Urgent (11.25.19 @ 15:46) >  Impression:      No radiographic evidence of acute cardiopulmonary disease.      MEDICATIONS  (STANDING):  ammonium lactate 12% Lotion 1 Application(s) Topical two times a day  artificial  tears Solution 1 Drop(s) Both EYES two times a day  calcium carbonate 1250 mG  + Vitamin D (OsCal 500 + D) 1 Tablet(s) Oral daily  chlorhexidine 4% Liquid 1 Application(s) Topical <User Schedule>  clotrimazole 1% Cream 1 Application(s) Topical two times a day  dextrose 5% + sodium chloride 0.9%. 1000 milliLiter(s) (50 mL/Hr) IV Continuous <Continuous>  enoxaparin Injectable 40 milliGRAM(s) SubCutaneous daily  lamoTRIgine 100 milliGRAM(s) Oral at bedtime  lamoTRIgine 75 milliGRAM(s) Oral daily  levothyroxine 112 MICROGram(s) Oral daily  polyethylene glycol 3350 17 Gram(s) Oral daily  QUEtiapine 25 milliGRAM(s) Oral at bedtime  senna 8.6 milliGRAM(s) Oral Tablet - Peds 1 Tablet(s) Oral at bedtime  simvastatin 20 milliGRAM(s) Oral at bedtime  tamsulosin 0.4 milliGRAM(s) Oral at bedtime

## 2019-11-30 LAB
ANION GAP SERPL CALC-SCNC: 9 MMOL/L — SIGNIFICANT CHANGE UP (ref 7–14)
BUN SERPL-MCNC: 8 MG/DL — LOW (ref 10–20)
CALCIUM SERPL-MCNC: 8.3 MG/DL — LOW (ref 8.5–10.1)
CHLORIDE SERPL-SCNC: 112 MMOL/L — HIGH (ref 98–110)
CO2 SERPL-SCNC: 25 MMOL/L — SIGNIFICANT CHANGE UP (ref 17–32)
CREAT SERPL-MCNC: 1.1 MG/DL — SIGNIFICANT CHANGE UP (ref 0.7–1.5)
CULTURE RESULTS: SIGNIFICANT CHANGE UP
CULTURE RESULTS: SIGNIFICANT CHANGE UP
GLUCOSE SERPL-MCNC: 104 MG/DL — HIGH (ref 70–99)
HCT VFR BLD CALC: 36.1 % — LOW (ref 37–47)
HGB BLD-MCNC: 12 G/DL — SIGNIFICANT CHANGE UP (ref 12–16)
MCHC RBC-ENTMCNC: 32 PG — HIGH (ref 27–31)
MCHC RBC-ENTMCNC: 33.2 G/DL — SIGNIFICANT CHANGE UP (ref 32–37)
MCV RBC AUTO: 96.3 FL — SIGNIFICANT CHANGE UP (ref 81–99)
NRBC # BLD: 0 /100 WBCS — SIGNIFICANT CHANGE UP (ref 0–0)
PLATELET # BLD AUTO: 166 K/UL — SIGNIFICANT CHANGE UP (ref 130–400)
POTASSIUM SERPL-MCNC: 4.1 MMOL/L — SIGNIFICANT CHANGE UP (ref 3.5–5)
POTASSIUM SERPL-SCNC: 4.1 MMOL/L — SIGNIFICANT CHANGE UP (ref 3.5–5)
RBC # BLD: 3.75 M/UL — LOW (ref 4.2–5.4)
RBC # FLD: 14 % — SIGNIFICANT CHANGE UP (ref 11.5–14.5)
SODIUM SERPL-SCNC: 146 MMOL/L — SIGNIFICANT CHANGE UP (ref 135–146)
SPECIMEN SOURCE: SIGNIFICANT CHANGE UP
SPECIMEN SOURCE: SIGNIFICANT CHANGE UP
WBC # BLD: 3.33 K/UL — LOW (ref 4.8–10.8)
WBC # FLD AUTO: 3.33 K/UL — LOW (ref 4.8–10.8)

## 2019-11-30 PROCEDURE — 93010 ELECTROCARDIOGRAM REPORT: CPT

## 2019-11-30 PROCEDURE — 99233 SBSQ HOSP IP/OBS HIGH 50: CPT

## 2019-11-30 PROCEDURE — 95822 EEG COMA OR SLEEP ONLY: CPT | Mod: 26

## 2019-11-30 RX ORDER — HALOPERIDOL DECANOATE 100 MG/ML
2 INJECTION INTRAMUSCULAR EVERY 6 HOURS
Refills: 0 | Status: DISCONTINUED | OUTPATIENT
Start: 2019-11-30 | End: 2019-12-10

## 2019-11-30 RX ORDER — DRONABINOL 2.5 MG
2.5 CAPSULE ORAL
Refills: 0 | Status: DISCONTINUED | OUTPATIENT
Start: 2019-11-30 | End: 2019-12-07

## 2019-11-30 RX ORDER — CLONAZEPAM 1 MG
0.5 TABLET ORAL EVERY 12 HOURS
Refills: 0 | Status: DISCONTINUED | OUTPATIENT
Start: 2019-11-30 | End: 2019-12-07

## 2019-11-30 RX ORDER — SODIUM CHLORIDE 9 MG/ML
1000 INJECTION INTRAMUSCULAR; INTRAVENOUS; SUBCUTANEOUS
Refills: 0 | Status: DISCONTINUED | OUTPATIENT
Start: 2019-11-30 | End: 2019-11-30

## 2019-11-30 RX ADMIN — SODIUM CHLORIDE 75 MILLILITER(S): 9 INJECTION INTRAMUSCULAR; INTRAVENOUS; SUBCUTANEOUS at 17:34

## 2019-11-30 RX ADMIN — Medication 1 DROP(S): at 17:37

## 2019-11-30 RX ADMIN — ENOXAPARIN SODIUM 40 MILLIGRAM(S): 100 INJECTION SUBCUTANEOUS at 12:42

## 2019-11-30 RX ADMIN — Medication 112 MICROGRAM(S): at 05:52

## 2019-11-30 RX ADMIN — Medication 0.5 MILLIGRAM(S): at 17:41

## 2019-11-30 RX ADMIN — Medication 1 DROP(S): at 06:03

## 2019-11-30 RX ADMIN — LAMOTRIGINE 75 MILLIGRAM(S): 25 TABLET, ORALLY DISINTEGRATING ORAL at 12:41

## 2019-11-30 RX ADMIN — HALOPERIDOL DECANOATE 2 MILLIGRAM(S): 100 INJECTION INTRAMUSCULAR at 13:55

## 2019-11-30 RX ADMIN — Medication 1 APPLICATION(S): at 17:37

## 2019-11-30 RX ADMIN — Medication 1 APPLICATION(S): at 05:56

## 2019-11-30 RX ADMIN — CHLORHEXIDINE GLUCONATE 1 APPLICATION(S): 213 SOLUTION TOPICAL at 06:40

## 2019-11-30 RX ADMIN — Medication 1 TABLET(S): at 12:41

## 2019-11-30 RX ADMIN — Medication 1 APPLICATION(S): at 17:36

## 2019-11-30 RX ADMIN — Medication 1 APPLICATION(S): at 05:57

## 2019-11-30 RX ADMIN — Medication 2.5 MILLIGRAM(S): at 17:36

## 2019-11-30 NOTE — PROGRESS NOTE ADULT - SUBJECTIVE AND OBJECTIVE BOX
Neurology Follow up note      Name  SALVATORE STEELE    HPI:  54 Year Old Female with a PMH of down syndrome, dementia ( non verbal at baseline) and seizure disorder (neurologist Dr. Torres presents from the group home for evaluation of failure of thrive and decrease po intake. collateral history was obtained from sister / Aid at the bedside. also from Cesiay on call RN at the group home at [phone] who reported decreased po intake, decreased urine output and dehydration. rest of ROS is negative.     in the ED, pt is hemodynamically stable. (25 Nov 2019 19:38)      Interval History - Increased lethargy - resulted in d/c of benzo from drug regime. Pt noted to have twitches since then, No twitches seen in real time upon my arrival. However - family member video record pt arm twiches. They appear to be bilateral Upper extremity symmetric movements where pt abducts arms. Not rhytmic or cyclic in nature. Does not appear to be siezure like.. And not reproducible          Vital Signs Last 24 Hrs  T(C): 36.4 (30 Nov 2019 05:19), Max: 36.7 (29 Nov 2019 14:07)  T(F): 97.5 (30 Nov 2019 05:19), Max: 98.1 (29 Nov 2019 14:07)  HR: 45 (30 Nov 2019 05:19) (45 - 65)  BP: 119/56 (30 Nov 2019 05:19) (103/51 - 151/66)  BP(mean): --  RR: 18 (30 Nov 2019 05:19) (17 - 18)  SpO2: 96% (29 Nov 2019 08:00) (96% - 96%)          Neurological Exam:   Mental status' Awake Alert  Nonverbal  Not following commands  DOLAN X 4  Sensory: grossly intact to light touch    Medications  ammonium lactate 12% Lotion 1 Application(s) Topical two times a day  artificial  tears Solution 1 Drop(s) Both EYES two times a day  calcium carbonate 1250 mG  + Vitamin D (OsCal 500 + D) 1 Tablet(s) Oral daily  chlorhexidine 4% Liquid 1 Application(s) Topical <User Schedule>  clotrimazole 1% Cream 1 Application(s) Topical two times a day  dextrose 5% + sodium chloride 0.9%. 1000 milliLiter(s) IV Continuous <Continuous>  enoxaparin Injectable 40 milliGRAM(s) SubCutaneous daily  lamoTRIgine 100 milliGRAM(s) Oral at bedtime  lamoTRIgine 75 milliGRAM(s) Oral daily  levothyroxine 112 MICROGram(s) Oral daily  polyethylene glycol 3350 17 Gram(s) Oral daily  QUEtiapine 25 milliGRAM(s) Oral at bedtime  senna 8.6 milliGRAM(s) Oral Tablet - Peds 1 Tablet(s) Oral at bedtime  simvastatin 20 milliGRAM(s) Oral at bedtime  tamsulosin 0.4 milliGRAM(s) Oral at bedtime      Lab      Radiology

## 2019-11-30 NOTE — PROGRESS NOTE ADULT - ASSESSMENT
IMP:  54 Year Old Female with a PMH of down syndrome, dementia ( non verbal at baseline) and seizure disorder. Presents from the group home for evaluation of failure of thrive and decrease po intake with behavioral changes.  Suspect underlying Dementia Alzheimers with behavioral issues possibly with overmedication with benzodiazepines.          Plan:  Monitor for twitches  Continue Lamictal 75 AM, 100 PM  May benefit from EEG

## 2019-11-30 NOTE — PROGRESS NOTE ADULT - ASSESSMENT
54 Year Old Female with a PMH of down syndrome, dementia (alert but non verbal at baseline) and seizure disorder (neurologist outside, Dr. Hood) presents from the group home for evaluation of failure of thrive and decrease po intake.      A/P     # Decreased po intake  - pt is mentally challenged, she needs assistance with meals  - f/u  calorie count over the weekend, start Dronabinol 2.5 mg BID   - pt has no sings of malnutrition     # Down syndrome with mental disability and agitation  -supportive care   - Haldol PRN   - prevent falls and aspiration     # Seizure disorder  - seizure precautions   - keep Magnesium above 2.0  - REEG abnormal   - neurology is following   - off  benzo and olanzapine, decreased   - Lamictal  was decreased to 75 in am [id number] in pm,  - started on Seroquel 25 mg   - started on Klonopin 0.5 BID     # Urinary retention   - c/w Gallo cath, monitor urine output   -  follow up after discharge   - started on Flomax   - failed TOV     # Hypothyroidism:  - Continue with Synthroid 112 , Follow with TSH     DVT Prophylaxis: lovenox  GI ppx: not indicated  Disposition: from group [place], [facility]  HCP and legal guardian [phone] Madisyn Enamorado (Sister)    #Progress Note Handoff  Pending (specify):  Calorie count over the weekend, one to one feedings, start Dronabinol 2.5 mg BID  Family discussion: I spoke with an aid at the bedside will contact  group home [facility] [phone]  Disposition: anticipate discharge on Monday

## 2019-11-30 NOTE — PROGRESS NOTE ADULT - SUBJECTIVE AND OBJECTIVE BOX
54y old Female who presents with a chief complaint of decreased po intake, pt has a Down syndrome, mentally challenged, she needs assistance with meals, pt is able to consume 100% on her meals with assistance, start Dronabinol 2.5 BId today, c/w calorie count.   She failed TOV, Gallo cath was put back.   Today pt is awake and calm, has no appetite.     PAST MEDICAL & SURGICAL HISTORY  Seizures  Intellectual disability  Hypothyroid  Impulse control disorder in adult  Down's syndrome  No significant past surgical history      ALLERGIES:  No Known Allergies    VITALS:   T(C): 36.3 (30 Nov 2019 14:56), Max: 36.4 (29 Nov 2019 18:07)  T(F): 97.4 (30 Nov 2019 14:56), Max: 97.6 (29 Nov 2019 18:07)  HR: 55 (30 Nov 2019 14:56) (45 - 58)  BP: 122/60 (30 Nov 2019 14:56) (119/56 - 151/66)  BP(mean): --  RR: 19 (30 Nov 2019 14:56) (18 - 19)  SpO2: --    PHYSICAL EXAM:  GEN: No acute distress, mentally challenged   PULM/CHEST: Clear to auscultation bilaterally, no rales, rhonchi or wheezes   CVS: Regular rate and rhythm, S1-S2, no murmurs  ABD: Soft, non-tender, non-distended, +BS  EXT: No edema  NEURO: Awake, nonverbal, does not follow commands     LABS:                                           12.0   3.33  )-----------( 166      ( 30 Nov 2019 07:34 )             36.1   11-30    146  |  112<H>  |  8<L>  ----------------------------<  104<H>  4.1   |  25  |  1.1    Ca    8.3<L>      30 Nov 2019 07:34        Ca    8.3<L>      29 Nov 2019 08:36  Specimen Source: .Urine Clean Catch (Midstream) (11.25.19 @ 14:24)    Culture - Urine (11.25.19 @ 14:24)    Specimen Source: .Urine Clean Catch (Midstream)    Culture Results:   No growth    Culture - Blood (11.25.19 @ 14:24)    Specimen Source: .Blood Blood    Culture Results:   No growth to date.    RADIOLOGY:    < from: Xray Chest 1 View- PORTABLE-Urgent (11.25.19 @ 15:46) >  Impression:      No radiographic evidence of acute cardiopulmonary disease.    < from: EEG (11.29.19 @ 17:35) >    Impression  Abnormal due to the presence of: generalized slowing as above    Clinical Correlation & Recommendations   Consistent with diffuse cerebral electrophysiological dysfunction,   secondary to nonspecific cause.    < end of copied text >    MEDICATIONS  (STANDING):  ammonium lactate 12% Lotion 1 Application(s) Topical two times a day  artificial  tears Solution 1 Drop(s) Both EYES two times a day  calcium carbonate 1250 mG  + Vitamin D (OsCal 500 + D) 1 Tablet(s) Oral daily  chlorhexidine 4% Liquid 1 Application(s) Topical <User Schedule>  clonazePAM  Tablet 0.5 milliGRAM(s) Oral every 12 hours  clotrimazole 1% Cream 1 Application(s) Topical two times a day  dextrose 5% + sodium chloride 0.9%. 1000 milliLiter(s) (50 mL/Hr) IV Continuous <Continuous>  dronabinol 2.5 milliGRAM(s) Oral two times a day  enoxaparin Injectable 40 milliGRAM(s) SubCutaneous daily  lamoTRIgine 100 milliGRAM(s) Oral at bedtime  lamoTRIgine 75 milliGRAM(s) Oral daily  levothyroxine 112 MICROGram(s) Oral daily  polyethylene glycol 3350 17 Gram(s) Oral daily  QUEtiapine 25 milliGRAM(s) Oral at bedtime  senna 8.6 milliGRAM(s) Oral Tablet - Peds 1 Tablet(s) Oral at bedtime  simvastatin 20 milliGRAM(s) Oral at bedtime  sodium chloride 0.9%. 1000 milliLiter(s) (75 mL/Hr) IV Continuous <Continuous>  tamsulosin 0.4 milliGRAM(s) Oral at bedtime    MEDICATIONS  (PRN):  haloperidol    Injectable 2 milliGRAM(s) IntraMuscular every 6 hours PRN Agitation

## 2019-12-01 ENCOUNTER — TRANSCRIPTION ENCOUNTER (OUTPATIENT)
Age: 54
End: 2019-12-01

## 2019-12-01 LAB
ANION GAP SERPL CALC-SCNC: 9 MMOL/L — SIGNIFICANT CHANGE UP (ref 7–14)
BUN SERPL-MCNC: 11 MG/DL — SIGNIFICANT CHANGE UP (ref 10–20)
CALCIUM SERPL-MCNC: 8.4 MG/DL — LOW (ref 8.5–10.1)
CHLORIDE SERPL-SCNC: 109 MMOL/L — SIGNIFICANT CHANGE UP (ref 98–110)
CO2 SERPL-SCNC: 26 MMOL/L — SIGNIFICANT CHANGE UP (ref 17–32)
CREAT SERPL-MCNC: 1.2 MG/DL — SIGNIFICANT CHANGE UP (ref 0.7–1.5)
GLUCOSE SERPL-MCNC: 97 MG/DL — SIGNIFICANT CHANGE UP (ref 70–99)
MAGNESIUM SERPL-MCNC: 1.9 MG/DL — SIGNIFICANT CHANGE UP (ref 1.8–2.4)
PHOSPHATE SERPL-MCNC: 3.1 MG/DL — SIGNIFICANT CHANGE UP (ref 2.1–4.9)
POTASSIUM SERPL-MCNC: 4.3 MMOL/L — SIGNIFICANT CHANGE UP (ref 3.5–5)
POTASSIUM SERPL-SCNC: 4.3 MMOL/L — SIGNIFICANT CHANGE UP (ref 3.5–5)
SODIUM SERPL-SCNC: 144 MMOL/L — SIGNIFICANT CHANGE UP (ref 135–146)

## 2019-12-01 PROCEDURE — 95822 EEG COMA OR SLEEP ONLY: CPT | Mod: 26

## 2019-12-01 PROCEDURE — 99233 SBSQ HOSP IP/OBS HIGH 50: CPT

## 2019-12-01 RX ADMIN — Medication 112 MICROGRAM(S): at 05:55

## 2019-12-01 RX ADMIN — SENNA PLUS 1 TABLET(S): 8.6 TABLET ORAL at 21:53

## 2019-12-01 RX ADMIN — Medication 0.5 MILLIGRAM(S): at 05:55

## 2019-12-01 RX ADMIN — QUETIAPINE FUMARATE 25 MILLIGRAM(S): 200 TABLET, FILM COATED ORAL at 21:53

## 2019-12-01 RX ADMIN — HALOPERIDOL DECANOATE 2 MILLIGRAM(S): 100 INJECTION INTRAMUSCULAR at 13:12

## 2019-12-01 RX ADMIN — ENOXAPARIN SODIUM 40 MILLIGRAM(S): 100 INJECTION SUBCUTANEOUS at 13:11

## 2019-12-01 RX ADMIN — LAMOTRIGINE 100 MILLIGRAM(S): 25 TABLET, ORALLY DISINTEGRATING ORAL at 21:54

## 2019-12-01 RX ADMIN — Medication 1 APPLICATION(S): at 17:04

## 2019-12-01 RX ADMIN — SIMVASTATIN 20 MILLIGRAM(S): 20 TABLET, FILM COATED ORAL at 21:53

## 2019-12-01 RX ADMIN — Medication 2.5 MILLIGRAM(S): at 05:55

## 2019-12-01 RX ADMIN — Medication 1 DROP(S): at 17:03

## 2019-12-01 RX ADMIN — Medication 1 DROP(S): at 05:57

## 2019-12-01 RX ADMIN — Medication 0.5 MILLIGRAM(S): at 17:03

## 2019-12-01 RX ADMIN — Medication 1 APPLICATION(S): at 17:03

## 2019-12-01 RX ADMIN — Medication 1 APPLICATION(S): at 05:57

## 2019-12-01 RX ADMIN — Medication 1 APPLICATION(S): at 05:58

## 2019-12-01 RX ADMIN — TAMSULOSIN HYDROCHLORIDE 0.4 MILLIGRAM(S): 0.4 CAPSULE ORAL at 21:53

## 2019-12-01 RX ADMIN — LAMOTRIGINE 75 MILLIGRAM(S): 25 TABLET, ORALLY DISINTEGRATING ORAL at 13:10

## 2019-12-01 RX ADMIN — CHLORHEXIDINE GLUCONATE 1 APPLICATION(S): 213 SOLUTION TOPICAL at 07:02

## 2019-12-01 RX ADMIN — Medication 2.5 MILLIGRAM(S): at 17:03

## 2019-12-01 RX ADMIN — Medication 1 TABLET(S): at 13:13

## 2019-12-01 NOTE — PROGRESS NOTE ADULT - ASSESSMENT
54 Year Old Female with a PMH of down syndrome, dementia (alert but non verbal at baseline) and seizure disorder (neurologist outside, Dr. Hood) presents from the group home for evaluation of failure of thrive and decrease po intake.      A/P     # Decreased po intake  - pt is mentally challenged, she needs assistance with meals  - f/u  calorie count over the weekend, started on  Dronabinol 2.5 mg BID   - pt has no sings of malnutrition     # Down syndrome with mental disability and agitation  -supportive care   - Haldol 0.5 Q 12 hours   - prevent falls and aspiration     # Seizure disorder  - seizure precautions   - keep Magnesium above 2.0  - REEG abnormal   - neurology is following   - off  benzo and olanzapine, decreased   - Lamictal  was decreased to 75 in am [id number] in pm,  - started on Seroquel 25 mg   - started on Klonopin 0.5 BID     # Urinary retention   - TOV today   -  follow up if pt'll fail   - started on Flomax   - failed TOV in the past     # Hypothyroidism:  - Continue with Synthroid 112 , Follow with TSH     DVT Prophylaxis: lovenox  GI ppx: not indicated  Disposition: from group home, [facility]  HCP and legal guardian [phone] Madisyn Enamorado (Sister)    #Progress Note Handoff  Pending (specify):  f/u  Calorie count , one to one feedings, TOV  Family discussion: I spoke with health care proxy today Sarah Enamorado [phone]  Disposition: anticipate discharge on Monday

## 2019-12-01 NOTE — PROGRESS NOTE ADULT - ASSESSMENT
54 Year Old Female with a PMH of down syndrome, dementia (alert but non verbal at baseline) and seizure disorder (neurologist outside, Dr. Hood) presents from the group home for evaluation of failure of thrive and decrease po intake.    INTERVAL EVENTS: eeg with slowing, per neuro upper body shakes are not seizure activity, no events overnight, pt eating well with 1 to 1  possible d/c tomorrow    # Decreased po intake and urinary retention in setting of Down Syndrome and dementia, seizure disorder  - blood work is unremarkable, FLU swab negative, urinalysis neg  -CT scan of head, cervical spine, maxillofacial, chest, abdomen, pelvis without acute pathology   -Per Dr Hood she has been declining quickly for weeks, increased agitation--they suspect rapidly progressive dementia. He changed her medications, most recently her lamictal level was high so he lowered the dose;   Dronabinol 2.5 BID added pt eating well with 1 to 1 feeds  EEG with generalized slowing  -- UA negative   --Doherty was placed for retention will d/c and try trial of void on 11-27 - on 11-28 doherty replaced as pt failed trial of void  --Medication adjustments per neuro--stopped benzos and olanzapine, decreased lamictal to 75 in am [id number] in pm, kloopin 0.5 BID added seroquel at night--if agitated, use seroquel 25 or haldol, avoid benzo, keep mag abive 2  --Lamictal level 22 per neuro 75 in am and 100 in pm  --When lethargy resolves, plan for calorie count if low possible peg  -added glucose to normal saline for nutrition, pt remains with poor oral intake discussed possibility of peg tube if no improvement with sister     # Hypothyroidism:  - Continue with Synthroid 112 , Follow with TSH     DVT Prophylaxis: lovenox  GI ppx: not indicated  Soft diet --speech and swallow eval--while she is not eating, IV fluids   Disposition: from group home, [facility]  HCP and legal guardian [phone] Madisyn Enamorado (Sister)

## 2019-12-01 NOTE — DISCHARGE NOTE PROVIDER - HOSPITAL COURSE
· Assessment	    54 Year Old Female with a PMH of down syndrome, dementia (alert but non verbal at baseline) and seizure disorder (neurologist outside, Dr. Hood) presents from the group home for evaluation of failure of thrive and decrease po intake.        # Decreased po intake    - pt is mentally challenged, she needs assistance with meals    - f/u  calorie count over the weekend, started on  Dronabinol 2.5 mg BID     - pt has no sings of malnutrition         # Down syndrome with mental disability and agitation    -supportive care     - clonazepam    - prevent falls and aspiration         # Seizure disorder    - seizure precautions     - keep Magnesium above 2.0    - REEG abnormal     - neurology is following     - off  benzo and olanzapine, decreased     - Lamictal  was decreased to 75 in am and 100 in pm,    - started on Seroquel 25 mg     - started on Klonopin 0.5 BID         # Urinary retention     - removed doherty able to void    -  follow up if pt'll fail     - started on Flomax HPI:    54 Year Old Female with a PMH of down syndrome, dementia ( non verbal at baseline) and seizure disorder (neurologist Dr. Torres presents from the group home for evaluation of failure of thrive and decrease po intake. collateral history was obtained from sister / Aid at the bedside. also from [place]esi[place] on call RN at the group home at [phone] who reported decreased po intake, decreased urine output and dehydration. rest of ROS is negative.         in the ED, pt is hemodynamically stable. (25 Nov 2019 19:38)        Met with the patients group home and family members, requested a calorie count to make sure the patient is meeting her calorie needs. The deitician evaluated the patient and counted the calories, reporting adequate intake. The patient was placed on a primafit catheter as she was incontinent. Hospital stay was uneventful, aside from episodes of agitation that were treated with haldol as needed. The patients caregivers are aware of the circumstances surrounding her discharge.         54 Year Old Female with a PMH of down syndrome, dementia (alert but non verbal at baseline) and seizure disorder (neurologist outside, Dr. Hood) presents from the group home for evaluation of failure of thrive and decrease po intake.        # Complicated UTI     # Metabolic Encephalopathy     - not catheter associated     - Urine culture pansensitive E. coli     - finished antibiotic course        # Decreased po intake     - pt is mentally challenged, she needs assistance with meals    - needs encouragement and one to one feed    - Calorie count is adequate as per dietician - meeting approximately 100% of daily energy and protein needs    - Met with group home and family at length earlier this week. They would like a repeated calorie count as they believe it would be inaccurate if the patient was receiving haldol for agitation.     - Patients doherty was DC'ed as per meeting, and the patient is incontinent.     - Primafit    - Given dronabinol    - Discontinued fluids as patient has adequate PO intake and will be prepared for DC    - F/U BMP this morning to make sure potassium is WNL        # Down syndrome     # Alzheimer's dementia    - c/w Seroquel     - reduced Klonopin to 0.25 BID     - aspiration precautions     - EEG results as above        # HLD    - c/w statin        # Seizure disorder    - seizure precautions     - keep Magnesium above 2.0    - Lamictal  was decreased to 75 in am [id number] in pm         # Acute Urinary retention      - c/w Flomax     - urology f/u outpatient         # Hypothyroidism:    - Continue with Synthroid 100, Thyroid Stimulating Hormone, Serum: 0.25 uIU/mL (11.26.19 @ 07:49), dose was decreased form 112 to 100        #) constipation    - resolved, discontinued bowel regimen         DVT Prophylaxis: lovenox    GI ppx: not indicated    Disposition: Pending placement    HCP and legal guardian [phone] Madisyn Fei (Sister)

## 2019-12-01 NOTE — DISCHARGE NOTE PROVIDER - NSDCMRMEDTOKEN_GEN_ALL_CORE_FT
acetaminophen 325 mg oral tablet: 1 tab(s) orally every 6 hours, As Needed -Temp greater or equal to 38C (100.4F), Mild Pain (1 - 3)   ALPRAZolam 0.5 mg oral tablet: 1 tab(s) orally 2 times a day  LaMICtal 100 mg oral tablet: 1 tab(s) orally once a day  LaMICtal 150 mg oral tablet: 0.5 tab(s) orally once a day  Melatonin 3 mg oral tablet: 1 tab(s) orally once a day (at bedtime)  OLANZapine 2.5 mg oral tablet: 1 tab(s) orally once a day  Senna 8.6 mg oral tablet: 1 tab(s) orally once a day (at bedtime)  simvastatin 20 mg oral tablet: 1 tab(s) orally once a day (at bedtime)  Synthroid 112 mcg (0.112 mg) oral tablet: 1 tab(s) orally once a day ammonium lactate 12% topical lotion: 1 application topically 2 times a day  calcium-vitamin D 500 mg-200 intl units oral tablet: 1 tab(s) orally once a day  clonazePAM 0.5 mg oral tablet: 1 tab(s) orally every 12 hours  clotrimazole 1% topical cream: 1 application topically 2 times a day  dronabinol 2.5 mg oral capsule: 1 cap(s) orally 2 times a day  LaMICtal 25 mg oral tablet: 3 tab(s) orally once a day  lamoTRIgine 100 mg oral tablet: 1 tab(s) orally once a day (at bedtime)  QUEtiapine 25 mg oral tablet: 1 tab(s) orally once a day (at bedtime)  Senna 8.6 mg oral tablet: 1 tab(s) orally once a day (at bedtime)  simvastatin 20 mg oral tablet: 1 tab(s) orally once a day (at bedtime)  Synthroid 112 mcg (0.112 mg) oral tablet: 1 tab(s) orally once a day   tamsulosin 0.4 mg oral capsule: 1 cap(s) orally once a day (at bedtime) ammonium lactate 12% topical lotion: 1 application topically 2 times a day  calcium-vitamin D 500 mg-200 intl units oral tablet: 1 tab(s) orally once a day  clonazePAM 0.5 mg oral tablet: 1 tab(s) orally every 12 hours  clotrimazole 1% topical cream: 1 application topically 2 times a day  dronabinol 2.5 mg oral capsule: 1 cap(s) orally 2 times a day  LaMICtal 25 mg oral tablet: 3 tab(s) orally once a day  lamoTRIgine 100 mg oral tablet: 1 tab(s) orally once a day (at bedtime)  levothyroxine 100 mcg (0.1 mg) oral tablet: 1 tab(s) orally once a day   QUEtiapine 25 mg oral tablet: 1 tab(s) orally once a day (at bedtime)  Senna 8.6 mg oral tablet: 1 tab(s) orally once a day (at bedtime)  simvastatin 20 mg oral tablet: 1 tab(s) orally once a day (at bedtime)  tamsulosin 0.4 mg oral capsule: 1 cap(s) orally once a day (at bedtime) ammonium lactate 12% topical lotion: 1 application topically 2 times a day  calcium-vitamin D 500 mg-200 intl units oral tablet: 1 tab(s) orally once a day  clonazePAM 0.5 mg oral tablet: 1 tab(s) orally every 12 hours  clotrimazole 1% topical cream: 1 application topically 2 times a day  dronabinol 2.5 mg oral capsule: 1 cap(s) orally 2 times a day  dronabinol 2.5 mg oral capsule: 1 cap(s) orally 2 times a day MDD:5 mg  LaMICtal 25 mg oral tablet: 3 tab(s) orally once a day  lamoTRIgine 100 mg oral tablet: 1 tab(s) orally once a day (at bedtime)  levothyroxine 100 mcg (0.1 mg) oral tablet: 1 tab(s) orally once a day   QUEtiapine 25 mg oral tablet: 1 tab(s) orally once a day (at bedtime)  Senna 8.6 mg oral tablet: 1 tab(s) orally once a day (at bedtime)  simvastatin 20 mg oral tablet: 1 tab(s) orally once a day (at bedtime)  tamsulosin 0.4 mg oral capsule: 1 cap(s) orally once a day (at bedtime) ammonium lactate 12% topical lotion: 1 application topically 2 times a day  calcium-vitamin D 500 mg-200 intl units oral tablet: 1 tab(s) orally once a day  clotrimazole 1% topical cream: 1 application topically 2 times a day  LaMICtal 25 mg oral tablet: 3 tab(s) orally once a day  lamoTRIgine 100 mg oral tablet: 1 tab(s) orally once a day (at bedtime)  levothyroxine 100 mcg (0.1 mg) oral tablet: 1 tab(s) orally once a day   QUEtiapine 25 mg oral tablet: 1 tab(s) orally once a day (at bedtime)  Senna 8.6 mg oral tablet: 1 tab(s) orally once a day (at bedtime)  simvastatin 20 mg oral tablet: 1 tab(s) orally once a day (at bedtime)  tamsulosin 0.4 mg oral capsule: 1 cap(s) orally once a day (at bedtime) acetaminophen 325 mg oral capsule: 1 cap(s) orally every 6 hours, As Needed   ammonium lactate 12% topical lotion: 1 application topically 2 times a day  calcium-vitamin D 500 mg-200 intl units oral tablet: 1 tab(s) orally once a day  clotrimazole 1% topical cream: 1 application topically 2 times a day  LaMICtal 25 mg oral tablet: 3 tab(s) orally once a day  lamoTRIgine 100 mg oral tablet: 1 tab(s) orally once a day (at bedtime)  levothyroxine 100 mcg (0.1 mg) oral tablet: 1 tab(s) orally once a day   QUEtiapine 25 mg oral tablet: 1 tab(s) orally once a day (at bedtime)  Senna 8.6 mg oral tablet: 1 tab(s) orally once a day (at bedtime)  simvastatin 20 mg oral tablet: 1 tab(s) orally once a day (at bedtime)  tamsulosin 0.4 mg oral capsule: 1 cap(s) orally once a day (at bedtime)

## 2019-12-01 NOTE — DISCHARGE NOTE PROVIDER - NSDCCPCAREPLAN_GEN_ALL_CORE_FT
PRINCIPAL DISCHARGE DIAGNOSIS  Diagnosis: Failure to thrive in adult  Assessment and Plan of Treatment: you presented with Decrease oral intake, and urinary retention. you were treated medically (dronabinol) which increased your appetite. Gallo was also inserted for urinary retention. you passed the trial of void. PRINCIPAL DISCHARGE DIAGNOSIS  Diagnosis: Failure to thrive in adult  Assessment and Plan of Treatment: You presented with decrease oral intake, and urinary retention. you were treated medically (dronabinol) which increased your appetite. Doherty was also inserted for urinary retention. A calorie count was initiated which, when evaluated by the registered dietician, is reported to be meeting daily nutritional intake goals. Your doherty was removed, with incontinence, for which a primafit was provided. Please follow up with your outpatient physicians and continue taking your medications as prescribed.

## 2019-12-01 NOTE — PROGRESS NOTE ADULT - SUBJECTIVE AND OBJECTIVE BOX
SUBJECTIVE:    Patient is a 54y old Female who presents with a chief complaint of decreased po intake (30 Nov 2019 15:25)    Currently admitted to medicine with the primary diagnosis of Failure to thrive in adult     Today is hospital day 6d. This morning she is resting comfortably in bed and reports no new issues or overnight events.   HPI:  54 Year Old Female with a PMH of down syndrome, dementia ( non verbal at baseline) and seizure disorder (neurologist Dr. Torres presents from the group home for evaluation of failure of thrive and decrease po intake. collateral history was obtained from sister / Aid at the bedside. also from Hernan on call RN at the group home at [phone] who reported decreased po intake, decreased urine output and dehydration. rest of ROS is negative.     in the ED, pt is hemodynamically stable. (25 Nov 2019 19:38)    INTERVAL EVENTS: eeg with slowing, per neuro upper body shakes are not siezure activity, no events overnight, pt eating well with 1 to 1    PAST MEDICAL & SURGICAL HISTORY  Seizures  Intellectual disability  Hypothyroid  Impulse control disorder in adult  Down's syndrome  No significant past surgical history      ALLERGIES:  No Known Allergies    MEDICATIONS:  STANDING MEDICATIONS  ammonium lactate 12% Lotion 1 Application(s) Topical two times a day  artificial  tears Solution 1 Drop(s) Both EYES two times a day  calcium carbonate 1250 mG  + Vitamin D (OsCal 500 + D) 1 Tablet(s) Oral daily  chlorhexidine 4% Liquid 1 Application(s) Topical <User Schedule>  clonazePAM  Tablet 0.5 milliGRAM(s) Oral every 12 hours  clotrimazole 1% Cream 1 Application(s) Topical two times a day  dextrose 5% + sodium chloride 0.9%. 1000 milliLiter(s) IV Continuous <Continuous>  dronabinol 2.5 milliGRAM(s) Oral two times a day  enoxaparin Injectable 40 milliGRAM(s) SubCutaneous daily  lamoTRIgine 100 milliGRAM(s) Oral at bedtime  lamoTRIgine 75 milliGRAM(s) Oral daily  levothyroxine 112 MICROGram(s) Oral daily  polyethylene glycol 3350 17 Gram(s) Oral daily  QUEtiapine 25 milliGRAM(s) Oral at bedtime  senna 8.6 milliGRAM(s) Oral Tablet - Peds 1 Tablet(s) Oral at bedtime  simvastatin 20 milliGRAM(s) Oral at bedtime  tamsulosin 0.4 milliGRAM(s) Oral at bedtime    PRN MEDICATIONS  haloperidol    Injectable 2 milliGRAM(s) IntraMuscular every 6 hours PRN    VITALS:   T(F): 96.8  HR: 52  BP: 119/56  RR: 18  SpO2: --    LABS:                        12.0   3.33  )-----------( 166      ( 30 Nov 2019 07:34 )             36.1     11-30    146  |  112<H>  |  8<L>  ----------------------------<  104<H>  4.1   |  25  |  1.1    Ca    8.3<L>      30 Nov 2019 07:34                    RADIOLOGY:    PHYSICAL EXAM:  GEN: No acute distress  PULM/CHEST: Clear to auscultation bilaterally, no rales, rhonchi or wheezes   CVS: Regular rate and rhythm, S1-S2, no murmurs  ABD: Soft, non-tender, non-distended, +BS  EXT: No edema  NEURO: AAOx3    Gallo Catheter:   Indwelling Urethral Catheter:     Connect To:  Straight Drainage/Caruthers    Indication:  Urinary Retention / Obstruction (11-28-19 @ 09:03) (not performed) [active]  Indwelling Urethral Catheter:     Connect To:  Straight Drainage/Caruthers    Indication:  Urinary Retention / Obstruction (12-01-19 @ 03:10) (not performed) [active]

## 2019-12-01 NOTE — PROGRESS NOTE ADULT - SUBJECTIVE AND OBJECTIVE BOX
54y old Female who presents with a chief complaint of decreased po intake, pt has a Down syndrome, mentally challenged, she needs assistance with meals, pt is able to consume 100% on her meals with assistance, started on  Dronabinol 2.5 BId , c/w calorie count.   She failed TOV, Gallo cath was put back but healthcare proxy expressed her concern today ( the group home won't accept pt back  with Gallo cath), will proceed with TOV today again and call  if pt'll fail.   Today pt ate better, awake, comfortable.     PAST MEDICAL & SURGICAL HISTORY  Seizures  Intellectual disability  Hypothyroid  Impulse control disorder in adult  Down's syndrome  No significant past surgical history      ALLERGIES:  No Known Allergies    VITALS:   T(C): 35.4 (01 Dec 2019 15:06), Max: 36.2 (30 Nov 2019 19:49)  T(F): 95.8 (01 Dec 2019 15:06), Max: 97.2 (30 Nov 2019 19:49)  HR: 50 (01 Dec 2019 15:06) (50 - 76)  BP: 118/56 (01 Dec 2019 15:06) (118/56 - 130/63)  BP(mean): --  RR: 18 (01 Dec 2019 15:06) (18 - 18)  SpO2: --    PHYSICAL EXAM:  GEN: No acute distress, mentally challenged   PULM/CHEST: Clear to auscultation bilaterally, no rales, rhonchi or wheezes   CVS: Regular rate and rhythm, S1-S2, no murmurs  ABD: Soft, non-tender, non-distended, +BS, Gallo noted   EXT: No edema  NEURO: Awake, nonverbal, does not follow commands     LABS:                                   12.0   3.33  )-----------( 166      ( 30 Nov 2019 07:34 )             36.1   12-01    144  |  109  |  11  ----------------------------<  97  4.3   |  26  |  1.2    Ca    8.4<L>      01 Dec 2019 09:32  Phos  3.1     12-01  Mg     1.9     12-01    Ca    8.3<L>      29 Nov 2019 08:36  Specimen Source: .Urine Clean Catch (Midstream) (11.25.19 @ 14:24)    Culture - Urine (11.25.19 @ 14:24)    Specimen Source: .Urine Clean Catch (Midstream)    Culture Results:   No growth    Culture - Blood (11.25.19 @ 14:24)    Specimen Source: .Blood Blood    Culture Results:   No growth to date.    RADIOLOGY:    < from: Xray Chest 1 View- PORTABLE-Urgent (11.25.19 @ 15:46) >  Impression:      No radiographic evidence of acute cardiopulmonary disease.    < from: EEG (11.29.19 @ 17:35) >    Impression  Abnormal due to the presence of: generalized slowing as above    Clinical Correlation & Recommendations   Consistent with diffuse cerebral electrophysiological dysfunction,   secondary to nonspecific cause.    < end of copied text >    MEDICATIONS  (STANDING):  ammonium lactate 12% Lotion 1 Application(s) Topical two times a day  artificial  tears Solution 1 Drop(s) Both EYES two times a day  calcium carbonate 1250 mG  + Vitamin D (OsCal 500 + D) 1 Tablet(s) Oral daily  chlorhexidine 4% Liquid 1 Application(s) Topical <User Schedule>  clonazePAM  Tablet 0.5 milliGRAM(s) Oral every 12 hours  clotrimazole 1% Cream 1 Application(s) Topical two times a day  dextrose 5% + sodium chloride 0.9%. 1000 milliLiter(s) (50 mL/Hr) IV Continuous <Continuous>  dronabinol 2.5 milliGRAM(s) Oral two times a day  enoxaparin Injectable 40 milliGRAM(s) SubCutaneous daily  lamoTRIgine 100 milliGRAM(s) Oral at bedtime  lamoTRIgine 75 milliGRAM(s) Oral daily  levothyroxine 112 MICROGram(s) Oral daily  polyethylene glycol 3350 17 Gram(s) Oral daily  QUEtiapine 25 milliGRAM(s) Oral at bedtime  senna 8.6 milliGRAM(s) Oral Tablet - Peds 1 Tablet(s) Oral at bedtime  simvastatin 20 milliGRAM(s) Oral at bedtime  tamsulosin 0.4 milliGRAM(s) Oral at bedtime    MEDICATIONS  (PRN):  haloperidol    Injectable 2 milliGRAM(s) IntraMuscular every 6 hours PRN Agitation

## 2019-12-02 LAB
ANION GAP SERPL CALC-SCNC: 10 MMOL/L — SIGNIFICANT CHANGE UP (ref 7–14)
BUN SERPL-MCNC: 9 MG/DL — LOW (ref 10–20)
CALCIUM SERPL-MCNC: 8.2 MG/DL — LOW (ref 8.5–10.1)
CHLORIDE SERPL-SCNC: 107 MMOL/L — SIGNIFICANT CHANGE UP (ref 98–110)
CO2 SERPL-SCNC: 25 MMOL/L — SIGNIFICANT CHANGE UP (ref 17–32)
CREAT SERPL-MCNC: 1.1 MG/DL — SIGNIFICANT CHANGE UP (ref 0.7–1.5)
GLUCOSE SERPL-MCNC: 112 MG/DL — HIGH (ref 70–99)
HCT VFR BLD CALC: 35.6 % — LOW (ref 37–47)
HGB BLD-MCNC: 11.7 G/DL — LOW (ref 12–16)
MCHC RBC-ENTMCNC: 31.6 PG — HIGH (ref 27–31)
MCHC RBC-ENTMCNC: 32.9 G/DL — SIGNIFICANT CHANGE UP (ref 32–37)
MCV RBC AUTO: 96.2 FL — SIGNIFICANT CHANGE UP (ref 81–99)
NRBC # BLD: 0 /100 WBCS — SIGNIFICANT CHANGE UP (ref 0–0)
PLATELET # BLD AUTO: 180 K/UL — SIGNIFICANT CHANGE UP (ref 130–400)
POTASSIUM SERPL-MCNC: 4 MMOL/L — SIGNIFICANT CHANGE UP (ref 3.5–5)
POTASSIUM SERPL-SCNC: 4 MMOL/L — SIGNIFICANT CHANGE UP (ref 3.5–5)
RBC # BLD: 3.7 M/UL — LOW (ref 4.2–5.4)
RBC # FLD: 14.2 % — SIGNIFICANT CHANGE UP (ref 11.5–14.5)
SODIUM SERPL-SCNC: 142 MMOL/L — SIGNIFICANT CHANGE UP (ref 135–146)
WBC # BLD: 4.89 K/UL — SIGNIFICANT CHANGE UP (ref 4.8–10.8)
WBC # FLD AUTO: 4.89 K/UL — SIGNIFICANT CHANGE UP (ref 4.8–10.8)

## 2019-12-02 PROCEDURE — 99233 SBSQ HOSP IP/OBS HIGH 50: CPT

## 2019-12-02 RX ORDER — LAMOTRIGINE 25 MG/1
0.5 TABLET, ORALLY DISINTEGRATING ORAL
Qty: 0 | Refills: 0 | DISCHARGE

## 2019-12-02 RX ORDER — LAMOTRIGINE 25 MG/1
1 TABLET, ORALLY DISINTEGRATING ORAL
Qty: 0 | Refills: 0 | DISCHARGE
Start: 2019-12-02

## 2019-12-02 RX ORDER — LAMOTRIGINE 25 MG/1
3 TABLET, ORALLY DISINTEGRATING ORAL
Qty: 0 | Refills: 0 | DISCHARGE
Start: 2019-12-02

## 2019-12-02 RX ORDER — ALPRAZOLAM 0.25 MG
1 TABLET ORAL
Qty: 0 | Refills: 0 | DISCHARGE

## 2019-12-02 RX ORDER — DRONABINOL 2.5 MG
1 CAPSULE ORAL
Qty: 0 | Refills: 0 | DISCHARGE
Start: 2019-12-02

## 2019-12-02 RX ORDER — LANOLIN ALCOHOL/MO/W.PET/CERES
1 CREAM (GRAM) TOPICAL
Qty: 0 | Refills: 0 | DISCHARGE

## 2019-12-02 RX ORDER — HALOPERIDOL DECANOATE 100 MG/ML
1 INJECTION INTRAMUSCULAR ONCE
Refills: 0 | Status: DISCONTINUED | OUTPATIENT
Start: 2019-12-02 | End: 2019-12-02

## 2019-12-02 RX ORDER — QUETIAPINE FUMARATE 200 MG/1
1 TABLET, FILM COATED ORAL
Qty: 0 | Refills: 0 | DISCHARGE
Start: 2019-12-02

## 2019-12-02 RX ORDER — CLONAZEPAM 1 MG
1 TABLET ORAL
Qty: 0 | Refills: 0 | DISCHARGE
Start: 2019-12-02

## 2019-12-02 RX ORDER — SOD,AMMONIUM,POTASSIUM LACTATE
1 CREAM (GRAM) TOPICAL
Qty: 0 | Refills: 0 | DISCHARGE
Start: 2019-12-02

## 2019-12-02 RX ORDER — OLANZAPINE 15 MG/1
1 TABLET, FILM COATED ORAL
Qty: 0 | Refills: 0 | DISCHARGE

## 2019-12-02 RX ORDER — LAMOTRIGINE 25 MG/1
1 TABLET, ORALLY DISINTEGRATING ORAL
Qty: 0 | Refills: 0 | DISCHARGE

## 2019-12-02 RX ORDER — TAMSULOSIN HYDROCHLORIDE 0.4 MG/1
1 CAPSULE ORAL
Qty: 0 | Refills: 0 | DISCHARGE
Start: 2019-12-02

## 2019-12-02 RX ADMIN — Medication 1 DROP(S): at 06:01

## 2019-12-02 RX ADMIN — LAMOTRIGINE 100 MILLIGRAM(S): 25 TABLET, ORALLY DISINTEGRATING ORAL at 22:28

## 2019-12-02 RX ADMIN — SODIUM CHLORIDE 50 MILLILITER(S): 9 INJECTION, SOLUTION INTRAVENOUS at 02:22

## 2019-12-02 RX ADMIN — SENNA PLUS 1 TABLET(S): 8.6 TABLET ORAL at 22:30

## 2019-12-02 RX ADMIN — Medication 1 APPLICATION(S): at 06:00

## 2019-12-02 RX ADMIN — TAMSULOSIN HYDROCHLORIDE 0.4 MILLIGRAM(S): 0.4 CAPSULE ORAL at 22:28

## 2019-12-02 RX ADMIN — Medication 112 MICROGRAM(S): at 05:59

## 2019-12-02 RX ADMIN — QUETIAPINE FUMARATE 25 MILLIGRAM(S): 200 TABLET, FILM COATED ORAL at 22:28

## 2019-12-02 RX ADMIN — Medication 0.5 MILLIGRAM(S): at 05:59

## 2019-12-02 RX ADMIN — Medication 1 TABLET(S): at 11:56

## 2019-12-02 RX ADMIN — CHLORHEXIDINE GLUCONATE 1 APPLICATION(S): 213 SOLUTION TOPICAL at 05:59

## 2019-12-02 RX ADMIN — Medication 2.5 MILLIGRAM(S): at 17:52

## 2019-12-02 RX ADMIN — SIMVASTATIN 20 MILLIGRAM(S): 20 TABLET, FILM COATED ORAL at 22:29

## 2019-12-02 RX ADMIN — Medication 1 APPLICATION(S): at 18:11

## 2019-12-02 RX ADMIN — ENOXAPARIN SODIUM 40 MILLIGRAM(S): 100 INJECTION SUBCUTANEOUS at 11:56

## 2019-12-02 RX ADMIN — LAMOTRIGINE 75 MILLIGRAM(S): 25 TABLET, ORALLY DISINTEGRATING ORAL at 11:56

## 2019-12-02 RX ADMIN — Medication 1 DROP(S): at 18:11

## 2019-12-02 RX ADMIN — Medication 0.5 MILLIGRAM(S): at 17:52

## 2019-12-02 RX ADMIN — SODIUM CHLORIDE 50 MILLILITER(S): 9 INJECTION, SOLUTION INTRAVENOUS at 17:50

## 2019-12-02 NOTE — PROGRESS NOTE ADULT - ASSESSMENT
54 Year Old Female with a PMH of down syndrome, dementia (alert but non verbal at baseline) and seizure disorder (neurologist outside, Dr. Hood) presents from the group home for evaluation of failure of thrive and decrease po intake.      A/P     # Decreased po intake  - pt is mentally challenged, she needs assistance with meals  - on  Dronabinol 2.5 mg BID, was able to consume  % of her meals   - case d/w dietitian today, add Ensure to meals   - pt has no sings of malnutrition     # Down syndrome with mental disability and agitation  -supportive care   - Haldol 0.5 Q 12 hours   - prevent falls and aspiration     # Seizure disorder  - seizure precautions   - keep Magnesium above 2.0  - REEG abnormal   - neurology is following   - off  benzo and olanzapine, decreased   - Lamictal  was decreased to 75 in am [id number] in pm,  - started on Seroquel 25 mg   - started on Klonopin 0.5 BID     # Urinary retention   -  passed TOV   -  follow up if pt'll fail   -  on Flomax       # Hypothyroidism:  - Continue with Synthroid 112 , Follow with TSH     DVT Prophylaxis: lovenox  GI ppx: not indicated  Disposition: from group home, [facility]  HCP and legal guardian [phone] Madisyn Enamorado (Sister)    #Progress Note Handoff  Pending (specify): none   Family discussion: I spoke with health care proxy today Sarah Enamorado [phone], group home nurse called, message left, I spoke with visitors from the NH   Disposition: pt is stable for discharge to group home

## 2019-12-02 NOTE — PROGRESS NOTE ADULT - SUBJECTIVE AND OBJECTIVE BOX
SALVATORE STEELE 54y Female  MRN#: [id number]   CODE STATUS: full code      SUBJECTIVE  Patient is a 54y old Female who presents with a chief complaint of decreased po intake (02 Dec 2019 15:55)  Currently admitted to medicine with the primary diagnosis of Failure to thrive in adult    Today is hospital day 7d, no overnight events, patient's doherty is removed, patient is able to void successfully, patient doesn't eat breakfast, but eats lunch and dinner >75% of it    OBJECTIVE  PAST MEDICAL & SURGICAL HISTORY  Seizures  Intellectual disability  Hypothyroid  Impulse control disorder in adult  Down's syndrome  No significant past surgical history    ALLERGIES:  No Known Allergies    MEDICATIONS:  STANDING MEDICATIONS  ammonium lactate 12% Lotion 1 Application(s) Topical two times a day  artificial  tears Solution 1 Drop(s) Both EYES two times a day  calcium carbonate 1250 mG  + Vitamin D (OsCal 500 + D) 1 Tablet(s) Oral daily  chlorhexidine 4% Liquid 1 Application(s) Topical <User Schedule>  clonazePAM  Tablet 0.5 milliGRAM(s) Oral every 12 hours  clotrimazole 1% Cream 1 Application(s) Topical two times a day  dextrose 5% + sodium chloride 0.9%. 1000 milliLiter(s) IV Continuous <Continuous>  dronabinol 2.5 milliGRAM(s) Oral two times a day  enoxaparin Injectable 40 milliGRAM(s) SubCutaneous daily  lamoTRIgine 100 milliGRAM(s) Oral at bedtime  lamoTRIgine 75 milliGRAM(s) Oral daily  levothyroxine 112 MICROGram(s) Oral daily  polyethylene glycol 3350 17 Gram(s) Oral daily  QUEtiapine 25 milliGRAM(s) Oral at bedtime  senna 8.6 milliGRAM(s) Oral Tablet - Peds 1 Tablet(s) Oral at bedtime  simvastatin 20 milliGRAM(s) Oral at bedtime  tamsulosin 0.4 milliGRAM(s) Oral at bedtime    PRN MEDICATIONS  haloperidol    Injectable 2 milliGRAM(s) IntraMuscular every 6 hours PRN      VITAL SIGNS: Last 24 Hours  T(C): 35.8 (02 Dec 2019 14:37), Max: 36.7 (02 Dec 2019 05:19)  T(F): 96.4 (02 Dec 2019 14:37), Max: 98.1 (02 Dec 2019 05:19)  HR: 55 (02 Dec 2019 14:37) (55 - 64)  BP: 157/58 (02 Dec 2019 14:37) (122/60 - 157/58)  BP(mean): --  RR: 18 (02 Dec 2019 14:37) (18 - 18)  SpO2: 98% (02 Dec 2019 08:35) (98% - 98%)    PHYSICAL EXAM:  GENERAL: NAD, well-developed, patient hardly responds, tearing and crying, not oriented, which appears to be her baseline per aid sitting next to her  HEENT:  Atraumatic, Normocephalic.   PULMONARY: Clear  CARDIOVASCULAR: s1, s2  GASTROINTESTINAL: Soft, Nontender, Nondistended; Bowel sounds present  MUSCULOSKELETAL:  2+ Peripheral Pulses, No clubbing, cyanosis, or elizabeth  LABS:                        11.7   4.89  )-----------( 180      ( 02 Dec 2019 07:41 )             35.6     12-02    142  |  107  |  9<L>  ----------------------------<  112<H>  4.0   |  25  |  1.1    Ca    8.2<L>      02 Dec 2019 07:41  Phos  3.1     12-01  Mg     1.9     12-01                    RADIOLOGY:

## 2019-12-02 NOTE — PROGRESS NOTE ADULT - SUBJECTIVE AND OBJECTIVE BOX
54y old Female who presents with a chief complaint of decreased po intake, pt has a Down syndrome, mentally challenged, she needs assistance with meals, pt is able to consume 100% on her meals with assistance, started on  Dronabinol 2.5 BId , pt was able to eat  % of her meals, passed TOV.   Today pt is comfortable, gets anxious at times.     PAST MEDICAL & SURGICAL HISTORY  Seizures  Intellectual disability  Hypothyroid  Impulse control disorder in adult  Down's syndrome  No significant past surgical history      ALLERGIES:  No Known Allergies    VITALS:   T(C): 35.8 (02 Dec 2019 14:37), Max: 36.7 (02 Dec 2019 05:19)  T(F): 96.4 (02 Dec 2019 14:37), Max: 98.1 (02 Dec 2019 05:19)  HR: 55 (02 Dec 2019 14:37) (55 - 64)  BP: 157/58 (02 Dec 2019 14:37) (122/60 - 157/58)  BP(mean): --  RR: 18 (02 Dec 2019 14:37) (18 - 18)  SpO2: 98% (02 Dec 2019 08:35) (98% - 98%)    PHYSICAL EXAM:  GEN: No acute distress, mentally challenged   PULM/CHEST: Clear to auscultation bilaterally, no rales, rhonchi or wheezes   CVS: Regular rate and rhythm, S1-S2, no murmurs  ABD: Soft, non-tender, non-distended, +BS, Gallo noted   EXT: No edema  NEURO: Awake, nonverbal, does not follow commands     LABS:                                   11.7   4.89  )-----------( 180      ( 02 Dec 2019 07:41 )             35.6   12-02    142  |  107  |  9<L>  ----------------------------<  112<H>  4.0   |  25  |  1.1    Ca    8.2<L>      02 Dec 2019 07:41  Phos  3.1     12-01  Mg     1.9     12-01      Specimen Source: .Urine Clean Catch (Midstream) (11.25.19 @ 14:24)    Culture - Urine (11.25.19 @ 14:24)    Specimen Source: .Urine Clean Catch (Midstream)    Culture Results:   No growth    Culture - Blood (11.25.19 @ 14:24)    Specimen Source: .Blood Blood    Culture Results:   No growth to date.    RADIOLOGY:    < from: Xray Chest 1 View- PORTABLE-Urgent (11.25.19 @ 15:46) >  Impression:      No radiographic evidence of acute cardiopulmonary disease.    < from: EEG (11.29.19 @ 17:35) >    Impression  Abnormal due to the presence of: generalized slowing as above    Clinical Correlation & Recommendations   Consistent with diffuse cerebral electrophysiological dysfunction,   secondary to nonspecific cause.    < end of copied text >    MEDICATIONS  (STANDING):  ammonium lactate 12% Lotion 1 Application(s) Topical two times a day  artificial  tears Solution 1 Drop(s) Both EYES two times a day  calcium carbonate 1250 mG  + Vitamin D (OsCal 500 + D) 1 Tablet(s) Oral daily  chlorhexidine 4% Liquid 1 Application(s) Topical <User Schedule>  clonazePAM  Tablet 0.5 milliGRAM(s) Oral every 12 hours  clotrimazole 1% Cream 1 Application(s) Topical two times a day  dextrose 5% + sodium chloride 0.9%. 1000 milliLiter(s) (50 mL/Hr) IV Continuous <Continuous>  dronabinol 2.5 milliGRAM(s) Oral two times a day  enoxaparin Injectable 40 milliGRAM(s) SubCutaneous daily  lamoTRIgine 100 milliGRAM(s) Oral at bedtime  lamoTRIgine 75 milliGRAM(s) Oral daily  levothyroxine 112 MICROGram(s) Oral daily  polyethylene glycol 3350 17 Gram(s) Oral daily  QUEtiapine 25 milliGRAM(s) Oral at bedtime  senna 8.6 milliGRAM(s) Oral Tablet - Peds 1 Tablet(s) Oral at bedtime  simvastatin 20 milliGRAM(s) Oral at bedtime  tamsulosin 0.4 milliGRAM(s) Oral at bedtime    MEDICATIONS  (PRN):  haloperidol    Injectable 2 milliGRAM(s) IntraMuscular every 6 hours PRN Agitation

## 2019-12-02 NOTE — CHART NOTE - NSCHARTNOTEFT_GEN_A_CORE
2 Day Calorie Count Results  --CC results obtained x2d as per MD request (Dr. Stubbs)     Diet order: dysphagia I pureed, nectar thick liquids    Day 1 (11/30): 0% intake at breakfast,  75% intake lunch meal and applesauce at lunch and 100% dinner meal with 2c. applesauce consumed.       Day 2 (12/1): 0% intake at breakfast, 75% intake lunch meal with ice cream and 100% dinner meal with 1c applesauce consumed.     Unable to calculate specific caloric and protein intake as CC documentation noted as "lunch meal" but specifics of meal consumption not noted. Despite this, % meal intake sufficient for pts nutritional intake. Appetite stimulant in place. Appears that pt has lowest intake at breakfast meal, as per group home aid, this is pts norm.  Recommend nutrition supplement (Ensure with thickener packet) at breakfast time as meal intake suboptimal. However, pt with sufficient intake at both lunch and dinner so malnutrition not suspected, do not suspect need for PEG at this time. Results d/w Dr. Stubbs.

## 2019-12-02 NOTE — PHYSICAL THERAPY INITIAL EVALUATION ADULT - PERTINENT HX OF CURRENT PROBLEM, REHAB EVAL
54 Year Old Female with a PMH of down syndrome, dementia (alert but non verbal at baseline) and seizure disorder (neurologist outside, Dr. Hood) presents from the group home for evaluation of failure of thrive and decrease po intake.

## 2019-12-02 NOTE — PROGRESS NOTE ADULT - ASSESSMENT
54 Year Old Female with a PMH of down syndrome, dementia (alert but non verbal at baseline) and seizure disorder (neurologist outside, Dr. Hood) presents from the group home for evaluation of failure of thrive and decrease po intake.    # Decreased po intake  - pt is mentally challenged, she needs assistance with meals  - on  Dronabinol 2.5 mg BID , patient doesn't eat breakfast, but eats dinner and lunch  - pt has no sings of malnutrition     # Down syndrome   -supportive care   - Haldol 0.5 Q 12 hours   - prevent falls and aspiration     # Seizure disorder  - seizure precautions   - keep Magnesium above 2.0  - REEG abnormal   - neurology is following   - off  benzo and olanzapine, decreased   - Lamictal  was decreased to 75 in am [id number] in pm,  - started on Seroquel 25 mg   - started on Klonopin 0.5 BID     # Urinary retention   - flomax  - able to void  -dced doherty    # Hypothyroidism:  - Continue with Synthroid 112 , Thyroid Stimulating Hormone, Serum: 0.25 uIU/mL (11.26.19 @ 07:49)        DVT Prophylaxis: lovenox  GI ppx: not indicated  Disposition: from group home, [facility]  HCP and legal guardian [phone] Madisyn Enamorado (Sister)    patient expected to be discharged tomorrow (able to urinate and good intake), encourage increase water intake

## 2019-12-02 NOTE — PHYSICAL THERAPY INITIAL EVALUATION ADULT - GENERAL OBSERVATIONS, REHAB EVAL
9:15-9:45. chart reviewed. Pt received semi-copeland at B/S, confused, oriented X 0, unable to follow instructions, [facility] representative was present. Pt was agitated at time, crying, screaming, Self limited behavior. Pt is dependent for bed mobility , poor for sitting balance, unable to assume standing position or ambulate at this time.

## 2019-12-03 PROCEDURE — 99233 SBSQ HOSP IP/OBS HIGH 50: CPT

## 2019-12-03 RX ORDER — LEVOTHYROXINE SODIUM 125 MCG
1 TABLET ORAL
Qty: 30 | Refills: 0
Start: 2019-12-03 | End: 2020-01-01

## 2019-12-03 RX ORDER — LAMOTRIGINE 25 MG/1
1 TABLET, ORALLY DISINTEGRATING ORAL
Qty: 30 | Refills: 0
Start: 2019-12-03 | End: 2020-01-01

## 2019-12-03 RX ORDER — CLONAZEPAM 1 MG
1 TABLET ORAL
Qty: 60 | Refills: 0
Start: 2019-12-03 | End: 2020-01-01

## 2019-12-03 RX ORDER — TAMSULOSIN HYDROCHLORIDE 0.4 MG/1
1 CAPSULE ORAL
Qty: 30 | Refills: 0
Start: 2019-12-03 | End: 2020-01-01

## 2019-12-03 RX ORDER — LAMOTRIGINE 25 MG/1
3 TABLET, ORALLY DISINTEGRATING ORAL
Qty: 90 | Refills: 0
Start: 2019-12-03 | End: 2020-01-01

## 2019-12-03 RX ORDER — QUETIAPINE FUMARATE 200 MG/1
1 TABLET, FILM COATED ORAL
Qty: 30 | Refills: 0
Start: 2019-12-03 | End: 2020-01-01

## 2019-12-03 RX ORDER — SOD,AMMONIUM,POTASSIUM LACTATE
1 CREAM (GRAM) TOPICAL
Qty: 6 | Refills: 0
Start: 2019-12-03 | End: 2020-01-01

## 2019-12-03 RX ORDER — DRONABINOL 2.5 MG
1 CAPSULE ORAL
Qty: 60 | Refills: 0
Start: 2019-12-03 | End: 2020-01-01

## 2019-12-03 RX ADMIN — QUETIAPINE FUMARATE 25 MILLIGRAM(S): 200 TABLET, FILM COATED ORAL at 21:38

## 2019-12-03 RX ADMIN — LAMOTRIGINE 75 MILLIGRAM(S): 25 TABLET, ORALLY DISINTEGRATING ORAL at 13:42

## 2019-12-03 RX ADMIN — Medication 2.5 MILLIGRAM(S): at 17:25

## 2019-12-03 RX ADMIN — Medication 0.5 MILLIGRAM(S): at 17:26

## 2019-12-03 RX ADMIN — SENNA PLUS 1 TABLET(S): 8.6 TABLET ORAL at 21:38

## 2019-12-03 RX ADMIN — ENOXAPARIN SODIUM 40 MILLIGRAM(S): 100 INJECTION SUBCUTANEOUS at 13:42

## 2019-12-03 RX ADMIN — Medication 1 DROP(S): at 17:21

## 2019-12-03 RX ADMIN — POLYETHYLENE GLYCOL 3350 17 GRAM(S): 17 POWDER, FOR SOLUTION ORAL at 13:41

## 2019-12-03 RX ADMIN — Medication 1 APPLICATION(S): at 17:22

## 2019-12-03 RX ADMIN — Medication 0.5 MILLIGRAM(S): at 05:34

## 2019-12-03 RX ADMIN — SIMVASTATIN 20 MILLIGRAM(S): 20 TABLET, FILM COATED ORAL at 21:37

## 2019-12-03 RX ADMIN — LAMOTRIGINE 100 MILLIGRAM(S): 25 TABLET, ORALLY DISINTEGRATING ORAL at 21:37

## 2019-12-03 RX ADMIN — Medication 2.5 MILLIGRAM(S): at 05:35

## 2019-12-03 RX ADMIN — Medication 1 TABLET(S): at 13:42

## 2019-12-03 RX ADMIN — Medication 1 APPLICATION(S): at 17:21

## 2019-12-03 RX ADMIN — Medication 112 MICROGRAM(S): at 05:28

## 2019-12-03 RX ADMIN — TAMSULOSIN HYDROCHLORIDE 0.4 MILLIGRAM(S): 0.4 CAPSULE ORAL at 21:37

## 2019-12-03 NOTE — PROGRESS NOTE ADULT - ASSESSMENT
54 Year Old Female with a PMH of down syndrome, dementia (alert but non verbal at baseline) and seizure disorder (neurologist outside, Dr. Hood) presents from the group home for evaluation of failure of thrive and decrease po intake.      A/P     # Decreased po intake  - pt is mentally challenged, she needs assistance with meals  - on  Dronabinol 2.5 mg BID, was able to consume  % of her meals   - case d/w dietitian , add Ensure to meals Q 24 hours in the morning   - pt has no sings of malnutrition     # Down syndrome with mental disability and agitation  -supportive care   - will d/c on Klonopin 0.5 mg Q 12 hours   - prevent falls and aspiration     # Seizure disorder  - seizure precautions   - keep Magnesium above 2.0  - REEG abnormal   - neurology is following   - off  benzo and olanzapine, decreased   - Lamictal  was decreased to 75 in am [id number] in pm,  - started on Seroquel 25 mg   - started on Klonopin 0.5 BID     # Urinary retention   -  passed TOV   -  follow up if pt'll fail   -  on Flomax       # Hypothyroidism:  - TSH was low, decreased the dose of Synthroid to 100 mcg     DVT Prophylaxis: lovenox  GI ppx: not indicated  Disposition: from group home, [facility]  HCP and legal guardian [phone] Madisyn Enamorado (Sister)    #Progress Note Handoff  Pending (specify): none   Family discussion: I spoke with health care proxy today Sarah Weeks Fei [phone], group home nurse called, case discussed in great details.  Disposition: pt is stable for discharge to group home

## 2019-12-03 NOTE — PROGRESS NOTE ADULT - ASSESSMENT
54 Year Old Female with a PMH of down syndrome, dementia (alert but non verbal at baseline) and seizure disorder (neurologist outside, Dr. Hood) presents from the group home for evaluation of failure of thrive and decrease po intake.    # Decreased po intake  - pt is mentally challenged, she needs assistance with meals  - on  Dronabinol 2.5 mg BID , patient doesn't eat breakfast, but eats dinner and lunch  - pt has no sings of malnutrition     # Down syndrome   -supportive care   - Haldol 0.5 Q 12 hours   - prevent falls and aspiration     # Seizure disorder  - seizure precautions   - keep Magnesium above 2.0  - REEG abnormal   - neurology is following   - off  benzo and olanzapine, decreased   - Lamictal  was decreased to 75 in am [id number] in pm,  - started on Seroquel 25 mg   - started on Klonopin 0.5 BID     # Urinary retention   - flomax  - able to void  -dced doherty    # Hypothyroidism:  - Continue with Synthroid 112 , Thyroid Stimulating Hormone, Serum: 0.25 uIU/mL (11.26.19 @ 07:49)        DVT Prophylaxis: lovenox  GI ppx: not indicated  Disposition: from group home, [facility]  HCP and legal guardian [phone] Madisyn Enamorado (Sister)    patient expected to be discharged tomorrow (able to urinate and good intake), encourage increase water intake, pending placemenet

## 2019-12-03 NOTE — CHART NOTE - NSCHARTNOTEFT_GEN_A_CORE
<<<RESIDENT DISCHARGE NOTE>>>     SALVATORE STEELE  MRN-[id number]    VITAL SIGNS:  T(F): 97.8 (12-03-19 @ 05:35), Max: 97.8 (12-03-19 @ 05:35)  HR: 75 (12-03-19 @ 06:37)  BP: 103/50 (12-03-19 @ 05:35)  SpO2: 96% (12-03-19 @ 06:37)      PHYSICAL EXAMINATION:  GEN: No acute distress, mentally challenged   PULM/CHEST: Clear to auscultation bilaterally, no rales, rhonchi or wheezes   CVS: Regular rate and rhythm, S1-S2, no murmurs  ABD: Soft, non-tender, non-distended, +BS, Gallo noted   EXT: No edema  NEURO: Awake, nonverbal, does not follow commands   TEST RESULTS:                        11.7   4.89  )-----------( 180      ( 02 Dec 2019 07:41 )             35.6       12-02    142  |  107  |  9<L>  ----------------------------<  112<H>  4.0   |  25  |  1.1    Ca    8.2<L>      02 Dec 2019 07:41        FINAL DISCHARGE INTERVIEW:  Resident(s) Present: (Name:Mamadou Nascimento RN Present: (Name:  ___________)    DISCHARGE MEDICATION RECONCILIATION  reviewed with Attending (Name: Dr. Stubbs    DISPOSITION:   [  ] Home,    [  ] Home with Visiting Nursing Services,   [    ]  SNF/ NH,    [   ] Acute Rehab (4A),   [ x  ] Other (Specify: group home

## 2019-12-03 NOTE — PROGRESS NOTE ADULT - SUBJECTIVE AND OBJECTIVE BOX
54y old Female who presents with a chief complaint of decreased po intake, pt has a Down syndrome, mentally challenged, she needs assistance with meals, pt is able to consume 100% on her meals with assistance, started on  Dronabinol 2.5 BId , pt was able to eat  % of her meals, passed TOV.   Today pt is comfortable, awake, sister is at the bedside.     PAST MEDICAL & SURGICAL HISTORY  Seizures  Intellectual disability  Hypothyroid  Impulse control disorder in adult  Down's syndrome  No significant past surgical history      ALLERGIES:  No Known Allergies    VITALS:   T(C): 36.6 (03 Dec 2019 05:35), Max: 36.6 (03 Dec 2019 05:35)  T(F): 97.8 (03 Dec 2019 05:35), Max: 97.8 (03 Dec 2019 05:35)  HR: 75 (03 Dec 2019 06:37) (49 - 76)  BP: 103/50 (03 Dec 2019 05:35) (103/50 - 157/58)  BP(mean): --  RR: 18 (03 Dec 2019 05:35) (18 - 18)  SpO2: 96% (03 Dec 2019 06:37) (96% - 96%)    PHYSICAL EXAM:  GEN: No acute distress, mentally challenged   PULM/CHEST: Clear to auscultation bilaterally, no rales, rhonchi or wheezes   CVS: Regular rate and rhythm, S1-S2, no murmurs  ABD: Soft, non-tender, non-distended, +BS, Gallo noted   EXT: No edema  NEURO: Awake, nonverbal, does not follow commands     LABS:                           no new labs              11.7   4.89  )-----------( 180      ( 02 Dec 2019 07:41 )             35.6   12-02    142  |  107  |  9<L>  ----------------------------<  112<H>  4.0   |  25  |  1.1    Ca    8.2<L>      02 Dec 2019 07:41  Phos  3.1     12-01  Mg     1.9     12-01      Specimen Source: .Urine Clean Catch (Midstream) (11.25.19 @ 14:24)    Culture - Urine (11.25.19 @ 14:24)    Specimen Source: .Urine Clean Catch (Midstream)    Culture Results:   No growth    Culture - Blood (11.25.19 @ 14:24)    Specimen Source: .Blood Blood    Culture Results:   No growth to date.    RADIOLOGY:    < from: Xray Chest 1 View- PORTABLE-Urgent (11.25.19 @ 15:46) >  Impression:      No radiographic evidence of acute cardiopulmonary disease.    < from: EEG (11.29.19 @ 17:35) >    Impression  Abnormal due to the presence of: generalized slowing as above    Clinical Correlation & Recommendations   Consistent with diffuse cerebral electrophysiological dysfunction,   secondary to nonspecific cause.    < end of copied text >    MEDICATIONS  (STANDING):  ammonium lactate 12% Lotion 1 Application(s) Topical two times a day  artificial  tears Solution 1 Drop(s) Both EYES two times a day  calcium carbonate 1250 mG  + Vitamin D (OsCal 500 + D) 1 Tablet(s) Oral daily  chlorhexidine 4% Liquid 1 Application(s) Topical <User Schedule>  clonazePAM  Tablet 0.5 milliGRAM(s) Oral every 12 hours  clotrimazole 1% Cream 1 Application(s) Topical two times a day  dronabinol 2.5 milliGRAM(s) Oral two times a day  enoxaparin Injectable 40 milliGRAM(s) SubCutaneous daily  lamoTRIgine 100 milliGRAM(s) Oral at bedtime  lamoTRIgine 75 milliGRAM(s) Oral daily  levothyroxine 112 MICROGram(s) Oral daily  polyethylene glycol 3350 17 Gram(s) Oral daily  QUEtiapine 25 milliGRAM(s) Oral at bedtime  senna 8.6 milliGRAM(s) Oral Tablet - Peds 1 Tablet(s) Oral at bedtime  simvastatin 20 milliGRAM(s) Oral at bedtime  tamsulosin 0.4 milliGRAM(s) Oral at bedtime    MEDICATIONS  (PRN):  haloperidol    Injectable 2 milliGRAM(s) IntraMuscular every 6 hours PRN Agitation

## 2019-12-03 NOTE — PROGRESS NOTE ADULT - SUBJECTIVE AND OBJECTIVE BOX
SALVATORE STEELE 54y Female  MRN#: [id number]   CODE STATUS: full code      SUBJECTIVE  Patient is a 54y old Female who presents with a chief complaint of decreased po intake (02 Dec 2019 15:55)  Currently admitted to medicine with the primary diagnosis of Failure to thrive in adult    Today is hospital day 8, no overnight events, patient's doherty is removed, patient is able to void successfully, patient doesn't eat breakfast, but eats lunch and dinner >75% of it PENDING PLACEMENT    OBJECTIVE  PAST MEDICAL & SURGICAL HISTORY  Seizures  Intellectual disability  Hypothyroid  Impulse control disorder in adult  Down's syndrome  No significant past surgical history    ALLERGIES:  No Known Allergies    MEDICATIONS:  STANDING MEDICATIONS  ammonium lactate 12% Lotion 1 Application(s) Topical two times a day  artificial  tears Solution 1 Drop(s) Both EYES two times a day  calcium carbonate 1250 mG  + Vitamin D (OsCal 500 + D) 1 Tablet(s) Oral daily  chlorhexidine 4% Liquid 1 Application(s) Topical <User Schedule>  clonazePAM  Tablet 0.5 milliGRAM(s) Oral every 12 hours  clotrimazole 1% Cream 1 Application(s) Topical two times a day  dextrose 5% + sodium chloride 0.9%. 1000 milliLiter(s) IV Continuous <Continuous>  dronabinol 2.5 milliGRAM(s) Oral two times a day  enoxaparin Injectable 40 milliGRAM(s) SubCutaneous daily  lamoTRIgine 100 milliGRAM(s) Oral at bedtime  lamoTRIgine 75 milliGRAM(s) Oral daily  levothyroxine 112 MICROGram(s) Oral daily  polyethylene glycol 3350 17 Gram(s) Oral daily  QUEtiapine 25 milliGRAM(s) Oral at bedtime  senna 8.6 milliGRAM(s) Oral Tablet - Peds 1 Tablet(s) Oral at bedtime  simvastatin 20 milliGRAM(s) Oral at bedtime  tamsulosin 0.4 milliGRAM(s) Oral at bedtime    PRN MEDICATIONS  haloperidol    Injectable 2 milliGRAM(s) IntraMuscular every 6 hours PRN      VITAL SIGNS: Last 24 Hours  Vital Signs Last 24 Hrs  T(C): 36.1 (03 Dec 2019 13:16), Max: 36.6 (03 Dec 2019 05:35)  T(F): 96.9 (03 Dec 2019 13:16), Max: 97.8 (03 Dec 2019 05:35)  HR: 50 (03 Dec 2019 13:16) (49 - 76)  BP: 113/54 (03 Dec 2019 13:16) (103/50 - 156/81)  BP(mean): --  RR: 16 (03 Dec 2019 13:16) (16 - 18)  SpO2: 95% (03 Dec 2019 08:00) (95% - 96%)    PHYSICAL EXAM:  GENERAL: NAD, well-developed, patient hardly responds, tearing and crying, not oriented, which appears to be her baseline per aid sitting next to her  HEENT:  Atraumatic, Normocephalic.   PULMONARY: Clear  CARDIOVASCULAR: s1, s2  GASTROINTESTINAL: Soft, Nontender, Nondistended; Bowel sounds present  MUSCULOSKELETAL:  2+ Peripheral Pulses, No clubbing, cyanosis, or elizabeth  LABS:                          11.7   4.89  )-----------( 180      ( 02 Dec 2019 07:41 )             35.6     12-02    142  |  107  |  9<L>  ----------------------------<  112<H>  4.0   |  25  |  1.1    Ca    8.2<L>      02 Dec 2019 07:41  Phos  3.1     12-01  Mg     1.9     12-01                    RADIOLOGY:

## 2019-12-03 NOTE — CHART NOTE - NSCHARTNOTEFT_GEN_A_CORE
Registered Dietitian Follow-Up     Patient Profile Reviewed                           Yes [x]   No []     Nutrition History Previously Obtained        Yes [x]  No []       Pertinent Subjective Information:  -Pt sleeping soundly, sister and group home aid at bedside. Observed untouched breakfast tray at assessment but this is pts norm, as per sister and aid. Pt is a late sleeper, when she wakes up usually consumes yogurt and juice, then eats 100% of both lunch and dinner with snacks provided. CC completed yesterday as per request of Dr. Stubbs, pt meeting nutritional needs with current intake, even if she does not always eat breakfast meal. Do not suspect malnutrition or need for PEG tube at this time. Recommend Ensure Enlive with thickener packet at breakfast since meal intake lowest at that time. Recs d/w attending MD, BOZENA, pt sister and group home aid.      Pertinent Medical Interventions:  1. Decreased PO intake  --improved/resolved   --marinol and meal assistance provided.   --recommend Ensure in morning   2. Down syndrome with mental disability and agitation  3. Seizure d/o, neurology following      Diet order: Dysphagia I Pureed, nectar thick liquids      Anthropometrics:  - Ht. 149.86cm   - Wt. 81.4kg (11/25), no new wt   - %wt change  - BMI  - IBW     Pertinent Lab Data: (12/2/19) RBC 3.70, H/H 11.7/35.6, BUN 9, glucose 112, Ca 8.2, GFR 57     Pertinent Meds: lovenox, marinol, [id number] + vitamin D, miralax, synthroid, senna, simvastatin      Physical Findings:  - Appearance: Down Syndrome, dementia and minimally verbal at baseline   - GI function: fecal incontinence with LBM 12/1   - Tubes:  - Oral/Mouth cavity: SLP 11/27: dysphagia 1 pureed, nectar thick liquids   - Skin: intact. no edema.      Nutrition Requirements  Weight Used: 81.4kg CBW; 44.3kg IBW     estimated calorie needs = ~6716-1251 kcal/day (30-35 kcal/kg IBW d/t BMI >30 but IBW significantly <CBW).   estimated protein needs = 53-62 g/day (1.2-1.4 g/kg CBW, reasons mentioned above).   estimated fluid needs = 1mL/kcal or per LIP     Nutrient Intake: meeting needs with current intake      [] Previous Nutrition Diagnosis: inadequate energy intake             [] Ongoing          [x] Resolved    [x] No active nutrition diagnosis identified at this time     Nutrition Intervention: meals and snacks, medical food supplements  Recommend:  1. Provide Ensure Enlive daily (at breakfast) with thickener packet.   2. Continue Dysphagia 1 pureed, nectar thick liquids with 1:1 feeds.   3. Continue Marinol PRN.     Goal/Expected Outcome: Pt to consistently consume >75% meals and supplements throughout LOS. Reassess in 7 days.      Indicator/Monitoring: RD will monitor diet order, energy intake, nutrition related labs, body composition, NFPF

## 2019-12-04 PROCEDURE — 99233 SBSQ HOSP IP/OBS HIGH 50: CPT

## 2019-12-04 RX ORDER — TAMSULOSIN HYDROCHLORIDE 0.4 MG/1
0.8 CAPSULE ORAL AT BEDTIME
Refills: 0 | Status: DISCONTINUED | OUTPATIENT
Start: 2019-12-04 | End: 2019-12-16

## 2019-12-04 RX ORDER — TAMSULOSIN HYDROCHLORIDE 0.4 MG/1
0.4 CAPSULE ORAL ONCE
Refills: 0 | Status: COMPLETED | OUTPATIENT
Start: 2019-12-04 | End: 2019-12-04

## 2019-12-04 RX ADMIN — ENOXAPARIN SODIUM 40 MILLIGRAM(S): 100 INJECTION SUBCUTANEOUS at 11:32

## 2019-12-04 RX ADMIN — Medication 1 APPLICATION(S): at 17:13

## 2019-12-04 RX ADMIN — TAMSULOSIN HYDROCHLORIDE 0.4 MILLIGRAM(S): 0.4 CAPSULE ORAL at 11:33

## 2019-12-04 RX ADMIN — Medication 1 DROP(S): at 17:13

## 2019-12-04 RX ADMIN — Medication 1 APPLICATION(S): at 06:25

## 2019-12-04 RX ADMIN — SENNA PLUS 1 TABLET(S): 8.6 TABLET ORAL at 22:08

## 2019-12-04 RX ADMIN — QUETIAPINE FUMARATE 25 MILLIGRAM(S): 200 TABLET, FILM COATED ORAL at 22:08

## 2019-12-04 RX ADMIN — Medication 0.5 MILLIGRAM(S): at 06:25

## 2019-12-04 RX ADMIN — SIMVASTATIN 20 MILLIGRAM(S): 20 TABLET, FILM COATED ORAL at 22:08

## 2019-12-04 RX ADMIN — CHLORHEXIDINE GLUCONATE 1 APPLICATION(S): 213 SOLUTION TOPICAL at 06:25

## 2019-12-04 RX ADMIN — TAMSULOSIN HYDROCHLORIDE 0.8 MILLIGRAM(S): 0.4 CAPSULE ORAL at 22:08

## 2019-12-04 RX ADMIN — Medication 112 MICROGRAM(S): at 06:25

## 2019-12-04 RX ADMIN — Medication 2.5 MILLIGRAM(S): at 06:36

## 2019-12-04 RX ADMIN — Medication 1 TABLET(S): at 11:33

## 2019-12-04 RX ADMIN — Medication 0.5 MILLIGRAM(S): at 17:13

## 2019-12-04 RX ADMIN — LAMOTRIGINE 75 MILLIGRAM(S): 25 TABLET, ORALLY DISINTEGRATING ORAL at 11:33

## 2019-12-04 RX ADMIN — LAMOTRIGINE 100 MILLIGRAM(S): 25 TABLET, ORALLY DISINTEGRATING ORAL at 22:08

## 2019-12-04 RX ADMIN — Medication 1 DROP(S): at 06:25

## 2019-12-04 RX ADMIN — Medication 2.5 MILLIGRAM(S): at 17:13

## 2019-12-04 NOTE — PROGRESS NOTE ADULT - SUBJECTIVE AND OBJECTIVE BOX
54y old Female who presents with a chief complaint of decreased po intake, pt has a Down syndrome, mentally challenged, she needs assistance with meals, pt is able to consume 100% on her meals with assistance, started on  Dronabinol 2.5 BId , pt was able to eat  % of her meals, passed TOV few day ago but last night retained urine again.   Today pt is comfortable and calm.     PAST MEDICAL & SURGICAL HISTORY  Seizures  Intellectual disability  Hypothyroid  Impulse control disorder in adult  Down's syndrome  No significant past surgical history      ALLERGIES:  No Known Allergies    VITALS:   T(C): 36.3 (04 Dec 2019 05:22), Max: 36.4 (03 Dec 2019 21:33)  T(F): 97.4 (04 Dec 2019 05:22), Max: 97.6 (03 Dec 2019 21:33)  HR: 53 (04 Dec 2019 05:22) (50 - 81)  BP: 132/60 (04 Dec 2019 05:22) (113/54 - 149/70)  BP(mean): --  RR: 18 (04 Dec 2019 05:22) (16 - 18)  SpO2: 99% (03 Dec 2019 20:24) (99% - 99%)    PHYSICAL EXAM:  GEN: No acute distress, mentally challenged   PULM/CHEST: Clear to auscultation bilaterally, no rales, rhonchi or wheezes   CVS: Regular rate and rhythm, S1-S2, no murmurs  ABD: Soft, non-tender, non-distended, +BS, Gallo noted   EXT: No edema  NEURO: Awake, nonverbal, does not follow commands     LABS:                           no new labs              11.7   4.89  )-----------( 180      ( 02 Dec 2019 07:41 )             35.6   12-02    142  |  107  |  9<L>  ----------------------------<  112<H>  4.0   |  25  |  1.1    Ca    8.2<L>      02 Dec 2019 07:41  Phos  3.1     12-01  Mg     1.9     12-01      Specimen Source: .Urine Clean Catch (Midstream) (11.25.19 @ 14:24)    Culture - Urine (11.25.19 @ 14:24)    Specimen Source: .Urine Clean Catch (Midstream)    Culture Results:   No growth    Culture - Blood (11.25.19 @ 14:24)    Specimen Source: .Blood Blood    Culture Results:   No growth to date.    RADIOLOGY:    < from: Xray Chest 1 View- PORTABLE-Urgent (11.25.19 @ 15:46) >  Impression:      No radiographic evidence of acute cardiopulmonary disease.    < from: EEG (11.29.19 @ 17:35) >    Impression  Abnormal due to the presence of: generalized slowing as above    Clinical Correlation & Recommendations   Consistent with diffuse cerebral electrophysiological dysfunction,   secondary to nonspecific cause.    < end of copied text >    MEDICATIONS  (STANDING):  ammonium lactate 12% Lotion 1 Application(s) Topical two times a day  artificial  tears Solution 1 Drop(s) Both EYES two times a day  calcium carbonate 1250 mG  + Vitamin D (OsCal 500 + D) 1 Tablet(s) Oral daily  chlorhexidine 4% Liquid 1 Application(s) Topical <User Schedule>  clonazePAM  Tablet 0.5 milliGRAM(s) Oral every 12 hours  clotrimazole 1% Cream 1 Application(s) Topical two times a day  dronabinol 2.5 milliGRAM(s) Oral two times a day  enoxaparin Injectable 40 milliGRAM(s) SubCutaneous daily  lamoTRIgine 100 milliGRAM(s) Oral at bedtime  lamoTRIgine 75 milliGRAM(s) Oral daily  levothyroxine 112 MICROGram(s) Oral daily  polyethylene glycol 3350 17 Gram(s) Oral daily  QUEtiapine 25 milliGRAM(s) Oral at bedtime  senna 8.6 milliGRAM(s) Oral Tablet - Peds 1 Tablet(s) Oral at bedtime  simvastatin 20 milliGRAM(s) Oral at bedtime  tamsulosin 0.8 milliGRAM(s) Oral at bedtime  tamsulosin 0.4 milliGRAM(s) Oral once    MEDICATIONS  (PRN):  haloperidol    Injectable 2 milliGRAM(s) IntraMuscular every 6 hours PRN Agitation

## 2019-12-04 NOTE — PROGRESS NOTE ADULT - ASSESSMENT
54 Year Old Female with a PMH of down syndrome, dementia (alert but non verbal at baseline) and seizure disorder (neurologist outside, Dr. Hood) presents from the group home for evaluation of failure of thrive and decrease po intake.    # Decreased po intake  - pt is mentally challenged, she needs assistance with meals  - on  Dronabinol 2.5 mg BID , patient doesn't eat breakfast, but eats dinner and lunch  - pt has no sings of malnutrition     # Down syndrome   -supportive care   - Haldol 0.5 Q 12 hours   - prevent falls and aspiration     #HLD  -statin    # Seizure disorder  - seizure precautions   - keep Magnesium above 2.0  - REEG abnormal   - neurology is following   - Lamictal  was decreased to 75 in am [id number] in pm,  - started on Seroquel 25 mg   - started on Klonopin 0.5 BID     # Urinary retention   - flomax - increased to 0.8  - retaining urine, will check with bladder scan again, if unable to pee will add doherty    # Hypothyroidism:  - Continue with Synthroid 100 , Thyroid Stimulating Hormone, Serum: 0.25 uIU/mL (11.26.19 @ 07:49)    #)antipsychotic  - seroquel    #constipiation  - stool softeners    DVT Prophylaxis: lovenox  GI ppx: not indicated  Disposition: from group home, [facility]  HCP and legal guardian [phone] Madisyn Enamorado (Sister)    pending: need to be able to pass urine, before we can discharge the patient

## 2019-12-04 NOTE — PROGRESS NOTE ADULT - SUBJECTIVE AND OBJECTIVE BOX
SALVATORE STEELE 54y Female  MRN#: [id number]   CODE STATUS: full code      SUBJECTIVE  Patient is a 54y old Female who presents with a chief complaint of decreased po intake (02 Dec 2019 15:55)  Currently admitted to medicine with the primary diagnosis of Failure to thrive in adult    Today is hospital day 9, patient is retaining urine again, she has not peed all night, upon bladder scan showed roughly 370cc,     OBJECTIVE  PAST MEDICAL & SURGICAL HISTORY  Seizures  Intellectual disability  Hypothyroid  Impulse control disorder in adult  Down's syndrome  No significant past surgical history    ALLERGIES:  No Known Allergies    MEDICATIONS:  STANDING MEDICATIONS  ammonium lactate 12% Lotion 1 Application(s) Topical two times a day  artificial  tears Solution 1 Drop(s) Both EYES two times a day  calcium carbonate 1250 mG  + Vitamin D (OsCal 500 + D) 1 Tablet(s) Oral daily  chlorhexidine 4% Liquid 1 Application(s) Topical <User Schedule>  clonazePAM  Tablet 0.5 milliGRAM(s) Oral every 12 hours  clotrimazole 1% Cream 1 Application(s) Topical two times a day  dextrose 5% + sodium chloride 0.9%. 1000 milliLiter(s) IV Continuous <Continuous>  dronabinol 2.5 milliGRAM(s) Oral two times a day  enoxaparin Injectable 40 milliGRAM(s) SubCutaneous daily  lamoTRIgine 100 milliGRAM(s) Oral at bedtime  lamoTRIgine 75 milliGRAM(s) Oral daily  levothyroxine 112 MICROGram(s) Oral daily  polyethylene glycol 3350 17 Gram(s) Oral daily  QUEtiapine 25 milliGRAM(s) Oral at bedtime  senna 8.6 milliGRAM(s) Oral Tablet - Peds 1 Tablet(s) Oral at bedtime  simvastatin 20 milliGRAM(s) Oral at bedtime  tamsulosin 0.4 milliGRAM(s) Oral at bedtime    PRN MEDICATIONS  haloperidol    Injectable 2 milliGRAM(s) IntraMuscular every 6 hours PRN      VITAL SIGNS: Last 24 Hours  Vital Signs Last 24 Hrs  T(C): 36.3 (04 Dec 2019 05:22), Max: 36.4 (03 Dec 2019 21:33)  T(F): 97.4 (04 Dec 2019 05:22), Max: 97.6 (03 Dec 2019 21:33)  HR: 53 (04 Dec 2019 05:22) (50 - 81)  BP: 132/60 (04 Dec 2019 05:22) (113/54 - 149/70)  BP(mean): --  RR: 18 (04 Dec 2019 05:22) (16 - 18)  SpO2: 99% (03 Dec 2019 20:24) (99% - 99%)  PHYSICAL EXAM:  GENERAL: NAD, well-developed, patient hardly responds, tearing and crying, not oriented, which appears to be her baseline per aid sitting next to her  HEENT:  Atraumatic, Normocephalic.   PULMONARY: Clear  CARDIOVASCULAR: s1, s2  GASTROINTESTINAL: Soft, Nontender, Nondistended; Bowel sounds present  MUSCULOSKELETAL:  2+ Peripheral Pulses, No clubbing, cyanosis, or elizabeth  LABS:                            11.7   4.89  )-----------( 180      ( 02 Dec 2019 07:41 )             35.6     12-02    142  |  107  |  9<L>  ----------------------------<  112<H>  4.0   |  25  |  1.1    Ca    8.2<L>      02 Dec 2019 07:41  Phos  3.1     12-01  Mg     1.9     12-01                    RADIOLOGY:

## 2019-12-05 PROCEDURE — 99232 SBSQ HOSP IP/OBS MODERATE 35: CPT

## 2019-12-05 RX ORDER — SODIUM CHLORIDE 9 MG/ML
500 INJECTION INTRAMUSCULAR; INTRAVENOUS; SUBCUTANEOUS ONCE
Refills: 0 | Status: COMPLETED | OUTPATIENT
Start: 2019-12-05 | End: 2019-12-05

## 2019-12-05 RX ORDER — SODIUM CHLORIDE 9 MG/ML
1000 INJECTION INTRAMUSCULAR; INTRAVENOUS; SUBCUTANEOUS ONCE
Refills: 0 | Status: DISCONTINUED | OUTPATIENT
Start: 2019-12-05 | End: 2019-12-05

## 2019-12-05 RX ADMIN — Medication 1 APPLICATION(S): at 06:17

## 2019-12-05 RX ADMIN — Medication 0.5 MILLIGRAM(S): at 17:38

## 2019-12-05 RX ADMIN — CHLORHEXIDINE GLUCONATE 1 APPLICATION(S): 213 SOLUTION TOPICAL at 06:20

## 2019-12-05 RX ADMIN — SENNA PLUS 1 TABLET(S): 8.6 TABLET ORAL at 21:57

## 2019-12-05 RX ADMIN — ENOXAPARIN SODIUM 40 MILLIGRAM(S): 100 INJECTION SUBCUTANEOUS at 12:01

## 2019-12-05 RX ADMIN — Medication 0.5 MILLIGRAM(S): at 06:20

## 2019-12-05 RX ADMIN — Medication 2.5 MILLIGRAM(S): at 17:38

## 2019-12-05 RX ADMIN — QUETIAPINE FUMARATE 25 MILLIGRAM(S): 200 TABLET, FILM COATED ORAL at 21:57

## 2019-12-05 RX ADMIN — POLYETHYLENE GLYCOL 3350 17 GRAM(S): 17 POWDER, FOR SOLUTION ORAL at 12:01

## 2019-12-05 RX ADMIN — SIMVASTATIN 20 MILLIGRAM(S): 20 TABLET, FILM COATED ORAL at 21:57

## 2019-12-05 RX ADMIN — SODIUM CHLORIDE 1000 MILLILITER(S): 9 INJECTION INTRAMUSCULAR; INTRAVENOUS; SUBCUTANEOUS at 06:24

## 2019-12-05 RX ADMIN — LAMOTRIGINE 75 MILLIGRAM(S): 25 TABLET, ORALLY DISINTEGRATING ORAL at 12:02

## 2019-12-05 RX ADMIN — Medication 112 MICROGRAM(S): at 06:17

## 2019-12-05 RX ADMIN — Medication 1 APPLICATION(S): at 17:39

## 2019-12-05 RX ADMIN — Medication 2.5 MILLIGRAM(S): at 06:20

## 2019-12-05 RX ADMIN — Medication 1 DROP(S): at 06:17

## 2019-12-05 RX ADMIN — Medication 1 APPLICATION(S): at 06:20

## 2019-12-05 RX ADMIN — Medication 1 TABLET(S): at 12:02

## 2019-12-05 RX ADMIN — Medication 1 DROP(S): at 17:38

## 2019-12-05 RX ADMIN — HALOPERIDOL DECANOATE 2 MILLIGRAM(S): 100 INJECTION INTRAMUSCULAR at 17:39

## 2019-12-05 RX ADMIN — LAMOTRIGINE 100 MILLIGRAM(S): 25 TABLET, ORALLY DISINTEGRATING ORAL at 21:57

## 2019-12-05 NOTE — CHART NOTE - NSCHARTNOTEFT_GEN_A_CORE
Pt was found to have 500 + cc urine on bladder scan. Straight cath performed. Consider  Gallo in the Am.

## 2019-12-05 NOTE — PROGRESS NOTE ADULT - ASSESSMENT
54 Year Old Female with a PMH of down syndrome, dementia (alert but non verbal at baseline) and seizure disorder (neurologist outside, Dr. Hood) presents from the group home for evaluation of failure of thrive and decrease po intake.    # Decreased po intake (resolved)  - pt is mentally challenged, she needs assistance with meals  - on  Dronabinol 2.5 mg BID , patient doesn't eat breakfast, but eats dinner and lunch  - pt has no sings of malnutrition, calory count was sufficient    # Down syndrome   -supportive care   - Haldol 0.5 Q 12 hours   - prevent falls and aspiration     #HLD  -statin    # Seizure disorder  - seizure precautions   - keep Magnesium above 2.0  - REEG abnormal   - neurology is following   - Lamictal  was decreased to 75 in am [id number] in pm,  - on Seroquel 25 mg   - on Klonopin 0.5 BID     # Urinary retention  (resolved)  - flomax - increased to 0.8  -  doherty removed    # Hypothyroidism:  - Continue with Synthroid 100 , Thyroid Stimulating Hormone, Serum: 0.25 uIU/mL (11.26.19 @ 07:49), dose was decreased form 112 to 100    #)antipsychotic  - seroquel    #constipiation  - stool softeners as needed    DVT Prophylaxis: lovenox  GI ppx: not indicated  Disposition: Pending placement  HCP and legal guardian [phone] Madisyn Enamorado (Sister)    pending: Pending placement,  working on it

## 2019-12-05 NOTE — PROGRESS NOTE ADULT - SUBJECTIVE AND OBJECTIVE BOX
SALVATORE STEELE 54y Female  MRN#: [id number]   CODE STATUS: full code      SUBJECTIVE  Patient is a 54y old Female who presents with a chief complaint of decreased po intake (02 Dec 2019 15:55)  Currently admitted to medicine with the primary diagnosis of Failure to thrive in adult    Today is hospital day 10, patient was retaining urine yesterday, however she was able to pee , PENDING PLACEMENT    OBJECTIVE  PAST MEDICAL & SURGICAL HISTORY  Seizures  Intellectual disability  Hypothyroid  Impulse control disorder in adult  Down's syndrome  No significant past surgical history    ALLERGIES:  No Known Allergies    MEDICATIONS:  MEDICATIONS  (STANDING):  ammonium lactate 12% Lotion 1 Application(s) Topical two times a day  artificial  tears Solution 1 Drop(s) Both EYES two times a day  calcium carbonate 1250 mG  + Vitamin D (OsCal 500 + D) 1 Tablet(s) Oral daily  chlorhexidine 4% Liquid 1 Application(s) Topical <User Schedule>  clonazePAM  Tablet 0.5 milliGRAM(s) Oral every 12 hours  clotrimazole 1% Cream 1 Application(s) Topical two times a day  dronabinol 2.5 milliGRAM(s) Oral two times a day  enoxaparin Injectable 40 milliGRAM(s) SubCutaneous daily  lamoTRIgine 100 milliGRAM(s) Oral at bedtime  lamoTRIgine 75 milliGRAM(s) Oral daily  levothyroxine 112 MICROGram(s) Oral daily  polyethylene glycol 3350 17 Gram(s) Oral daily  QUEtiapine 25 milliGRAM(s) Oral at bedtime  senna 8.6 milliGRAM(s) Oral Tablet - Peds 1 Tablet(s) Oral at bedtime  simvastatin 20 milliGRAM(s) Oral at bedtime  tamsulosin 0.8 milliGRAM(s) Oral at bedtime    MEDICATIONS  (PRN):  haloperidol    Injectable 2 milliGRAM(s) IntraMuscular every 6 hours PRN Agitation        VITAL SIGNS: Last 24 Hours  Vital Signs Last 24 Hrs  T(C): 36.4 (05 Dec 2019 13:30), Max: 37.2 (05 Dec 2019 06:09)  T(F): 97.5 (05 Dec 2019 13:30), Max: 98.9 (05 Dec 2019 06:09)  HR: 65 (05 Dec 2019 13:30) (51 - 80)  BP: 125/58 (05 Dec 2019 13:30) (92/44 - 125/58)  BP(mean): 63 (05 Dec 2019 06:09) (63 - 63)  RR: 20 (05 Dec 2019 13:30) (18 - 20)  SpO2: 96% (05 Dec 2019 10:29) (96% - 97%)  PHYSICAL EXAM:  GENERAL: NAD, well-developed, patient hardly responds, tearing and crying, not oriented, which appears to be her baseline per aid sitting next to her  HEENT:  Atraumatic, Normocephalic.   PULMONARY: Clear  CARDIOVASCULAR: s1, s2  GASTROINTESTINAL: Soft, Nontender, Nondistended; Bowel sounds present  MUSCULOSKELETAL:  2+ Peripheral Pulses, No clubbing, cyanosis, or edema  LABS:                            11.7   4.89  )-----------( 180      ( 02 Dec 2019 07:41 )             35.6     12-02    142  |  107  |  9<L>  ----------------------------<  112<H>  4.0   |  25  |  1.1    Ca    8.2<L>      02 Dec 2019 07:41  Phos  3.1     12-01  Mg     1.9     12-01                    RADIOLOGY:

## 2019-12-06 PROCEDURE — 99232 SBSQ HOSP IP/OBS MODERATE 35: CPT

## 2019-12-06 RX ADMIN — Medication 1 DROP(S): at 17:25

## 2019-12-06 RX ADMIN — LAMOTRIGINE 100 MILLIGRAM(S): 25 TABLET, ORALLY DISINTEGRATING ORAL at 21:39

## 2019-12-06 RX ADMIN — SIMVASTATIN 20 MILLIGRAM(S): 20 TABLET, FILM COATED ORAL at 21:39

## 2019-12-06 RX ADMIN — Medication 2.5 MILLIGRAM(S): at 17:24

## 2019-12-06 RX ADMIN — Medication 0.5 MILLIGRAM(S): at 06:14

## 2019-12-06 RX ADMIN — LAMOTRIGINE 75 MILLIGRAM(S): 25 TABLET, ORALLY DISINTEGRATING ORAL at 11:13

## 2019-12-06 RX ADMIN — Medication 2.5 MILLIGRAM(S): at 08:30

## 2019-12-06 RX ADMIN — Medication 1 APPLICATION(S): at 17:25

## 2019-12-06 RX ADMIN — Medication 1 TABLET(S): at 11:13

## 2019-12-06 RX ADMIN — SENNA PLUS 1 TABLET(S): 8.6 TABLET ORAL at 21:39

## 2019-12-06 RX ADMIN — Medication 1 DROP(S): at 06:14

## 2019-12-06 RX ADMIN — Medication 0.5 MILLIGRAM(S): at 17:24

## 2019-12-06 RX ADMIN — Medication 1 APPLICATION(S): at 06:16

## 2019-12-06 RX ADMIN — QUETIAPINE FUMARATE 25 MILLIGRAM(S): 200 TABLET, FILM COATED ORAL at 21:39

## 2019-12-06 RX ADMIN — POLYETHYLENE GLYCOL 3350 17 GRAM(S): 17 POWDER, FOR SOLUTION ORAL at 11:13

## 2019-12-06 RX ADMIN — Medication 1 APPLICATION(S): at 06:15

## 2019-12-06 RX ADMIN — ENOXAPARIN SODIUM 40 MILLIGRAM(S): 100 INJECTION SUBCUTANEOUS at 11:12

## 2019-12-06 RX ADMIN — Medication 112 MICROGRAM(S): at 06:14

## 2019-12-06 NOTE — PROGRESS NOTE ADULT - SUBJECTIVE AND OBJECTIVE BOX
SALVATORE STEELE 54y Female  MRN#: [id number]   CODE STATUS: full code      SUBJECTIVE  Patient is a 54y old Female who presents with a chief complaint of decreased po intake (02 Dec 2019 15:55)  Currently admitted to medicine with the primary diagnosis of Failure to thrive in adult    Today is hospital day 11, patient retained urine last night, upon bladder scan showed around 500cc had to straight cath. Last night patient did not eat dinner      OBJECTIVE  PAST MEDICAL & SURGICAL HISTORY  Seizures  Intellectual disability  Hypothyroid  Impulse control disorder in adult  Down's syndrome  No significant past surgical history    ALLERGIES:  No Known Allergies    MEDICATIONS:  MEDICATIONS  (STANDING):  ammonium lactate 12% Lotion 1 Application(s) Topical two times a day  artificial  tears Solution 1 Drop(s) Both EYES two times a day  calcium carbonate 1250 mG  + Vitamin D (OsCal 500 + D) 1 Tablet(s) Oral daily  chlorhexidine 4% Liquid 1 Application(s) Topical <User Schedule>  clonazePAM  Tablet 0.5 milliGRAM(s) Oral every 12 hours  clotrimazole 1% Cream 1 Application(s) Topical two times a day  dronabinol 2.5 milliGRAM(s) Oral two times a day  enoxaparin Injectable 40 milliGRAM(s) SubCutaneous daily  lamoTRIgine 100 milliGRAM(s) Oral at bedtime  lamoTRIgine 75 milliGRAM(s) Oral daily  levothyroxine 112 MICROGram(s) Oral daily  polyethylene glycol 3350 17 Gram(s) Oral daily  QUEtiapine 25 milliGRAM(s) Oral at bedtime  senna 8.6 milliGRAM(s) Oral Tablet - Peds 1 Tablet(s) Oral at bedtime  simvastatin 20 milliGRAM(s) Oral at bedtime  tamsulosin 0.8 milliGRAM(s) Oral at bedtime      VITAL SIGNS: Last 24 Hours  Vital Signs Last 24 Hrs  T(C): 37 (06 Dec 2019 05:19), Max: 37 (06 Dec 2019 05:19)  T(F): 98.6 (06 Dec 2019 05:19), Max: 98.6 (06 Dec 2019 05:19)  HR: 51 (06 Dec 2019 05:19) (51 - 79)  BP: 89/42 (06 Dec 2019 05:19) (89/42 - 133/63)  BP(mean): --  RR: 20 (06 Dec 2019 05:19) (20 - 20)  SpO2: 96% (06 Dec 2019 07:59) (96% - 96%)  PHYSICAL EXAM:  GENERAL: NAD, well-developed, patient hardly responds, tearing and crying, not oriented, which appears to be her baseline per aid sitting next to her  HEENT:  Atraumatic, Normocephalic.   PULMONARY: Clear  CARDIOVASCULAR: s1, s2  GASTROINTESTINAL: Soft, Nontender, Nondistended; Bowel sounds present  MUSCULOSKELETAL:  2+ Peripheral Pulses, No clubbing, cyanosis, or edema  LABS:                            11.7   4.89  )-----------( 180      ( 02 Dec 2019 07:41 )             35.6     12-02    142  |  107  |  9<L>  ----------------------------<  112<H>  4.0   |  25  |  1.1    Ca    8.2<L>      02 Dec 2019 07:41  Phos  3.1     12-01  Mg     1.9     12-01                    RADIOLOGY:

## 2019-12-06 NOTE — PROGRESS NOTE ADULT - ASSESSMENT
54 Year Old Female with a PMH of down syndrome, dementia (alert but non verbal at baseline) and seizure disorder (neurologist outside, Dr. Hood) presents from the group home for evaluation of failure of thrive and decrease po intake.    # Decreased po intake   - pt is mentally challenged, she needs assistance with meals  - on  Dronabinol 2.5 mg BID , patient did not eat dinner last night,  - she is again not eating, needs encouragement and one to one feed    # Down syndrome   -supportive care   - Haldol 0.5 Q 12 hours   - prevent falls and aspiration     #HLD  -statin    # Seizure disorder  - seizure precautions   - keep Magnesium above 2.0  - REEG abnormal   - neurology is following   - Lamictal  was decreased to 75 in am [id number] in pm,  - on Seroquel 25 mg   - on Klonopin 0.5 BID     # Urinary retention    - flomax - increased to 0.8  - on 12/5/19 had to straight cath, patient was retaining urine, bladder scan showed 500 cc    # Hypothyroidism:  - Continue with Synthroid 100 , Thyroid Stimulating Hormone, Serum: 0.25 uIU/mL (11.26.19 @ 07:49), dose was decreased form 112 to 100    #)antipsychotic  - seroquel    #constipiation  - stool softeners as needed    DVT Prophylaxis: lovenox  GI ppx: not indicated  Disposition: Pending placement  HCP and legal guardian [phone] Madisyn Enamorado (Sister)    pending: Pending placement,  working on it 54 Year Old Female with a PMH of down syndrome, dementia (alert but non verbal at baseline) and seizure disorder (neurologist outside, Dr. Hood) presents from the group home for evaluation of failure of thrive and decrease po intake.    # Decreased po intake   - pt is mentally challenged, she needs assistance with meals  - on  Dronabinol 2.5 mg BID , patient did not eat dinner last night,  - she is again not eating, needs encouragement and one to one feed    # Down syndrome   -supportive care   - Haldol 0.5 Q 12 hours   - prevent falls and aspiration     #HLD  -statin    # Seizure disorder  - seizure precautions   - keep Magnesium above 2.0  - REEG abnormal   - neurology is following   - Lamictal  was decreased to 75 in am [id number] in pm,  - on Seroquel 25 mg   - on Klonopin 0.5 BID     # Urinary retention    - flomax - increased to 0.8  - on 12/5/19 had to straight cath, patient was retaining urine, bladder scan showed 500 cc  - patient has doherty now    # Hypothyroidism:  - Continue with Synthroid 100 , Thyroid Stimulating Hormone, Serum: 0.25 uIU/mL (11.26.19 @ 07:49), dose was decreased form 112 to 100    #)antipsychotic  - seroquel    #constipiation  - stool softeners as needed    DVT Prophylaxis: lovenox  GI ppx: not indicated  Disposition: Pending placement  HCP and legal guardian [phone] Madisyn Enamorado (Sister)    pending: Pending placement,  working on it 54 Year Old Female with a PMH of down syndrome, dementia (alert but non verbal at baseline) and seizure disorder (neurologist outside, Dr. Hood) presents from the group home for evaluation of failure of thrive and decrease po intake.    # Decreased po intake   - pt is mentally challenged, she needs assistance with meals  - on  Dronabinol 2.5 mg BID , patient did not eat dinner last night,  - she is again not eating, needs encouragement and one to one feed    # Down syndrome   -supportive care   - Haldol 0.5 Q 12 hours   - prevent falls and aspiration     #HLD  -statin    # Seizure disorder  - seizure precautions   - keep Magnesium above 2.0  - REEG abnormal   - neurology is following   - Lamictal  was decreased to 75 in am [id number] in pm,  - on Seroquel 25 mg   - on Klonopin 0.5 BID     # Acute Urinary retention    - flomax - increased to 0.8  - on 12/5/19 had to straight cath, patient was retaining urine, bladder scan showed 500 cc  - patient has doherty now  - urology evaluation     # Hypothyroidism:  - Continue with Synthroid 100 , Thyroid Stimulating Hormone, Serum: 0.25 uIU/mL (11.26.19 @ 07:49), dose was decreased form 112 to 100    #)antipsychotic  - seroquel    #) constipation  - stool softeners as needed    DVT Prophylaxis: lovenox  GI ppx: not indicated  Disposition: Pending placement  HCP and legal guardian [phone] Madisyn Enamorado (Sister)    pending: Pending placement,  working on it

## 2019-12-06 NOTE — CHART NOTE - NSCHARTNOTEFT_GEN_A_CORE
Registered Dietitian Follow-Up     Patient Profile Reviewed                           Yes [x]   No []     Nutrition History Previously Obtained        Yes [x]  No []       Pertinent Subjective Information:  -Incorrect RD handoff written so pt fully assessed today. Pt sleeping soundly, group home aid at bedside. Reports this morning pt consumed fair breakfast. Ate 75% waffle and 50% farina this morning which is very good as pt does not usually consume breakfast well. Continues to eat >75% both lunch and dinner per staff. Ensure ordered daily and pt usually consumes all of supplement or half. No longer at nutritional risk, continue current diet order.      Pertinent Medical Interventions:  1. Urinary retention    --doherty now in place   2. Decreased PO intake  --improved/resolved   --marinol and meal assistance provided.   --recommend Ensure in morning   3. Constipation, stool softeners PRN   4. Down syndrome with mental disability and agitation  5. Seizure d/o, neurology following      Diet order: Dysphagia I Pureed, nectar thick liquids      Anthropometrics:  - Ht. 149.86cm   - Wt. 81.4kg (11/25), no new wt   - %wt change  - BMI  - IBW     Pertinent Lab Data: no new labs since 12/2     Pertinent Meds: lovenox, abx, flomax, marinol, [id number] + vitamin D, miralax, synthroid, senna, simvastatin      Physical Findings:  - Appearance: Down Syndrome, dementia and minimally verbal at baseline   - GI function: fecal incontinence with LBM 12/3. ?constipation with no BM x3 days.   - Tubes:  - Oral/Mouth cavity: SLP 11/27: dysphagia 1 pureed, nectar thick liquids   - Skin: intact. no edema.      Nutrition Requirements  Weight Used: 81.4kg CBW; 44.3kg IBW     estimated calorie needs = ~8342-0513 kcal/day (30-35 kcal/kg IBW d/t BMI >30 but IBW significantly <CBW).   estimated protein needs = 53-62 g/day (1.2-1.4 g/kg CBW, reasons mentioned above).   estimated fluid needs = 1mL/kcal or per LIP     Nutrient Intake: meeting needs with current intake     [x] No active nutrition diagnosis identified at this time     Nutrition Intervention: meals and snacks, medical food supplements  Recommend:  1. Continue Dysphagia 1 pureed, nectar thick liquids with 1:1 feeds. Continue Ensure Enlive daily (at breakfast) with thickener packet.   2. Continue Marinol PRN.  3. Continue stool softeners as no BM x3 days.      Goal/Expected Outcome: Pt to consistently consume >75% meals and supplements throughout LOS. Reassess in 7 days.      Indicator/Monitoring: RD will monitor diet order, energy intake, nutrition related labs, body composition, NFPF.

## 2019-12-07 LAB
APPEARANCE UR: ABNORMAL
BACTERIA # UR AUTO: ABNORMAL
BILIRUB UR-MCNC: NEGATIVE — SIGNIFICANT CHANGE UP
COLOR SPEC: YELLOW — SIGNIFICANT CHANGE UP
DIFF PNL FLD: ABNORMAL
EPI CELLS # UR: 1 /HPF — SIGNIFICANT CHANGE UP (ref 0–5)
GLUCOSE UR QL: NEGATIVE — SIGNIFICANT CHANGE UP
HYALINE CASTS # UR AUTO: 12 /LPF — HIGH (ref 0–7)
KETONES UR-MCNC: NEGATIVE — SIGNIFICANT CHANGE UP
LEUKOCYTE ESTERASE UR-ACNC: ABNORMAL
NITRITE UR-MCNC: POSITIVE
PH UR: 6 — SIGNIFICANT CHANGE UP (ref 5–8)
PROT UR-MCNC: ABNORMAL
RBC CASTS # UR COMP ASSIST: 23 /HPF — HIGH (ref 0–4)
SP GR SPEC: 1.02 — SIGNIFICANT CHANGE UP (ref 1.01–1.02)
UROBILINOGEN FLD QL: SIGNIFICANT CHANGE UP
WBC UR QL: >720 /HPF — HIGH (ref 0–5)

## 2019-12-07 PROCEDURE — 99221 1ST HOSP IP/OBS SF/LOW 40: CPT

## 2019-12-07 PROCEDURE — 99233 SBSQ HOSP IP/OBS HIGH 50: CPT

## 2019-12-07 PROCEDURE — 70450 CT HEAD/BRAIN W/O DYE: CPT | Mod: 26

## 2019-12-07 RX ORDER — CEFTRIAXONE 500 MG/1
1000 INJECTION, POWDER, FOR SOLUTION INTRAMUSCULAR; INTRAVENOUS EVERY 24 HOURS
Refills: 0 | Status: COMPLETED | OUTPATIENT
Start: 2019-12-07 | End: 2019-12-11

## 2019-12-07 RX ORDER — LACTULOSE 10 G/15ML
20 SOLUTION ORAL
Refills: 0 | Status: DISCONTINUED | OUTPATIENT
Start: 2019-12-07 | End: 2019-12-10

## 2019-12-07 RX ADMIN — TAMSULOSIN HYDROCHLORIDE 0.8 MILLIGRAM(S): 0.4 CAPSULE ORAL at 22:12

## 2019-12-07 RX ADMIN — Medication 1 APPLICATION(S): at 06:40

## 2019-12-07 RX ADMIN — SIMVASTATIN 20 MILLIGRAM(S): 20 TABLET, FILM COATED ORAL at 22:12

## 2019-12-07 RX ADMIN — Medication 1 APPLICATION(S): at 16:22

## 2019-12-07 RX ADMIN — Medication 1 TABLET(S): at 11:32

## 2019-12-07 RX ADMIN — LAMOTRIGINE 75 MILLIGRAM(S): 25 TABLET, ORALLY DISINTEGRATING ORAL at 11:33

## 2019-12-07 RX ADMIN — Medication 1 APPLICATION(S): at 16:29

## 2019-12-07 RX ADMIN — Medication 1 APPLICATION(S): at 06:41

## 2019-12-07 RX ADMIN — ENOXAPARIN SODIUM 40 MILLIGRAM(S): 100 INJECTION SUBCUTANEOUS at 11:32

## 2019-12-07 RX ADMIN — LACTULOSE 20 GRAM(S): 10 SOLUTION ORAL at 16:22

## 2019-12-07 RX ADMIN — LACTULOSE 20 GRAM(S): 10 SOLUTION ORAL at 11:31

## 2019-12-07 RX ADMIN — LAMOTRIGINE 100 MILLIGRAM(S): 25 TABLET, ORALLY DISINTEGRATING ORAL at 22:12

## 2019-12-07 RX ADMIN — SENNA PLUS 1 TABLET(S): 8.6 TABLET ORAL at 22:12

## 2019-12-07 RX ADMIN — Medication 0.5 MILLIGRAM(S): at 06:39

## 2019-12-07 RX ADMIN — LACTULOSE 20 GRAM(S): 10 SOLUTION ORAL at 23:10

## 2019-12-07 RX ADMIN — Medication 1 DROP(S): at 16:21

## 2019-12-07 RX ADMIN — Medication 112 MICROGRAM(S): at 06:40

## 2019-12-07 RX ADMIN — CEFTRIAXONE 100 MILLIGRAM(S): 500 INJECTION, POWDER, FOR SOLUTION INTRAMUSCULAR; INTRAVENOUS at 16:19

## 2019-12-07 RX ADMIN — POLYETHYLENE GLYCOL 3350 17 GRAM(S): 17 POWDER, FOR SOLUTION ORAL at 11:32

## 2019-12-07 RX ADMIN — Medication 1 DROP(S): at 06:39

## 2019-12-07 RX ADMIN — CHLORHEXIDINE GLUCONATE 1 APPLICATION(S): 213 SOLUTION TOPICAL at 06:40

## 2019-12-07 NOTE — CONSULT NOTE ADULT - ATTENDING COMMENTS
Pt seen and examined , discussed urinary retention w pts sister.  recommend CIC until volumes < 200cc. then can stop.
I have personally seen and examined this patient.  I have fully participated in the care of this patient.  I have reviewed all pertinent clinical information, including history, physical exam, plan and note.   I have reviewed all pertinent clinical information and reviewed all relevant imaging and diagnostic studies personally.  Recommendations as above.  Agree with above assessment except as noted.

## 2019-12-07 NOTE — CONSULT NOTE ADULT - ASSESSMENT
55 y/o female with urinary retention.  - UA, UCx  - TOV when ambulating  - IV abx  - Once urine clear can attempt TOV, follow up with bladder scans q6-8 hrs for PVR. Straight cath for PVR >300cc  - Will d/w attending

## 2019-12-07 NOTE — CONSULT NOTE ADULT - SUBJECTIVE AND OBJECTIVE BOX
HPI:  54 Year Old Female with a PMH of down syndrome, dementia ( non verbal at baseline) and seizure disorder (neurologist Dr. Torres presents from the group home for evaluation of failure of thrive and decrease po intake. collateral history was obtained from sister / Aid at the bedside. also from Cazy on call RN at the group home at [phone] who reported decreased po intake, decreased urine output and dehydration. rest of ROS is negative.     : Called for evaluation of urinary retention. Bladder scan at bedside revealed 500cc, pt was straight cathed. Later a doherty cath was placed. Continues to drain cloudy yellow urine. No fevers, chills, N/V.       PAST MEDICAL & SURGICAL HISTORY:  Seizures  Intellectual disability  Hypothyroid  Impulse control disorder in adult  Down's syndrome  No significant past surgical history      REVIEW OF SYSTEMS:    CONSTITUTIONAL:  No fevers or chills  HEENT: No visual changes  ENDO: No sweating  NECK: No pain or stiffness  MUSCULOSKELETAL: No back pain, no joint pain  RESPIRATORY: No shortness of breath  CARDIOVASCULAR: No chest pain  GASTROINTESTINAL: No abdominal or epigastric pain. No nausea, vomiting,  No diarrhea or constipation.   NEUROLOGICAL: No mental status changes  PSYCH: No depression, no mood changes  SKIN: No itching      MEDICATIONS  (STANDING):  ammonium lactate 12% Lotion 1 Application(s) Topical two times a day  artificial  tears Solution 1 Drop(s) Both EYES two times a day  calcium carbonate 1250 mG  + Vitamin D (OsCal 500 + D) 1 Tablet(s) Oral daily  chlorhexidine 4% Liquid 1 Application(s) Topical <User Schedule>  clonazePAM  Tablet 0.5 milliGRAM(s) Oral every 12 hours  clotrimazole 1% Cream 1 Application(s) Topical two times a day  dronabinol 2.5 milliGRAM(s) Oral two times a day  enoxaparin Injectable 40 milliGRAM(s) SubCutaneous daily  lamoTRIgine 100 milliGRAM(s) Oral at bedtime  lamoTRIgine 75 milliGRAM(s) Oral daily  levothyroxine 112 MICROGram(s) Oral daily  polyethylene glycol 3350 17 Gram(s) Oral daily  QUEtiapine 25 milliGRAM(s) Oral at bedtime  senna 8.6 milliGRAM(s) Oral Tablet - Peds 1 Tablet(s) Oral at bedtime  simvastatin 20 milliGRAM(s) Oral at bedtime  tamsulosin 0.8 milliGRAM(s) Oral at bedtime    MEDICATIONS  (PRN):  haloperidol    Injectable 2 milliGRAM(s) IntraMuscular every 6 hours PRN Agitation      Allergies    No Known Allergies    Intolerances        SOCIAL HISTORY: No illicit drug use    FAMILY HISTORY:      Vital Signs Last 24 Hrs  T(C): 37.2 (07 Dec 2019 04:54), Max: 37.2 (07 Dec 2019 04:54)  T(F): 98.9 (07 Dec 2019 04:54), Max: 98.9 (07 Dec 2019 04:54)  HR: 61 (07 Dec 2019 04:54) (61 - 63)  BP: 109/78 (07 Dec 2019 04:54) (109/78 - 122/60)  BP(mean): --  RR: 18 (07 Dec 2019 04:54) (18 - 18)  SpO2: --    PHYSICAL EXAM:    Constitutional: NAD, nonverbal  Back: No CVA tenderness B/L  Respiratory: No accessory respiratory muscle use  Abd: Soft, NT/ND, bladder non palpable, no suprapubic tenderness  : + doherty cath draining cloudy yellow urine  Extremities: no edema  Neurological: A/O x 3  Psychiatric: Normal mood, normal affect      I&O's Summary    06 Dec 2019 07:01  -  07 Dec 2019 07:00  --------------------------------------------------------  IN: 0 mL / OUT: 110 mL / NET: -110 mL        LABS:

## 2019-12-07 NOTE — PROGRESS NOTE ADULT - ASSESSMENT
54 Year Old Female with a PMH of down syndrome, dementia (alert but non verbal at baseline) and seizure disorder (neurologist outside, Dr. Hood) presents from the group home for evaluation of failure of thrive and decrease po intake.    # Down's syndrome   # Dementia   - c/w Seroquel    - haldol PRN   - 1:1 feeds     # HLD  - c/w statin    # Seizure disorder  - seizure precautions   - c/w AED     # Acute Urinary retention    - c/w Flomax   - c/w doherty catheter   - urology eval   - f/u UA     # Hypothyroidism:  - Continue with Synthroid 100mcg, decreased from 112mcg     # Constipation  - stool softeners as needed    DVT Prophylaxis: lovenox  GI ppx: not indicated  Disposition: Pending placement  HCP and legal guardian [phone] Madisyn Enamorado (Sister)      #Progress Note Handoff  Pending (specify):  pending placement   Family discussion: dw sister   Disposition: Group home 54 Year Old Female with a PMH of down syndrome, dementia (alert but non verbal at baseline) and seizure disorder (neurologist outside, Dr. Hood) presents from the group home for evaluation of failure of thrive and decrease po intake.    # Lethargy  - possibly due to UTI, not catheter associated   - start ceftriaxone and send urine culture   - Obtain CTH NC, EEG  - hold Klonopin and Seroquel for now     # Down's syndrome   # Dementia   - c/w Seroquel    - haldol PRN   - 1:1 feeds     # HLD  - c/w statin    # Seizure disorder  - seizure precautions   - c/w AED     # Acute Urinary retention    - c/w Flomax   - c/w doherty catheter   - urology eval   - f/u UA     # Hypothyroidism:  - Continue with Synthroid 100mcg, decreased from 112mcg     # Constipation  - stool softeners as needed    DVT Prophylaxis: lovenox  GI ppx: not indicated  Disposition: Pending placement  HCP and legal guardian [phone] Madisyn Enamorado (Sister)      #Progress Note Handoff  Pending (specify):  pending placement   Family discussion: dw sister at bedside plan of care   Disposition: Group home

## 2019-12-07 NOTE — PROGRESS NOTE ADULT - SUBJECTIVE AND OBJECTIVE BOX
Pt seen and examined at bedside. No events overnight. Noted to have cloudy urine in the doherty bag. No fevers. Patient is non-verbal.      VITAL SIGNS (Last 24 hrs):  T(C): 36.8 (12-07-19 @ 14:12), Max: 37.2 (12-07-19 @ 04:54)  HR: 63 (12-07-19 @ 14:12) (61 - 63)  BP: 121/58 (12-07-19 @ 14:12) (109/78 - 122/60)  RR: 19 (12-07-19 @ 14:12) (18 - 19)  SpO2: 93% (12-07-19 @ 09:29) (93% - 93%)  Wt(kg): --  Daily     Daily     I&O's Summary    06 Dec 2019 07:01  -  07 Dec 2019 07:00  --------------------------------------------------------  IN: 0 mL / OUT: 110 mL / NET: -110 mL    07 Dec 2019 07:01  -  07 Dec 2019 14:41  --------------------------------------------------------  IN: 0 mL / OUT: 200 mL / NET: -200 mL        PHYSICAL EXAM:  GENERAL: NAD   HEAD:  Atraumatic, Normocephalic  EYES:  conjunctiva and sclera clear  NECK: Supple, No JVD  CHEST/LUNG: Clear to auscultation bilaterally; No wheeze  HEART: Regular rate and rhythm; No murmurs, rubs, or gallops  ABDOMEN: Soft, Nontender, Nondistended; Bowel sounds present  EXTREMITIES:  2+ Peripheral Pulses, No clubbing, cyanosis, or edema  SKIN: No rashes or lesions    Labs Reviewed            MEDICATIONS  (STANDING):  ammonium lactate 12% Lotion 1 Application(s) Topical two times a day  artificial  tears Solution 1 Drop(s) Both EYES two times a day  calcium carbonate 1250 mG  + Vitamin D (OsCal 500 + D) 1 Tablet(s) Oral daily  chlorhexidine 4% Liquid 1 Application(s) Topical <User Schedule>  clonazePAM  Tablet 0.5 milliGRAM(s) Oral every 12 hours  clotrimazole 1% Cream 1 Application(s) Topical two times a day  dronabinol 2.5 milliGRAM(s) Oral two times a day  enoxaparin Injectable 40 milliGRAM(s) SubCutaneous daily  lactulose Syrup 20 Gram(s) Oral four times a day  lamoTRIgine 100 milliGRAM(s) Oral at bedtime  lamoTRIgine 75 milliGRAM(s) Oral daily  levothyroxine 112 MICROGram(s) Oral daily  polyethylene glycol 3350 17 Gram(s) Oral daily  QUEtiapine 25 milliGRAM(s) Oral at bedtime  senna 8.6 milliGRAM(s) Oral Tablet - Peds 1 Tablet(s) Oral at bedtime  simvastatin 20 milliGRAM(s) Oral at bedtime  tamsulosin 0.8 milliGRAM(s) Oral at bedtime    MEDICATIONS  (PRN):  haloperidol    Injectable 2 milliGRAM(s) IntraMuscular every 6 hours PRN Agitation

## 2019-12-08 LAB
ALBUMIN SERPL ELPH-MCNC: 3.2 G/DL — LOW (ref 3.5–5.2)
ALP SERPL-CCNC: 123 U/L — HIGH (ref 30–115)
ALT FLD-CCNC: 49 U/L — HIGH (ref 0–41)
ANION GAP SERPL CALC-SCNC: 15 MMOL/L — HIGH (ref 7–14)
AST SERPL-CCNC: 41 U/L — SIGNIFICANT CHANGE UP (ref 0–41)
BILIRUB SERPL-MCNC: 0.2 MG/DL — SIGNIFICANT CHANGE UP (ref 0.2–1.2)
BUN SERPL-MCNC: 18 MG/DL — SIGNIFICANT CHANGE UP (ref 10–20)
CALCIUM SERPL-MCNC: 8.7 MG/DL — SIGNIFICANT CHANGE UP (ref 8.5–10.1)
CHLORIDE SERPL-SCNC: 104 MMOL/L — SIGNIFICANT CHANGE UP (ref 98–110)
CO2 SERPL-SCNC: 25 MMOL/L — SIGNIFICANT CHANGE UP (ref 17–32)
CREAT SERPL-MCNC: 1.2 MG/DL — SIGNIFICANT CHANGE UP (ref 0.7–1.5)
GLUCOSE SERPL-MCNC: 113 MG/DL — HIGH (ref 70–99)
HCT VFR BLD CALC: 36.8 % — LOW (ref 37–47)
HGB BLD-MCNC: 12.1 G/DL — SIGNIFICANT CHANGE UP (ref 12–16)
MCHC RBC-ENTMCNC: 31.7 PG — HIGH (ref 27–31)
MCHC RBC-ENTMCNC: 32.9 G/DL — SIGNIFICANT CHANGE UP (ref 32–37)
MCV RBC AUTO: 96.3 FL — SIGNIFICANT CHANGE UP (ref 81–99)
NRBC # BLD: 0 /100 WBCS — SIGNIFICANT CHANGE UP (ref 0–0)
PLATELET # BLD AUTO: 267 K/UL — SIGNIFICANT CHANGE UP (ref 130–400)
POTASSIUM SERPL-MCNC: 4.2 MMOL/L — SIGNIFICANT CHANGE UP (ref 3.5–5)
POTASSIUM SERPL-SCNC: 4.2 MMOL/L — SIGNIFICANT CHANGE UP (ref 3.5–5)
PROT SERPL-MCNC: 6.4 G/DL — SIGNIFICANT CHANGE UP (ref 6–8)
RBC # BLD: 3.82 M/UL — LOW (ref 4.2–5.4)
RBC # FLD: 13.9 % — SIGNIFICANT CHANGE UP (ref 11.5–14.5)
SODIUM SERPL-SCNC: 144 MMOL/L — SIGNIFICANT CHANGE UP (ref 135–146)
WBC # BLD: 7.65 K/UL — SIGNIFICANT CHANGE UP (ref 4.8–10.8)
WBC # FLD AUTO: 7.65 K/UL — SIGNIFICANT CHANGE UP (ref 4.8–10.8)

## 2019-12-08 PROCEDURE — 99232 SBSQ HOSP IP/OBS MODERATE 35: CPT

## 2019-12-08 RX ORDER — QUETIAPINE FUMARATE 200 MG/1
25 TABLET, FILM COATED ORAL AT BEDTIME
Refills: 0 | Status: DISCONTINUED | OUTPATIENT
Start: 2019-12-08 | End: 2019-12-16

## 2019-12-08 RX ORDER — CLONAZEPAM 1 MG
0.5 TABLET ORAL EVERY 12 HOURS
Refills: 0 | Status: DISCONTINUED | OUTPATIENT
Start: 2019-12-08 | End: 2019-12-10

## 2019-12-08 RX ORDER — SODIUM CHLORIDE 9 MG/ML
1000 INJECTION INTRAMUSCULAR; INTRAVENOUS; SUBCUTANEOUS
Refills: 0 | Status: DISCONTINUED | OUTPATIENT
Start: 2019-12-08 | End: 2019-12-10

## 2019-12-08 RX ADMIN — SENNA PLUS 1 TABLET(S): 8.6 TABLET ORAL at 21:50

## 2019-12-08 RX ADMIN — Medication 1 DROP(S): at 06:12

## 2019-12-08 RX ADMIN — LACTULOSE 20 GRAM(S): 10 SOLUTION ORAL at 06:12

## 2019-12-08 RX ADMIN — Medication 1 APPLICATION(S): at 06:13

## 2019-12-08 RX ADMIN — SIMVASTATIN 20 MILLIGRAM(S): 20 TABLET, FILM COATED ORAL at 21:50

## 2019-12-08 RX ADMIN — Medication 1 DROP(S): at 17:36

## 2019-12-08 RX ADMIN — CHLORHEXIDINE GLUCONATE 1 APPLICATION(S): 213 SOLUTION TOPICAL at 06:11

## 2019-12-08 RX ADMIN — LACTULOSE 20 GRAM(S): 10 SOLUTION ORAL at 23:12

## 2019-12-08 RX ADMIN — LACTULOSE 20 GRAM(S): 10 SOLUTION ORAL at 11:36

## 2019-12-08 RX ADMIN — ENOXAPARIN SODIUM 40 MILLIGRAM(S): 100 INJECTION SUBCUTANEOUS at 11:37

## 2019-12-08 RX ADMIN — LACTULOSE 20 GRAM(S): 10 SOLUTION ORAL at 17:36

## 2019-12-08 RX ADMIN — QUETIAPINE FUMARATE 25 MILLIGRAM(S): 200 TABLET, FILM COATED ORAL at 21:50

## 2019-12-08 RX ADMIN — TAMSULOSIN HYDROCHLORIDE 0.8 MILLIGRAM(S): 0.4 CAPSULE ORAL at 21:50

## 2019-12-08 RX ADMIN — LAMOTRIGINE 100 MILLIGRAM(S): 25 TABLET, ORALLY DISINTEGRATING ORAL at 21:50

## 2019-12-08 RX ADMIN — Medication 1 APPLICATION(S): at 17:36

## 2019-12-08 RX ADMIN — Medication 1 TABLET(S): at 11:37

## 2019-12-08 RX ADMIN — Medication 112 MICROGRAM(S): at 06:12

## 2019-12-08 RX ADMIN — CEFTRIAXONE 100 MILLIGRAM(S): 500 INJECTION, POWDER, FOR SOLUTION INTRAMUSCULAR; INTRAVENOUS at 17:34

## 2019-12-08 RX ADMIN — LAMOTRIGINE 75 MILLIGRAM(S): 25 TABLET, ORALLY DISINTEGRATING ORAL at 11:36

## 2019-12-08 RX ADMIN — Medication 1 APPLICATION(S): at 17:39

## 2019-12-08 RX ADMIN — SODIUM CHLORIDE 50 MILLILITER(S): 9 INJECTION INTRAMUSCULAR; INTRAVENOUS; SUBCUTANEOUS at 11:38

## 2019-12-08 RX ADMIN — Medication 0.5 MILLIGRAM(S): at 17:38

## 2019-12-08 RX ADMIN — POLYETHYLENE GLYCOL 3350 17 GRAM(S): 17 POWDER, FOR SOLUTION ORAL at 11:38

## 2019-12-08 NOTE — PROGRESS NOTE ADULT - ASSESSMENT
54 Year Old Female with a PMH of down syndrome, dementia (alert but non verbal at baseline) and seizure disorder (neurologist outside, Dr. Hood) presents from the group home for evaluation of failure of thrive and decrease po intake.    # Decreased po intake   - pt is mentally challenged, she needs assistance with meals  - on  Dronabinol 2.5 mg BID , patient is again not eating  - she is again not eating, needs encouragement and one to one feed  - started IVF  - patient was lethargic, ct head negative  - patient is started on calory count     # Down syndrome   -supportive care   - Haldol 0.5 Q 12 hours   - prevent falls and aspiration     #HLD  -statin    # Seizure disorder  - seizure precautions   - keep Magnesium above 2.0  - REEG abnormal   - neurology is following   - Lamictal  was decreased to 75 in am [id number] in pm,  - restarted Seroquel 25 mg   - restarted Klonopin 0.5 BID   -    # Acute Urinary retention    - flomax - increased to 0.8  - on 12/5/19 had to straight cath, patient was retaining urine, bladder scan showed 500 cc  - patient has doherty now  - urology evaluation     # Hypothyroidism:  - Continue with Synthroid 100 , Thyroid Stimulating Hormone, Serum: 0.25 uIU/mL (11.26.19 @ 07:49), dose was decreased form 112 to 100    #)antipsychotic  - seroquel    #) constipation  - stool softeners as needed    DVT Prophylaxis: lovenox  GI ppx: not indicated  Disposition: Pending placement  HCP and legal guardian [phone] Madisyn Enamorado (Sister)    pending: Pending placement,  working on it 54 Year Old Female with a PMH of down syndrome, dementia (alert but non verbal at baseline) and seizure disorder (neurologist outside, Dr. Hood) presents from the group home for evaluation of failure of thrive and decrease po intake.    # Decreased po intake   - pt is mentally challenged, she needs assistance with meals  - needs encouragement and one to one feed  - start calorie count     # Down syndrome   # Alzheimer's dementia  - Haldol 0.5 Q 12 hours   - restart Seroquel and klonopin   - aspiration precautions     #HLD  - c/w statin    # Seizure disorder  - seizure precautions   - keep Magnesium above 2.0  - Lamictal  was decreased to 75 in am [id number] in pm   - r/u REEG     # Acute Urinary retention    - c/w Flomax   - urology evaluation     # UTI   - c/w ceftriaxone and f/u urine culture     # Hypothyroidism:  - Continue with Synthroid 100, Thyroid Stimulating Hormone, Serum: 0.25 uIU/mL (11.26.19 @ 07:49), dose was decreased form 112 to 100    #) constipation  - no BM yet, c/w lactulose and bowel regimen     DVT Prophylaxis: lovenox  GI ppx: not indicated  Disposition: Pending placement  HCP and legal guardian [phone] Madisyn Enamorado (Sister)    pending: Pending placement,  working on it 54 Year Old Female with a PMH of down syndrome, dementia (alert but non verbal at baseline) and seizure disorder (neurologist outside, Dr. Hood) presents from the group home for evaluation of failure of thrive and decrease po intake.    # Decreased po intake   - pt is mentally challenged, she needs assistance with meals  - needs encouragement and one to one feed  - start calorie count     # Down syndrome   # Alzheimer's dementia  - Haldol 0.5 Q 12 hours   - restart Seroquel and klonopin   - aspiration precautions     #HLD  - c/w statin    # Seizure disorder  - seizure precautions   - keep Magnesium above 2.0  - Lamictal  was decreased to 75 in am [id number] in pm   - r/u REEG     # Acute Urinary retention    - c/w Flomax   - recommending UA: which is positive, pending urine culture    # UTI   - c/w ceftriaxone and f/u urine culture     # Hypothyroidism:  - Continue with Synthroid 100, Thyroid Stimulating Hormone, Serum: 0.25 uIU/mL (11.26.19 @ 07:49), dose was decreased form 112 to 100    #) constipation  - no BM yet, c/w lactulose and bowel regimen     DVT Prophylaxis: lovenox  GI ppx: not indicated  Disposition: Pending placement  HCP and legal guardian [phone] Madisyn Enamorado (Sister)    pending: Pending placement,  working on it

## 2019-12-08 NOTE — PROVIDER CONTACT NOTE (OTHER) - SITUATION
MD Len made aware that pt is having poor PO intake (food and fluid) RN asked about possibly starting pt on IVF, MD verbalized understanding and stated he will look into it, will continue to monitor

## 2019-12-08 NOTE — PROGRESS NOTE ADULT - SUBJECTIVE AND OBJECTIVE BOX
SALVATORE STEELE 54y Female  MRN#: [id number]   CODE STATUS: full code      SUBJECTIVE  Patient is a 54y old Female who presents with a chief complaint of decreased po intake (02 Dec 2019 15:55)  Currently admitted to medicine with the primary diagnosis of Failure to thrive in adult    Today is hospital day 13, patient has doherty, patient is screaming without any apparent reason but then she calms down and becomes quiet.      OBJECTIVE  PAST MEDICAL & SURGICAL HISTORY  Seizures  Intellectual disability  Hypothyroid  Impulse control disorder in adult  Down's syndrome  No significant past surgical history    ALLERGIES:  No Known Allergies    MEDICATIONS:  MEDICATIONS  (STANDING):  ammonium lactate 12% Lotion 1 Application(s) Topical two times a day  artificial  tears Solution 1 Drop(s) Both EYES two times a day  calcium carbonate 1250 mG  + Vitamin D (OsCal 500 + D) 1 Tablet(s) Oral daily  chlorhexidine 4% Liquid 1 Application(s) Topical <User Schedule>  clonazePAM  Tablet 0.5 milliGRAM(s) Oral every 12 hours  clotrimazole 1% Cream 1 Application(s) Topical two times a day  dronabinol 2.5 milliGRAM(s) Oral two times a day  enoxaparin Injectable 40 milliGRAM(s) SubCutaneous daily  lamoTRIgine 100 milliGRAM(s) Oral at bedtime  lamoTRIgine 75 milliGRAM(s) Oral daily  levothyroxine 112 MICROGram(s) Oral daily  polyethylene glycol 3350 17 Gram(s) Oral daily  QUEtiapine 25 milliGRAM(s) Oral at bedtime  senna 8.6 milliGRAM(s) Oral Tablet - Peds 1 Tablet(s) Oral at bedtime  simvastatin 20 milliGRAM(s) Oral at bedtime  tamsulosin 0.8 milliGRAM(s) Oral at bedtime      VITAL SIGNS: Last 24 Hours  Vital Signs Last 24 Hrs  T(C): 36.7 (08 Dec 2019 00:20), Max: 36.9 (07 Dec 2019 20:42)  T(F): 98 (08 Dec 2019 00:20), Max: 98.5 (07 Dec 2019 20:42)  HR: 80 (08 Dec 2019 00:20) (63 - 80)  BP: 163/80 (08 Dec 2019 00:20) (121/58 - 163/80)  BP(mean): --  RR: 19 (08 Dec 2019 00:20) (19 - 19)  SpO2: 94% (08 Dec 2019 08:11) (94% - 94%)  PHYSICAL EXAM:  GENERAL: NAD, well-developed, patient hardly responds, tearing and crying, not oriented, which appears to be her baseline per aid sitting next to her  HEENT:  Atraumatic, Normocephalic.   PULMONARY: Clear  CARDIOVASCULAR: s1, s2  GASTROINTESTINAL: Soft, Nontender, Nondistended; Bowel sounds present  MUSCULOSKELETAL:  2+ Peripheral Pulses, No clubbing, cyanosis, or edema  LABS:                              11.7   4.89  )-----------( 180      ( 02 Dec 2019 07:41 )             35.6     12-02    142  |  107  |  9<L>  ----------------------------<  112<H>  4.0   |  25  |  1.1    Ca    8.2<L>      02 Dec 2019 07:41  Phos  3.1     12-01  Mg     1.9     12-01                    RADIOLOGY:      < from: CT Head No Cont (12.07.19 @ 16:55) >  IMPRESSION:  1.  No evidence of acute intracranial pathology.  2.  Chronic microvascular ischemic changes.

## 2019-12-09 LAB
ALBUMIN SERPL ELPH-MCNC: 2.8 G/DL — LOW (ref 3.5–5.2)
ALP SERPL-CCNC: 100 U/L — SIGNIFICANT CHANGE UP (ref 30–115)
ALT FLD-CCNC: 38 U/L — SIGNIFICANT CHANGE UP (ref 0–41)
ANION GAP SERPL CALC-SCNC: 14 MMOL/L — SIGNIFICANT CHANGE UP (ref 7–14)
AST SERPL-CCNC: 27 U/L — SIGNIFICANT CHANGE UP (ref 0–41)
BILIRUB SERPL-MCNC: 0.3 MG/DL — SIGNIFICANT CHANGE UP (ref 0.2–1.2)
BUN SERPL-MCNC: 14 MG/DL — SIGNIFICANT CHANGE UP (ref 10–20)
CALCIUM SERPL-MCNC: 8.6 MG/DL — SIGNIFICANT CHANGE UP (ref 8.5–10.1)
CHLORIDE SERPL-SCNC: 106 MMOL/L — SIGNIFICANT CHANGE UP (ref 98–110)
CO2 SERPL-SCNC: 24 MMOL/L — SIGNIFICANT CHANGE UP (ref 17–32)
CREAT SERPL-MCNC: 1.3 MG/DL — SIGNIFICANT CHANGE UP (ref 0.7–1.5)
GLUCOSE SERPL-MCNC: 96 MG/DL — SIGNIFICANT CHANGE UP (ref 70–99)
HCT VFR BLD CALC: 34.7 % — LOW (ref 37–47)
HGB BLD-MCNC: 11.3 G/DL — LOW (ref 12–16)
MCHC RBC-ENTMCNC: 31.7 PG — HIGH (ref 27–31)
MCHC RBC-ENTMCNC: 32.6 G/DL — SIGNIFICANT CHANGE UP (ref 32–37)
MCV RBC AUTO: 97.5 FL — SIGNIFICANT CHANGE UP (ref 81–99)
NRBC # BLD: 0 /100 WBCS — SIGNIFICANT CHANGE UP (ref 0–0)
PLATELET # BLD AUTO: 257 K/UL — SIGNIFICANT CHANGE UP (ref 130–400)
POTASSIUM SERPL-MCNC: 4 MMOL/L — SIGNIFICANT CHANGE UP (ref 3.5–5)
POTASSIUM SERPL-SCNC: 4 MMOL/L — SIGNIFICANT CHANGE UP (ref 3.5–5)
PROT SERPL-MCNC: 5.5 G/DL — LOW (ref 6–8)
RBC # BLD: 3.56 M/UL — LOW (ref 4.2–5.4)
RBC # FLD: 14 % — SIGNIFICANT CHANGE UP (ref 11.5–14.5)
SODIUM SERPL-SCNC: 144 MMOL/L — SIGNIFICANT CHANGE UP (ref 135–146)
WBC # BLD: 5.2 K/UL — SIGNIFICANT CHANGE UP (ref 4.8–10.8)
WBC # FLD AUTO: 5.2 K/UL — SIGNIFICANT CHANGE UP (ref 4.8–10.8)

## 2019-12-09 PROCEDURE — 99232 SBSQ HOSP IP/OBS MODERATE 35: CPT

## 2019-12-09 PROCEDURE — 95816 EEG AWAKE AND DROWSY: CPT | Mod: 26

## 2019-12-09 RX ADMIN — Medication 1 DROP(S): at 05:02

## 2019-12-09 RX ADMIN — Medication 1 TABLET(S): at 11:51

## 2019-12-09 RX ADMIN — Medication 1 APPLICATION(S): at 05:00

## 2019-12-09 RX ADMIN — SODIUM CHLORIDE 50 MILLILITER(S): 9 INJECTION INTRAMUSCULAR; INTRAVENOUS; SUBCUTANEOUS at 17:38

## 2019-12-09 RX ADMIN — LACTULOSE 20 GRAM(S): 10 SOLUTION ORAL at 11:50

## 2019-12-09 RX ADMIN — CEFTRIAXONE 100 MILLIGRAM(S): 500 INJECTION, POWDER, FOR SOLUTION INTRAMUSCULAR; INTRAVENOUS at 17:39

## 2019-12-09 RX ADMIN — LACTULOSE 20 GRAM(S): 10 SOLUTION ORAL at 17:38

## 2019-12-09 RX ADMIN — SIMVASTATIN 20 MILLIGRAM(S): 20 TABLET, FILM COATED ORAL at 21:44

## 2019-12-09 RX ADMIN — Medication 1 APPLICATION(S): at 17:37

## 2019-12-09 RX ADMIN — ENOXAPARIN SODIUM 40 MILLIGRAM(S): 100 INJECTION SUBCUTANEOUS at 11:50

## 2019-12-09 RX ADMIN — LACTULOSE 20 GRAM(S): 10 SOLUTION ORAL at 05:02

## 2019-12-09 RX ADMIN — HALOPERIDOL DECANOATE 2 MILLIGRAM(S): 100 INJECTION INTRAMUSCULAR at 15:26

## 2019-12-09 RX ADMIN — TAMSULOSIN HYDROCHLORIDE 0.8 MILLIGRAM(S): 0.4 CAPSULE ORAL at 21:44

## 2019-12-09 RX ADMIN — Medication 1 APPLICATION(S): at 17:38

## 2019-12-09 RX ADMIN — CHLORHEXIDINE GLUCONATE 1 APPLICATION(S): 213 SOLUTION TOPICAL at 05:02

## 2019-12-09 RX ADMIN — Medication 0.5 MILLIGRAM(S): at 05:03

## 2019-12-09 RX ADMIN — Medication 1 DROP(S): at 17:38

## 2019-12-09 RX ADMIN — POLYETHYLENE GLYCOL 3350 17 GRAM(S): 17 POWDER, FOR SOLUTION ORAL at 11:50

## 2019-12-09 RX ADMIN — SENNA PLUS 1 TABLET(S): 8.6 TABLET ORAL at 21:44

## 2019-12-09 RX ADMIN — Medication 1 APPLICATION(S): at 05:03

## 2019-12-09 RX ADMIN — LAMOTRIGINE 75 MILLIGRAM(S): 25 TABLET, ORALLY DISINTEGRATING ORAL at 11:51

## 2019-12-09 RX ADMIN — QUETIAPINE FUMARATE 25 MILLIGRAM(S): 200 TABLET, FILM COATED ORAL at 21:45

## 2019-12-09 RX ADMIN — Medication 0.5 MILLIGRAM(S): at 17:42

## 2019-12-09 RX ADMIN — Medication 112 MICROGRAM(S): at 05:02

## 2019-12-09 RX ADMIN — LAMOTRIGINE 100 MILLIGRAM(S): 25 TABLET, ORALLY DISINTEGRATING ORAL at 21:44

## 2019-12-09 NOTE — PROGRESS NOTE ADULT - ASSESSMENT
54 Year Old Female with a PMH of down syndrome, dementia (alert but non verbal at baseline) and seizure disorder (neurologist outside, Dr. Hood) presents from the group home for evaluation of failure of thrive and decrease po intake.    # Decreased po intake   - pt is mentally challenged, she needs assistance with meals  - needs encouragement and one to one feed  - start calorie count     # Down syndrome   # Alzheimer's dementia  - Haldol 0.5 Q 12 hours   - restart Seroquel and klonopin   - aspiration precautions     #HLD  - c/w statin    # Seizure disorder  - seizure precautions   - keep Magnesium above 2.0  - Lamictal  was decreased to 75 in am [id number] in pm   - r/u REEG     # Acute Urinary retention    - c/w Flomax   - recommending UA: which is positive, pending urine culture    # UTI   - c/w ceftriaxone and f/u urine culture     # Hypothyroidism:  - Continue with Synthroid 100, Thyroid Stimulating Hormone, Serum: 0.25 uIU/mL (11.26.19 @ 07:49), dose was decreased form 112 to 100    #) constipation  - no BM yet, c/w lactulose and bowel regimen     DVT Prophylaxis: lovenox  GI ppx: not indicated  Disposition: Pending placement  HCP and legal guardian [phone] Madisyn Enamorado (Sister)    pending: Pending placement,  working on it 54 Year Old Female with a PMH of down syndrome, dementia (alert but non verbal at baseline) and seizure disorder (neurologist outside, Dr. Hood) presents from the group home for evaluation of failure of thrive and decrease po intake.    # Decreased po intake   - pt is mentally challenged, she needs assistance with meals  - needs encouragement and one to one feed  - start calorie count     # Down syndrome   # Alzheimer's dementia  - Haldol 0.5 Q 12 hours   - restart Seroquel and klonopin   - aspiration precautions   - EEG results as above    #HLD  - c/w statin    # Seizure disorder  - seizure precautions   - keep Magnesium above 2.0  - Lamictal  was decreased to 75 in am [id number] in pm   - r/u REEG     # Acute Urinary retention    - c/w Flomax   - recommending UA: which is positive, pending urine culture    # UTI   - Positive UA  - c/w ceftriaxone   - F/U urine culture     # Hypothyroidism:  - Continue with Synthroid 100, Thyroid Stimulating Hormone, Serum: 0.25 uIU/mL (11.26.19 @ 07:49), dose was decreased form 112 to 100    #) constipation  - no BM yet, c/w lactulose and bowel regimen     DVT Prophylaxis: lovenox  GI ppx: not indicated  Disposition: Pending placement  HCP and legal guardian [phone] Madisyn Enamorado (Sister)    pending: Pending placement,  working on it 54 Year Old Female with a PMH of down syndrome, dementia (alert but non verbal at baseline) and seizure disorder (neurologist outside, Dr. Hood) presents from the group home for evaluation of failure of thrive and decrease po intake.    # Complicated UTI   # Metabolic Encephalopathy   - not catheter associated   - c/w ceftriaxone   - F/U urine culture sensitivities     # Decreased po intake   - pt is mentally challenged, she needs assistance with meals  - needs encouragement and one to one feed  - start calorie count     # Down syndrome   # Alzheimer's dementia  - Haldol 0.5 Q 12 hours   - restart Seroquel and klonopin   - aspiration precautions   - EEG results as above    #HLD  - c/w statin    # Seizure disorder  - seizure precautions   - keep Magnesium above 2.0  - Lamictal  was decreased to 75 in am [id number] in pm   - r/u REEG     # Acute Urinary retention    - c/w Flomax   - recommending UA: which is positive, pending urine culture    # Hypothyroidism:  - Continue with Synthroid 100, Thyroid Stimulating Hormone, Serum: 0.25 uIU/mL (11.26.19 @ 07:49), dose was decreased form 112 to 100    #) constipation  - no BM yet, c/w lactulose and bowel regimen     DVT Prophylaxis: lovenox  GI ppx: not indicated  Disposition: Pending placement  HCP and legal guardian [phone] Madisyn Enamorado (Sister)

## 2019-12-09 NOTE — PROGRESS NOTE ADULT - SUBJECTIVE AND OBJECTIVE BOX
HPI:  54 Year Old Female with a PMH of down syndrome, dementia ( non verbal at baseline) and seizure disorder (neurologist Dr. Torres presents from the group home for evaluation of failure of thrive and decrease po intake. collateral history was obtained from sister / Aid at the bedside. also from Hernan on call RN at the group home at [phone] who reported decreased po intake, decreased urine output and dehydration. rest of ROS is negative.     in the ED, pt is hemodynamically stable. (2019 19:38)    Currently admitted to medicine with the primary diagnosis of Failure to thrive in adult     Today is hospital day 14d.     INTERVAL HPI / OVERNIGHT EVENTS:  Patient was examined and seen at bedside. This morning she is resting comfortably in bed and reports no new issues or overnight events.     ROS: Otherwise unremarkable     PAST MEDICAL & SURGICAL HISTORY  Seizures  Intellectual disability  Hypothyroid  Impulse control disorder in adult  Down's syndrome  No significant past surgical history    ALLERGIES  No Known Allergies    MEDICATIONS  STANDING MEDICATIONS  ammonium lactate 12% Lotion 1 Application(s) Topical two times a day  artificial  tears Solution 1 Drop(s) Both EYES two times a day  calcium carbonate 1250 mG  + Vitamin D (OsCal 500 + D) 1 Tablet(s) Oral daily  cefTRIAXone   IVPB 1000 milliGRAM(s) IV Intermittent every 24 hours  chlorhexidine 4% Liquid 1 Application(s) Topical <User Schedule>  clonazePAM  Tablet 0.5 milliGRAM(s) Oral every 12 hours  clotrimazole 1% Cream 1 Application(s) Topical two times a day  enoxaparin Injectable 40 milliGRAM(s) SubCutaneous daily  lactulose Syrup 20 Gram(s) Oral four times a day  lamoTRIgine 100 milliGRAM(s) Oral at bedtime  lamoTRIgine 75 milliGRAM(s) Oral daily  levothyroxine 112 MICROGram(s) Oral daily  polyethylene glycol 3350 17 Gram(s) Oral daily  QUEtiapine 25 milliGRAM(s) Oral at bedtime  senna 8.6 milliGRAM(s) Oral Tablet - Peds 1 Tablet(s) Oral at bedtime  simvastatin 20 milliGRAM(s) Oral at bedtime  sodium chloride 0.9%. 1000 milliLiter(s) IV Continuous <Continuous>  tamsulosin 0.8 milliGRAM(s) Oral at bedtime    PRN MEDICATIONS  haloperidol    Injectable 2 milliGRAM(s) IntraMuscular every 6 hours PRN    VITALS:  T(F): 99.5  HR: 62  BP: 102/62  RR: 19  SpO2: 95%    PHYSICAL EXAM  GEN: NAD, Resting comfortably in bed  PULM: Clear to auscultation bilaterally, No wheezes  CVS: Regular rate and rhythm, S1-S2, no murmurs  ABD: Soft, non-tender, non-distended, no guarding  EXT: No edema  NEURO: AAOx3, no focal deficits    LABS                        12.1   7.65  )-----------( 267      ( 08 Dec 2019 18:53 )             36.8         144  |  104  |  18  ----------------------------<  113<H>  4.2   |  25  |  1.2    Ca    8.7      08 Dec 2019 18:53    TPro  6.4  /  Alb  3.2<L>  /  TBili  0.2  /  DBili  x   /  AST  41  /  ALT  49<H>  /  AlkPhos  123<H>  12      Urinalysis Basic - ( 07 Dec 2019 10:08 )    Color: Yellow / Appearance: Turbid / S.021 / pH: x  Gluc: x / Ketone: Negative  / Bili: Negative / Urobili: <2 mg/dL   Blood: x / Protein: 30 mg/dL / Nitrite: Positive   Leuk Esterase: Large / RBC: 23 /HPF / WBC >720 /HPF   Sq Epi: x / Non Sq Epi: 1 /HPF / Bacteria: Moderate                RADIOLOGY  < from: CT Head No Cont (19 @ 16:55) >  IMPRESSION:  1.  No evidence of acute intracranial pathology.  2.  Chronic microvascular ischemic changes. HPI:  54 Year Old Female with a PMH of down syndrome, dementia ( non verbal at baseline) and seizure disorder (neurologist Dr. Torres presents from the group home for evaluation of failure of thrive and decrease po intake. collateral history was obtained from sister / Aid at the bedside. also from Hernan on call RN at the group home at [phone] who reported decreased po intake, decreased urine output and dehydration. rest of ROS is negative.     in the ED, pt is hemodynamically stable. (2019 19:38)    Currently admitted to medicine with the primary diagnosis of Failure to thrive in adult     Today is hospital day 14d.     INTERVAL HPI / OVERNIGHT EVENTS:  Patient was examined and seen at bedside. This morning she is resting comfortably in bed and reports no new issues or overnight events. Aid was at bedside and presented as sheet with overnight activity. Patient was conversational, and said "hi" and said "no" to cp, sob, abdominal pain.    ROS: Otherwise unremarkable     PAST MEDICAL & SURGICAL HISTORY  Seizures  Intellectual disability  Hypothyroid  Impulse control disorder in adult  Down's syndrome  No significant past surgical history    ALLERGIES  No Known Allergies    MEDICATIONS  STANDING MEDICATIONS  ammonium lactate 12% Lotion 1 Application(s) Topical two times a day  artificial  tears Solution 1 Drop(s) Both EYES two times a day  calcium carbonate 1250 mG  + Vitamin D (OsCal 500 + D) 1 Tablet(s) Oral daily  cefTRIAXone   IVPB 1000 milliGRAM(s) IV Intermittent every 24 hours  chlorhexidine 4% Liquid 1 Application(s) Topical <User Schedule>  clonazePAM  Tablet 0.5 milliGRAM(s) Oral every 12 hours  clotrimazole 1% Cream 1 Application(s) Topical two times a day  enoxaparin Injectable 40 milliGRAM(s) SubCutaneous daily  lactulose Syrup 20 Gram(s) Oral four times a day  lamoTRIgine 100 milliGRAM(s) Oral at bedtime  lamoTRIgine 75 milliGRAM(s) Oral daily  levothyroxine 112 MICROGram(s) Oral daily  polyethylene glycol 3350 17 Gram(s) Oral daily  QUEtiapine 25 milliGRAM(s) Oral at bedtime  senna 8.6 milliGRAM(s) Oral Tablet - Peds 1 Tablet(s) Oral at bedtime  simvastatin 20 milliGRAM(s) Oral at bedtime  sodium chloride 0.9%. 1000 milliLiter(s) IV Continuous <Continuous>  tamsulosin 0.8 milliGRAM(s) Oral at bedtime    PRN MEDICATIONS  haloperidol    Injectable 2 milliGRAM(s) IntraMuscular every 6 hours PRN    VITALS:  T(F): 99.5  HR: 62  BP: 102/62  RR: 19  SpO2: 95%    LABS                        12.1   7.65  )-----------( 267      ( 08 Dec 2019 18:53 )             36.8         144  |  104  |  18  ----------------------------<  113<H>  4.2   |  25  |  1.2    Ca    8.7      08 Dec 2019 18:53    TPro  6.4  /  Alb  3.2<L>  /  TBili  0.2  /  DBili  x   /  AST  41  /  ALT  49<H>  /  AlkPhos  123<H>        Urinalysis Basic - ( 07 Dec 2019 10:08 )    Color: Yellow / Appearance: Turbid / S.021 / pH: x  Gluc: x / Ketone: Negative  / Bili: Negative / Urobili: <2 mg/dL   Blood: x / Protein: 30 mg/dL / Nitrite: Positive   Leuk Esterase: Large / RBC: 23 /HPF / WBC >720 /HPF   Sq Epi: x / Non Sq Epi: 1 /HPF / Bacteria: Moderate      RADIOLOGY  < from: CT Head No Cont (19 @ 16:55) >  IMPRESSION:  1.  No evidence of acute intracranial pathology.  2.  Chronic microvascular ischemic changes.    < from: EEG (19 @ 11:00) >  Impression  Abnormal due to the presence of: generalized slowing as above    Clinical Correlation & Recommendations   Consistent with diffuse cerebral electrophysiological dysfunction   secondary to nonspecific cause.    < end of copied text >

## 2019-12-10 LAB
-  AMIKACIN: SIGNIFICANT CHANGE UP
-  AMPICILLIN/SULBACTAM: SIGNIFICANT CHANGE UP
-  AMPICILLIN: SIGNIFICANT CHANGE UP
-  AZTREONAM: SIGNIFICANT CHANGE UP
-  CEFAZOLIN: SIGNIFICANT CHANGE UP
-  CEFEPIME: SIGNIFICANT CHANGE UP
-  CEFOXITIN: SIGNIFICANT CHANGE UP
-  CEFTRIAXONE: SIGNIFICANT CHANGE UP
-  CIPROFLOXACIN: SIGNIFICANT CHANGE UP
-  GENTAMICIN: SIGNIFICANT CHANGE UP
-  IMIPENEM: SIGNIFICANT CHANGE UP
-  LEVOFLOXACIN: SIGNIFICANT CHANGE UP
-  MEROPENEM: SIGNIFICANT CHANGE UP
-  NITROFURANTOIN: SIGNIFICANT CHANGE UP
-  PIPERACILLIN/TAZOBACTAM: SIGNIFICANT CHANGE UP
-  TIGECYCLINE: SIGNIFICANT CHANGE UP
-  TOBRAMYCIN: SIGNIFICANT CHANGE UP
-  TRIMETHOPRIM/SULFAMETHOXAZOLE: SIGNIFICANT CHANGE UP
ANION GAP SERPL CALC-SCNC: 12 MMOL/L — SIGNIFICANT CHANGE UP (ref 7–14)
BUN SERPL-MCNC: 16 MG/DL — SIGNIFICANT CHANGE UP (ref 10–20)
CALCIUM SERPL-MCNC: 8.8 MG/DL — SIGNIFICANT CHANGE UP (ref 8.5–10.1)
CHLORIDE SERPL-SCNC: 107 MMOL/L — SIGNIFICANT CHANGE UP (ref 98–110)
CO2 SERPL-SCNC: 27 MMOL/L — SIGNIFICANT CHANGE UP (ref 17–32)
CREAT SERPL-MCNC: 1.2 MG/DL — SIGNIFICANT CHANGE UP (ref 0.7–1.5)
CULTURE RESULTS: SIGNIFICANT CHANGE UP
GLUCOSE SERPL-MCNC: 93 MG/DL — SIGNIFICANT CHANGE UP (ref 70–99)
HCT VFR BLD CALC: 35.8 % — LOW (ref 37–47)
HGB BLD-MCNC: 11.4 G/DL — LOW (ref 12–16)
MAGNESIUM SERPL-MCNC: 2 MG/DL — SIGNIFICANT CHANGE UP (ref 1.8–2.4)
MCHC RBC-ENTMCNC: 31.4 PG — HIGH (ref 27–31)
MCHC RBC-ENTMCNC: 31.8 G/DL — LOW (ref 32–37)
MCV RBC AUTO: 98.6 FL — SIGNIFICANT CHANGE UP (ref 81–99)
METHOD TYPE: SIGNIFICANT CHANGE UP
NRBC # BLD: 0 /100 WBCS — SIGNIFICANT CHANGE UP (ref 0–0)
ORGANISM # SPEC MICROSCOPIC CNT: SIGNIFICANT CHANGE UP
ORGANISM # SPEC MICROSCOPIC CNT: SIGNIFICANT CHANGE UP
PLATELET # BLD AUTO: 258 K/UL — SIGNIFICANT CHANGE UP (ref 130–400)
POTASSIUM SERPL-MCNC: 3.9 MMOL/L — SIGNIFICANT CHANGE UP (ref 3.5–5)
POTASSIUM SERPL-SCNC: 3.9 MMOL/L — SIGNIFICANT CHANGE UP (ref 3.5–5)
RBC # BLD: 3.63 M/UL — LOW (ref 4.2–5.4)
RBC # FLD: 14 % — SIGNIFICANT CHANGE UP (ref 11.5–14.5)
SODIUM SERPL-SCNC: 146 MMOL/L — SIGNIFICANT CHANGE UP (ref 135–146)
SPECIMEN SOURCE: SIGNIFICANT CHANGE UP
WBC # BLD: 5.68 K/UL — SIGNIFICANT CHANGE UP (ref 4.8–10.8)
WBC # FLD AUTO: 5.68 K/UL — SIGNIFICANT CHANGE UP (ref 4.8–10.8)

## 2019-12-10 PROCEDURE — 99232 SBSQ HOSP IP/OBS MODERATE 35: CPT

## 2019-12-10 RX ORDER — CLONAZEPAM 1 MG
0.25 TABLET ORAL EVERY 12 HOURS
Refills: 0 | Status: DISCONTINUED | OUTPATIENT
Start: 2019-12-10 | End: 2019-12-16

## 2019-12-10 RX ORDER — DEXTROSE MONOHYDRATE, SODIUM CHLORIDE, AND POTASSIUM CHLORIDE 50; .745; 4.5 G/1000ML; G/1000ML; G/1000ML
1000 INJECTION, SOLUTION INTRAVENOUS
Refills: 0 | Status: DISCONTINUED | OUTPATIENT
Start: 2019-12-10 | End: 2019-12-15

## 2019-12-10 RX ADMIN — SENNA PLUS 1 TABLET(S): 8.6 TABLET ORAL at 22:04

## 2019-12-10 RX ADMIN — CHLORHEXIDINE GLUCONATE 1 APPLICATION(S): 213 SOLUTION TOPICAL at 06:05

## 2019-12-10 RX ADMIN — Medication 1 DROP(S): at 17:40

## 2019-12-10 RX ADMIN — SIMVASTATIN 20 MILLIGRAM(S): 20 TABLET, FILM COATED ORAL at 22:04

## 2019-12-10 RX ADMIN — Medication 1 TABLET(S): at 11:48

## 2019-12-10 RX ADMIN — Medication 1 APPLICATION(S): at 17:39

## 2019-12-10 RX ADMIN — Medication 0.5 MILLIGRAM(S): at 06:05

## 2019-12-10 RX ADMIN — TAMSULOSIN HYDROCHLORIDE 0.4 MILLIGRAM(S): 0.4 CAPSULE ORAL at 21:59

## 2019-12-10 RX ADMIN — Medication 1 APPLICATION(S): at 06:05

## 2019-12-10 RX ADMIN — Medication 0.25 MILLIGRAM(S): at 17:38

## 2019-12-10 RX ADMIN — CEFTRIAXONE 100 MILLIGRAM(S): 500 INJECTION, POWDER, FOR SOLUTION INTRAMUSCULAR; INTRAVENOUS at 17:39

## 2019-12-10 RX ADMIN — LAMOTRIGINE 100 MILLIGRAM(S): 25 TABLET, ORALLY DISINTEGRATING ORAL at 22:04

## 2019-12-10 RX ADMIN — Medication 1 DROP(S): at 06:05

## 2019-12-10 RX ADMIN — ENOXAPARIN SODIUM 40 MILLIGRAM(S): 100 INJECTION SUBCUTANEOUS at 11:48

## 2019-12-10 RX ADMIN — Medication 112 MICROGRAM(S): at 06:05

## 2019-12-10 RX ADMIN — Medication 1 APPLICATION(S): at 06:07

## 2019-12-10 RX ADMIN — DEXTROSE MONOHYDRATE, SODIUM CHLORIDE, AND POTASSIUM CHLORIDE 75 MILLILITER(S): 50; .745; 4.5 INJECTION, SOLUTION INTRAVENOUS at 13:58

## 2019-12-10 RX ADMIN — LAMOTRIGINE 75 MILLIGRAM(S): 25 TABLET, ORALLY DISINTEGRATING ORAL at 11:48

## 2019-12-10 NOTE — PROGRESS NOTE ADULT - ATTENDING COMMENTS
#Progress Note Handoff  Pending (specify):  SNF placement   Family discussion: dw sister  Disposition: SNF
D/W Family and care givers.   Calorie count   Encourage diet.   D/W family about PEG tube also. will see the calorie count
Patient seen and examined and agree with above except as noted.  Reviewed pertinent notes, imaging, vitals and labs.  Patient with intermittent twitching since change in her medications.  No seizures.    Plan  1. Continue lamictal  2. Add Klonopin 0.5mg BID  3. f/u EEG results
Possible PEG
#Progress Note Handoff  Pending (specify):  pending placement   Family discussion: dw sister at bedside plan of care   Disposition: Group home
#Progress Note Handoff  Pending (specify):  urine culture, calorie count   Family discussion: dw sister plan of care   Disposition: Home vs STR
#Progress Note Handoff  Pending (specify):  pending placement   Family discussion: dw sister   Disposition: Group home
#Progress Note Handoff  Pending (specify):  urine culture   Family discussion: dw sister plan of care   Disposition: Home

## 2019-12-10 NOTE — PROGRESS NOTE ADULT - SUBJECTIVE AND OBJECTIVE BOX
HPI  Patient is a 54y old Female who presents with a chief complaint of decreased po intake (09 Dec 2019 08:07)     Currently admitted to medicine with the primary diagnosis of Failure to thrive in adult     Today is hospital day 15d.     INTERVAL HPI / OVERNIGHT EVENTS:  Patient was examined and seen at bedside. This morning she is resting comfortably in bed and reports no new issues or overnight events. Patient was sleeping this morning.    PAST MEDICAL & SURGICAL HISTORY  Seizures  Intellectual disability  Hypothyroid  Impulse control disorder in adult  Down's syndrome  No significant past surgical history    ALLERGIES  No Known Allergies    MEDICATIONS  STANDING MEDICATIONS  ammonium lactate 12% Lotion 1 Application(s) Topical two times a day  artificial  tears Solution 1 Drop(s) Both EYES two times a day  calcium carbonate 1250 mG  + Vitamin D (OsCal 500 + D) 1 Tablet(s) Oral daily  cefTRIAXone   IVPB 1000 milliGRAM(s) IV Intermittent every 24 hours  chlorhexidine 4% Liquid 1 Application(s) Topical <User Schedule>  clonazePAM  Tablet 0.25 milliGRAM(s) Oral every 12 hours  clotrimazole 1% Cream 1 Application(s) Topical two times a day  dextrose 5% + sodium chloride 0.45% with potassium chloride 20 mEq/L 1000 milliLiter(s) IV Continuous <Continuous>  enoxaparin Injectable 40 milliGRAM(s) SubCutaneous daily  lamoTRIgine 100 milliGRAM(s) Oral at bedtime  lamoTRIgine 75 milliGRAM(s) Oral daily  levothyroxine 112 MICROGram(s) Oral daily  QUEtiapine 25 milliGRAM(s) Oral at bedtime  senna 8.6 milliGRAM(s) Oral Tablet - Peds 1 Tablet(s) Oral at bedtime  simvastatin 20 milliGRAM(s) Oral at bedtime  tamsulosin 0.8 milliGRAM(s) Oral at bedtime    PRN MEDICATIONS    VITALS:  T(F): 97.1  HR: 48  BP: 99/52  RR: 19  SpO2: 96%    LABS                        11.4   5.68  )-----------( 258      ( 10 Dec 2019 08:52 )             35.8     12-10    146  |  107  |  16  ----------------------------<  93  3.9   |  27  |  1.2    Ca    8.8      10 Dec 2019 08:52  Mg     2.0     12-10    TPro  5.5<L>  /  Alb  2.8<L>  /  TBili  0.3  /  DBili  x   /  AST  27  /  ALT  38  /  AlkPhos  100  12-09    Culture - Urine (collected 07 Dec 2019 16:10)  Source: .Urine Catheterized  Preliminary Report (09 Dec 2019 08:38):    >100,000 CFU/ml Gram Negative Rods    RADIOLOGY    < from: CT Head No Cont (12.07.19 @ 16:55) >  IMPRESSION:  1.  No evidence of acute intracranial pathology.  2.  Chronic microvascular ischemic changes.    < from: EEG (12.08.19 @ 11:00) >  Impression  Abnormal due to the presence of: generalized slowing as above    Clinical Correlation & Recommendations   Consistent with diffuse cerebral electrophysiological dysfunction   secondary to nonspecific cause.    < end of copied text >

## 2019-12-10 NOTE — PROGRESS NOTE ADULT - ASSESSMENT
54 Year Old Female with a PMH of down syndrome, dementia (alert but non verbal at baseline) and seizure disorder (neurologist outside, Dr. Hood) presents from the group home for evaluation of failure of thrive and decrease po intake.    # Complicated UTI   # Metabolic Encephalopathy   - not catheter associated   - c/w ceftriaxone   - F/U urine culture sensitivities     # Decreased po intake   - pt is mentally challenged, she needs assistance with meals  - needs encouragement and one to one feed  - F/U calorie count     # Down syndrome   # Alzheimer's dementia  - Haldol 0.5 Q 12 hours   - restart Seroquel and klonopin   - aspiration precautions   - EEG results as above    #HLD  - c/w statin    # Seizure disorder  - seizure precautions   - keep Magnesium above 2.0  - Lamictal  was decreased to 75 in am [id number] in pm   - r/u REEG     # Acute Urinary retention    - c/w Flomax   - recommending UA: which is positive, pending urine culture    # Hypothyroidism:  - Continue with Synthroid 100, Thyroid Stimulating Hormone, Serum: 0.25 uIU/mL (11.26.19 @ 07:49), dose was decreased form 112 to 100    #) constipation  - no BM yet, c/w lactulose and bowel regimen     DVT Prophylaxis: lovenox  GI ppx: not indicated  Disposition: Pending placement  HCP and legal guardian [phone] Madisyn Enamorado (Sister) 54 Year Old Female with a PMH of down syndrome, dementia (alert but non verbal at baseline) and seizure disorder (neurologist outside, Dr. Hood) presents from the group home for evaluation of failure of thrive and decrease po intake.    # Complicated UTI   # Metabolic Encephalopathy   - not catheter associated   - c/w ceftriaxone   - F/U urine culture sensitivities     # Decreased po intake   - pt is mentally challenged, she needs assistance with meals  - needs encouragement and one to one feed  - F/U calorie count - might benefit from feeding tube     # Down syndrome   # Alzheimer's dementia  - c/w Seroquel   - reduced Klonopin to 0.25 BID   - aspiration precautions   - EEG results as above    # HLD  - c/w statin    # Seizure disorder  - seizure precautions   - keep Magnesium above 2.0  - Lamictal  was decreased to 75 in am [id number] in pm     # Acute Urinary retention    - c/w Flomax   - urology f/u outpatient     # Hypothyroidism:  - Continue with Synthroid 100, Thyroid Stimulating Hormone, Serum: 0.25 uIU/mL (11.26.19 @ 07:49), dose was decreased form 112 to 100    #) constipation  - resolved, discontinued bowel regimen     DVT Prophylaxis: lovenox  GI ppx: not indicated  Disposition: Pending placement  HCP and legal guardian [phone] Madisyn Enamorado (Sister)

## 2019-12-11 PROCEDURE — 99232 SBSQ HOSP IP/OBS MODERATE 35: CPT

## 2019-12-11 RX ORDER — DRONABINOL 2.5 MG
2.5 CAPSULE ORAL
Refills: 0 | Status: DISCONTINUED | OUTPATIENT
Start: 2019-12-11 | End: 2019-12-16

## 2019-12-11 RX ADMIN — Medication 1 APPLICATION(S): at 17:31

## 2019-12-11 RX ADMIN — Medication 2.5 MILLIGRAM(S): at 17:30

## 2019-12-11 RX ADMIN — Medication 1 TABLET(S): at 12:04

## 2019-12-11 RX ADMIN — SIMVASTATIN 20 MILLIGRAM(S): 20 TABLET, FILM COATED ORAL at 22:10

## 2019-12-11 RX ADMIN — SENNA PLUS 1 TABLET(S): 8.6 TABLET ORAL at 22:10

## 2019-12-11 RX ADMIN — Medication 112 MICROGRAM(S): at 05:06

## 2019-12-11 RX ADMIN — ENOXAPARIN SODIUM 40 MILLIGRAM(S): 100 INJECTION SUBCUTANEOUS at 12:03

## 2019-12-11 RX ADMIN — LAMOTRIGINE 100 MILLIGRAM(S): 25 TABLET, ORALLY DISINTEGRATING ORAL at 22:10

## 2019-12-11 RX ADMIN — Medication 0.25 MILLIGRAM(S): at 17:30

## 2019-12-11 RX ADMIN — QUETIAPINE FUMARATE 25 MILLIGRAM(S): 200 TABLET, FILM COATED ORAL at 22:10

## 2019-12-11 RX ADMIN — QUETIAPINE FUMARATE 25 MILLIGRAM(S): 200 TABLET, FILM COATED ORAL at 00:51

## 2019-12-11 RX ADMIN — Medication 1 DROP(S): at 05:07

## 2019-12-11 RX ADMIN — Medication 1 DROP(S): at 17:31

## 2019-12-11 RX ADMIN — Medication 1 APPLICATION(S): at 05:06

## 2019-12-11 RX ADMIN — Medication 1 APPLICATION(S): at 17:32

## 2019-12-11 RX ADMIN — DEXTROSE MONOHYDRATE, SODIUM CHLORIDE, AND POTASSIUM CHLORIDE 75 MILLILITER(S): 50; .745; 4.5 INJECTION, SOLUTION INTRAVENOUS at 03:52

## 2019-12-11 RX ADMIN — LAMOTRIGINE 75 MILLIGRAM(S): 25 TABLET, ORALLY DISINTEGRATING ORAL at 12:04

## 2019-12-11 RX ADMIN — Medication 1 APPLICATION(S): at 05:05

## 2019-12-11 RX ADMIN — Medication 0.25 MILLIGRAM(S): at 05:06

## 2019-12-11 RX ADMIN — CEFTRIAXONE 100 MILLIGRAM(S): 500 INJECTION, POWDER, FOR SOLUTION INTRAMUSCULAR; INTRAVENOUS at 17:30

## 2019-12-11 RX ADMIN — TAMSULOSIN HYDROCHLORIDE 0.8 MILLIGRAM(S): 0.4 CAPSULE ORAL at 22:10

## 2019-12-11 RX ADMIN — DEXTROSE MONOHYDRATE, SODIUM CHLORIDE, AND POTASSIUM CHLORIDE 75 MILLILITER(S): 50; .745; 4.5 INJECTION, SOLUTION INTRAVENOUS at 12:04

## 2019-12-11 RX ADMIN — CHLORHEXIDINE GLUCONATE 1 APPLICATION(S): 213 SOLUTION TOPICAL at 05:07

## 2019-12-11 NOTE — CHART NOTE - NSCHARTNOTEFT_GEN_A_CORE
3 Day Calorie Count Results   Diet order: dysphagia 1 pureed, nectar thick liquids   Supplement order: Ensure Enlive 1x/day     Day 1: only ice cream noted for dinner; no note of Ensure intake   total kcal = 655  total protein = 26.5g     Day 2: no note of Ensure intake   total kcal = 823  total protein = 40g    Day 3: no breakfast consumed; no note of Ensure intake   total kcal = 1155  total protein = 57g    PO intake was very inconsistent over 3 days of Calorie Count. Appears that intake improved at last day and pt with consumption adequate to nutritional needs. Nutrition supplement intake not documented, which would benefit overall intake. No further nutrition interventions at this time as intake appears to have improved. RD will continue to follow.

## 2019-12-11 NOTE — PROGRESS NOTE ADULT - SUBJECTIVE AND OBJECTIVE BOX
HPI  Patient is a 54y old Female who presents with a chief complaint of decreased po intake (10 Dec 2019 10:57)    Currently admitted to medicine with the primary diagnosis of Failure to thrive in adult     Today is hospital day 16d.     INTERVAL HPI / OVERNIGHT EVENTS:  Patient was examined and seen at bedside. This morning she is resting comfortably in bed and reports no new issues or overnight events. Patient was sleeping this morning.    PAST MEDICAL & SURGICAL HISTORY  Seizures  Intellectual disability  Hypothyroid  Impulse control disorder in adult  Down's syndrome  No significant past surgical history    ALLERGIES  No Known Allergies    MEDICATIONS  STANDING MEDICATIONS  ammonium lactate 12% Lotion 1 Application(s) Topical two times a day  artificial  tears Solution 1 Drop(s) Both EYES two times a day  calcium carbonate 1250 mG  + Vitamin D (OsCal 500 + D) 1 Tablet(s) Oral daily  cefTRIAXone   IVPB 1000 milliGRAM(s) IV Intermittent every 24 hours  chlorhexidine 4% Liquid 1 Application(s) Topical <User Schedule>  clonazePAM  Tablet 0.25 milliGRAM(s) Oral every 12 hours  clotrimazole 1% Cream 1 Application(s) Topical two times a day  dextrose 5% + sodium chloride 0.45% with potassium chloride 20 mEq/L 1000 milliLiter(s) IV Continuous <Continuous>  enoxaparin Injectable 40 milliGRAM(s) SubCutaneous daily  lamoTRIgine 100 milliGRAM(s) Oral at bedtime  lamoTRIgine 75 milliGRAM(s) Oral daily  levothyroxine 112 MICROGram(s) Oral daily  QUEtiapine 25 milliGRAM(s) Oral at bedtime  senna 8.6 milliGRAM(s) Oral Tablet - Peds 1 Tablet(s) Oral at bedtime  simvastatin 20 milliGRAM(s) Oral at bedtime  tamsulosin 0.8 milliGRAM(s) Oral at bedtime    PRN MEDICATIONS    VITALS:  T(F): 98.7  HR: 53  BP: 100/51  RR: 18  SpO2: 96%      LABS                        11.4   5.68  )-----------( 258      ( 10 Dec 2019 08:52 )             35.8     12-10    146  |  107  |  16  ----------------------------<  93  3.9   |  27  |  1.2    Ca    8.8      10 Dec 2019 08:52  Mg     2.0     12-10      RADIOLOGY    < from: CT Head No Cont (12.07.19 @ 16:55) >  IMPRESSION:  1.  No evidence of acute intracranial pathology.  2.  Chronic microvascular ischemic changes.    < from: EEG (12.08.19 @ 11:00) >  Impression  Abnormal due to the presence of: generalized slowing as above    Clinical Correlation & Recommendations   Consistent with diffuse cerebral electrophysiological dysfunction   secondary to nonspecific cause.    < end of copied text > HPI  Patient is a 54y old Female who presents with a chief complaint of decreased po intake (10 Dec 2019 10:57)    Currently admitted to medicine with the primary diagnosis of Failure to thrive in adult     Today is hospital day 16d.     INTERVAL HPI / OVERNIGHT EVENTS:  Patient was examined and seen at bedside. This morning she is resting comfortably in bed and reports no new issues or overnight events. Patient was sleeping this morning as well.    PAST MEDICAL & SURGICAL HISTORY  Seizures  Intellectual disability  Hypothyroid  Impulse control disorder in adult  Down's syndrome  No significant past surgical history    ALLERGIES  No Known Allergies    MEDICATIONS  STANDING MEDICATIONS  ammonium lactate 12% Lotion 1 Application(s) Topical two times a day  artificial  tears Solution 1 Drop(s) Both EYES two times a day  calcium carbonate 1250 mG  + Vitamin D (OsCal 500 + D) 1 Tablet(s) Oral daily  cefTRIAXone   IVPB 1000 milliGRAM(s) IV Intermittent every 24 hours  chlorhexidine 4% Liquid 1 Application(s) Topical <User Schedule>  clonazePAM  Tablet 0.25 milliGRAM(s) Oral every 12 hours  clotrimazole 1% Cream 1 Application(s) Topical two times a day  dextrose 5% + sodium chloride 0.45% with potassium chloride 20 mEq/L 1000 milliLiter(s) IV Continuous <Continuous>  enoxaparin Injectable 40 milliGRAM(s) SubCutaneous daily  lamoTRIgine 100 milliGRAM(s) Oral at bedtime  lamoTRIgine 75 milliGRAM(s) Oral daily  levothyroxine 112 MICROGram(s) Oral daily  QUEtiapine 25 milliGRAM(s) Oral at bedtime  senna 8.6 milliGRAM(s) Oral Tablet - Peds 1 Tablet(s) Oral at bedtime  simvastatin 20 milliGRAM(s) Oral at bedtime  tamsulosin 0.8 milliGRAM(s) Oral at bedtime    PRN MEDICATIONS    VITALS:  T(F): 98.7  HR: 53  BP: 100/51  RR: 18  SpO2: 96%      LABS                        11.4   5.68  )-----------( 258      ( 10 Dec 2019 08:52 )             35.8     12-10    146  |  107  |  16  ----------------------------<  93  3.9   |  27  |  1.2    Ca    8.8      10 Dec 2019 08:52  Mg     2.0     12-10      RADIOLOGY    < from: CT Head No Cont (12.07.19 @ 16:55) >  IMPRESSION:  1.  No evidence of acute intracranial pathology.  2.  Chronic microvascular ischemic changes.    < from: EEG (12.08.19 @ 11:00) >  Impression  Abnormal due to the presence of: generalized slowing as above    Clinical Correlation & Recommendations   Consistent with diffuse cerebral electrophysiological dysfunction   secondary to nonspecific cause.    < end of copied text >

## 2019-12-11 NOTE — PROGRESS NOTE ADULT - SUBJECTIVE AND OBJECTIVE BOX
54y old Female who presents with a chief complaint of decreased po intake, pt has a Down syndrome, mentally challenged, she needs assistance with meals, and calorie count, resume   Dronabinol 2.5 BId. Pt has h/o urinary retention, failed TOV twice during this admission, will give one more TOV today and call for  follow up. She was diagnosed and treated with UTI.   Today pt is comfortable and calm.     PAST MEDICAL & SURGICAL HISTORY  Seizures  Intellectual disability  Hypothyroid  Impulse control disorder in adult  Down's syndrome  No significant past surgical history      ALLERGIES:  No Known Allergies    VITALS:   T(C): 36.7 (11 Dec 2019 14:15), Max: 37.1 (11 Dec 2019 06:03)  T(F): 98 (11 Dec 2019 14:15), Max: 98.7 (11 Dec 2019 06:03)  HR: 70 (11 Dec 2019 14:15) (53 - 70)  BP: 141/61 (11 Dec 2019 14:15) (100/51 - 141/61)  BP(mean): --  RR: 20 (11 Dec 2019 14:15) (18 - 20)  SpO2: --    PHYSICAL EXAM:  GEN: No acute distress, mentally challenged   PULM/CHEST: Clear to auscultation bilaterally, no rales, rhonchi or wheezes   CVS: Regular rate and rhythm, S1-S2, no murmurs  ABD: Soft, non-tender, non-distended, +BS  EXT: No edema  NEURO: Awake, nonverbal, does not follow commands     LABS:                                   11.4   5.68  )-----------( 258      ( 10 Dec 2019 08:52 )             35.8   12-10    146  |  107  |  16  ----------------------------<  93  3.9   |  27  |  1.2    Ca    8.8      10 Dec 2019 08:52  Mg     2.0     12-10    Culture - Urine (12.07.19 @ 16:10)    -  Ciprofloxacin: S <=1    -  Ceftriaxone: S <=1 Enterobacter, Citrobacter, and Serratia may develop resistance during prolonged therapy    -  Cefoxitin: S <=8    -  Cefepime: S <=4    -  Cefazolin: S <=8 ([id number]) For uncomplicated UTI with K. pneumoniae, E. coli, or P. mirablis: DREW <=16 is sensitive and DREW >=32 is resistant. This also predicts results for oral agents cefaclor, cefdinir, cefpodoxime, cefprozil, cefuroxime axetil, cephalexin and locarbef for uncomplicated UTI. Note that some isolates may be susceptible to these agents while testing resistant to cefazolin.    -  Aztreonam: S <=4    -  Ampicillin/Sulbactam: S <=8/4 Enterobacter, Citrobacter, and Serratia may develop resistance during prolonged therapy (3-4 days)    -  Ampicillin: S <=8 These ampicillin results predict results for amoxicillin    -  Trimethoprim/Sulfamethoxazole: S <=2/38    -  Tobramycin: S <=4    -  Tigecycline: S <=2    -  Piperacillin/Tazobactam: S <=16    -  Nitrofurantoin: S <=32 Should not be used to treat pyelonephritis    -  Meropenem: S <=1    -  Levofloxacin: S <=2    -  Imipenem: S <=1    -  Gentamicin: S <=4    -  Amikacin: S <=16    Specimen Source: .Urine Catheterized    Culture Results:   >100,000 CFU/ml Escherichia coli    Organism Identification: Escherichia coli    Organism: Escherichia coli    Method Type: DREW      Culture - Blood (11.25.19 @ 14:24)    Specimen Source: .Blood Blood    Culture Results:   No growth to date.    RADIOLOGY:    < from: Xray Chest 1 View- PORTABLE-Urgent (11.25.19 @ 15:46) >  Impression:      No radiographic evidence of acute cardiopulmonary disease.    < from: EEG (11.29.19 @ 17:35) >    Impression  Abnormal due to the presence of: generalized slowing as above    Clinical Correlation & Recommendations   Consistent with diffuse cerebral electrophysiological dysfunction,   secondary to nonspecific cause.    < from: CT Head No Cont (12.07.19 @ 16:55) >  IMPRESSION:  1.  No evidence of acute intracranial pathology.  2.  Chronic microvascular ischemic changes.      MEDICATIONS  (STANDING):  ammonium lactate 12% Lotion 1 Application(s) Topical two times a day  artificial  tears Solution 1 Drop(s) Both EYES two times a day  calcium carbonate 1250 mG  + Vitamin D (OsCal 500 + D) 1 Tablet(s) Oral daily  cefTRIAXone   IVPB 1000 milliGRAM(s) IV Intermittent every 24 hours  chlorhexidine 4% Liquid 1 Application(s) Topical <User Schedule>  clonazePAM  Tablet 0.25 milliGRAM(s) Oral every 12 hours  clotrimazole 1% Cream 1 Application(s) Topical two times a day  dextrose 5% + sodium chloride 0.45% with potassium chloride 20 mEq/L 1000 milliLiter(s) (75 mL/Hr) IV Continuous <Continuous>  dronabinol 2.5 milliGRAM(s) Oral two times a day  enoxaparin Injectable 40 milliGRAM(s) SubCutaneous daily  lamoTRIgine 100 milliGRAM(s) Oral at bedtime  lamoTRIgine 75 milliGRAM(s) Oral daily  levothyroxine 112 MICROGram(s) Oral daily  QUEtiapine 25 milliGRAM(s) Oral at bedtime  senna 8.6 milliGRAM(s) Oral Tablet - Peds 1 Tablet(s) Oral at bedtime  simvastatin 20 milliGRAM(s) Oral at bedtime  tamsulosin 0.8 milliGRAM(s) Oral at bedtime

## 2019-12-11 NOTE — PROGRESS NOTE ADULT - ASSESSMENT
54 Year Old Female with a PMH of down syndrome, dementia (alert but non verbal at baseline) and seizure disorder (neurologist outside, Dr. Hood) presents from the group home for evaluation of failure of thrive and decrease po intake.      A/P     # Decreased po intake  - pt is mentally challenged, she needs assistance with meals  - resume   Dronabinol 2.5 mg BID  - calorie count   - consulted by nutritionist   - Ensure with meals     # Down syndrome with mental disability and agitation  -supportive care   - will d/c on Clonazepam  0.5 mg Q 12 hours   - prevent falls and aspiration     # Seizure disorder  - seizure precautions   - keep Magnesium above 2.0  - REEG abnormal   - neurology is following   - Lamictal  was decreased to 75 in am [id number] in pm,  -  on Seroquel 25 mg   - started on Clonazepam  0.5 BID     # Urinary retention   -  TOV today   -  follow up if pt'll fail   - c/w   Flomax       # Hypothyroidism:  - TSH was low, decreased the dose of Synthroid to 100 mcg on this admission     DVT Prophylaxis: lovenox  GI ppx: not indicated  Disposition: from group home, [facility]  HCP and legal guardian [phone] Ava Enamorado (Sister)    #Progress Note Handoff  Pending (specify): TOV today , resume Dronabinol, calorie count   Family discussion: I spoke with aid today at the bedside,  health care proxy today Sarah Enamorado [phone]  Disposition: group home vs NH ?

## 2019-12-11 NOTE — PROGRESS NOTE ADULT - ASSESSMENT
54 Year Old Female with a PMH of down syndrome, dementia (alert but non verbal at baseline) and seizure disorder (neurologist outside, Dr. Hood) presents from the group home for evaluation of failure of thrive and decrease po intake.    # Complicated UTI   # Metabolic Encephalopathy   - not catheter associated   - c/w ceftriaxone   - Urine culture pansensitive e. coli     # Decreased po intake   - pt is mentally challenged, she needs assistance with meals  - needs encouragement and one to one feed  - F/U calorie count - might benefit from feeding tube     # Down syndrome   # Alzheimer's dementia  - c/w Seroquel   - reduced Klonopin to 0.25 BID   - aspiration precautions   - EEG results as above    # HLD  - c/w statin    # Seizure disorder  - seizure precautions   - keep Magnesium above 2.0  - Lamictal  was decreased to 75 in am [id number] in pm     # Acute Urinary retention    - c/w Flomax   - urology f/u outpatient     # Hypothyroidism:  - Continue with Synthroid 100, Thyroid Stimulating Hormone, Serum: 0.25 uIU/mL (11.26.19 @ 07:49), dose was decreased form 112 to 100    #) constipation  - resolved, discontinued bowel regimen     DVT Prophylaxis: lovenox  GI ppx: not indicated  Disposition: Pending placement  HCP and legal guardian [phone] Madisyn Enamorado (Sister) 54 Year Old Female with a PMH of down syndrome, dementia (alert but non verbal at baseline) and seizure disorder (neurologist outside, Dr. Hood) presents from the group home for evaluation of failure of thrive and decrease po intake.    # Complicated UTI   # Metabolic Encephalopathy   - not catheter associated   - c/w ceftriaxone   - Urine culture pansensitive E. coli     # Decreased po intake   - pt is mentally challenged, she needs assistance with meals  - needs encouragement and one to one feed  - F/U calorie count today - might benefit from feeding tube if not meeting needs  - Meeting today?    # Down syndrome   # Alzheimer's dementia  - c/w Seroquel   - reduced Klonopin to 0.25 BID   - aspiration precautions   - EEG results as above    # HLD  - c/w statin    # Seizure disorder  - seizure precautions   - keep Magnesium above 2.0  - Lamictal  was decreased to 75 in am [id number] in pm     # Acute Urinary retention    - c/w Flomax   - urology f/u outpatient     # Hypothyroidism:  - Continue with Synthroid 100, Thyroid Stimulating Hormone, Serum: 0.25 uIU/mL (11.26.19 @ 07:49), dose was decreased form 112 to 100    #) constipation  - resolved, discontinued bowel regimen     DVT Prophylaxis: lovenox  GI ppx: not indicated  Disposition: Pending placement  HCP and legal guardian [phone] Madisyn Enamorado (Sister)

## 2019-12-12 PROCEDURE — 99232 SBSQ HOSP IP/OBS MODERATE 35: CPT

## 2019-12-12 RX ORDER — NYSTATIN CREAM 100000 [USP'U]/G
1 CREAM TOPICAL
Refills: 0 | Status: DISCONTINUED | OUTPATIENT
Start: 2019-12-12 | End: 2019-12-16

## 2019-12-12 RX ADMIN — Medication 2.5 MILLIGRAM(S): at 17:19

## 2019-12-12 RX ADMIN — NYSTATIN CREAM 1 APPLICATION(S): 100000 CREAM TOPICAL at 17:21

## 2019-12-12 RX ADMIN — SIMVASTATIN 20 MILLIGRAM(S): 20 TABLET, FILM COATED ORAL at 22:12

## 2019-12-12 RX ADMIN — Medication 1 APPLICATION(S): at 17:21

## 2019-12-12 RX ADMIN — ENOXAPARIN SODIUM 40 MILLIGRAM(S): 100 INJECTION SUBCUTANEOUS at 12:29

## 2019-12-12 RX ADMIN — Medication 1 TABLET(S): at 12:28

## 2019-12-12 RX ADMIN — Medication 1 DROP(S): at 17:22

## 2019-12-12 RX ADMIN — Medication 1 APPLICATION(S): at 06:01

## 2019-12-12 RX ADMIN — DEXTROSE MONOHYDRATE, SODIUM CHLORIDE, AND POTASSIUM CHLORIDE 75 MILLILITER(S): 50; .745; 4.5 INJECTION, SOLUTION INTRAVENOUS at 22:12

## 2019-12-12 RX ADMIN — Medication 1 APPLICATION(S): at 06:02

## 2019-12-12 RX ADMIN — LAMOTRIGINE 100 MILLIGRAM(S): 25 TABLET, ORALLY DISINTEGRATING ORAL at 22:12

## 2019-12-12 RX ADMIN — DEXTROSE MONOHYDRATE, SODIUM CHLORIDE, AND POTASSIUM CHLORIDE 75 MILLILITER(S): 50; .745; 4.5 INJECTION, SOLUTION INTRAVENOUS at 12:27

## 2019-12-12 RX ADMIN — LAMOTRIGINE 75 MILLIGRAM(S): 25 TABLET, ORALLY DISINTEGRATING ORAL at 12:28

## 2019-12-12 RX ADMIN — SENNA PLUS 1 TABLET(S): 8.6 TABLET ORAL at 22:13

## 2019-12-12 RX ADMIN — QUETIAPINE FUMARATE 25 MILLIGRAM(S): 200 TABLET, FILM COATED ORAL at 22:12

## 2019-12-12 RX ADMIN — Medication 2.5 MILLIGRAM(S): at 06:02

## 2019-12-12 RX ADMIN — NYSTATIN CREAM 1 APPLICATION(S): 100000 CREAM TOPICAL at 06:01

## 2019-12-12 RX ADMIN — Medication 1 DROP(S): at 06:02

## 2019-12-12 RX ADMIN — Medication 112 MICROGRAM(S): at 06:01

## 2019-12-12 RX ADMIN — Medication 0.25 MILLIGRAM(S): at 17:19

## 2019-12-12 RX ADMIN — TAMSULOSIN HYDROCHLORIDE 0.8 MILLIGRAM(S): 0.4 CAPSULE ORAL at 22:11

## 2019-12-12 RX ADMIN — Medication 0.25 MILLIGRAM(S): at 06:01

## 2019-12-12 RX ADMIN — CHLORHEXIDINE GLUCONATE 1 APPLICATION(S): 213 SOLUTION TOPICAL at 05:56

## 2019-12-12 NOTE — PROGRESS NOTE ADULT - ASSESSMENT
54 Year Old Female with a PMH of down syndrome, dementia (alert but non verbal at baseline) and seizure disorder (neurologist outside, Dr. Hood) presents from the group home for evaluation of failure of thrive and decrease po intake.    # Complicated UTI   # Metabolic Encephalopathy   - not catheter associated   - c/w ceftriaxone   - Urine culture pansensitive E. coli     # Decreased po intake   - pt is mentally challenged, she needs assistance with meals  - needs encouragement and one to one feed  - F/U calorie count today - might benefit from feeding tube if not meeting needs  - Meeting today?    # Down syndrome   # Alzheimer's dementia  - c/w Seroquel   - reduced Klonopin to 0.25 BID   - aspiration precautions   - EEG results as above    # HLD  - c/w statin    # Seizure disorder  - seizure precautions   - keep Magnesium above 2.0  - Lamictal  was decreased to 75 in am [id number] in pm     # Acute Urinary retention    - c/w Flomax   - urology f/u outpatient     # Hypothyroidism:  - Continue with Synthroid 100, Thyroid Stimulating Hormone, Serum: 0.25 uIU/mL (11.26.19 @ 07:49), dose was decreased form 112 to 100    #) constipation  - resolved, discontinued bowel regimen     DVT Prophylaxis: lovenox  GI ppx: not indicated  Disposition: Pending placement  HCP and legal guardian [phone] Madisyn Enamorado (Sister) 54 Year Old Female with a PMH of down syndrome, dementia (alert but non verbal at baseline) and seizure disorder (neurologist outside, Dr. Hood) presents from the group home for evaluation of failure of thrive and decrease po intake.    # Complicated UTI   # Metabolic Encephalopathy   - not catheter associated   - c/w ceftriaxone   - Urine culture pansensitive E. coli     # Decreased po intake   - pt is mentally challenged, she needs assistance with meals  - needs encouragement and one to one feed  - Starting new calorie count  - Met with group home and family at length yesterday. They would like a repeated calorie count as they believe it would be inaccurate if the patient was receiving haldol for agitation.   - They would also like a trial of void to attempt to remove the doherty.     # Down syndrome   # Alzheimer's dementia  - c/w Seroquel   - reduced Klonopin to 0.25 BID   - aspiration precautions   - EEG results as above    # HLD  - c/w statin    # Seizure disorder  - seizure precautions   - keep Magnesium above 2.0  - Lamictal  was decreased to 75 in am [id number] in pm     # Acute Urinary retention    - c/w Flomax   - urology f/u outpatient     # Hypothyroidism:  - Continue with Synthroid 100, Thyroid Stimulating Hormone, Serum: 0.25 uIU/mL (11.26.19 @ 07:49), dose was decreased form 112 to 100    #) constipation  - resolved, discontinued bowel regimen     DVT Prophylaxis: lovenox  GI ppx: not indicated  Disposition: Pending placement  HCP and legal guardian [phone] Madisyn Enamorado (Sister) 54 Year Old Female with a PMH of down syndrome, dementia (alert but non verbal at baseline) and seizure disorder (neurologist outside, Dr. Hood) presents from the group home for evaluation of failure of thrive and decrease po intake.    # Complicated UTI   # Metabolic Encephalopathy   - not catheter associated   - Urine culture pansensitive E. coli   - finished antibiotic course    # Decreased po intake   - pt is mentally challenged, she needs assistance with meals  - needs encouragement and one to one feed  - Starting new calorie count  - Met with group home and family at length yesterday. They would like a repeated calorie count as they believe it would be inaccurate if the patient was receiving haldol for agitation.   - They would also like a trial of void to attempt to remove the doherty.   - Given dronabinol    # Down syndrome   # Alzheimer's dementia  - c/w Seroquel   - reduced Klonopin to 0.25 BID   - aspiration precautions   - EEG results as above    # HLD  - c/w statin    # Seizure disorder  - seizure precautions   - keep Magnesium above 2.0  - Lamictal  was decreased to 75 in am [id number] in pm     # Acute Urinary retention    - c/w Flomax   - urology f/u outpatient     # Hypothyroidism:  - Continue with Synthroid 100, Thyroid Stimulating Hormone, Serum: 0.25 uIU/mL (11.26.19 @ 07:49), dose was decreased form 112 to 100    #) constipation  - resolved, discontinued bowel regimen     DVT Prophylaxis: lovenox  GI ppx: not indicated  Disposition: Pending placement  HCP and legal guardian [phone] Madisyn Enamorado (Sister)

## 2019-12-12 NOTE — PROGRESS NOTE ADULT - ASSESSMENT
54 Year Old Female with a PMH of down syndrome, dementia (alert but non verbal at baseline) and seizure disorder (neurologist outside, Dr. Hood) presents from the group home for evaluation of failure of thrive and decrease po intake.      A/P     # Decreased po intake  - pt is mentally challenged, she needs assistance with meals  - c/w   Dronabinol 2.5 mg BID  -  f/u calorie count   - consulted by nutritionist   - Ensure with meals     # Down syndrome with mental disability and agitation  -supportive care   - will d/c on Clonazepam  0.25 mg Q 12 hours   - prevent falls and aspiration     # Seizure disorder  - seizure precautions   - keep Magnesium above 2.0  - REEG abnormal   - neurology is following   - Lamictal  was decreased to 75 in am [id number] in pm,  -  on Seroquel 25 mg   - started on Clonazepam  0.25 BID     # Urinary retention   -  TOV today   -  follow up if pt'll fail   - c/w   Flomax       # Hypothyroidism:  - TSH was low, decreased the dose of Synthroid to 100 mcg on this admission     DVT Prophylaxis: lovenox  GI ppx: not indicated  Disposition: from group home, [facility]  HCP and legal guardian [phone] Ava Fei (Sister)    #Progress Note Handoff  Pending (specify): TOV today ,f/u  calorie count   Family discussion: I spoke with aid today at the bedside,  health care proxy today Sarah Enamorado [phone]  Disposition: group home vs NH ?

## 2019-12-12 NOTE — PROGRESS NOTE ADULT - SUBJECTIVE AND OBJECTIVE BOX
HPI  Patient is a 54y old Female who presents with a chief complaint of decreased po intake (11 Dec 2019 15:36)    Currently admitted to medicine with the primary diagnosis of Failure to thrive in adult     Today is hospital day 17d.     INTERVAL HPI / OVERNIGHT EVENTS:  Patient was examined and seen at bedside. This morning she is resting comfortably in bed and reports no new issues or overnight events. Patient was sleeping this morning as well.    ROS: Otherwise unremarkable     PAST MEDICAL & SURGICAL HISTORY  Seizures  Intellectual disability  Hypothyroid  Impulse control disorder in adult  Down's syndrome  No significant past surgical history    ALLERGIES  No Known Allergies    MEDICATIONS  STANDING MEDICATIONS  ammonium lactate 12% Lotion 1 Application(s) Topical two times a day  artificial  tears Solution 1 Drop(s) Both EYES two times a day  calcium carbonate 1250 mG  + Vitamin D (OsCal 500 + D) 1 Tablet(s) Oral daily  chlorhexidine 4% Liquid 1 Application(s) Topical <User Schedule>  clonazePAM  Tablet 0.25 milliGRAM(s) Oral every 12 hours  clotrimazole 1% Cream 1 Application(s) Topical two times a day  dextrose 5% + sodium chloride 0.45% with potassium chloride 20 mEq/L 1000 milliLiter(s) IV Continuous <Continuous>  dronabinol 2.5 milliGRAM(s) Oral two times a day  enoxaparin Injectable 40 milliGRAM(s) SubCutaneous daily  lamoTRIgine 100 milliGRAM(s) Oral at bedtime  lamoTRIgine 75 milliGRAM(s) Oral daily  levothyroxine 112 MICROGram(s) Oral daily  nystatin Powder 1 Application(s) Topical two times a day  QUEtiapine 25 milliGRAM(s) Oral at bedtime  senna 8.6 milliGRAM(s) Oral Tablet - Peds 1 Tablet(s) Oral at bedtime  simvastatin 20 milliGRAM(s) Oral at bedtime  tamsulosin 0.8 milliGRAM(s) Oral at bedtime    PRN MEDICATIONS    VITALS:  T(F): 98.9  HR: 70  BP: 120/70  RR: 19  SpO2: --    PHYSICAL EXAM  GEN: NAD, Resting comfortably in bed  PULM: Clear to auscultation bilaterally, No wheezes  CVS: Regular rate and rhythm, S1-S2, no murmurs  ABD: Soft, non-tender, non-distended, no guarding  EXT: No edema  NEURO: AAOx3, no focal deficits    LABS                        11.4   5.68  )-----------( 258      ( 10 Dec 2019 08:52 )             35.8     12-10    146  |  107  |  16  ----------------------------<  93  3.9   |  27  |  1.2    Ca    8.8      10 Dec 2019 08:52  Mg     2.0     12-10      RADIOLOGY    < from: CT Head No Cont (12.07.19 @ 16:55) >  IMPRESSION:  1.  No evidence of acute intracranial pathology.  2.  Chronic microvascular ischemic changes.    < from: EEG (12.08.19 @ 11:00) >  Impression  Abnormal due to the presence of: generalized slowing as above    Clinical Correlation & Recommendations   Consistent with diffuse cerebral electrophysiological dysfunction   secondary to nonspecific cause.    < end of copied text > HPI  Patient is a 54y old Female who presents with a chief complaint of decreased po intake (11 Dec 2019 15:36)    Currently admitted to medicine with the primary diagnosis of Failure to thrive in adult     Today is hospital day 17d.     INTERVAL HPI / OVERNIGHT EVENTS:  Patient was examined and seen at bedside. This morning she is resting comfortably in bed and reports no new issues or overnight events. Patient was sleeping this morning as well.    ROS: Otherwise unremarkable     PAST MEDICAL & SURGICAL HISTORY  Seizures  Intellectual disability  Hypothyroid  Impulse control disorder in adult  Down's syndrome  No significant past surgical history    ALLERGIES  No Known Allergies    MEDICATIONS  STANDING MEDICATIONS  ammonium lactate 12% Lotion 1 Application(s) Topical two times a day  artificial  tears Solution 1 Drop(s) Both EYES two times a day  calcium carbonate 1250 mG  + Vitamin D (OsCal 500 + D) 1 Tablet(s) Oral daily  chlorhexidine 4% Liquid 1 Application(s) Topical <User Schedule>  clonazePAM  Tablet 0.25 milliGRAM(s) Oral every 12 hours  clotrimazole 1% Cream 1 Application(s) Topical two times a day  dextrose 5% + sodium chloride 0.45% with potassium chloride 20 mEq/L 1000 milliLiter(s) IV Continuous <Continuous>  dronabinol 2.5 milliGRAM(s) Oral two times a day  enoxaparin Injectable 40 milliGRAM(s) SubCutaneous daily  lamoTRIgine 100 milliGRAM(s) Oral at bedtime  lamoTRIgine 75 milliGRAM(s) Oral daily  levothyroxine 112 MICROGram(s) Oral daily  nystatin Powder 1 Application(s) Topical two times a day  QUEtiapine 25 milliGRAM(s) Oral at bedtime  senna 8.6 milliGRAM(s) Oral Tablet - Peds 1 Tablet(s) Oral at bedtime  simvastatin 20 milliGRAM(s) Oral at bedtime  tamsulosin 0.8 milliGRAM(s) Oral at bedtime    PRN MEDICATIONS    VITALS:  T(F): 98.9  HR: 70  BP: 120/70  RR: 19  SpO2: --    LABS                        11.4   5.68  )-----------( 258      ( 10 Dec 2019 08:52 )             35.8     12-10    146  |  107  |  16  ----------------------------<  93  3.9   |  27  |  1.2    Ca    8.8      10 Dec 2019 08:52  Mg     2.0     12-10      RADIOLOGY    < from: CT Head No Cont (12.07.19 @ 16:55) >  IMPRESSION:  1.  No evidence of acute intracranial pathology.  2.  Chronic microvascular ischemic changes.    < from: EEG (12.08.19 @ 11:00) >  Impression  Abnormal due to the presence of: generalized slowing as above    Clinical Correlation & Recommendations   Consistent with diffuse cerebral electrophysiological dysfunction   secondary to nonspecific cause.    < end of copied text >

## 2019-12-12 NOTE — PROGRESS NOTE ADULT - SUBJECTIVE AND OBJECTIVE BOX
54y old Female who presents with a chief complaint of decreased po intake, pt has a Down syndrome, mentally challenged, she needs assistance with meals, and calorie count, resume   Dronabinol 2.5 BId. Pt has h/o urinary retention, failed TOV twice during this admission, will give one more TOV today and call for  follow up. She was diagnosed and treated with UTI.   Today pt is comfortable and calm.     PAST MEDICAL & SURGICAL HISTORY  Seizures  Intellectual disability  Hypothyroid  Impulse control disorder in adult  Down's syndrome  No significant past surgical history      ALLERGIES:  No Known Allergies    VITALS:   T(C): 36.1 (12 Dec 2019 14:56), Max: 37.2 (12 Dec 2019 05:15)  T(F): 97 (12 Dec 2019 14:56), Max: 98.9 (12 Dec 2019 05:15)  HR: 65 (12 Dec 2019 14:56) (62 - 70)  BP: 125/58 (12 Dec 2019 14:56) (120/70 - 125/58)  BP(mean): --  RR: 18 (12 Dec 2019 14:56) (16 - 19)  SpO2: --    PHYSICAL EXAM:  GEN: No acute distress, mentally challenged   PULM/CHEST: Clear to auscultation bilaterally, no rales, rhonchi or wheezes   CVS: Regular rate and rhythm, S1-S2, no murmurs  ABD: Soft, non-tender, non-distended, +BS  EXT: No edema  NEURO: Awake, nonverbal, does not follow commands     LABS:              no new labs     Culture - Urine (12.07.19 @ 16:10)    -  Ciprofloxacin: S <=1    -  Ceftriaxone: S <=1 Enterobacter, Citrobacter, and Serratia may develop resistance during prolonged therapy    -  Cefoxitin: S <=8    -  Cefepime: S <=4    -  Cefazolin: S <=8 (MIC_CL_COM_[email or web address]) For uncomplicated UTI with K. pneumoniae, E. coli, or P. mirablis: DREW <=16 is sensitive and DREW >=32 is resistant. This also predicts results for oral agents cefaclor, cefdinir, cefpodoxime, cefprozil, cefuroxime axetil, cephalexin and locarbef for uncomplicated UTI. Note that some isolates may be susceptible to these agents while testing resistant to cefazolin.    -  Aztreonam: S <=4    -  Ampicillin/Sulbactam: S <=8/4 Enterobacter, Citrobacter, and Serratia may develop resistance during prolonged therapy (3-4 days)    -  Ampicillin: S <=8 These ampicillin results predict results for amoxicillin    -  Trimethoprim/Sulfamethoxazole: S <=2/38    -  Tobramycin: S <=4    -  Tigecycline: S <=2    -  Piperacillin/Tazobactam: S <=16    -  Nitrofurantoin: S <=32 Should not be used to treat pyelonephritis    -  Meropenem: S <=1    -  Levofloxacin: S <=2    -  Imipenem: S <=1    -  Gentamicin: S <=4    -  Amikacin: S <=16    Specimen Source: .Urine Catheterized    Culture Results:   >100,000 CFU/ml Escherichia coli    Organism Identification: Escherichia coli    Organism: Escherichia coli    Method Type: DREW      Culture - Blood (11.25.19 @ 14:24)    Specimen Source: .Blood Blood    Culture Results:   No growth to date.    RADIOLOGY:    < from: Xray Chest 1 View- PORTABLE-Urgent (11.25.19 @ 15:46) >  Impression:      No radiographic evidence of acute cardiopulmonary disease.    < from: EEG (11.29.19 @ 17:35) >    Impression  Abnormal due to the presence of: generalized slowing as above    Clinical Correlation & Recommendations   Consistent with diffuse cerebral electrophysiological dysfunction,   secondary to nonspecific cause.    < from: CT Head No Cont (12.07.19 @ 16:55) >  IMPRESSION:  1.  No evidence of acute intracranial pathology.  2.  Chronic microvascular ischemic changes.      MEDICATIONS  (STANDING):  ammonium lactate 12% Lotion 1 Application(s) Topical two times a day  artificial  tears Solution 1 Drop(s) Both EYES two times a day  calcium carbonate 1250 mG  + Vitamin D (OsCal 500 + D) 1 Tablet(s) Oral daily  chlorhexidine 4% Liquid 1 Application(s) Topical <User Schedule>  clonazePAM  Tablet 0.25 milliGRAM(s) Oral every 12 hours  clotrimazole 1% Cream 1 Application(s) Topical two times a day  dextrose 5% + sodium chloride 0.45% with potassium chloride 20 mEq/L 1000 milliLiter(s) (75 mL/Hr) IV Continuous <Continuous>  dronabinol 2.5 milliGRAM(s) Oral two times a day  enoxaparin Injectable 40 milliGRAM(s) SubCutaneous daily  lamoTRIgine 100 milliGRAM(s) Oral at bedtime  lamoTRIgine 75 milliGRAM(s) Oral daily  levothyroxine 112 MICROGram(s) Oral daily  nystatin Powder 1 Application(s) Topical two times a day  QUEtiapine 25 milliGRAM(s) Oral at bedtime  senna 8.6 milliGRAM(s) Oral Tablet - Peds 1 Tablet(s) Oral at bedtime  simvastatin 20 milliGRAM(s) Oral at bedtime  tamsulosin 0.8 milliGRAM(s) Oral at bedtime

## 2019-12-13 PROCEDURE — 99232 SBSQ HOSP IP/OBS MODERATE 35: CPT

## 2019-12-13 RX ADMIN — Medication 2.5 MILLIGRAM(S): at 18:19

## 2019-12-13 RX ADMIN — Medication 0.25 MILLIGRAM(S): at 18:19

## 2019-12-13 RX ADMIN — Medication 1 APPLICATION(S): at 06:08

## 2019-12-13 RX ADMIN — QUETIAPINE FUMARATE 25 MILLIGRAM(S): 200 TABLET, FILM COATED ORAL at 21:58

## 2019-12-13 RX ADMIN — DEXTROSE MONOHYDRATE, SODIUM CHLORIDE, AND POTASSIUM CHLORIDE 75 MILLILITER(S): 50; .745; 4.5 INJECTION, SOLUTION INTRAVENOUS at 11:22

## 2019-12-13 RX ADMIN — SENNA PLUS 1 TABLET(S): 8.6 TABLET ORAL at 21:58

## 2019-12-13 RX ADMIN — LAMOTRIGINE 75 MILLIGRAM(S): 25 TABLET, ORALLY DISINTEGRATING ORAL at 11:24

## 2019-12-13 RX ADMIN — ENOXAPARIN SODIUM 40 MILLIGRAM(S): 100 INJECTION SUBCUTANEOUS at 11:24

## 2019-12-13 RX ADMIN — NYSTATIN CREAM 1 APPLICATION(S): 100000 CREAM TOPICAL at 06:08

## 2019-12-13 RX ADMIN — Medication 1 DROP(S): at 18:19

## 2019-12-13 RX ADMIN — Medication 112 MICROGRAM(S): at 06:08

## 2019-12-13 RX ADMIN — TAMSULOSIN HYDROCHLORIDE 0.8 MILLIGRAM(S): 0.4 CAPSULE ORAL at 21:59

## 2019-12-13 RX ADMIN — Medication 1 APPLICATION(S): at 18:19

## 2019-12-13 RX ADMIN — SIMVASTATIN 20 MILLIGRAM(S): 20 TABLET, FILM COATED ORAL at 21:58

## 2019-12-13 RX ADMIN — Medication 1 DROP(S): at 06:07

## 2019-12-13 RX ADMIN — Medication 1 TABLET(S): at 11:24

## 2019-12-13 RX ADMIN — CHLORHEXIDINE GLUCONATE 1 APPLICATION(S): 213 SOLUTION TOPICAL at 06:07

## 2019-12-13 RX ADMIN — LAMOTRIGINE 100 MILLIGRAM(S): 25 TABLET, ORALLY DISINTEGRATING ORAL at 21:58

## 2019-12-13 RX ADMIN — Medication 2.5 MILLIGRAM(S): at 06:08

## 2019-12-13 RX ADMIN — Medication 0.25 MILLIGRAM(S): at 06:08

## 2019-12-13 RX ADMIN — NYSTATIN CREAM 1 APPLICATION(S): 100000 CREAM TOPICAL at 18:20

## 2019-12-13 NOTE — PROGRESS NOTE ADULT - SUBJECTIVE AND OBJECTIVE BOX
HPI  Patient is a 54y old Female who presents with a chief complaint of decreased po intake (12 Dec 2019 17:34)    Currently admitted to medicine with the primary diagnosis of Failure to thrive in adult     Today is hospital day 18d.     INTERVAL HPI / OVERNIGHT EVENTS:  Patient was examined and seen at bedside. This morning she is resting comfortably in bed and reports no new issues or overnight events. Patient was sleeping this morning as well.    ROS: Otherwise unremarkable     PAST MEDICAL & SURGICAL HISTORY  Seizures  Intellectual disability  Hypothyroid  Impulse control disorder in adult  Down's syndrome  No significant past surgical history    ALLERGIES  No Known Allergies    MEDICATIONS  STANDING MEDICATIONS  ammonium lactate 12% Lotion 1 Application(s) Topical two times a day  artificial  tears Solution 1 Drop(s) Both EYES two times a day  calcium carbonate 1250 mG  + Vitamin D (OsCal 500 + D) 1 Tablet(s) Oral daily  chlorhexidine 4% Liquid 1 Application(s) Topical <User Schedule>  clonazePAM  Tablet 0.25 milliGRAM(s) Oral every 12 hours  clotrimazole 1% Cream 1 Application(s) Topical two times a day  dextrose 5% + sodium chloride 0.45% with potassium chloride 20 mEq/L 1000 milliLiter(s) IV Continuous <Continuous>  dronabinol 2.5 milliGRAM(s) Oral two times a day  enoxaparin Injectable 40 milliGRAM(s) SubCutaneous daily  lamoTRIgine 100 milliGRAM(s) Oral at bedtime  lamoTRIgine 75 milliGRAM(s) Oral daily  levothyroxine 112 MICROGram(s) Oral daily  nystatin Powder 1 Application(s) Topical two times a day  QUEtiapine 25 milliGRAM(s) Oral at bedtime  senna 8.6 milliGRAM(s) Oral Tablet - Peds 1 Tablet(s) Oral at bedtime  simvastatin 20 milliGRAM(s) Oral at bedtime  tamsulosin 0.8 milliGRAM(s) Oral at bedtime    PRN MEDICATIONS    VITALS:  T(F): 98  HR: 52  BP: 110/55  RR: 18  SpO2: --    PHYSICAL EXAM  GEN: NAD, Resting comfortably in bed  PULM: Clear to auscultation bilaterally, No wheezes  CVS: Regular rate and rhythm, S1-S2, no murmurs  ABD: Soft, non-tender, non-distended, no guarding  EXT: No edema  NEURO: AAOx3, no focal deficits        RADIOLOGY  < from: CT Head No Cont (12.07.19 @ 16:55) >  IMPRESSION:  1.  No evidence of acute intracranial pathology.  2.  Chronic microvascular ischemic changes.    < from: EEG (12.08.19 @ 11:00) >  Impression  Abnormal due to the presence of: generalized slowing as above    Clinical Correlation & Recommendations   Consistent with diffuse cerebral electrophysiological dysfunction   secondary to nonspecific cause.    < end of copied text > HPI  Patient is a 54y old Female who presents with a chief complaint of decreased po intake (12 Dec 2019 17:34)    Currently admitted to medicine with the primary diagnosis of Failure to thrive in adult     Today is hospital day 18d.     INTERVAL HPI / OVERNIGHT EVENTS:  Patient was examined and seen at bedside. This morning she is resting comfortably in bed and reports no new issues or overnight events. Patient was sleeping this morning as well. Spoke with caregiver at bedside. Updated on new calorie count and result of doherty removal.    ROS: Otherwise unremarkable     PAST MEDICAL & SURGICAL HISTORY  Seizures  Intellectual disability  Hypothyroid  Impulse control disorder in adult  Down's syndrome  No significant past surgical history    ALLERGIES  No Known Allergies    MEDICATIONS  STANDING MEDICATIONS  ammonium lactate 12% Lotion 1 Application(s) Topical two times a day  artificial  tears Solution 1 Drop(s) Both EYES two times a day  calcium carbonate 1250 mG  + Vitamin D (OsCal 500 + D) 1 Tablet(s) Oral daily  chlorhexidine 4% Liquid 1 Application(s) Topical <User Schedule>  clonazePAM  Tablet 0.25 milliGRAM(s) Oral every 12 hours  clotrimazole 1% Cream 1 Application(s) Topical two times a day  dextrose 5% + sodium chloride 0.45% with potassium chloride 20 mEq/L 1000 milliLiter(s) IV Continuous <Continuous>  dronabinol 2.5 milliGRAM(s) Oral two times a day  enoxaparin Injectable 40 milliGRAM(s) SubCutaneous daily  lamoTRIgine 100 milliGRAM(s) Oral at bedtime  lamoTRIgine 75 milliGRAM(s) Oral daily  levothyroxine 112 MICROGram(s) Oral daily  nystatin Powder 1 Application(s) Topical two times a day  QUEtiapine 25 milliGRAM(s) Oral at bedtime  senna 8.6 milliGRAM(s) Oral Tablet - Peds 1 Tablet(s) Oral at bedtime  simvastatin 20 milliGRAM(s) Oral at bedtime  tamsulosin 0.8 milliGRAM(s) Oral at bedtime    PRN MEDICATIONS    VITALS:  T(F): 98  HR: 52  BP: 110/55  RR: 18  SpO2: --    RADIOLOGY  < from: CT Head No Cont (12.07.19 @ 16:55) >  IMPRESSION:  1.  No evidence of acute intracranial pathology.  2.  Chronic microvascular ischemic changes.    < from: EEG (12.08.19 @ 11:00) >  Impression  Abnormal due to the presence of: generalized slowing as above    Clinical Correlation & Recommendations   Consistent with diffuse cerebral electrophysiological dysfunction   secondary to nonspecific cause.    < end of copied text > Statement Selected

## 2019-12-13 NOTE — CHART NOTE - NSCHARTNOTEFT_GEN_A_CORE
Registered Dietitian Limited Follow-Up:    Pt admitted with decreased po intake - noted on marinol at this time. Multiple calorie counts ordered this admit. Aide at bedside + RN both report good po intake over past 3 days, with >75% po intake at this time. Receiving Dysphagia 1 Pureed diet with nectar thick liquids & Ensure Enlive q24hrs with thickening packet. Last SLP eval was on 11/27 - noted recommendation of "continue to trial puree and nectar diet as tolerated/accepted, consider non-oral means of nutrition and hydration if this is consistent w/ family wishes". Recommend Last wt noted 81.4 kg (11/25) - no new wt at this time. Requested new actual wt from staff. Labs/meds reviewed. No pressure ulcers noted. No additional nutrition intervention at this time. Calorie count to be completed 12/15, will monitor results. If pt does not meet estimated nutrient needs, will reassess within 3-4 days, otherwise will reassess in 7 days.

## 2019-12-13 NOTE — PROGRESS NOTE ADULT - ASSESSMENT
54 Year Old Female with a PMH of down syndrome, dementia (alert but non verbal at baseline) and seizure disorder (neurologist outside, Dr. Hood) presents from the group home for evaluation of failure of thrive and decrease po intake.    # Complicated UTI   # Metabolic Encephalopathy   - not catheter associated   - Urine culture pansensitive E. coli   - finished antibiotic course    # Decreased po intake   - pt is mentally challenged, she needs assistance with meals  - needs encouragement and one to one feed  - Starting new calorie count  - Met with group home and family at length yesterday. They would like a repeated calorie count as they believe it would be inaccurate if the patient was receiving haldol for agitation.   - They would also like a trial of void to attempt to remove the doherty.   - Given dronabinol    # Down syndrome   # Alzheimer's dementia  - c/w Seroquel   - reduced Klonopin to 0.25 BID   - aspiration precautions   - EEG results as above    # HLD  - c/w statin    # Seizure disorder  - seizure precautions   - keep Magnesium above 2.0  - Lamictal  was decreased to 75 in am [id number] in pm     # Acute Urinary retention    - c/w Flomax   - urology f/u outpatient     # Hypothyroidism:  - Continue with Synthroid 100, Thyroid Stimulating Hormone, Serum: 0.25 uIU/mL (11.26.19 @ 07:49), dose was decreased form 112 to 100    #) constipation  - resolved, discontinued bowel regimen     DVT Prophylaxis: lovenox  GI ppx: not indicated  Disposition: Pending placement  HCP and legal guardian [phone] Madisny Enamorado (Sister) 54 Year Old Female with a PMH of down syndrome, dementia (alert but non verbal at baseline) and seizure disorder (neurologist outside, Dr. Hood) presents from the group home for evaluation of failure of thrive and decrease po intake.    # Complicated UTI   # Metabolic Encephalopathy   - not catheter associated   - Urine culture pansensitive E. coli   - finished antibiotic course    # Decreased po intake   - pt is mentally challenged, she needs assistance with meals  - needs encouragement and one to one feed  - Starting new calorie count  - Met with group home and family at length earlier this week. They would like a repeated calorie count as they believe it would be inaccurate if the patient was receiving haldol for agitation.   - Patients doherty was DC'ed as per meeting, and the patient is incontinent.   - Given dronabinol  - F/U Calorie count    # Down syndrome   # Alzheimer's dementia  - c/w Seroquel   - reduced Klonopin to 0.25 BID   - aspiration precautions   - EEG results as above    # HLD  - c/w statin    # Seizure disorder  - seizure precautions   - keep Magnesium above 2.0  - Lamictal  was decreased to 75 in am [id number] in pm     # Acute Urinary retention    - c/w Flomax   - urology f/u outpatient     # Hypothyroidism:  - Continue with Synthroid 100, Thyroid Stimulating Hormone, Serum: 0.25 uIU/mL (11.26.19 @ 07:49), dose was decreased form 112 to 100    #) constipation  - resolved, discontinued bowel regimen     DVT Prophylaxis: lovenox  GI ppx: not indicated  Disposition: Pending placement  HCP and legal guardian [phone] Madisyn Enamorado (Sister)

## 2019-12-13 NOTE — PROGRESS NOTE ADULT - SUBJECTIVE AND OBJECTIVE BOX
54y old Female who presents with a chief complaint of decreased po intake, pt has a Down syndrome, mentally challenged, she needs assistance with meals, and calorie count, resume   Dronabinol 2.5 BId. Pt has h/o urinary retention, failed TOV x3 , needs Gallo cath.  She was diagnosed and treated with UTI.   Today pt is comfortable and calm, crying at times, eating well.     PAST MEDICAL & SURGICAL HISTORY  Seizures  Intellectual disability  Hypothyroid  Impulse control disorder in adult  Down's syndrome  No significant past surgical history      ALLERGIES:  No Known Allergies    VITALS:   T(C): 36.1 (13 Dec 2019 14:11), Max: 36.7 (13 Dec 2019 05:30)  T(F): 97 (13 Dec 2019 14:11), Max: 98 (13 Dec 2019 05:30)  HR: 63 (13 Dec 2019 14:11) (52 - 65)  BP: 123/57 (13 Dec 2019 14:11) (110/55 - 125/58)  BP(mean): --  RR: 18 (13 Dec 2019 05:30) (18 - 18)  SpO2: 96% (13 Dec 2019 09:58) (96% - 96%)    PHYSICAL EXAM:  GEN: No acute distress, mentally challenged   PULM/CHEST: Clear to auscultation bilaterally, no rales, rhonchi or wheezes   CVS: Regular rate and rhythm, S1-S2, no murmurs  ABD: Soft, non-tender, non-distended, +BS  EXT: No edema  NEURO: Awake, nonverbal, does not follow commands     LABS:             no new labs     Culture - Urine (12.07.19 @ 16:10)    -  Ciprofloxacin: S <=1    -  Ceftriaxone: S <=1 Enterobacter, Citrobacter, and Serratia may develop resistance during prolonged therapy    -  Cefoxitin: S <=8    -  Cefepime: S <=4    -  Cefazolin: S <=8 (MIC_CL_COM_ENTERIC_CEFAZU) For uncomplicated UTI with K. pneumoniae, E. coli, or P. mirablis: DREW <=16 is sensitive and DREW >=32 is resistant. This also predicts results for oral agents cefaclor, cefdinir, cefpodoxime, cefprozil, cefuroxime axetil, cephalexin and locarbef for uncomplicated UTI. Note that some isolates may be susceptible to these agents while testing resistant to cefazolin.    -  Aztreonam: S <=4    -  Ampicillin/Sulbactam: S <=8/4 Enterobacter, Citrobacter, and Serratia may develop resistance during prolonged therapy (3-4 days)    -  Ampicillin: S <=8 These ampicillin results predict results for amoxicillin    -  Trimethoprim/Sulfamethoxazole: S <=2/38    -  Tobramycin: S <=4    -  Tigecycline: S <=2    -  Piperacillin/Tazobactam: S <=16    -  Nitrofurantoin: S <=32 Should not be used to treat pyelonephritis    -  Meropenem: S <=1    -  Levofloxacin: S <=2    -  Imipenem: S <=1    -  Gentamicin: S <=4    -  Amikacin: S <=16    Specimen Source: .Urine Catheterized    Culture Results:   >100,000 CFU/ml Escherichia coli    Organism Identification: Escherichia coli    Organism: Escherichia coli    Method Type: DREW      Culture - Blood (11.25.19 @ 14:24)    Specimen Source: .Blood Blood    Culture Results:   No growth to date.    RADIOLOGY:    < from: Xray Chest 1 View- PORTABLE-Urgent (11.25.19 @ 15:46) >  Impression:      No radiographic evidence of acute cardiopulmonary disease.    < from: EEG (11.29.19 @ 17:35) >    Impression  Abnormal due to the presence of: generalized slowing as above    Clinical Correlation & Recommendations   Consistent with diffuse cerebral electrophysiological dysfunction,   secondary to nonspecific cause.    < from: CT Head No Cont (12.07.19 @ 16:55) >  IMPRESSION:  1.  No evidence of acute intracranial pathology.  2.  Chronic microvascular ischemic changes.      MEDICATIONS  (STANDING):  ammonium lactate 12% Lotion 1 Application(s) Topical two times a day  artificial  tears Solution 1 Drop(s) Both EYES two times a day  calcium carbonate 1250 mG  + Vitamin D (OsCal 500 + D) 1 Tablet(s) Oral daily  chlorhexidine 4% Liquid 1 Application(s) Topical <User Schedule>  clonazePAM  Tablet 0.25 milliGRAM(s) Oral every 12 hours  clotrimazole 1% Cream 1 Application(s) Topical two times a day  dextrose 5% + sodium chloride 0.45% with potassium chloride 20 mEq/L 1000 milliLiter(s) (75 mL/Hr) IV Continuous <Continuous>  dronabinol 2.5 milliGRAM(s) Oral two times a day  enoxaparin Injectable 40 milliGRAM(s) SubCutaneous daily  lamoTRIgine 100 milliGRAM(s) Oral at bedtime  lamoTRIgine 75 milliGRAM(s) Oral daily  levothyroxine 112 MICROGram(s) Oral daily  nystatin Powder 1 Application(s) Topical two times a day  QUEtiapine 25 milliGRAM(s) Oral at bedtime  senna 8.6 milliGRAM(s) Oral Tablet - Peds 1 Tablet(s) Oral at bedtime  simvastatin 20 milliGRAM(s) Oral at bedtime  tamsulosin 0.8 milliGRAM(s) Oral at bedtime 54y old Female who presents with a chief complaint of decreased po intake, pt has a Down syndrome, mentally challenged, she needs assistance with meals, and calorie count, resume   Dronabinol 2.5 BId. Pt has h/o urinary retention, failed TOV x2, urinating now ( as per nurse) She was diagnosed and treated with UTI.   Today pt is comfortable and calm, crying at times, eating well.     PAST MEDICAL & SURGICAL HISTORY  Seizures  Intellectual disability  Hypothyroid  Impulse control disorder in adult  Down's syndrome  No significant past surgical history      ALLERGIES:  No Known Allergies    VITALS:   T(C): 36.1 (13 Dec 2019 14:11), Max: 36.7 (13 Dec 2019 05:30)  T(F): 97 (13 Dec 2019 14:11), Max: 98 (13 Dec 2019 05:30)  HR: 63 (13 Dec 2019 14:11) (52 - 65)  BP: 123/57 (13 Dec 2019 14:11) (110/55 - 125/58)  BP(mean): --  RR: 18 (13 Dec 2019 05:30) (18 - 18)  SpO2: 96% (13 Dec 2019 09:58) (96% - 96%)    PHYSICAL EXAM:  GEN: No acute distress, mentally challenged   PULM/CHEST: Clear to auscultation bilaterally, no rales, rhonchi or wheezes   CVS: Regular rate and rhythm, S1-S2, no murmurs  ABD: Soft, non-tender, non-distended, +BS  EXT: No edema  NEURO: Awake, nonverbal, does not follow commands     LABS:             no new labs     Culture - Urine (12.07.19 @ 16:10)    -  Ciprofloxacin: S <=1    -  Ceftriaxone: S <=1 Enterobacter, Citrobacter, and Serratia may develop resistance during prolonged therapy    -  Cefoxitin: S <=8    -  Cefepime: S <=4    -  Cefazolin: S <=8 (MIC_CL_COM_ENTERIC_CEFAZU) For uncomplicated UTI with K. pneumoniae, E. coli, or P. mirablis: DREW <=16 is sensitive and DREW >=32 is resistant. This also predicts results for oral agents cefaclor, cefdinir, cefpodoxime, cefprozil, cefuroxime axetil, cephalexin and locarbef for uncomplicated UTI. Note that some isolates may be susceptible to these agents while testing resistant to cefazolin.    -  Aztreonam: S <=4    -  Ampicillin/Sulbactam: S <=8/4 Enterobacter, Citrobacter, and Serratia may develop resistance during prolonged therapy (3-4 days)    -  Ampicillin: S <=8 These ampicillin results predict results for amoxicillin    -  Trimethoprim/Sulfamethoxazole: S <=2/38    -  Tobramycin: S <=4    -  Tigecycline: S <=2    -  Piperacillin/Tazobactam: S <=16    -  Nitrofurantoin: S <=32 Should not be used to treat pyelonephritis    -  Meropenem: S <=1    -  Levofloxacin: S <=2    -  Imipenem: S <=1    -  Gentamicin: S <=4    -  Amikacin: S <=16    Specimen Source: .Urine Catheterized    Culture Results:   >100,000 CFU/ml Escherichia coli    Organism Identification: Escherichia coli    Organism: Escherichia coli    Method Type: DREW      Culture - Blood (11.25.19 @ 14:24)    Specimen Source: .Blood Blood    Culture Results:   No growth to date.    RADIOLOGY:    < from: Xray Chest 1 View- PORTABLE-Urgent (11.25.19 @ 15:46) >  Impression:      No radiographic evidence of acute cardiopulmonary disease.    < from: EEG (11.29.19 @ 17:35) >    Impression  Abnormal due to the presence of: generalized slowing as above    Clinical Correlation & Recommendations   Consistent with diffuse cerebral electrophysiological dysfunction,   secondary to nonspecific cause.    < from: CT Head No Cont (12.07.19 @ 16:55) >  IMPRESSION:  1.  No evidence of acute intracranial pathology.  2.  Chronic microvascular ischemic changes.      MEDICATIONS  (STANDING):  ammonium lactate 12% Lotion 1 Application(s) Topical two times a day  artificial  tears Solution 1 Drop(s) Both EYES two times a day  calcium carbonate 1250 mG  + Vitamin D (OsCal 500 + D) 1 Tablet(s) Oral daily  chlorhexidine 4% Liquid 1 Application(s) Topical <User Schedule>  clonazePAM  Tablet 0.25 milliGRAM(s) Oral every 12 hours  clotrimazole 1% Cream 1 Application(s) Topical two times a day  dextrose 5% + sodium chloride 0.45% with potassium chloride 20 mEq/L 1000 milliLiter(s) (75 mL/Hr) IV Continuous <Continuous>  dronabinol 2.5 milliGRAM(s) Oral two times a day  enoxaparin Injectable 40 milliGRAM(s) SubCutaneous daily  lamoTRIgine 100 milliGRAM(s) Oral at bedtime  lamoTRIgine 75 milliGRAM(s) Oral daily  levothyroxine 112 MICROGram(s) Oral daily  nystatin Powder 1 Application(s) Topical two times a day  QUEtiapine 25 milliGRAM(s) Oral at bedtime  senna 8.6 milliGRAM(s) Oral Tablet - Peds 1 Tablet(s) Oral at bedtime  simvastatin 20 milliGRAM(s) Oral at bedtime  tamsulosin 0.8 milliGRAM(s) Oral at bedtime

## 2019-12-13 NOTE — PROGRESS NOTE ADULT - ASSESSMENT
54 Year Old Female with a PMH of down syndrome, dementia (alert but non verbal at baseline) and seizure disorder (neurologist outside, Dr. Hood) presents from the group home for evaluation of failure of thrive and decrease po intake.      A/P     # Decreased po intake  - pt is mentally challenged, she needs assistance with meals  - c/w   Dronabinol 2.5 mg BID  -  f/u calorie count, as per sister and aid pt is eating well   - consulted by nutritionist   - Ensure with meals     # Down syndrome with mental disability and agitation  -supportive care   - will d/c on Clonazepam  0.25 mg Q 12 hours   - prevent falls and aspiration     # Seizure disorder  - seizure precautions   - keep Magnesium above 2.0  - REEG abnormal   - neurology is following   - Lamictal  was decreased to 75 in am [id number] in pm,  -  on Seroquel 25 mg   - started on Clonazepam  0.25 BID     # Urinary retention   - failed TOV X 3, needs Doherty   -  follow up if pt'll fail   - c/w   Flomax       # Hypothyroidism:  - TSH was low, decreased the dose of Synthroid to 100 mcg on this admission     DVT Prophylaxis: lovenox  GI ppx: not indicated  Disposition: from group home, [facility]  HCP and legal guardian [phone] Ava Enamorado (Sister)    #Progress Note Handoff  Pending (specify):  put doherty in ,f/u  calorie count   Family discussion: I spoke with aid today at the bedside and  health care proxy today Sarah Weeks Fei [phone]  Disposition: group home on Monday 54 Year Old Female with a PMH of down syndrome, dementia (alert but non verbal at baseline) and seizure disorder (neurologist outside, Dr. Hood) presents from the group home for evaluation of failure of thrive and decrease po intake.      A/P     # Decreased po intake  - pt is mentally challenged, she needs assistance with meals  - c/w   Dronabinol 2.5 mg BID  -  f/u calorie count, as per sister and aid pt is eating well   - consulted by nutritionist   - Ensure with meals     # Down syndrome with mental disability and agitation  -supportive care   - will d/c on Clonazepam  0.25 mg Q 12 hours   - prevent falls and aspiration     # Seizure disorder  - seizure precautions   - keep Magnesium above 2.0  - REEG abnormal   - neurology is following   - Lamictal  was decreased to 75 in am [id number] in pm,  -  on Seroquel 25 mg   - started on Clonazepam  0.25 BID     # Urinary retention   - failed TOV X 2, urinating now ( nurse reported that pt is incontinent and soaked her bed)   - consulted by    - c/w   Flomax       # Hypothyroidism:  - TSH was low, decreased the dose of Synthroid to 100 mcg on this admission     DVT Prophylaxis: lovenox  GI ppx: not indicated  Disposition: from group home, [facility]  HCP and legal guardian [phone] Ava Enamorado (Sister)    #Progress Note Handoff  Pending (specify):  f/u  calorie count   Family discussion: I spoke with aid today at the bedside and  health care proxy today Sarah Weeks Fei [phone]  Disposition: group home on Monday

## 2019-12-14 PROCEDURE — 99232 SBSQ HOSP IP/OBS MODERATE 35: CPT

## 2019-12-14 RX ADMIN — QUETIAPINE FUMARATE 25 MILLIGRAM(S): 200 TABLET, FILM COATED ORAL at 21:28

## 2019-12-14 RX ADMIN — SENNA PLUS 1 TABLET(S): 8.6 TABLET ORAL at 21:28

## 2019-12-14 RX ADMIN — Medication 2.5 MILLIGRAM(S): at 07:34

## 2019-12-14 RX ADMIN — Medication 2.5 MILLIGRAM(S): at 18:05

## 2019-12-14 RX ADMIN — Medication 0.25 MILLIGRAM(S): at 07:02

## 2019-12-14 RX ADMIN — SIMVASTATIN 20 MILLIGRAM(S): 20 TABLET, FILM COATED ORAL at 21:28

## 2019-12-14 RX ADMIN — NYSTATIN CREAM 1 APPLICATION(S): 100000 CREAM TOPICAL at 07:01

## 2019-12-14 RX ADMIN — DEXTROSE MONOHYDRATE, SODIUM CHLORIDE, AND POTASSIUM CHLORIDE 75 MILLILITER(S): 50; .745; 4.5 INJECTION, SOLUTION INTRAVENOUS at 13:50

## 2019-12-14 RX ADMIN — Medication 1 TABLET(S): at 12:08

## 2019-12-14 RX ADMIN — LAMOTRIGINE 75 MILLIGRAM(S): 25 TABLET, ORALLY DISINTEGRATING ORAL at 12:08

## 2019-12-14 RX ADMIN — Medication 1 APPLICATION(S): at 18:10

## 2019-12-14 RX ADMIN — DEXTROSE MONOHYDRATE, SODIUM CHLORIDE, AND POTASSIUM CHLORIDE 75 MILLILITER(S): 50; .745; 4.5 INJECTION, SOLUTION INTRAVENOUS at 00:17

## 2019-12-14 RX ADMIN — Medication 1 APPLICATION(S): at 06:59

## 2019-12-14 RX ADMIN — NYSTATIN CREAM 1 APPLICATION(S): 100000 CREAM TOPICAL at 18:10

## 2019-12-14 RX ADMIN — Medication 112 MICROGRAM(S): at 06:58

## 2019-12-14 RX ADMIN — ENOXAPARIN SODIUM 40 MILLIGRAM(S): 100 INJECTION SUBCUTANEOUS at 12:08

## 2019-12-14 RX ADMIN — Medication 1 DROP(S): at 18:11

## 2019-12-14 RX ADMIN — Medication 1 APPLICATION(S): at 06:58

## 2019-12-14 RX ADMIN — Medication 1 DROP(S): at 06:58

## 2019-12-14 RX ADMIN — Medication 0.25 MILLIGRAM(S): at 18:07

## 2019-12-14 RX ADMIN — Medication 1 APPLICATION(S): at 18:11

## 2019-12-14 RX ADMIN — CHLORHEXIDINE GLUCONATE 1 APPLICATION(S): 213 SOLUTION TOPICAL at 07:07

## 2019-12-14 RX ADMIN — TAMSULOSIN HYDROCHLORIDE 0.8 MILLIGRAM(S): 0.4 CAPSULE ORAL at 21:28

## 2019-12-14 RX ADMIN — LAMOTRIGINE 100 MILLIGRAM(S): 25 TABLET, ORALLY DISINTEGRATING ORAL at 21:28

## 2019-12-14 NOTE — PROGRESS NOTE ADULT - ASSESSMENT
54 Year Old Female with a PMH of down syndrome, dementia (alert but non verbal at baseline) and seizure disorder (neurologist outside, Dr. Hood) presents from the group home for evaluation of failure of thrive and decrease po intake.      A/P     # Decreased po intake  - pt is mentally challenged, she needs assistance with meals  - c/w   Dronabinol 2.5 mg BID  -  f/u calorie count, as per sister and aid pt is eating well   - consulted by nutritionist   - Ensure with meals     # Down syndrome with mental disability and agitation  -supportive care   - will d/c on Clonazepam  0.25 mg Q 12 hours   - prevent falls and aspiration     # Seizure disorder  - seizure precautions   - keep Magnesium above 2.0  - REEG abnormal   - neurology is following   - Lamictal  was decreased to 75 in am [id number] in pm,  -  on Seroquel 25 mg   - started on Clonazepam  0.25 BID     # Urinary retention   - failed TOV X 2, urinating now ( nurse reported that pt is incontinent and soaked her bed)   - put prima fit today for output monitoring   - consulted by    - c/w   Flomax       # Hypothyroidism:  - TSH was low, decreased the dose of Synthroid to 100 mcg on this admission     DVT Prophylaxis: lovenox  GI ppx: not indicated  Disposition: from group home, [facility]  HCP and legal guardian [phone] Ava Enamorado (Sister)    #Progress Note Handoff  Pending (specify):  f/u  calorie count, monitor urine output   Family discussion: I spoke with aid today at the bedside ; pt's  health care proxy today Sarah Weeks Fei [phone]  Disposition: group home on Monday

## 2019-12-14 NOTE — PROGRESS NOTE ADULT - SUBJECTIVE AND OBJECTIVE BOX
54y old Female who presents with a chief complaint of decreased po intake, pt has a Down syndrome, mentally challenged, she needs assistance with meals, and calorie count, resume   Dronabinol 2.5 BId. Pt has h/o urinary retention, failed TOV x2, urinating now ( as per nurse) She was diagnosed and treated with UTI. Today will put prima fit for output monitoring.   Today pt is comfortable, gets anxious at time.     PAST MEDICAL & SURGICAL HISTORY  Seizures  Intellectual disability  Hypothyroid  Impulse control disorder in adult  Down's syndrome  No significant past surgical history      ALLERGIES:  No Known Allergies    VITALS:   T(C): 36.3 (14 Dec 2019 05:23), Max: 36.3 (13 Dec 2019 22:11)  T(F): 97.3 (14 Dec 2019 05:23), Max: 97.4 (13 Dec 2019 22:11)  HR: 47 (14 Dec 2019 05:23) (47 - 63)  BP: 103/51 (14 Dec 2019 05:23) (103/51 - 125/58)  BP(mean): --  RR: 18 (14 Dec 2019 05:23) (18 - 19)  SpO2: 95% (14 Dec 2019 07:57) (95% - 96%)    PHYSICAL EXAM:  GEN: No acute distress, mentally challenged   PULM/CHEST: Clear to auscultation bilaterally, no rales, rhonchi or wheezes   CVS: Regular rate and rhythm, S1-S2, no murmurs  ABD: Soft, non-tender, non-distended, +BS  EXT: No edema  NEURO: Awake, nonverbal, does not follow commands     LABS:             no new labs     Culture - Urine (12.07.19 @ 16:10)    -  Ciprofloxacin: S <=1    -  Ceftriaxone: S <=1 Enterobacter, Citrobacter, and Serratia may develop resistance during prolonged therapy    -  Cefoxitin: S <=8    -  Cefepime: S <=4    -  Cefazolin: S <=8 (MIC_CL_COM_ENTERIC_CEFAZU) For uncomplicated UTI with K. pneumoniae, E. coli, or P. mirablis: DREW <=16 is sensitive and DREW >=32 is resistant. This also predicts results for oral agents cefaclor, cefdinir, cefpodoxime, cefprozil, cefuroxime axetil, cephalexin and locarbef for uncomplicated UTI. Note that some isolates may be susceptible to these agents while testing resistant to cefazolin.    -  Aztreonam: S <=4    -  Ampicillin/Sulbactam: S <=8/4 Enterobacter, Citrobacter, and Serratia may develop resistance during prolonged therapy (3-4 days)    -  Ampicillin: S <=8 These ampicillin results predict results for amoxicillin    -  Trimethoprim/Sulfamethoxazole: S <=2/38    -  Tobramycin: S <=4    -  Tigecycline: S <=2    -  Piperacillin/Tazobactam: S <=16    -  Nitrofurantoin: S <=32 Should not be used to treat pyelonephritis    -  Meropenem: S <=1    -  Levofloxacin: S <=2    -  Imipenem: S <=1    -  Gentamicin: S <=4    -  Amikacin: S <=16    Specimen Source: .Urine Catheterized    Culture Results:   >100,000 CFU/ml Escherichia coli    Organism Identification: Escherichia coli    Organism: Escherichia coli    Method Type: DREW      Culture - Blood (11.25.19 @ 14:24)    Specimen Source: .Blood Blood    Culture Results:   No growth to date.    RADIOLOGY:    < from: Xray Chest 1 View- PORTABLE-Urgent (11.25.19 @ 15:46) >  Impression:      No radiographic evidence of acute cardiopulmonary disease.    < from: EEG (11.29.19 @ 17:35) >    Impression  Abnormal due to the presence of: generalized slowing as above    Clinical Correlation & Recommendations   Consistent with diffuse cerebral electrophysiological dysfunction,   secondary to nonspecific cause.    < from: CT Head No Cont (12.07.19 @ 16:55) >  IMPRESSION:  1.  No evidence of acute intracranial pathology.  2.  Chronic microvascular ischemic changes.      MEDICATIONS  (STANDING):  ammonium lactate 12% Lotion 1 Application(s) Topical two times a day  artificial  tears Solution 1 Drop(s) Both EYES two times a day  calcium carbonate 1250 mG  + Vitamin D (OsCal 500 + D) 1 Tablet(s) Oral daily  chlorhexidine 4% Liquid 1 Application(s) Topical <User Schedule>  clonazePAM  Tablet 0.25 milliGRAM(s) Oral every 12 hours  clotrimazole 1% Cream 1 Application(s) Topical two times a day  dextrose 5% + sodium chloride 0.45% with potassium chloride 20 mEq/L 1000 milliLiter(s) (75 mL/Hr) IV Continuous <Continuous>  dronabinol 2.5 milliGRAM(s) Oral two times a day  enoxaparin Injectable 40 milliGRAM(s) SubCutaneous daily  lamoTRIgine 100 milliGRAM(s) Oral at bedtime  lamoTRIgine 75 milliGRAM(s) Oral daily  levothyroxine 112 MICROGram(s) Oral daily  nystatin Powder 1 Application(s) Topical two times a day  QUEtiapine 25 milliGRAM(s) Oral at bedtime  senna 8.6 milliGRAM(s) Oral Tablet - Peds 1 Tablet(s) Oral at bedtime  simvastatin 20 milliGRAM(s) Oral at bedtime  tamsulosin 0.8 milliGRAM(s) Oral at bedtime

## 2019-12-15 PROCEDURE — 99232 SBSQ HOSP IP/OBS MODERATE 35: CPT

## 2019-12-15 RX ORDER — HALOPERIDOL DECANOATE 100 MG/ML
1 INJECTION INTRAMUSCULAR ONCE
Refills: 0 | Status: COMPLETED | OUTPATIENT
Start: 2019-12-15 | End: 2019-12-15

## 2019-12-15 RX ORDER — POLYETHYLENE GLYCOL 3350 17 G/17G
17 POWDER, FOR SOLUTION ORAL ONCE
Refills: 0 | Status: COMPLETED | OUTPATIENT
Start: 2019-12-15 | End: 2019-12-15

## 2019-12-15 RX ADMIN — SIMVASTATIN 20 MILLIGRAM(S): 20 TABLET, FILM COATED ORAL at 21:47

## 2019-12-15 RX ADMIN — Medication 1 APPLICATION(S): at 05:37

## 2019-12-15 RX ADMIN — Medication 2.5 MILLIGRAM(S): at 17:55

## 2019-12-15 RX ADMIN — DEXTROSE MONOHYDRATE, SODIUM CHLORIDE, AND POTASSIUM CHLORIDE 75 MILLILITER(S): 50; .745; 4.5 INJECTION, SOLUTION INTRAVENOUS at 05:40

## 2019-12-15 RX ADMIN — NYSTATIN CREAM 1 APPLICATION(S): 100000 CREAM TOPICAL at 05:37

## 2019-12-15 RX ADMIN — Medication 112 MICROGRAM(S): at 05:37

## 2019-12-15 RX ADMIN — Medication 1 DROP(S): at 05:37

## 2019-12-15 RX ADMIN — Medication 1 APPLICATION(S): at 17:56

## 2019-12-15 RX ADMIN — Medication 1 TABLET(S): at 11:14

## 2019-12-15 RX ADMIN — SENNA PLUS 1 TABLET(S): 8.6 TABLET ORAL at 21:47

## 2019-12-15 RX ADMIN — HALOPERIDOL DECANOATE 1 MILLIGRAM(S): 100 INJECTION INTRAMUSCULAR at 18:43

## 2019-12-15 RX ADMIN — Medication 0.25 MILLIGRAM(S): at 05:40

## 2019-12-15 RX ADMIN — LAMOTRIGINE 100 MILLIGRAM(S): 25 TABLET, ORALLY DISINTEGRATING ORAL at 21:46

## 2019-12-15 RX ADMIN — Medication 0.25 MILLIGRAM(S): at 17:54

## 2019-12-15 RX ADMIN — TAMSULOSIN HYDROCHLORIDE 0.8 MILLIGRAM(S): 0.4 CAPSULE ORAL at 21:47

## 2019-12-15 RX ADMIN — POLYETHYLENE GLYCOL 3350 17 GRAM(S): 17 POWDER, FOR SOLUTION ORAL at 18:41

## 2019-12-15 RX ADMIN — Medication 2.5 MILLIGRAM(S): at 08:36

## 2019-12-15 RX ADMIN — Medication 1 APPLICATION(S): at 17:55

## 2019-12-15 RX ADMIN — QUETIAPINE FUMARATE 25 MILLIGRAM(S): 200 TABLET, FILM COATED ORAL at 21:46

## 2019-12-15 RX ADMIN — LAMOTRIGINE 75 MILLIGRAM(S): 25 TABLET, ORALLY DISINTEGRATING ORAL at 11:14

## 2019-12-15 RX ADMIN — CHLORHEXIDINE GLUCONATE 1 APPLICATION(S): 213 SOLUTION TOPICAL at 05:36

## 2019-12-15 RX ADMIN — ENOXAPARIN SODIUM 40 MILLIGRAM(S): 100 INJECTION SUBCUTANEOUS at 11:14

## 2019-12-15 RX ADMIN — NYSTATIN CREAM 1 APPLICATION(S): 100000 CREAM TOPICAL at 17:55

## 2019-12-15 RX ADMIN — Medication 1 DROP(S): at 17:55

## 2019-12-15 NOTE — PROGRESS NOTE ADULT - SUBJECTIVE AND OBJECTIVE BOX
54y old Female who presents with a chief complaint of decreased po intake, pt has a Down syndrome, mentally challenged, she needs assistance with meals, and calorie count, resume   Dronabinol 2.5 BId. Pt has h/o urinary retention, failed TOV x2, urinating now ( as per nurse) She was diagnosed and treated with UTI. Gallo was d/c, will monitor urine output with prima fit.   Today pt is comfortable and calm today.     PAST MEDICAL & SURGICAL HISTORY  Seizures  Intellectual disability  Hypothyroid  Impulse control disorder in adult  Down's syndrome  No significant past surgical history      ALLERGIES:  No Known Allergies    VITALS:   T(C): 35.7 (15 Dec 2019 05:15), Max: 36.4 (14 Dec 2019 21:52)  T(F): 96.2 (15 Dec 2019 05:15), Max: 97.6 (14 Dec 2019 21:52)  HR: 50 (15 Dec 2019 05:15) (50 - 61)  BP: 117/49 (15 Dec 2019 05:15) (117/49 - 130/58)  BP(mean): --  RR: 18 (15 Dec 2019 05:15) (18 - 19)  SpO2: --    PHYSICAL EXAM:  GEN: No acute distress, mentally challenged   PULM/CHEST: Clear to auscultation bilaterally, no rales, rhonchi or wheezes   CVS: Regular rate and rhythm, S1-S2, no murmurs  ABD: Soft, non-tender, non-distended, +BS  EXT: No edema  NEURO: Awake, nonverbal, does not follow commands     LABS:             no new labs     Culture - Urine (12.07.19 @ 16:10)    -  Ciprofloxacin: S <=1    -  Ceftriaxone: S <=1 Enterobacter, Citrobacter, and Serratia may develop resistance during prolonged therapy    -  Cefoxitin: S <=8    -  Cefepime: S <=4    -  Cefazolin: S <=8 (MIC_CL_COM_ENTERIC_CEFAZU) For uncomplicated UTI with K. pneumoniae, E. coli, or P. mirablis: DREW <=16 is sensitive and DREW >=32 is resistant. This also predicts results for oral agents cefaclor, cefdinir, cefpodoxime, cefprozil, cefuroxime axetil, cephalexin and locarbef for uncomplicated UTI. Note that some isolates may be susceptible to these agents while testing resistant to cefazolin.    -  Aztreonam: S <=4    -  Ampicillin/Sulbactam: S <=8/4 Enterobacter, Citrobacter, and Serratia may develop resistance during prolonged therapy (3-4 days)    -  Ampicillin: S <=8 These ampicillin results predict results for amoxicillin    -  Trimethoprim/Sulfamethoxazole: S <=2/38    -  Tobramycin: S <=4    -  Tigecycline: S <=2    -  Piperacillin/Tazobactam: S <=16    -  Nitrofurantoin: S <=32 Should not be used to treat pyelonephritis    -  Meropenem: S <=1    -  Levofloxacin: S <=2    -  Imipenem: S <=1    -  Gentamicin: S <=4    -  Amikacin: S <=16    Specimen Source: .Urine Catheterized    Culture Results:   >100,000 CFU/ml Escherichia coli    Organism Identification: Escherichia coli    Organism: Escherichia coli    Method Type: DREW      Culture - Blood (11.25.19 @ 14:24)    Specimen Source: .Blood Blood    Culture Results:   No growth to date.    RADIOLOGY:    < from: Xray Chest 1 View- PORTABLE-Urgent (11.25.19 @ 15:46) >  Impression:      No radiographic evidence of acute cardiopulmonary disease.    < from: EEG (11.29.19 @ 17:35) >    Impression  Abnormal due to the presence of: generalized slowing as above    Clinical Correlation & Recommendations   Consistent with diffuse cerebral electrophysiological dysfunction,   secondary to nonspecific cause.    < from: CT Head No Cont (12.07.19 @ 16:55) >  IMPRESSION:  1.  No evidence of acute intracranial pathology.  2.  Chronic microvascular ischemic changes.      MEDICATIONS  (STANDING):  ammonium lactate 12% Lotion 1 Application(s) Topical two times a day  artificial  tears Solution 1 Drop(s) Both EYES two times a day  calcium carbonate 1250 mG  + Vitamin D (OsCal 500 + D) 1 Tablet(s) Oral daily  chlorhexidine 4% Liquid 1 Application(s) Topical <User Schedule>  clonazePAM  Tablet 0.25 milliGRAM(s) Oral every 12 hours  clotrimazole 1% Cream 1 Application(s) Topical two times a day  dextrose 5% + sodium chloride 0.45% with potassium chloride 20 mEq/L 1000 milliLiter(s) (75 mL/Hr) IV Continuous <Continuous>  dronabinol 2.5 milliGRAM(s) Oral two times a day  enoxaparin Injectable 40 milliGRAM(s) SubCutaneous daily  lamoTRIgine 100 milliGRAM(s) Oral at bedtime  lamoTRIgine 75 milliGRAM(s) Oral daily  levothyroxine 112 MICROGram(s) Oral daily  nystatin Powder 1 Application(s) Topical two times a day  QUEtiapine 25 milliGRAM(s) Oral at bedtime  senna 8.6 milliGRAM(s) Oral Tablet - Peds 1 Tablet(s) Oral at bedtime  simvastatin 20 milliGRAM(s) Oral at bedtime  tamsulosin 0.8 milliGRAM(s) Oral at bedtime

## 2019-12-15 NOTE — PROGRESS NOTE ADULT - ASSESSMENT
54 Year Old Female with a PMH of down syndrome, dementia (alert but non verbal at baseline) and seizure disorder (neurologist outside, Dr. Hood) presents from the group home for evaluation of failure of thrive and decrease po intake.      A/P     # Decreased po intake  - pt is mentally challenged, she needs assistance with meals  - c/w   Dronabinol 2.5 mg BID  -  f/u calorie count, as per sister and aid pt is eating well   - consulted by nutritionist   - Ensure with meals     # Down syndrome with mental disability and agitation  -supportive care   - will d/c on Clonazepam  0.25 mg Q 12 hours   - prevent falls and aspiration     # Seizure disorder  - seizure precautions   - keep Magnesium above 2.0  - REEG abnormal   - neurology is following   - Lamictal  was decreased to 75 in am [id number] in pm,  -  on Seroquel 25 mg   - started on Clonazepam  0.25 BID     # Urinary retention   - failed TOV X 2, urinating now ( nurse reported that pt is incontinent and soaked her bed)   - put prima fit today for output monitoring   - consulted by    - c/w   Flomax       # Hypothyroidism:  - TSH was low, decreased the dose of Synthroid to 100 mcg on this admission     DVT Prophylaxis: lovenox  GI ppx: not indicated  Disposition: from group home, [facility]  HCP and legal guardian [phone] Ava Enamorado (Sister)    #Progress Note Handoff  Pending (specify):  f/u  calorie count, monitor urine output   Family discussion: I spoke with aid today at the bedside ; pt's  health care proxy today Sarah Weeks Fei [phone]  Disposition: group home on Monday

## 2019-12-15 NOTE — CHART NOTE - NSCHARTNOTEFT_GEN_A_CORE
Calorie Count Results -    Day 1: 1630kcal, 64g Pro  Day 2: 1386kcal, 57g Pro  Day 3: 1339kcal, 35g Pro - note pt slept through breakfast meal    Pt ordered ensure enlive q24h, however, no supplements documented in kcal count- cannot confirm if pt drinking supplements or not.    *3 day avkcal, 52g Pro. Pt approximately meeting 100% of estimated energy and protein needs.     RD due to f/u

## 2019-12-15 NOTE — PROGRESS NOTE ADULT - ASSESSMENT
54 Year Old Female with a PMH of down syndrome, dementia (alert but non verbal at baseline) and seizure disorder (neurologist outside, Dr. Hood) presents from the group home for evaluation of failure of thrive and decrease po intake.    # Complicated UTI   # Metabolic Encephalopathy   - not catheter associated   - Urine culture pansensitive E. coli   - finished antibiotic course    # Decreased po intake   - pt is mentally challenged, she needs assistance with meals  - needs encouragement and one to one feed  - Starting new calorie count  - Met with group home and family at length earlier this week. They would like a repeated calorie count as they believe it would be inaccurate if the patient was receiving haldol for agitation.   - Patients doherty was DC'ed as per meeting, and the patient is incontinent.   - Primafit  - Given dronabinol  - F/U Calorie count    # Down syndrome   # Alzheimer's dementia  - c/w Seroquel   - reduced Klonopin to 0.25 BID   - aspiration precautions   - EEG results as above    # HLD  - c/w statin    # Seizure disorder  - seizure precautions   - keep Magnesium above 2.0  - Lamictal  was decreased to 75 in am [id number] in pm     # Acute Urinary retention    - c/w Flomax   - urology f/u outpatient     # Hypothyroidism:  - Continue with Synthroid 100, Thyroid Stimulating Hormone, Serum: 0.25 uIU/mL (11.26.19 @ 07:49), dose was decreased form 112 to 100    #) constipation  - resolved, discontinued bowel regimen     DVT Prophylaxis: lovenox  GI ppx: not indicated  Disposition: Pending placement  HCP and legal guardian [phone] Madisyn Enamorado (Sister)

## 2019-12-15 NOTE — PROGRESS NOTE ADULT - SUBJECTIVE AND OBJECTIVE BOX
HPI  Patient is a 54y old Female who presents with a chief complaint of decreased po intake (14 Dec 2019 09:37)    Currently admitted to medicine with the primary diagnosis of Failure to thrive in adult     Today is hospital day 20d.     INTERVAL HPI / OVERNIGHT EVENTS:  Patient was examined and seen at bedside. This morning she is resting comfortably in bed and reports no new issues or overnight events. Patient was sleeping this morning as well. Spoke with caregiver at bedside. Updated on new calorie count and result of doherty removal.    ROS: Otherwise unremarkable     PAST MEDICAL & SURGICAL HISTORY  Seizures  Intellectual disability  Hypothyroid  Impulse control disorder in adult  Down's syndrome  No significant past surgical history    ALLERGIES  No Known Allergies    MEDICATIONS  STANDING MEDICATIONS  ammonium lactate 12% Lotion 1 Application(s) Topical two times a day  artificial  tears Solution 1 Drop(s) Both EYES two times a day  calcium carbonate 1250 mG  + Vitamin D (OsCal 500 + D) 1 Tablet(s) Oral daily  chlorhexidine 4% Liquid 1 Application(s) Topical <User Schedule>  clonazePAM  Tablet 0.25 milliGRAM(s) Oral every 12 hours  clotrimazole 1% Cream 1 Application(s) Topical two times a day  dextrose 5% + sodium chloride 0.45% with potassium chloride 20 mEq/L 1000 milliLiter(s) IV Continuous <Continuous>  dronabinol 2.5 milliGRAM(s) Oral two times a day  enoxaparin Injectable 40 milliGRAM(s) SubCutaneous daily  lamoTRIgine 100 milliGRAM(s) Oral at bedtime  lamoTRIgine 75 milliGRAM(s) Oral daily  levothyroxine 112 MICROGram(s) Oral daily  nystatin Powder 1 Application(s) Topical two times a day  QUEtiapine 25 milliGRAM(s) Oral at bedtime  senna 8.6 milliGRAM(s) Oral Tablet - Peds 1 Tablet(s) Oral at bedtime  simvastatin 20 milliGRAM(s) Oral at bedtime  tamsulosin 0.8 milliGRAM(s) Oral at bedtime    PRN MEDICATIONS    VITALS:  T(F): 96.2  HR: 50  BP: 117/49  RR: 18  SpO2: --    PHYSICAL EXAM  GEN: NAD, Resting comfortably in bed  PULM: Clear to auscultation bilaterally, No wheezes  CVS: Regular rate and rhythm, S1-S2, no murmurs  ABD: Soft, non-tender, non-distended, no guarding  EXT: No edema  NEURO: AAOx3, no focal deficits    LABS      RADIOLOGY HPI  Patient is a 54y old Female who presents with a chief complaint of decreased po intake (14 Dec 2019 09:37)    Currently admitted to medicine with the primary diagnosis of Failure to thrive in adult     Today is hospital day 20d.     INTERVAL HPI / OVERNIGHT EVENTS:  Patient was examined and seen at bedside. This morning she is resting comfortably in bed and reports no new issues or overnight events. Patient was agitated this morning. had primafit order placed yesterday.    ROS: Otherwise unremarkable     PAST MEDICAL & SURGICAL HISTORY  Seizures  Intellectual disability  Hypothyroid  Impulse control disorder in adult  Down's syndrome  No significant past surgical history    ALLERGIES  No Known Allergies    MEDICATIONS  STANDING MEDICATIONS  ammonium lactate 12% Lotion 1 Application(s) Topical two times a day  artificial  tears Solution 1 Drop(s) Both EYES two times a day  calcium carbonate 1250 mG  + Vitamin D (OsCal 500 + D) 1 Tablet(s) Oral daily  chlorhexidine 4% Liquid 1 Application(s) Topical <User Schedule>  clonazePAM  Tablet 0.25 milliGRAM(s) Oral every 12 hours  clotrimazole 1% Cream 1 Application(s) Topical two times a day  dextrose 5% + sodium chloride 0.45% with potassium chloride 20 mEq/L 1000 milliLiter(s) IV Continuous <Continuous>  dronabinol 2.5 milliGRAM(s) Oral two times a day  enoxaparin Injectable 40 milliGRAM(s) SubCutaneous daily  lamoTRIgine 100 milliGRAM(s) Oral at bedtime  lamoTRIgine 75 milliGRAM(s) Oral daily  levothyroxine 112 MICROGram(s) Oral daily  nystatin Powder 1 Application(s) Topical two times a day  QUEtiapine 25 milliGRAM(s) Oral at bedtime  senna 8.6 milliGRAM(s) Oral Tablet - Peds 1 Tablet(s) Oral at bedtime  simvastatin 20 milliGRAM(s) Oral at bedtime  tamsulosin 0.8 milliGRAM(s) Oral at bedtime    PRN MEDICATIONS    VITALS:  T(F): 96.2  HR: 50  BP: 117/49  RR: 18  SpO2: --    RADIOLOGY    < from: CT Head No Cont (12.07.19 @ 16:55) >  IMPRESSION:  1.  No evidence of acute intracranial pathology.  2.  Chronic microvascular ischemic changes.    < from: EEG (12.08.19 @ 11:00) >  Impression  Abnormal due to the presence of: generalized slowing as above    Clinical Correlation & Recommendations   Consistent with diffuse cerebral electrophysiological dysfunction   secondary to nonspecific cause.    < end of copied text >

## 2019-12-16 ENCOUNTER — TRANSCRIPTION ENCOUNTER (OUTPATIENT)
Age: 54
End: 2019-12-16

## 2019-12-16 VITALS
SYSTOLIC BLOOD PRESSURE: 147 MMHG | RESPIRATION RATE: 18 BRPM | HEART RATE: 66 BPM | DIASTOLIC BLOOD PRESSURE: 66 MMHG | TEMPERATURE: 97 F

## 2019-12-16 LAB
ANION GAP SERPL CALC-SCNC: 10 MMOL/L — SIGNIFICANT CHANGE UP (ref 7–14)
BUN SERPL-MCNC: 9 MG/DL — LOW (ref 10–20)
CALCIUM SERPL-MCNC: 9.1 MG/DL — SIGNIFICANT CHANGE UP (ref 8.5–10.1)
CHLORIDE SERPL-SCNC: 103 MMOL/L — SIGNIFICANT CHANGE UP (ref 98–110)
CO2 SERPL-SCNC: 29 MMOL/L — SIGNIFICANT CHANGE UP (ref 17–32)
CREAT SERPL-MCNC: 1.2 MG/DL — SIGNIFICANT CHANGE UP (ref 0.7–1.5)
GLUCOSE SERPL-MCNC: 90 MG/DL — SIGNIFICANT CHANGE UP (ref 70–99)
HCT VFR BLD CALC: 37 % — SIGNIFICANT CHANGE UP (ref 37–47)
HGB BLD-MCNC: 11.8 G/DL — LOW (ref 12–16)
MAGNESIUM SERPL-MCNC: 2.2 MG/DL — SIGNIFICANT CHANGE UP (ref 1.8–2.4)
MCHC RBC-ENTMCNC: 31.3 PG — HIGH (ref 27–31)
MCHC RBC-ENTMCNC: 31.9 G/DL — LOW (ref 32–37)
MCV RBC AUTO: 98.1 FL — SIGNIFICANT CHANGE UP (ref 81–99)
NRBC # BLD: 0 /100 WBCS — SIGNIFICANT CHANGE UP (ref 0–0)
PLATELET # BLD AUTO: 302 K/UL — SIGNIFICANT CHANGE UP (ref 130–400)
POTASSIUM SERPL-MCNC: 4.9 MMOL/L — SIGNIFICANT CHANGE UP (ref 3.5–5)
POTASSIUM SERPL-SCNC: 4.9 MMOL/L — SIGNIFICANT CHANGE UP (ref 3.5–5)
RBC # BLD: 3.77 M/UL — LOW (ref 4.2–5.4)
RBC # FLD: 14.3 % — SIGNIFICANT CHANGE UP (ref 11.5–14.5)
SODIUM SERPL-SCNC: 142 MMOL/L — SIGNIFICANT CHANGE UP (ref 135–146)
WBC # BLD: 5.29 K/UL — SIGNIFICANT CHANGE UP (ref 4.8–10.8)
WBC # FLD AUTO: 5.29 K/UL — SIGNIFICANT CHANGE UP (ref 4.8–10.8)

## 2019-12-16 PROCEDURE — 99232 SBSQ HOSP IP/OBS MODERATE 35: CPT

## 2019-12-16 RX ORDER — ACETAMINOPHEN 500 MG
1 TABLET ORAL
Qty: 120 | Refills: 0
Start: 2019-12-16 | End: 2020-01-14

## 2019-12-16 RX ORDER — DRONABINOL 2.5 MG
1 CAPSULE ORAL
Qty: 60 | Refills: 0
Start: 2019-12-16 | End: 2020-01-14

## 2019-12-16 RX ORDER — CLONAZEPAM 1 MG
1 TABLET ORAL
Qty: 60 | Refills: 0
Start: 2019-12-16 | End: 2020-01-14

## 2019-12-16 RX ADMIN — Medication 2.5 MILLIGRAM(S): at 09:36

## 2019-12-16 RX ADMIN — ENOXAPARIN SODIUM 40 MILLIGRAM(S): 100 INJECTION SUBCUTANEOUS at 12:10

## 2019-12-16 RX ADMIN — Medication 112 MICROGRAM(S): at 05:59

## 2019-12-16 RX ADMIN — NYSTATIN CREAM 1 APPLICATION(S): 100000 CREAM TOPICAL at 05:59

## 2019-12-16 RX ADMIN — Medication 1 APPLICATION(S): at 07:26

## 2019-12-16 RX ADMIN — Medication 1 TABLET(S): at 12:08

## 2019-12-16 RX ADMIN — LAMOTRIGINE 75 MILLIGRAM(S): 25 TABLET, ORALLY DISINTEGRATING ORAL at 12:10

## 2019-12-16 RX ADMIN — Medication 0.25 MILLIGRAM(S): at 06:02

## 2019-12-16 RX ADMIN — CHLORHEXIDINE GLUCONATE 1 APPLICATION(S): 213 SOLUTION TOPICAL at 05:59

## 2019-12-16 RX ADMIN — Medication 1 APPLICATION(S): at 05:59

## 2019-12-16 RX ADMIN — Medication 1 DROP(S): at 05:59

## 2019-12-16 NOTE — PROGRESS NOTE ADULT - SUBJECTIVE AND OBJECTIVE BOX
54y old Female who presents with a chief complaint of decreased po intake, pt has a Down syndrome, mentally challenged, she needs assistance with meals, and calorie count, resume   Dronabinol 2.5 BId. Pt has h/o urinary retention, failed TOV x2, urinating now ( as per nurse) She was diagnosed and treated with UTI.   Pt passed TOV with very good output on po hydration.   Today pt is comfortable and calm, eating and drinking well.     PAST MEDICAL & SURGICAL HISTORY  Seizures  Intellectual disability  Hypothyroid  Impulse control disorder in adult  Down's syndrome  No significant past surgical history      ALLERGIES:  No Known Allergies    VITALS:   T(C): 36.1 (16 Dec 2019 05:52), Max: 36.1 (16 Dec 2019 05:52)  T(F): 96.9 (16 Dec 2019 05:52), Max: 96.9 (16 Dec 2019 05:52)  HR: 62 (16 Dec 2019 05:52) (62 - 64)  BP: 102/67 (16 Dec 2019 05:52) (102/67 - 134/65)  BP(mean): --  RR: 19 (16 Dec 2019 05:52) (19 - 20)  SpO2: 95% (16 Dec 2019 09:22) (95% - 95%)    PHYSICAL EXAM:  GEN: No acute distress, mentally challenged   PULM/CHEST: Clear to auscultation bilaterally, no rales, rhonchi or wheezes   CVS: Regular rate and rhythm, S1-S2, no murmurs  ABD: Soft, non-tender, non-distended, +BS  EXT: No edema  NEURO: Awake, nonverbal, does not follow commands     LABS:                                   11.8   5.29  )-----------( 302      ( 16 Dec 2019 08:10 )             37.0   12-16    142  |  103  |  9<L>  ----------------------------<  90  4.9   |  29  |  1.2    Ca    9.1      16 Dec 2019 08:10  Mg     2.2     12-16        Culture - Urine (12.07.19 @ 16:10)    -  Ciprofloxacin: S <=1    -  Ceftriaxone: S <=1 Enterobacter, Citrobacter, and Serratia may develop resistance during prolonged therapy    -  Cefoxitin: S <=8    -  Cefepime: S <=4    -  Cefazolin: S <=8 ([id number]) For uncomplicated UTI with K. pneumoniae, E. coli, or P. mirablis: DREW <=16 is sensitive and DREW >=32 is resistant. This also predicts results for oral agents cefaclor, cefdinir, cefpodoxime, cefprozil, cefuroxime axetil, cephalexin and locarbef for uncomplicated UTI. Note that some isolates may be susceptible to these agents while testing resistant to cefazolin.    -  Aztreonam: S <=4    -  Ampicillin/Sulbactam: S <=8/4 Enterobacter, Citrobacter, and Serratia may develop resistance during prolonged therapy (3-4 days)    -  Ampicillin: S <=8 These ampicillin results predict results for amoxicillin    -  Trimethoprim/Sulfamethoxazole: S <=2/38    -  Tobramycin: S <=4    -  Tigecycline: S <=2    -  Piperacillin/Tazobactam: S <=16    -  Nitrofurantoin: S <=32 Should not be used to treat pyelonephritis    -  Meropenem: S <=1    -  Levofloxacin: S <=2    -  Imipenem: S <=1    -  Gentamicin: S <=4    -  Amikacin: S <=16    Specimen Source: .Urine Catheterized    Culture Results:   >100,000 CFU/ml Escherichia coli    Organism Identification: Escherichia coli    Organism: Escherichia coli    Method Type: DREW      Culture - Blood (11.25.19 @ 14:24)    Specimen Source: .Blood Blood    Culture Results:   No growth to date.    RADIOLOGY:    < from: Xray Chest 1 View- PORTABLE-Urgent (11.25.19 @ 15:46) >  Impression:      No radiographic evidence of acute cardiopulmonary disease.    < from: EEG (11.29.19 @ 17:35) >    Impression  Abnormal due to the presence of: generalized slowing as above    Clinical Correlation & Recommendations   Consistent with diffuse cerebral electrophysiological dysfunction,   secondary to nonspecific cause.    < from: CT Head No Cont (12.07.19 @ 16:55) >  IMPRESSION:  1.  No evidence of acute intracranial pathology.  2.  Chronic microvascular ischemic changes.      MEDICATIONS  (STANDING):  ammonium lactate 12% Lotion 1 Application(s) Topical two times a day  artificial  tears Solution 1 Drop(s) Both EYES two times a day  calcium carbonate 1250 mG  + Vitamin D (OsCal 500 + D) 1 Tablet(s) Oral daily  chlorhexidine 4% Liquid 1 Application(s) Topical <User Schedule>  clonazePAM  Tablet 0.25 milliGRAM(s) Oral every 12 hours  clotrimazole 1% Cream 1 Application(s) Topical two times a day  dronabinol 2.5 milliGRAM(s) Oral two times a day  enoxaparin Injectable 40 milliGRAM(s) SubCutaneous daily  lamoTRIgine 100 milliGRAM(s) Oral at bedtime  lamoTRIgine 75 milliGRAM(s) Oral daily  levothyroxine 112 MICROGram(s) Oral daily  nystatin Powder 1 Application(s) Topical two times a day  QUEtiapine 25 milliGRAM(s) Oral at bedtime  senna 8.6 milliGRAM(s) Oral Tablet - Peds 1 Tablet(s) Oral at bedtime  simvastatin 20 milliGRAM(s) Oral at bedtime  tamsulosin 0.8 milliGRAM(s) Oral at bedtime

## 2019-12-16 NOTE — PROGRESS NOTE ADULT - ASSESSMENT
54 Year Old Female with a PMH of down syndrome, dementia (alert but non verbal at baseline) and seizure disorder (neurologist outside, Dr. Hood) presents from the group home for evaluation of failure of thrive and decrease po intake.    # Complicated UTI   # Metabolic Encephalopathy   - not catheter associated   - Urine culture pansensitive E. coli   - finished antibiotic course    # Decreased po intake   - pt is mentally challenged, she needs assistance with meals  - needs encouragement and one to one feed  - Calorie count is adequate as per dietician - meeting approximately 100% of daily energy and protein needs  - Met with group home and family at length earlier this week. They would like a repeated calorie count as they believe it would be inaccurate if the patient was receiving haldol for agitation.   - Patients doherty was DC'ed as per meeting, and the patient is incontinent.   - Primafit  - Given dronabinol  - Discontinued fluids as patient has adequate PO intake and will be prepared for DC  - F/U BMP this morning to make sure potassium is WNL    # Down syndrome   # Alzheimer's dementia  - c/w Seroquel   - reduced Klonopin to 0.25 BID   - aspiration precautions   - EEG results as above    # HLD  - c/w statin    # Seizure disorder  - seizure precautions   - keep Magnesium above 2.0  - Lamictal  was decreased to 75 in am [id number] in pm     # Acute Urinary retention    - c/w Flomax   - urology f/u outpatient     # Hypothyroidism:  - Continue with Synthroid 100, Thyroid Stimulating Hormone, Serum: 0.25 uIU/mL (11.26.19 @ 07:49), dose was decreased form 112 to 100    #) constipation  - resolved, discontinued bowel regimen     DVT Prophylaxis: lovenox  GI ppx: not indicated  Disposition: Pending placement  HCP and legal guardian [phone] Madisyn Enamorado (Sister) 54 Year Old Female with a PMH of down syndrome, dementia (alert but non verbal at baseline) and seizure disorder (neurologist outside, Dr. Hood) presents from the group home for evaluation of failure of thrive and decrease po intake.    # Complicated UTI   # Metabolic Encephalopathy   - not catheter associated   - Urine culture pansensitive E. coli   - finished antibiotic course    # Decreased po intake   - pt is mentally challenged, she needs assistance with meals  - needs encouragement and one to one feed  - Calorie count is adequate as per dietician - meeting approximately 100% of daily energy and protein needs  - Met with group home and family at length earlier this week. They would like a repeated calorie count as they believe it would be inaccurate if the patient was receiving haldol for agitation.   - Patients doherty was DC'ed as per meeting, and the patient is incontinent.   - Primafit  - Given dronabinol  - Discontinued fluids as patient has adequate PO intake and will be prepared for DC  - F/U BMP this morning to make sure potassium is WNL    # Down syndrome   # Alzheimer's dementia  - c/w Seroquel   - reduced Klonopin to 0.25 BID   - aspiration precautions   - EEG results as above    # HLD  - c/w statin    # Seizure disorder  - seizure precautions   - keep Magnesium above 2.0  - Lamictal  was decreased to 75 in am [id number] in pm     # Acute Urinary retention    - c/w Flomax   - urology f/u outpatient     # Hypothyroidism:  - Continue with Synthroid 100, Thyroid Stimulating Hormone, Serum: 0.25 uIU/mL (11.26.19 @ 07:49), dose was decreased form 112 to 100    #) constipation  - resolved, discontinued bowel regimen     DVT Prophylaxis: lovenox  GI ppx: not indicated  Disposition: Pending placement  HCP and legal guardian [phone] Madisyn Enamorado (Sister)     Letter of Medical Necessity: Gel Overlay Mattress  Patient will need a gel overlay to help with healing of current skin breakdown and to prevent further skin breakdown. It is medically necessary for the patient to receive gel overlay due to her medical diagnosis. 54 Year Old Female with a PMH of down syndrome, dementia (alert but non verbal at baseline) and seizure disorder (neurologist outside, Dr. Hood) presents from the group home for evaluation of failure of thrive and decrease po intake.    # Complicated UTI   # Metabolic Encephalopathy   - not catheter associated   - Urine culture pansensitive E. coli   - finished antibiotic course    # Decreased po intake   - pt is mentally challenged, she needs assistance with meals  - needs encouragement and one to one feed  - Calorie count is adequate as per dietician - meeting approximately 100% of daily energy and protein needs  - Met with group home and family at length earlier this week. They would like a repeated calorie count as they believe it would be inaccurate if the patient was receiving haldol for agitation.   - Patients doherty was DC'ed as per meeting, and the patient is incontinent.   - Primafit  - Given dronabinol  - Discontinued fluids as patient has adequate PO intake and will be prepared for DC  - F/U BMP this morning to make sure potassium is WNL    # Down syndrome   # Alzheimer's dementia  - c/w Seroquel   - reduced Klonopin to 0.25 BID   - aspiration precautions   - EEG results as above    # HLD  - c/w statin    # Seizure disorder  - seizure precautions   - keep Magnesium above 2.0  - Lamictal  was decreased to 75 in am [id number] in pm     # Acute Urinary retention    - c/w Flomax   - urology f/u outpatient     # Hypothyroidism:  - Continue with Synthroid 100, Thyroid Stimulating Hormone, Serum: 0.25 uIU/mL (11.26.19 @ 07:49), dose was decreased form 112 to 100    #) constipation  - resolved, discontinued bowel regimen     DVT Prophylaxis: lovenox  GI ppx: not indicated  Disposition: Pending placement  HCP and legal guardian [phone] Madisyn Enamorado (Sister)     Letter of Medical Necessity: Gel Overlay [employer]  Patient will need a gel overlay to help prevent skin breakdown. It is medically necessary for the patient to receive gel overlay due to her medical diagnosis.

## 2019-12-16 NOTE — PROGRESS NOTE ADULT - NSHPATTENDINGPLANDISCUSS_GEN_ALL_CORE
house staff
house staff and health care proxy
house staff, aid and residents
house staff, medical residents during rounds
house staff, residents,  and group home
House staff
House staff and family
resident, RN, CM
house staff, medical residents during rounds
resident, RN, CM

## 2019-12-16 NOTE — PROGRESS NOTE ADULT - REASON FOR ADMISSION
decreased po intake

## 2019-12-16 NOTE — DISCHARGE NOTE NURSING/CASE MANAGEMENT/SOCIAL WORK - PATIENT PORTAL LINK FT
You can access the FollowMyHealth Patient Portal offered by [facility] by registering at the following website: [email or web address]. By joining [employer]’s FollowMyHealth portal, you will also be able to view your health information using other applications (apps) compatible with our system.

## 2019-12-16 NOTE — PROGRESS NOTE ADULT - SUBJECTIVE AND OBJECTIVE BOX
HPI  Patient is a 54y old Female who presents with a chief complaint of decreased po intake (15 Dec 2019 12:34)    Currently admitted to medicine with the primary diagnosis of Failure to thrive in adult     Today is hospital day 21d.     INTERVAL HPI / OVERNIGHT EVENTS:  Patient was examined and seen at bedside. This morning she is resting comfortably in bed and reports no new issues or overnight events. Patient was agitated this morning. had primafit order placed yesterday.    ROS: Otherwise unremarkable     PAST MEDICAL & SURGICAL HISTORY  Seizures  Intellectual disability  Hypothyroid  Impulse control disorder in adult  Down's syndrome  No significant past surgical history    ALLERGIES  No Known Allergies    MEDICATIONS  STANDING MEDICATIONS  ammonium lactate 12% Lotion 1 Application(s) Topical two times a day  artificial  tears Solution 1 Drop(s) Both EYES two times a day  calcium carbonate 1250 mG  + Vitamin D (OsCal 500 + D) 1 Tablet(s) Oral daily  chlorhexidine 4% Liquid 1 Application(s) Topical <User Schedule>  clonazePAM  Tablet 0.25 milliGRAM(s) Oral every 12 hours  clotrimazole 1% Cream 1 Application(s) Topical two times a day  dronabinol 2.5 milliGRAM(s) Oral two times a day  enoxaparin Injectable 40 milliGRAM(s) SubCutaneous daily  lamoTRIgine 100 milliGRAM(s) Oral at bedtime  lamoTRIgine 75 milliGRAM(s) Oral daily  levothyroxine 112 MICROGram(s) Oral daily  nystatin Powder 1 Application(s) Topical two times a day  QUEtiapine 25 milliGRAM(s) Oral at bedtime  senna 8.6 milliGRAM(s) Oral Tablet - Peds 1 Tablet(s) Oral at bedtime  simvastatin 20 milliGRAM(s) Oral at bedtime  tamsulosin 0.8 milliGRAM(s) Oral at bedtime    PRN MEDICATIONS    VITALS:  T(F): 96.9  HR: 62  BP: 102/67  RR: 19  SpO2: --      LABS      RADIOLOGY    < from: CT Head No Cont (12.07.19 @ 16:55) >  IMPRESSION:  1.  No evidence of acute intracranial pathology.  2.  Chronic microvascular ischemic changes.    < from: EEG (12.08.19 @ 11:00) >  Impression  Abnormal due to the presence of: generalized slowing as above    Clinical Correlation & Recommendations   Consistent with diffuse cerebral electrophysiological dysfunction   secondary to nonspecific cause.    < end of copied text > HPI  Patient is a 54y old Female who presents with a chief complaint of decreased po intake (15 Dec 2019 12:34)    Currently admitted to medicine with the primary diagnosis of Failure to thrive in adult     Today is hospital day 21d.     INTERVAL HPI / OVERNIGHT EVENTS:  Patient was examined and seen at bedside. This morning she is resting comfortably in bed and reports no new issues or overnight events. Patient was agitated yesterday evening and was given haldol.     PAST MEDICAL & SURGICAL HISTORY  Seizures  Intellectual disability  Hypothyroid  Impulse control disorder in adult  Down's syndrome  No significant past surgical history    ALLERGIES  No Known Allergies    MEDICATIONS  STANDING MEDICATIONS  ammonium lactate 12% Lotion 1 Application(s) Topical two times a day  artificial  tears Solution 1 Drop(s) Both EYES two times a day  calcium carbonate 1250 mG  + Vitamin D (OsCal 500 + D) 1 Tablet(s) Oral daily  chlorhexidine 4% Liquid 1 Application(s) Topical <User Schedule>  clonazePAM  Tablet 0.25 milliGRAM(s) Oral every 12 hours  clotrimazole 1% Cream 1 Application(s) Topical two times a day  dronabinol 2.5 milliGRAM(s) Oral two times a day  enoxaparin Injectable 40 milliGRAM(s) SubCutaneous daily  lamoTRIgine 100 milliGRAM(s) Oral at bedtime  lamoTRIgine 75 milliGRAM(s) Oral daily  levothyroxine 112 MICROGram(s) Oral daily  nystatin Powder 1 Application(s) Topical two times a day  QUEtiapine 25 milliGRAM(s) Oral at bedtime  senna 8.6 milliGRAM(s) Oral Tablet - Peds 1 Tablet(s) Oral at bedtime  simvastatin 20 milliGRAM(s) Oral at bedtime  tamsulosin 0.8 milliGRAM(s) Oral at bedtime    PRN MEDICATIONS    VITALS:  T(F): 96.9  HR: 62  BP: 102/67  RR: 19  SpO2: --      LABS      RADIOLOGY    < from: CT Head No Cont (12.07.19 @ 16:55) >  IMPRESSION:  1.  No evidence of acute intracranial pathology.  2.  Chronic microvascular ischemic changes.    < from: EEG (12.08.19 @ 11:00) >  Impression  Abnormal due to the presence of: generalized slowing as above    Clinical Correlation & Recommendations   Consistent with diffuse cerebral electrophysiological dysfunction   secondary to nonspecific cause.    < end of copied text >

## 2019-12-16 NOTE — PROGRESS NOTE ADULT - ASSESSMENT
54 Year Old Female with a PMH of down syndrome, dementia (alert but non verbal at baseline) and seizure disorder (neurologist outside, Dr. Hood) presents from the group home for evaluation of failure of thrive and decrease po intake.      A/P     # suspected poor po intake  - pt is mentally challenged, she needs assistance with meals  - c/w   Dronabinol 2.5 mg BID  - pt is eating very well, successfully completed calorie count   - consulted by nutritionist   - Ensure with meals     # Down syndrome with mental disability and agitation  -supportive care   - will d/c current medication   - prevent falls and aspiration     # Seizure disorder  - seizure precautions   - keep Magnesium above 2.0  - REEG abnormal   - neurology is following   - Lamictal  was decreased to 75 in am [id number] in pm,  -  on Seroquel 25 mg       # Urinary retention   - pt passed TOV, has good urine output on po hydration   - consulted by    - c/w   Flomax       # Hypothyroidism:  - TSH was low, decreased the dose of Synthroid to 100 mcg on this admission     DVT Prophylaxis: lovenox  GI ppx: not indicated  Disposition: from group [place]  HCP and legal guardian [phone] Ava Enamorado (Sister)    #Progress Note Handoff  Pending (specify):  none   Family discussion: I spoke with aid today at the bedside ; pt's  health care proxy today Kortneytanya Weeks Fei [phone],  had a conference call with [facility] with me on the phone, all questions answered.   Disposition: pt is stable for discharge today

## 2019-12-18 ENCOUNTER — EMERGENCY (EMERGENCY)
Facility: HOSPITAL | Age: 54
LOS: 1 days | Discharge: HOME | End: 2019-12-20
Attending: STUDENT IN AN ORGANIZED HEALTH CARE EDUCATION/TRAINING PROGRAM | Admitting: STUDENT IN AN ORGANIZED HEALTH CARE EDUCATION/TRAINING PROGRAM
Payer: MEDICARE

## 2019-12-18 VITALS
SYSTOLIC BLOOD PRESSURE: 118 MMHG | OXYGEN SATURATION: 99 % | RESPIRATION RATE: 18 BRPM | TEMPERATURE: 98 F | HEART RATE: 75 BPM | DIASTOLIC BLOOD PRESSURE: 67 MMHG

## 2019-12-18 LAB
ANION GAP SERPL CALC-SCNC: 14 MMOL/L — SIGNIFICANT CHANGE UP (ref 7–14)
APPEARANCE UR: CLEAR — SIGNIFICANT CHANGE UP
BASOPHILS # BLD AUTO: 0.08 K/UL — SIGNIFICANT CHANGE UP (ref 0–0.2)
BASOPHILS NFR BLD AUTO: 1.7 % — HIGH (ref 0–1)
BILIRUB UR-MCNC: NEGATIVE — SIGNIFICANT CHANGE UP
BUN SERPL-MCNC: 13 MG/DL — SIGNIFICANT CHANGE UP (ref 10–20)
CALCIUM SERPL-MCNC: 9.5 MG/DL — SIGNIFICANT CHANGE UP (ref 8.5–10.1)
CHLORIDE SERPL-SCNC: 99 MMOL/L — SIGNIFICANT CHANGE UP (ref 98–110)
CO2 SERPL-SCNC: 27 MMOL/L — SIGNIFICANT CHANGE UP (ref 17–32)
COLOR SPEC: SIGNIFICANT CHANGE UP
CREAT SERPL-MCNC: 1.3 MG/DL — SIGNIFICANT CHANGE UP (ref 0.7–1.5)
DIFF PNL FLD: NEGATIVE — SIGNIFICANT CHANGE UP
EOSINOPHIL # BLD AUTO: 0.11 K/UL — SIGNIFICANT CHANGE UP (ref 0–0.7)
EOSINOPHIL NFR BLD AUTO: 2.3 % — SIGNIFICANT CHANGE UP (ref 0–8)
GLUCOSE SERPL-MCNC: 98 MG/DL — SIGNIFICANT CHANGE UP (ref 70–99)
GLUCOSE UR QL: NEGATIVE — SIGNIFICANT CHANGE UP
HCT VFR BLD CALC: 40.3 % — SIGNIFICANT CHANGE UP (ref 37–47)
HGB BLD-MCNC: 13.3 G/DL — SIGNIFICANT CHANGE UP (ref 12–16)
IMM GRANULOCYTES NFR BLD AUTO: 1 % — HIGH (ref 0.1–0.3)
KETONES UR-MCNC: NEGATIVE — SIGNIFICANT CHANGE UP
LEUKOCYTE ESTERASE UR-ACNC: NEGATIVE — SIGNIFICANT CHANGE UP
LYMPHOCYTES # BLD AUTO: 1.08 K/UL — LOW (ref 1.2–3.4)
LYMPHOCYTES # BLD AUTO: 22.4 % — SIGNIFICANT CHANGE UP (ref 20.5–51.1)
MCHC RBC-ENTMCNC: 31.9 PG — HIGH (ref 27–31)
MCHC RBC-ENTMCNC: 33 G/DL — SIGNIFICANT CHANGE UP (ref 32–37)
MCV RBC AUTO: 96.6 FL — SIGNIFICANT CHANGE UP (ref 81–99)
MONOCYTES # BLD AUTO: 0.59 K/UL — SIGNIFICANT CHANGE UP (ref 0.1–0.6)
MONOCYTES NFR BLD AUTO: 12.2 % — HIGH (ref 1.7–9.3)
NEUTROPHILS # BLD AUTO: 2.92 K/UL — SIGNIFICANT CHANGE UP (ref 1.4–6.5)
NEUTROPHILS NFR BLD AUTO: 60.4 % — SIGNIFICANT CHANGE UP (ref 42.2–75.2)
NITRITE UR-MCNC: NEGATIVE — SIGNIFICANT CHANGE UP
NRBC # BLD: 0 /100 WBCS — SIGNIFICANT CHANGE UP (ref 0–0)
PH UR: 7.5 — SIGNIFICANT CHANGE UP (ref 5–8)
PLATELET # BLD AUTO: 309 K/UL — SIGNIFICANT CHANGE UP (ref 130–400)
POTASSIUM SERPL-MCNC: 4.6 MMOL/L — SIGNIFICANT CHANGE UP (ref 3.5–5)
POTASSIUM SERPL-SCNC: 4.6 MMOL/L — SIGNIFICANT CHANGE UP (ref 3.5–5)
PROT UR-MCNC: NEGATIVE — SIGNIFICANT CHANGE UP
RBC # BLD: 4.17 M/UL — LOW (ref 4.2–5.4)
RBC # FLD: 14.4 % — SIGNIFICANT CHANGE UP (ref 11.5–14.5)
SODIUM SERPL-SCNC: 140 MMOL/L — SIGNIFICANT CHANGE UP (ref 135–146)
SP GR SPEC: 1.01 — SIGNIFICANT CHANGE UP (ref 1.01–1.02)
UROBILINOGEN FLD QL: SIGNIFICANT CHANGE UP
WBC # BLD: 4.83 K/UL — SIGNIFICANT CHANGE UP (ref 4.8–10.8)
WBC # FLD AUTO: 4.83 K/UL — SIGNIFICANT CHANGE UP (ref 4.8–10.8)

## 2019-12-18 PROCEDURE — 99222 1ST HOSP IP/OBS MODERATE 55: CPT

## 2019-12-18 PROCEDURE — 99220: CPT

## 2019-12-18 RX ORDER — DRONABINOL 2.5 MG
2.5 CAPSULE ORAL ONCE
Refills: 0 | Status: DISCONTINUED | OUTPATIENT
Start: 2019-12-18 | End: 2019-12-18

## 2019-12-18 RX ORDER — QUETIAPINE FUMARATE 200 MG/1
25 TABLET, FILM COATED ORAL ONCE
Refills: 0 | Status: COMPLETED | OUTPATIENT
Start: 2019-12-18 | End: 2019-12-18

## 2019-12-18 RX ORDER — SENNA PLUS 8.6 MG/1
1 TABLET ORAL ONCE
Refills: 0 | Status: COMPLETED | OUTPATIENT
Start: 2019-12-18 | End: 2019-12-18

## 2019-12-18 RX ORDER — TAMSULOSIN HYDROCHLORIDE 0.4 MG/1
0.4 CAPSULE ORAL AT BEDTIME
Refills: 0 | Status: DISCONTINUED | OUTPATIENT
Start: 2019-12-18 | End: 2019-12-18

## 2019-12-18 RX ORDER — LAMOTRIGINE 25 MG/1
100 TABLET, ORALLY DISINTEGRATING ORAL ONCE
Refills: 0 | Status: COMPLETED | OUTPATIENT
Start: 2019-12-18 | End: 2019-12-18

## 2019-12-18 RX ORDER — CLONAZEPAM 1 MG
0.5 TABLET ORAL ONCE
Refills: 0 | Status: DISCONTINUED | OUTPATIENT
Start: 2019-12-18 | End: 2019-12-18

## 2019-12-18 RX ORDER — QUETIAPINE FUMARATE 200 MG/1
25 TABLET, FILM COATED ORAL ONCE
Refills: 0 | Status: DISCONTINUED | OUTPATIENT
Start: 2019-12-18 | End: 2019-12-18

## 2019-12-18 RX ORDER — SIMVASTATIN 20 MG/1
20 TABLET, FILM COATED ORAL AT BEDTIME
Refills: 0 | Status: DISCONTINUED | OUTPATIENT
Start: 2019-12-18 | End: 2019-12-18

## 2019-12-18 RX ADMIN — Medication 0.5 MILLIGRAM(S): at 23:04

## 2019-12-18 RX ADMIN — SIMVASTATIN 20 MILLIGRAM(S): 20 TABLET, FILM COATED ORAL at 23:05

## 2019-12-18 RX ADMIN — LAMOTRIGINE 100 MILLIGRAM(S): 25 TABLET, ORALLY DISINTEGRATING ORAL at 23:05

## 2019-12-18 RX ADMIN — QUETIAPINE FUMARATE 25 MILLIGRAM(S): 200 TABLET, FILM COATED ORAL at 20:45

## 2019-12-18 RX ADMIN — TAMSULOSIN HYDROCHLORIDE 0.4 MILLIGRAM(S): 0.4 CAPSULE ORAL at 23:05

## 2019-12-18 RX ADMIN — SENNA PLUS 1 TABLET(S): 8.6 TABLET ORAL at 23:05

## 2019-12-18 RX ADMIN — Medication 0.5 MILLIGRAM(S): at 19:52

## 2019-12-18 NOTE — ED CDU PROVIDER INITIAL DAY NOTE - ATTENDING CONTRIBUTION TO CARE
55yo woman h/o hypothyroid, down syndrome with developmental delay, dementia, recent admission for urinary retention and UTI, recurrent doherty catheters sent in from SNF for IR placement of suprapubic catheter, as pt is unable to self cath and due to dementia and agitation, the facility where she lives is also unable to cath; she is nonambulatory at this point. Plan is for IR and likely d/c. VS, exam as noted, family at bedside to sign consent.

## 2019-12-18 NOTE — ED CDU PROVIDER INITIAL DAY NOTE - MEDICAL DECISION MAKING DETAILS
53yo woman h/o hypothyroid, down syndrome with developmental delay, dementia, recent admission for urinary retention and UTI, recurrent doherty catheters sent in from SNF for IR placement of suprapubic catheter, as pt is unable to self cath and due to dementia and agitation, the facility where she lives is also unable to cath; she is nonambulatory at this point. Plan is for IR and likely d/c. VS, exam as noted, family at bedside to sign consent.

## 2019-12-18 NOTE — ED ADULT NURSE NOTE - OBJECTIVE STATEMENT
[facility] recently discharged monday for dehydration. patient hasn't had urine since 8pm yesterday. patient denies any n/v/d

## 2019-12-18 NOTE — ED ADULT NURSE NOTE - PATIENT IS UNABLE TO BE SCREENED DUE TO:
remains in no distress and with stable vitals, full workup in progress will continue to re-evaluate
Other:

## 2019-12-18 NOTE — ED PROVIDER NOTE - PROGRESS NOTE DETAILS
case dw urology, suture to be left in place for 6 weeks and patient to follow up with urology for removal. given concerns that patient may pull at catheter, plan to send patient home with abdominal binder. zw

## 2019-12-18 NOTE — CONSULT NOTE ADULT - SUBJECTIVE AND OBJECTIVE BOX
Patient is a 54y old  Female who presents with a chief complaint of in ability to void    HPI: 53 y/o F [facility] with recent hx of urinary retention unable to do CIC. She lives in a home and they are unable to do CIC.  Pt was given a trial of voiding and failed sent to ED for Gallo. Dr. Holt spoke with nurse at home pt with repetitive cycle of  Gallo then UTI. They are requesting intervention.      PAST MEDICAL & SURGICAL HISTORY:  Seizures  Intellectual disability  Hypothyroid  Impulse control disorder in adult  Down's syndrome  No significant past surgical history      REVIEW OF SYSTEMS:  [  ] a 10 point review of systems was negative except where noted    [  ]  Due to altered mental status/intubation, subjective information was not able t be obtained from the patient.  History was obtained, to the extent possible, from review of the chart and collateral sources of information.            MEDICATIONS  (STANDING):    MEDICATIONS  (PRN):      Allergies    No Known Allergies    Intolerances        SOCIAL HISTORY: No illicit drug use    FAMILY HISTORY:      Vital Signs Last 24 Hrs  T(C): 36.7 (18 Dec 2019 10:11), Max: 36.7 (18 Dec 2019 10:11)  T(F): 98 (18 Dec 2019 10:11), Max: 98 (18 Dec 2019 10:11)  HR: 52 (18 Dec 2019 10:11) (52 - 52)  BP: 118/59 (18 Dec 2019 10:11) (118/59 - 118/59)  BP(mean): --  RR: 18 (18 Dec 2019 10:11) (18 - 18)  SpO2: 99% (18 Dec 2019 10:11) (99% - 99%)    Daily     Daily     PHYSICAL EXAM:    Constitutional: NAD, well-developed  HEENT: EOMI  Neck: no pain  Back: No CVA tenderness  Respiratory: No accessory respiratory muscle use  Abd: Soft, NT/ND  no organomegally  no hernia  :   POLLO:   Extremities: no edema  Neurological: A/O x 3  Psychiatric: Normal mood, normal affect  Skin: No rashes    I&O's Summary    18 Dec 2019 07:01  -  18 Dec 2019 15:34  --------------------------------------------------------  IN: 0 mL / OUT: 850 mL / NET: -850 mL        LABS:                        13.3   4.83  )-----------( 309      ( 18 Dec 2019 11:50 )             40.3     12-18    140  |  99  |  13  ----------------------------<  98  4.6   |  27  |  1.3    Ca    9.5      18 Dec 2019 11:50        Urinalysis Basic - ( 18 Dec 2019 11:50 )    Color: Light Yellow / Appearance: Clear / S.013 / pH: x  Gluc: x / Ketone: Negative  / Bili: Negative / Urobili: <2 mg/dL   Blood: x / Protein: Negative / Nitrite: Negative   Leuk Esterase: Negative / RBC: x / WBC x   Sq Epi: x / Non Sq Epi: x / Bacteria: x      Urine Culture:         RADIOLOGY & ADDITIONAL STUDIES: Patient is a 54y old  Female who presents with a chief complaint of in ability to void    HPI: 55 y/o F  with recent hx of urinary retention unable to do CIC. She lives in a home and they are unable to do CIC.  Pt was given a trial of voiding and failed sent to ED for Gallo. Dr. Holt spoke with nurse at home pt with repetitive cycle of  Gallo then UTI. They are requesting intervention.      PAST MEDICAL & SURGICAL HISTORY:  Seizures  Intellectual disability  Hypothyroid  Impulse control disorder in adult  Down's syndrome  No significant past surgical history      REVIEW OF SYSTEMS:  [  ] a 10 point review of systems was negative except where noted    [x]  Due to altered mental status/intubation, subjective information was not able t be obtained from the patient.  History was obtained, to the extent possible, from review of the chart and collateral sources of information.    MEDICATIONS  (STANDING):    ammonium lactate 12% Lotion 1 Application(s) Topical two times a day  artificial  tears Solution 1 Drop(s) Both EYES two times a day  calcium carbonate 1250 mG  + Vitamin D (OsCal 500 + D) 1 Tablet(s) Oral daily  chlorhexidine 4% Liquid 1 Application(s) Topical <User Schedule>  clonazePAM  Tablet 0.5 milliGRAM(s) Oral every 12 hours  clotrimazole 1% Cream 1 Application(s) Topical two times a day  dronabinol 2.5 milliGRAM(s) Oral two times a day  enoxaparin Injectable 40 milliGRAM(s) SubCutaneous daily  lamoTRIgine 100 milliGRAM(s) Oral at bedtime  lamoTRIgine 75 milliGRAM(s) Oral daily  levothyroxine 112 MICROGram(s) Oral daily  polyethylene glycol 3350 17 Gram(s) Oral daily  QUEtiapine 25 milliGRAM(s) Oral at bedtime  senna 8.6 milliGRAM(s) Oral Tablet - Peds 1 Tablet(s) Oral at bedtime  simvastatin 20 milliGRAM(s) Oral at bedtime  tamsulosin 0.8 milliGRAM(s) Oral at bedtime      Allergies    No Known Allergies    SOCIAL HISTORY: No illicit drug use, No ETOH, No Tob.    FAMILY HISTORY: unable to illicit      Vital Signs Last 24 Hrs  T(C): 36.7 (18 Dec 2019 10:11), Max: 36.7 (18 Dec 2019 10:11)  T(F): 98 (18 Dec 2019 10:11), Max: 98 (18 Dec 2019 10:11)  HR: 52 (18 Dec 2019 10:11) (52 - 52)  BP: 118/59 (18 Dec 2019 10:11) (118/59 - 118/59)  BP(mean): --  RR: 18 (18 Dec 2019 10:11) (18 - 18)  SpO2: 99% (18 Dec 2019 10:11) (99% - 99%)    Daily     Daily     PHYSICAL EXAM:    Constitutional: NAD, Pt with Down's syndrome  Back: No CVA tenderness  Respiratory: No accessory respiratory muscle use  Abd: Soft, NT/ND  no organomegally  no hernia  : Gallo draining clear yellow urine 900cc pvr  Extremities: no edema  Neurological: A/O x 1  Psychiatric: Normal mood, normal affect  Skin: No rashes    I&O's Summary    18 Dec 2019 07:01  -  18 Dec 2019 15:34  --------------------------------------------------------  IN: 0 mL / OUT: 850 mL / NET: -850 mL        LABS:                        13.3   4.83  )-----------( 309      ( 18 Dec 2019 11:50 )             40.3     12-    140  |  99  |  13  ----------------------------<  98  4.6   |  27  |  1.3    Ca    9.5      18 Dec 2019 11:50        Urinalysis Basic - ( 18 Dec 2019 11:50 )    Color: Light Yellow / Appearance: Clear / S.013 / pH: x  Gluc: x / Ketone: Negative  / Bili: Negative / Urobili: <2 mg/dL   Blood: x / Protein: Negative / Nitrite: Negative   Leuk Esterase: Negative / RBC: x / WBC x   Sq Epi: x / Non Sq Epi: x / Bacteria: x      Urine Culture: Culture - Urine (19 @ 16:10)    -  Amikacin: S <=16    -  Ampicillin: S <=8 These ampicillin results predict results for amoxicillin    -  Ampicillin/Sulbactam: S <=8/4 Enterobacter, Citrobacter, and Serratia may develop resistance during prolonged therapy (3-4 days)    -  Aztreonam: S <=4    -  Cefazolin: S <=8 (MIC_CL_[id number]) For uncomplicated UTI with K. pneumoniae, E. coli, or P. mirablis: DREW <=16 is sensitive and DREW >=32 is resistant. This also predicts results for oral agents cefaclor, cefdinir, cefpodoxime, cefprozil, cefuroxime axetil, cephalexin and locarbef for uncomplicated UTI. Note that some isolates may be susceptible to these agents while testing resistant to cefazolin.    -  Cefepime: S <=4    -  Cefoxitin: S <=8    -  Ceftriaxone: S <=1 Enterobacter, Citrobacter, and Serratia may develop resistance during prolonged therapy    -  Ciprofloxacin: S <=1    -  Gentamicin: S <=4    -  Imipenem: S <=1    -  Levofloxacin: S <=2    -  Meropenem: S <=1    -  Nitrofurantoin: S <=32 Should not be used to treat pyelonephritis    -  Piperacillin/Tazobactam: S <=16    -  Tigecycline: S <=2    -  Tobramycin: S <=4    -  Trimethoprim/Sulfamethoxazole: S <=2/38    Specimen Source: .Urine Catheterized    Culture Results:   >100,000 CFU/ml Escherichia coli    Organism Identification: Escherichia coli    Organism: Escherichia coli    Method Type: DREW     repeat pending

## 2019-12-18 NOTE — ED CDU PROVIDER INITIAL DAY NOTE - PROGRESS NOTE DETAILS
pt is scheduled for supra-pubic cath placed tomorrow, discussed case with pt's sister (advocate) Emiliana, is aware of procedure and consents to it, Nurse manager Sepideh at facility also made aware

## 2019-12-18 NOTE — ED PROVIDER NOTE - CLINICAL SUMMARY MEDICAL DECISION MAKING FREE TEXT BOX
Pt seen for retention, doherty placed with drainage ~ 900 mL clear yellow urine. Land and UA unremarkable. Given recurrent episodes retention and pt nonverbal in group home urology consulted and recommendation made for suprapubic catheter as indwelling catheter until office follow up risk for UTI and pt with limited mobility and access to office. Pt placed in EDOU and IR consult requested for suprapubic catheter placement after urology recommended.

## 2019-12-18 NOTE — ED PROVIDER NOTE - PHYSICAL EXAMINATION
VITAL SIGNS: noted  CONSTITUTIONAL: Well-developed; well-nourished; in no acute distress, nonverbal  HEAD: Normocephalic; atraumatic  EYES: PERRL, EOM intact; conjunctiva and sclera clear  ENT: No nasal discharge; airway clear. MMM  NECK: Supple; non tender.    CARD: S1, S2 normal; no murmurs, gallops, or rubs. Regular rate and rhythm  RESP: CTAB/L, no wheezes, rales or rhonchi  ABD: Normal bowel sounds; soft; non-distended; non-tender; no CVA tenderness  EXT: Normal ROM. No calf tenderness or edema. Distal pulses intact  NEURO: Alert, alert, nonverbal. Grossly unremarkable. No focal deficits

## 2019-12-18 NOTE — CONSULT NOTE ADULT - ATTENDING COMMENTS
pt seen and examined at bedside  agree with above  plan for IR SPT today vs tomorrow if there is a need for anesthesia

## 2019-12-18 NOTE — ED PROVIDER NOTE - OBJECTIVE STATEMENT
53 yo female with PMH Down's syndrome, nonverbal, Alzheimer's dementia, seizure d/o, hypothyroidism sent in for urinary retention. Pt last urinated 8 PM last night and brought in for evaluation. Pt without any fevers, change in behavior, tolerating PO at baseline. Pt with recent admission where pt had multiple episodes retention, had doherty placed and then developed UTI which was treated. Doherty removed and intermittent straight catheterization performed.

## 2019-12-18 NOTE — ED PROVIDER NOTE - NS ED ROS FT
Constitutional: see HPI  Eyes:  No visual changes, eye pain or discharge.  ENMT:  No hearing changes, pain, discharge or infections. No neck pain or stiffness.  Cardiac:  No chest pain, SOB or edema. No chest pain with exertion.  Respiratory:  No cough or respiratory distress. No hemoptysis. No history of asthma or RAD.  GI:  No nausea, vomiting, diarrhea or abdominal pain.  :  see HPI.  MS:  No myalgia, muscle weakness, joint pain or back pain.  Neuro:  No headache or weakness.  No LOC.  Skin:  No skin rash.   Endocrine: +history of thyroid disease; no hx diabetes.

## 2019-12-18 NOTE — ED CDU PROVIDER INITIAL DAY NOTE - OBJECTIVE STATEMENT
Pt is a 55y/o female with pmhx down syndrome, Hypothyroidism, MR presents today for urinary retention from [facility]. Pt was recently admitted for failure to thrive, developed UTI during that admission, requiring doherty catherization. Pt was recently discharged 1 day ago after voiding om her own, but has been in retention over last 12 hours. Pt unable to give history due to chronic conditions.

## 2019-12-19 DIAGNOSIS — G30.9 ALZHEIMER'S DISEASE, UNSPECIFIED: ICD-10-CM

## 2019-12-19 DIAGNOSIS — E03.9 HYPOTHYROIDISM, UNSPECIFIED: ICD-10-CM

## 2019-12-19 DIAGNOSIS — F63.9 IMPULSE DISORDER, UNSPECIFIED: ICD-10-CM

## 2019-12-19 DIAGNOSIS — G40.909 EPILEPSY, UNSPECIFIED, NOT INTRACTABLE, WITHOUT STATUS EPILEPTICUS: ICD-10-CM

## 2019-12-19 DIAGNOSIS — E78.5 HYPERLIPIDEMIA, UNSPECIFIED: ICD-10-CM

## 2019-12-19 DIAGNOSIS — N39.0 URINARY TRACT INFECTION, SITE NOT SPECIFIED: ICD-10-CM

## 2019-12-19 DIAGNOSIS — G93.41 METABOLIC ENCEPHALOPATHY: ICD-10-CM

## 2019-12-19 DIAGNOSIS — R62.7 ADULT FAILURE TO THRIVE: ICD-10-CM

## 2019-12-19 DIAGNOSIS — F02.81 DEMENTIA IN OTHER DISEASES CLASSIFIED ELSEWHERE, UNSPECIFIED SEVERITY, WITH BEHAVIORAL DISTURBANCE: ICD-10-CM

## 2019-12-19 DIAGNOSIS — Q90.9 DOWN SYNDROME, UNSPECIFIED: ICD-10-CM

## 2019-12-19 DIAGNOSIS — R33.9 RETENTION OF URINE, UNSPECIFIED: ICD-10-CM

## 2019-12-19 DIAGNOSIS — R32 UNSPECIFIED URINARY INCONTINENCE: ICD-10-CM

## 2019-12-19 DIAGNOSIS — B96.29 OTHER ESCHERICHIA COLI [E. COLI] AS THE CAUSE OF DISEASES CLASSIFIED ELSEWHERE: ICD-10-CM

## 2019-12-19 DIAGNOSIS — F79 UNSPECIFIED INTELLECTUAL DISABILITIES: ICD-10-CM

## 2019-12-19 DIAGNOSIS — K59.00 CONSTIPATION, UNSPECIFIED: ICD-10-CM

## 2019-12-19 LAB
APTT BLD: 27 SEC — SIGNIFICANT CHANGE UP (ref 27–39.2)
INR BLD: 1.07 RATIO — SIGNIFICANT CHANGE UP (ref 0.65–1.3)
PROTHROM AB SERPL-ACNC: 12.3 SEC — SIGNIFICANT CHANGE UP (ref 9.95–12.87)

## 2019-12-19 PROCEDURE — 76942 ECHO GUIDE FOR BIOPSY: CPT | Mod: 26

## 2019-12-19 PROCEDURE — 99226: CPT

## 2019-12-19 PROCEDURE — 51102 DRAIN BL W/CATH INSERTION: CPT

## 2019-12-19 PROCEDURE — 99152 MOD SED SAME PHYS/QHP 5/>YRS: CPT

## 2019-12-19 RX ORDER — QUETIAPINE FUMARATE 200 MG/1
25 TABLET, FILM COATED ORAL ONCE
Refills: 0 | Status: COMPLETED | OUTPATIENT
Start: 2019-12-19 | End: 2019-12-19

## 2019-12-19 RX ORDER — SENNA PLUS 8.6 MG/1
1 TABLET ORAL ONCE
Refills: 0 | Status: COMPLETED | OUTPATIENT
Start: 2019-12-19 | End: 2019-12-19

## 2019-12-19 RX ORDER — DRONABINOL 2.5 MG
2.5 CAPSULE ORAL ONCE
Refills: 0 | Status: DISCONTINUED | OUTPATIENT
Start: 2019-12-19 | End: 2019-12-18

## 2019-12-19 RX ORDER — CLONAZEPAM 1 MG
0.5 TABLET ORAL ONCE
Refills: 0 | Status: DISCONTINUED | OUTPATIENT
Start: 2019-12-19 | End: 2019-12-19

## 2019-12-19 RX ORDER — LAMOTRIGINE 25 MG/1
100 TABLET, ORALLY DISINTEGRATING ORAL ONCE
Refills: 0 | Status: COMPLETED | OUTPATIENT
Start: 2019-12-19 | End: 2019-12-19

## 2019-12-19 RX ADMIN — TAMSULOSIN HYDROCHLORIDE 0.4 MILLIGRAM(S): 0.4 CAPSULE ORAL at 22:09

## 2019-12-19 RX ADMIN — LAMOTRIGINE 100 MILLIGRAM(S): 25 TABLET, ORALLY DISINTEGRATING ORAL at 22:09

## 2019-12-19 RX ADMIN — SIMVASTATIN 20 MILLIGRAM(S): 20 TABLET, FILM COATED ORAL at 22:09

## 2019-12-19 RX ADMIN — SENNA PLUS 1 TABLET(S): 8.6 TABLET ORAL at 22:09

## 2019-12-19 RX ADMIN — QUETIAPINE FUMARATE 25 MILLIGRAM(S): 200 TABLET, FILM COATED ORAL at 22:09

## 2019-12-19 NOTE — PROGRESS NOTE ADULT - SUBJECTIVE AND OBJECTIVE BOX
INTERVENTIONAL RADIOLOGY BRIEF-OPERATIVE NOTE    Procedure: SPT, 16F    Pre-Op Diagnosis: urinary retention    Post-Op Diagnosis: same    Attending: Gene  Resident: Sarah    Anesthesia (type):  [ ] General Anesthesia  [x] Sedation 3 mg versed, 25 mcg fentanyl  [ ] Spinal Anesthesia  [x] Local/Regional    Contrast: none intravenous    Estimated Blood Loss: minimal <1cc    Condition:   [ ] Critical  [ ] Serious  [ ] Fair   [x] Good    Findings/Follow up Plan of Care: successful placement of 16f spt pigtail,    Specimens Removed: none    Implants: none    Complications: none    Disposition: to ED      Please call Interventional Radiology [phone] with any questions, concerns, or issues. INTERVENTIONAL RADIOLOGY BRIEF-OPERATIVE NOTE    Procedure: SPT, 16F    Pre-Op Diagnosis: urinary retention    Post-Op Diagnosis: same    Attending: Gene  Resident: Sarah    Anesthesia (type):  [ ] General Anesthesia  [x] Sedation-- 3 mg versed, 25 mcg fentanyl  [ ] Spinal Anesthesia  [x] Local/Regional-- 1% Lidocaine SQ, 8cc    Total Face-to-Face Sedation Time:  50 minutes    Contrast:  visipaque, 15cc to urinary bladder, subsequently drained    Estimated Blood Loss:  2cc    Condition:   [ ] Critical  [ ] Serious  [ ] Fair   [x] Good    Findings/Follow-up Plan of Care:  16f spt pigtail catheter placed using arnel;-time ultrasound and fluoroscopic guidance.  Draining clear urine externally to gravity post-procedure.  Patient tolerated very well, without incident.  Urethral Gallo catheter D/C'd uneventfully.  Case d/w family in radiology department immediately post-procedure.    Specimens Removed:  None    Implants:  None    Complications:  None    Disposition:  Back to ED      Please call Interventional Radiology [phone] with any questions, concerns, or issues.

## 2019-12-19 NOTE — CONSULT NOTE ADULT - SUBJECTIVE AND OBJECTIVE BOX
INTERVENTIONAL RADIOLOGY CONSULT:     Procedure Requested: suprapubic catheter placement     HPI: 55 yo female with PMH Down's syndrome, nonverbal, Alzheimer's dementia, seizure d/o, hypothyroidism sent in for urinary retention. Pt last urinated 8 PM last night and brought in for evaluation. Pt without any fevers, change in behavior, tolerating PO at baseline. Pt with recent admission where pt had multiple episodes retention, had doherty placed and then developed UTI which was treated. Doherty removed and intermittent straight catheterization performed.      PAST MEDICAL & SURGICAL HISTORY:  Seizures  Intellectual disability  Hypothyroid  Impulse control disorder in adult  Down's syndrome  No significant past surgical history      MEDICATIONS  (STANDING):  simvastatin 20 milliGRAM(s) Oral at bedtime  tamsulosin 0.4 milliGRAM(s) Oral at bedtime    MEDICATIONS  (PRN):      Allergies    No Known Allergies    Intolerances    FAMILY HISTORY:  No pertinent family history in first degree relatives      Physical Exam:   Vital Signs Last 24 Hrs  T(C): 36.1 (19 Dec 2019 10:00), Max: 36.7 (19 Dec 2019 00:52)  T(F): 96.9 (19 Dec 2019 10:00), Max: 98 (19 Dec 2019 00:52)  HR: 55 (19 Dec 2019 10:00) (55 - 69)  BP: 100/55 (19 Dec 2019 10:00) (100/55 - 127/60)  BP(mean): --  RR: 18 (19 Dec 2019 10:00) (18 - 18)  SpO2: 96% (19 Dec 2019 10:00) (96% - 99%)      Labs:                         13.3   4.83  )-----------( 309      ( 18 Dec 2019 11:50 )             40.3     12-18    140  |  99  |  13  ----------------------------<  98  4.6   |  27  |  1.3    Ca    9.5      18 Dec 2019 11:50          Pertinent labs:                      13.3   4.83  )-----------( 309      ( 18 Dec 2019 11:50 )             40.3       12-18    140  |  99  |  13  ----------------------------<  98  4.6   |  27  |  1.3    Ca    9.5      18 Dec 2019 11:50    Radiology & Additional Studies:     < from: CT Abdomen and Pelvis No Cont (10.23.19 @ 05:04) >  IMPRESSION:     No CT evidence of acute traumatic injury to the chest, abdomen or pelvis.    < end of copied text >      Radiology imaging reviewed.       ASSESSMENT/ PLAN:   55 yo female with PMH Down's syndrome, nonverbal, Alzheimer's dementia, seizure d/o, hypothyroidism sent in for urinary retention.  - consulted for suprapubic tube catheter placement   - plan for image guided pigtail placement with conscious sedation today 12/19  - please keep patient NPO   - order stat PT/PTT/INR    Risks, benefits, and alternatives to treatment discussed. All questions answered with understanding.    Thank you for the courtesy of this consult, please call [phone] with any further questions.

## 2019-12-19 NOTE — ED CDU PROVIDER SUBSEQUENT DAY NOTE - PROGRESS NOTE DETAILS
pt. in nad, resting comfortably, health aide is at bedside, pt. will go to IR in am for suprapubic catheter placement. pt in nad, sleeping comfortably, family at bedside. Pending IR for suprapubic catheter placement Pt seen by IR Team, awaiting procedure. Awake and alert, at baseline neuro, family at bedside. s/p successful suprapubic cath placement by IR, 16f. Pt in no distress. Asking for food. Pt back from IR, alert and well appearing. Tolerating PO. Per IR pt can be discharged; spoke with supervisor at group home, there is no nursing staff at night and they are not trained to deal with catheter drainage, asked that pt stay the night, they will pick her up in the morning when there is more staff available for training. Will continue to observe and d/c in the morning. received signout from Dr. Giles/Dr. Carvalho; pt to be d/c in am to group home when staff available to be drained in care/drainage of suprapubic catheter; Aide at bed side; Pm meds ordered for pt; Attending Statement: I have personally seen and examined this patient. I have fully participated in the care of this patient. I have reviewed all pertinent clinical information, including history, physical exam, plan and the ACP's note and agree except as noted.

## 2019-12-19 NOTE — PROGRESS NOTE ADULT - SUBJECTIVE AND OBJECTIVE BOX
HPI:  Pt family at bedside, npo, awaiting IR procedure.    PAST MEDICAL & SURGICAL HISTORY:  Seizures  Intellectual disability  Hypothyroid  Impulse control disorder in adult  Down's syndrome  No significant past surgical history      Allergies    No Known Allergies        MEDICATIONS  (STANDING):  simvastatin 20 milliGRAM(s) Oral at bedtime  tamsulosin 0.4 milliGRAM(s) Oral at bedtime      General:	no fever, weight loss,  chills  Gastrointestinal:	no nausea, vomiting, diarrhea, abd pain  Genitourinary:	no dysuria, hematuria        Vital Signs Last 24 Hrs  T(F): 96.9 (19 Dec 2019 10:00), Max: 98 (19 Dec 2019 00:52)  HR: 55 (19 Dec 2019 10:00) (55 - 69)  BP: 100/55 (19 Dec 2019 10:00) (100/55 - 127/60)  RR: 18 (19 Dec 2019 10:00) (18 - 18)  SpO2: 96% (19 Dec 2019 10:00) (96% - 99%)    PHYSICAL EXAM:      Constitutional: Alert nad  Gastrointestinal: soft nt  nd + bs no rebound or guarding  Genitourinary: no cva tenderness doherty in place yellow urine drainig  Extremities: normal rom, no edema, calf tenderness                            13.3   4.83  )-----------( 309      ( 18 Dec 2019 11:50 )             40.3       12-18    140  |  99  |  13  ----------------------------<  98  4.6   |  27  |  1.3    Ca    9.5      18 Dec 2019 11:50            Urinalysis Basic - ( 18 Dec 2019 11:50 )    Color: Light Yellow / Appearance: Clear / S.013 / pH: x  Gluc: x / Ketone: Negative  / Bili: Negative / Urobili: <2 mg/dL   Blood: x / Protein: Negative / Nitrite: Negative   Leuk Esterase: Negative / RBC: x / WBC x   Sq Epi: x / Non Sq Epi: x / Bacteria: x

## 2019-12-19 NOTE — PROGRESS NOTE ADULT - ASSESSMENT
53 y/o F  with recent hx of urinary retention unable to do CIC. She lives in a home and they are unable to do CIC.  Pt was given a trial of voiding and failed sent to ED for Gallo. Dr. Holt spoke with nurse at home pt with repetitive cycle of  Gallo then UTI. They are requesting intervention.    A) urinary retention with recurrent UTI cysle 2nd to Gallo catheter.    P) IR to place  SP tube today  to f/u as op for monthly tube changes  no acute urologic intervention at this time  will follow   d/w Dr. Chavarria

## 2019-12-20 VITALS
OXYGEN SATURATION: 98 % | TEMPERATURE: 96 F | SYSTOLIC BLOOD PRESSURE: 114 MMHG | HEART RATE: 52 BPM | RESPIRATION RATE: 16 BRPM | DIASTOLIC BLOOD PRESSURE: 53 MMHG

## 2019-12-20 LAB
CULTURE RESULTS: NO GROWTH — SIGNIFICANT CHANGE UP
SPECIMEN SOURCE: SIGNIFICANT CHANGE UP

## 2019-12-20 PROCEDURE — 99217: CPT

## 2019-12-20 NOTE — ED CDU PROVIDER DISPOSITION NOTE - CLINICAL COURSE
pt sent to ed with hx of chronic urethral cathing doherty and straight cath with recurrent utis, plan was made for suprapubic catheter which was placed yesterday by IR. pt resting comfortably, case dw supervising rn, plan to return to living facility and pt to follow up with pmd and urology.

## 2019-12-20 NOTE — ED [facility] PROVIDER DISPOSITION NOTE - NSFOLLOWUPINSTRUCTIONS_ED_ALL_ED_FT
Follow up with your primary care doctor and/or the doctors recommended in 1-3 days to have patient, catheter, catheter insertion site evaluated.   Follow up with urology within 1 month, contact information below.     Suprapubic Cystostomy    WHAT YOU NEED TO KNOW:    Suprapubic cystostomy is surgery to create a stoma (opening) through your abdomen into your bladder. This opening is where a catheter is inserted to drain urine. You may need a cystostomy if your urine flow is blocked.    DISCHARGE INSTRUCTIONS:    Seek immediate care if:     No urine is draining into the urine bag.    Suprapubic Cystostomy    WHAT YOU NEED TO KNOW:    Suprapubic cystostomy is surgery to create a stoma (opening) through your abdomen into your bladder. This opening is where a catheter is inserted to drain urine. You may need a cystostomy if your urine flow is blocked.    DISCHARGE INSTRUCTIONS:    Seek immediate care if:     No urine is draining into the urine bag.      You have severe pain.    Call your doctor if:     You have a fever or chills.      You have a burning pain in your stoma.      Your stoma is red, swollen, or draining pus.      You have blood in your urine.      You have questions or concerns about your condition or care.    Medicines:     Medicines to treat pain and prevent infection may be given.      Take your medicine as directed. Contact your healthcare provider if you think your medicine is not helping or if you have side effects. Tell him or her if you are allergic to any medicine. Keep a list of the medicines, vitamins, and herbs you take. Include the amounts, and when and why you take them. Bring the list or the pill bottles to follow-up visits. Carry your medicine list with you in case of an emergency.    Empty your urine drainage bag: Empty your urine drainage bag when it is ½ to ? full, or every 8 hours. If you have a smaller leg bag, empty it every 3 to 4 hours. Do the following when you empty your urine drainage bag:    Hold the urine bag over a toilet or large container.       Remove the drain spout from its sleeve at the bottom of the urine bag. Do not touch the tip of the drain spout. Open the slide valve on the spout.      Let the urine flow out of the urine bag into the toilet or container. Do not let the drainage tube touch anything.      Clean the end of the drain spout with alcohol when the bag is empty. Ask which cleaning solution is best to use.       Close the slide valve and put the drain spout into its sleeve at the bottom of the urine bag. Write down how much urine was in your bag if you were asked to keep a record.    Prevent an infection:     Clean the stoma and skin around it daily. Wash your hands before and after cystostomy care. Put on a new pair of clean medical gloves. Ask for more information about stoma and skin care.      Change your urine bag or clean reusable bags. Ask how often you need to change or clean your urine drainage bag. You may need to change your reusable bag at least once a week.      Keep the bag below your waist. This will prevent urine from flowing back into your bladder and causing an infection or other problems. Also, keep the tube free of kinks so the urine will drain properly. Do not pull on the catheter. This can cause pain and bleeding and may cause the catheter to come out.     Follow up with your doctor or surgeon as directed: You may need to return to have your suprapubic catheter changed or removed. Write down your questions so you remember to ask them during your visits.     How to Care for Your Suprapubic Catheter    AMBULATORY CARE:    A suprapubic catheter is a tube that drains urine from your bladder. It is inserted through a small hole in your lower abdomen and into your bladder. Suprapubic means that the catheter is inserted above your pubic bone. You may need a catheter because you have problems urinating because of a medical condition or surgery. A suprapubic catheter is also called an indwelling urinary catheter.     Call your doctor if:     You have a fever.       You have changes in how your urine looks or smells, or you have blood in your urine.      You have an overgrowth of skin at the insertion site that is getting larger.      The closed drainage system has accidently come open or apart.       Your catheter keeps getting blocked.      There is less urine than usual or no urine draining into the drainage bag.      The catheter comes out.       Your insertion site is red, smells bad, or has green or yellow discharge.      You have pain in your hip, back, pelvis, or lower abdomen.      You are confused or have other changes in the way that you think.      You have questions or concerns about how to care for your catheter.    Why catheter care is important: An infection can develop when bacteria get inside the catheter or drainage system. This can happen when the urine bag is changed or when a urine sample is collected. You can get an infection if you do not wash your hands. You can also get an infection if the catheter equipment is not cleaned properly. Urinary catheter-based infections can lead to serious illness. The following can help prevent infection:     Care for your catheter as directed. Follow directions on how to clean and care for the catheter, insertion site, and drainage bag. Keep the catheter drainage system closed. Keep the catheter tube secured to your leg so it will drain well.      Drink liquids as directed. Ask how much liquid to drink each day and which liquids are best for you. Good choices for most people include water, juice, and milk. Coffee, soup, and fruit may be counted in your daily liquid amount.      Wash your hands often. Always wash with soap and water before and after you touch your catheter, tubing, or drainage bag. Wear clean medical gloves when you care for your catheter or disconnect the drainage bag. This will help stop germs from getting into your catheter. Remind anyone who cares for your catheter or drainage system to wash his or her hands.Handwashing         Care for your drainage bag:     Always wash your hands before and after you touch the catheter or the insertion site. Wear clean medical gloves when you care for your catheter.      Position the drainage bag and tubing:   Allow gravity drainage. Do not loop or kink the tubing so urine can flow into the bag.      Position the drainage bag properly. Keep the drainage bag below the level of your waist. This helps stop urine from moving back up the tubing and into your bladder.      Keep the bag off the floor. This will help prevent contamination.      Empty the drainage bag when needed. The weight of a full drainage bag can pull on the catheter and cause pain. Empty the drainage bag every 3 to 6 hours or when it is ½ to ? full.   Place a large container on the floor next to your chair. You may also hold the urine bag over the toilet.      Remove the drain spout from its sleeve at the bottom of the urine bag. Do not touch the tip. Open the slide valve on the spout.      Let the urine flow out of the urine bag into the container or toilet. Do not let the drainage tube touch anything.      Clean the end of the drain spout when the bag is empty. Ask your healthcare provider which cleaning solution is best to use.       Close the slide valve and put the drain spout into its sleeve at the bottom of the urine bag. Write down how much urine was in your bag if your healthcare provider has asked you to keep a record.      Clean and change the drainage bag as directed. Ask your healthcare provider how often you should change the drainage bag. You may buy a solution to clean the drainage bag. You may also make a solution with tap water and household bleach or vinegar. Wear medical gloves if you need to disconnect the tubing. Do not allow the end of the catheter or tubing to touch anything. Clean the ends with a new alcohol pad or as directed by your healthcare provider before you reconnect them.    Other tips:     Wash your genital area and the insertion site with soap and water 2 times a day. Wash your anal area with soap and water after each bowel movement.      You may need to use the larger drainage bag at night. A leg bag can be used during the day. A leg bag usually fits under clothing.     Follow up with your doctor as directed: Write down your questions so you remember to ask them during your visits.             You have severe pain.    Call your doctor if:     You have a fever or chills.      You have a burning pain in your stoma.      Your stoma is red, swollen, or draining pus.      You have blood in your urine.      You have questions or concerns about your condition or care.    Medicines:     Medicines to treat pain and prevent infection may be given.      Take your medicine as directed. Contact your healthcare provider if you think your medicine is not helping or if you have side effects. Tell him or her if you are allergic to any medicine. Keep a list of the medicines, vitamins, and herbs you take. Include the amounts, and when and why you take them. Bring the list or the pill bottles to follow-up visits. Carry your medicine list with you in case of an emergency.    Empty your urine drainage bag: Empty your urine drainage bag when it is ½ to ? full, or every 8 hours. If you have a smaller leg bag, empty it every 3 to 4 hours. Do the following when you empty your urine drainage bag:    Hold the urine bag over a toilet or large container.       Remove the drain spout from its sleeve at the bottom of the urine bag. Do not touch the tip of the drain spout. Open the slide valve on the spout.      Let the urine flow out of the urine bag into the toilet or container. Do not let the drainage tube touch anything.      Clean the end of the drain spout with alcohol when the bag is empty. Ask which cleaning solution is best to use.       Close the slide valve and put the drain spout into its sleeve at the bottom of the urine bag. Write down how much urine was in your bag if you were asked to keep a record.    Prevent an infection:     Clean the stoma and skin around it daily. Wash your hands before and after cystostomy care. Put on a new pair of clean medical gloves. Ask for more information about stoma and skin care.      Change your urine bag or clean reusable bags. Ask how often you need to change or clean your urine drainage bag. You may need to change your reusable bag at least once a week.      Keep the bag below your waist. This will prevent urine from flowing back into your bladder and causing an infection or other problems. Also, keep the tube free of kinks so the urine will drain properly. Do not pull on the catheter. This can cause pain and bleeding and may cause the catheter to come out.     Follow up with your doctor or surgeon as directed: You may need to return to have your suprapubic catheter changed or removed. Write down your questions so you remember to ask them during your visits.  Wound Care, Adult  Taking care of your wound properly can help to prevent pain, infection, and scarring. It can also help your wound to heal more quickly.  How to care for your wound  Wound care               Follow instructions from your health care provider about how to take care of your wound. Make sure you:  Wash your hands with soap and water before you change the bandage (dressing). If soap and water are not available, use hand .Change your dressing as told by your health care provider.Leave stitches (sutures), skin glue, or adhesive strips in place. These skin closures may need to stay in place for 2 weeks or longer. If adhesive strip edges start to loosen and curl up, you may trim the loose edges. Do not remove adhesive strips completely unless your health care provider tells you to do that.Check your wound area every day for signs of infection. Check for:  Redness, swelling, or pain.Fluid or blood.Warmth.Pus or a bad smell.Ask your health care provider if you should clean the wound with mild soap and water. Doing this may include:  Using a clean towel to pat the wound dry after cleaning it. Do not rub or scrub the wound.Applying a cream or ointment. Do this only as told by your health care provider.Covering the incision with a clean dressing.Ask your health care provider when you can leave the wound uncovered.Keep the dressing dry until your health care provider says it can be removed. Do not take baths, swim, use a hot tub, or do anything that would put the wound underwater until your health care provider approves. Ask your health care provider if you can take showers. You may only be allowed to take sponge baths.Medicines        If you were prescribed an antibiotic medicine, cream, or ointment, take or use the antibiotic as told by your health care provider. Do not stop taking or using the antibiotic even if your condition improves.Take over-the-counter and prescription medicines only as told by your health care provider. If you were prescribed pain medicine, take it 30 or more minutes before you do any wound care or as told by your health care provider.General instructions     Return to your normal activities as told by your health care provider. Ask your health care provider what activities are safe.Do not scratch or pick at the wound.Do not use any products that contain nicotine or tobacco, such as cigarettes and e-cigarettes. These may delay wound healing. If you need help quitting, ask your health care provider.Keep all follow-up visits as told by your health care provider. This is important.Eat a diet that includes protein, vitamin A, vitamin C, and other nutrient-rich foods to help the wound heal.  Foods rich in protein include meat, dairy, beans, nuts, and other sources.Foods rich in vitamin A include carrots and dark green, leafy vegetables.Foods rich in vitamin C include citrus, tomatoes, and other fruits and vegetables.Nutrient-rich foods have protein, carbohydrates, fat, vitamins, or minerals. Eat a variety of healthy foods including vegetables, fruits, and whole grains.Contact a health care provider if:  You received a tetanus shot and you have swelling, severe pain, redness, or bleeding at the injection site.Your pain is not controlled with medicine.You have redness, swelling, or pain around the wound.You have fluid or blood coming from the wound.Your wound feels warm to the touch.You have pus or a bad smell coming from the wound.You have a fever or chills.You are nauseous or you vomit.You are dizzy.Get help right away if:  You have a red streak going away from your wound.The edges of the wound open up and separate.Your wound is bleeding, and the bleeding does not stop with gentle pressure.You have a rash.You faint.You have trouble breathing.Summary  Always wash your hands with soap and water before changing your bandage (dressing).To help with healing, eat foods that are rich in protein, vitamin A, vitamin C, and other nutrients.Check your wound every day for signs of infection. Contact your health care provider if you suspect that your wound is infected.This information is not intended to replace advice given to you by your health care provider. Make sure you discuss any questions you have with your health care provider. Follow up with your primary care doctor and/or the doctors recommended in 1-3 days to have patient, catheter, catheter insertion site evaluated.   Follow up with urology within 1 month, contact information below.   The patient has been discharged with an abdominal binder. the patient does not need to wear the binder, however it is provided as a barrier, as needed, to prevent the patient from pulling or dislodging the suprapubic catheter.     Suprapubic Cystostomy    WHAT YOU NEED TO KNOW:    Suprapubic cystostomy is surgery to create a stoma (opening) through your abdomen into your bladder. This opening is where a catheter is inserted to drain urine. You may need a cystostomy if your urine flow is blocked.    DISCHARGE INSTRUCTIONS:    Seek immediate care if:     No urine is draining into the urine bag.    Suprapubic Cystostomy    WHAT YOU NEED TO KNOW:    Suprapubic cystostomy is surgery to create a stoma (opening) through your abdomen into your bladder. This opening is where a catheter is inserted to drain urine. You may need a cystostomy if your urine flow is blocked.    DISCHARGE INSTRUCTIONS:    Seek immediate care if:     No urine is draining into the urine bag.      You have severe pain.    Call your doctor if:     You have a fever or chills.      You have a burning pain in your stoma.      Your stoma is red, swollen, or draining pus.      You have blood in your urine.      You have questions or concerns about your condition or care.    Medicines:     Medicines to treat pain and prevent infection may be given.      Take your medicine as directed. Contact your healthcare provider if you think your medicine is not helping or if you have side effects. Tell him or her if you are allergic to any medicine. Keep a list of the medicines, vitamins, and herbs you take. Include the amounts, and when and why you take them. Bring the list or the pill bottles to follow-up visits. Carry your medicine list with you in case of an emergency.    Empty your urine drainage bag: Empty your urine drainage bag when it is ½ to ? full, or every 8 hours. If you have a smaller leg bag, empty it every 3 to 4 hours. Do the following when you empty your urine drainage bag:    Hold the urine bag over a toilet or large container.       Remove the drain spout from its sleeve at the bottom of the urine bag. Do not touch the tip of the drain spout. Open the slide valve on the spout.      Let the urine flow out of the urine bag into the toilet or container. Do not let the drainage tube touch anything.      Clean the end of the drain spout with alcohol when the bag is empty. Ask which cleaning solution is best to use.       Close the slide valve and put the drain spout into its sleeve at the bottom of the urine bag. Write down how much urine was in your bag if you were asked to keep a record.    Prevent an infection:     Clean the stoma and skin around it daily. Wash your hands before and after cystostomy care. Put on a new pair of clean medical gloves. Ask for more information about stoma and skin care.      Change your urine bag or clean reusable bags. Ask how often you need to change or clean your urine drainage bag. You may need to change your reusable bag at least once a week.      Keep the bag below your waist. This will prevent urine from flowing back into your bladder and causing an infection or other problems. Also, keep the tube free of kinks so the urine will drain properly. Do not pull on the catheter. This can cause pain and bleeding and may cause the catheter to come out.     Follow up with your doctor or surgeon as directed: You may need to return to have your suprapubic catheter changed or removed. Write down your questions so you remember to ask them during your visits.     How to Care for Your Suprapubic Catheter    AMBULATORY CARE:    A suprapubic catheter is a tube that drains urine from your bladder. It is inserted through a small hole in your lower abdomen and into your bladder. Suprapubic means that the catheter is inserted above your pubic bone. You may need a catheter because you have problems urinating because of a medical condition or surgery. A suprapubic catheter is also called an indwelling urinary catheter.     Call your doctor if:     You have a fever.       You have changes in how your urine looks or smells, or you have blood in your urine.      You have an overgrowth of skin at the insertion site that is getting larger.      The closed drainage system has accidently come open or apart.       Your catheter keeps getting blocked.      There is less urine than usual or no urine draining into the drainage bag.      The catheter comes out.       Your insertion site is red, smells bad, or has green or yellow discharge.      You have pain in your hip, back, pelvis, or lower abdomen.      You are confused or have other changes in the way that you think.      You have questions or concerns about how to care for your catheter.    Why catheter care is important: An infection can develop when bacteria get inside the catheter or drainage system. This can happen when the urine bag is changed or when a urine sample is collected. You can get an infection if you do not wash your hands. You can also get an infection if the catheter equipment is not cleaned properly. Urinary catheter-based infections can lead to serious illness. The following can help prevent infection:     Care for your catheter as directed. Follow directions on how to clean and care for the catheter, insertion site, and drainage bag. Keep the catheter drainage system closed. Keep the catheter tube secured to your leg so it will drain well.      Drink liquids as directed. Ask how much liquid to drink each day and which liquids are best for you. Good choices for most people include water, juice, and milk. Coffee, soup, and fruit may be counted in your daily liquid amount.      Wash your hands often. Always wash with soap and water before and after you touch your catheter, tubing, or drainage bag. Wear clean medical gloves when you care for your catheter or disconnect the drainage bag. This will help stop germs from getting into your catheter. Remind anyone who cares for your catheter or drainage system to wash his or her hands.Handwashing         Care for your drainage bag:     Always wash your hands before and after you touch the catheter or the insertion site. Wear clean medical gloves when you care for your catheter.      Position the drainage bag and tubing:   Allow gravity drainage. Do not loop or kink the tubing so urine can flow into the bag.      Position the drainage bag properly. Keep the drainage bag below the level of your waist. This helps stop urine from moving back up the tubing and into your bladder.      Keep the bag off the floor. This will help prevent contamination.      Empty the drainage bag when needed. The weight of a full drainage bag can pull on the catheter and cause pain. Empty the drainage bag every 3 to 6 hours or when it is ½ to ? full.   Place a large container on the floor next to your chair. You may also hold the urine bag over the toilet.      Remove the drain spout from its sleeve at the bottom of the urine bag. Do not touch the tip. Open the slide valve on the spout.      Let the urine flow out of the urine bag into the container or toilet. Do not let the drainage tube touch anything.      Clean the end of the drain spout when the bag is empty. Ask your healthcare provider which cleaning solution is best to use.       Close the slide valve and put the drain spout into its sleeve at the bottom of the urine bag. Write down how much urine was in your bag if your healthcare provider has asked you to keep a record.      Clean and change the drainage bag as directed. Ask your healthcare provider how often you should change the drainage bag. You may buy a solution to clean the drainage bag. You may also make a solution with tap water and household bleach or vinegar. Wear medical gloves if you need to disconnect the tubing. Do not allow the end of the catheter or tubing to touch anything. Clean the ends with a new alcohol pad or as directed by your healthcare provider before you reconnect them.    Other tips:     Wash your genital area and the insertion site with soap and water 2 times a day. Wash your anal area with soap and water after each bowel movement.      You may need to use the larger drainage bag at night. A leg bag can be used during the day. A leg bag usually fits under clothing.     Follow up with your doctor as directed: Write down your questions so you remember to ask them during your visits.             You have severe pain.    Call your doctor if:     You have a fever or chills.      You have a burning pain in your stoma.      Your stoma is red, swollen, or draining pus.      You have blood in your urine.      You have questions or concerns about your condition or care.    Medicines:     Medicines to treat pain and prevent infection may be given.      Take your medicine as directed. Contact your healthcare provider if you think your medicine is not helping or if you have side effects. Tell him or her if you are allergic to any medicine. Keep a list of the medicines, vitamins, and herbs you take. Include the amounts, and when and why you take them. Bring the list or the pill bottles to follow-up visits. Carry your medicine list with you in case of an emergency.    Empty your urine drainage bag: Empty your urine drainage bag when it is ½ to ? full, or every 8 hours. If you have a smaller leg bag, empty it every 3 to 4 hours. Do the following when you empty your urine drainage bag:    Hold the urine bag over a toilet or large container.       Remove the drain spout from its sleeve at the bottom of the urine bag. Do not touch the tip of the drain spout. Open the slide valve on the spout.      Let the urine flow out of the urine bag into the toilet or container. Do not let the drainage tube touch anything.      Clean the end of the drain spout with alcohol when the bag is empty. Ask which cleaning solution is best to use.       Close the slide valve and put the drain spout into its sleeve at the bottom of the urine bag. Write down how much urine was in your bag if you were asked to keep a record.    Prevent an infection:     Clean the stoma and skin around it daily. Wash your hands before and after cystostomy care. Put on a new pair of clean medical gloves. Ask for more information about stoma and skin care.      Change your urine bag or clean reusable bags. Ask how often you need to change or clean your urine drainage bag. You may need to change your reusable bag at least once a week.      Keep the bag below your waist. This will prevent urine from flowing back into your bladder and causing an infection or other problems. Also, keep the tube free of kinks so the urine will drain properly. Do not pull on the catheter. This can cause pain and bleeding and may cause the catheter to come out.     Follow up with your doctor or surgeon as directed: You may need to return to have your suprapubic catheter changed or removed. Write down your questions so you remember to ask them during your visits.  Wound Care, Adult  Taking care of your wound properly can help to prevent pain, infection, and scarring. It can also help your wound to heal more quickly.  How to care for your wound  Wound care               Follow instructions from your health care provider about how to take care of your wound. Make sure you:  Wash your hands with soap and water before you change the bandage (dressing). If soap and water are not available, use hand .Change your dressing as told by your health care provider.Leave stitches (sutures), skin glue, or adhesive strips in place. These skin closures may need to stay in place for 2 weeks or longer. If adhesive strip edges start to loosen and curl up, you may trim the loose edges. Do not remove adhesive strips completely unless your health care provider tells you to do that.Check your wound area every day for signs of infection. Check for:  Redness, swelling, or pain.Fluid or blood.Warmth.Pus or a bad smell.Ask your health care provider if you should clean the wound with mild soap and water. Doing this may include:  Using a clean towel to pat the wound dry after cleaning it. Do not rub or scrub the wound.Applying a cream or ointment. Do this only as told by your health care provider.Covering the incision with a clean dressing.Ask your health care provider when you can leave the wound uncovered.Keep the dressing dry until your health care provider says it can be removed. Do not take baths, swim, use a hot tub, or do anything that would put the wound underwater until your health care provider approves. Ask your health care provider if you can take showers. You may only be allowed to take sponge baths.Medicines        If you were prescribed an antibiotic medicine, cream, or ointment, take or use the antibiotic as told by your health care provider. Do not stop taking or using the antibiotic even if your condition improves.Take over-the-counter and prescription medicines only as told by your health care provider. If you were prescribed pain medicine, take it 30 or more minutes before you do any wound care or as told by your health care provider.General instructions     Return to your normal activities as told by your health care provider. Ask your health care provider what activities are safe.Do not scratch or pick at the wound.Do not use any products that contain nicotine or tobacco, such as cigarettes and e-cigarettes. These may delay wound healing. If you need help quitting, ask your health care provider.Keep all follow-up visits as told by your health care provider. This is important.Eat a diet that includes protein, vitamin A, vitamin C, and other nutrient-rich foods to help the wound heal.  Foods rich in protein include meat, dairy, beans, nuts, and other sources.Foods rich in vitamin A include carrots and dark green, leafy vegetables.Foods rich in vitamin C include citrus, tomatoes, and other fruits and vegetables.Nutrient-rich foods have protein, carbohydrates, fat, vitamins, or minerals. Eat a variety of healthy foods including vegetables, fruits, and whole grains.Contact a health care provider if:  You received a tetanus shot and you have swelling, severe pain, redness, or bleeding at the injection site.Your pain is not controlled with medicine.You have redness, swelling, or pain around the wound.You have fluid or blood coming from the wound.Your wound feels warm to the touch.You have pus or a bad smell coming from the wound.You have a fever or chills.You are nauseous or you vomit.You are dizzy.Get help right away if:  You have a red streak going away from your wound.The edges of the wound open up and separate.Your wound is bleeding, and the bleeding does not stop with gentle pressure.You have a rash.You faint.You have trouble breathing.Summary  Always wash your hands with soap and water before changing your bandage (dressing).To help with healing, eat foods that are rich in protein, vitamin A, vitamin C, and other nutrients.Check your wound every day for signs of infection. Contact your health care provider if you suspect that your wound is infected.This information is not intended to replace advice given to you by your health care provider. Make sure you discuss any questions you have with your health care provider.    Abdominal Binder    WHAT YOU NEED TO KNOW:    What is an abdominal binder? An abdominal binder is fitted elastic material that goes around your abdomen. It may be used to support your muscles. It also keeps bandages in place, or helps incisions heal after abdominal or pelvic surgery.     What do I need to know about an abdominal binder?     Wear your abdominal binder as directed. Do not take it off until your healthcare provider says it is okay to do so.       Use the right size abdominal binder. The abdominal binder may irritate your skin or not work correctly if it is too big or too small. Your healthcare provider will show you the correct size to wear.       Care for your skin and incision under your abdominal binder. Remove your abdominal binder as directed to clean your skin and incision. Wash your hands before you touch the incision. Clean your skin and change bandages as directed. Check your skin for redness, warmth, swelling, or numbness.      Care for your abdominal binder. Follow the directions on the label to clean your abdominal binder. You may be able to hand wash it in cold water and hang it to dry.     How do I use an abdominal binder?     Place your abdominal binder around your abdomen. Place it over bandages and under your clothes unless you are told differently by your healthcare provider.       Fasten the 2 sides of your abdominal binder as directed. Start from the bottom and work up if you had a tummy tuck or other abdominal surgery. If you had a , your healthcare provider may tell you to begin at the top and fasten down.       Make sure the abdominal binder fits comfortably. It should be snug but not too tight. Adjust it as needed. Make sure it is even and that there are no wrinkles. Make sure that you can breathe normally and go the bathroom. Make sure any tubes or drains are not pinched and fluids can empty.     When should I contact my healthcare provider?     Your abdominal binder will not stay in place or frequently comes apart.       The skin under your abdominal binder is red, raw, or blistered.      You have questions about how to wear your abdominal binder correctly.     CARE AGREEMENT:    You have the right to help plan your care. Learn about your health condition and how it may be treated. Discuss treatment options with your healthcare providers to decide what care you want to receive. You always have the right to refuse treatment.

## 2019-12-20 NOTE — ED CDU PROVIDER SUBSEQUENT DAY NOTE - PHYSICAL EXAMINATION
CONSTITUTIONAL: Well-developed; well-nourished; in no acute distress, resting comfortably   SKIN: warm, dry  HEAD: Normocephalic; atraumatic  EYES: no conjunctival erythema  ENT: No nasal discharge; airway clear, mucous membranes moist  NECK: Supple; non tender, FROM  CARD: +S1, S2 no murmurs, gallops, or rubs. Regular rate and rhythm. radial 2+  RESP: No wheezes, rales or rhonchi. CTABL  ABD: soft ntnd, no rebound, no guarding, no rigidity, +suprapubic catheter in place, draining  EXT: no cyanosis or edema.   NEURO: Alert, oriented  PSYCH: Cooperative, appropriate

## 2019-12-20 NOTE — ED ADULT NURSE REASSESSMENT NOTE - NS ED NURSE REASSESS COMMENT FT1
No s/s of distress noted, pt resting comfortably in stretcher. Aid at bedside. Gallo intact, in place. NPO status maintained. Comfort measures offered. Will cont to monitor.
Pt is still sleeping, but easily arousal, her supra pubis new urine bag was emptied, 260cc, yellow , dressing to the site is intact. Personal aid from her Group home is at the bedside, safety precautions in place.
Pt resting comfortably in bed. Family at bedside. Plan of care reviewed with patient and family. NPO maintained for IR procedure. Doherty cath in place, doherty cath care maintained. IV intact. Will continue to monitor.
pt at baseline mental status, suprapubic intact, awaiting transportation to group home. aide at bedside. VSS.
pt went to IR @ 1400 for suprapubic cath. family at bedside. pt expected to come back down to ED after procedure as per IR
Pt is alert, calm after given her night medication, no respiratory distress noted, urine bag draining well. Clear yellow urine
Pt is alert , to her baseline with a sitter at the bedside. Pt ia calm, sleeping most of the time. Adjust position. Subra pubis catheter replaced is intact, dressing to the site it still dry. Safety precautions in place, no distress noted.

## 2019-12-20 NOTE — ED CDU PROVIDER SUBSEQUENT DAY NOTE - PMH
Down's syndrome    Hypothyroid    Impulse control disorder in adult    Intellectual disability    Seizures
Down's syndrome    Hypothyroid    Impulse control disorder in adult    Intellectual disability    Seizures

## 2019-12-20 NOTE — ED CDU PROVIDER SUBSEQUENT DAY NOTE - HISTORY
pt resting in obs without complaints; pt's personal aide at bedside; pt to be discharged this am pt resting in obs without complaints; pt's personal aide at bedside; pt to be discharged this am. case d/w supervising [facility] hooper, states patient is okay to go back to facility, states she will facilitate transfer back.

## 2019-12-20 NOTE — ED CDU PROVIDER SUBSEQUENT DAY NOTE - NS ED ROS FT
Constitutional: no vomiting  Eyes:  No eye pain or discharge.  ENMT:  No hearing changes  Cardiac:  No chest pain, SOB  Respiratory:  No cough or respiratory distress.   GI:  No nausea, vomiting,  :  +catheter draining   MS:  No back pain.  Neuro: No LOC.  Skin:  No skin rash.   Endocrine: +history of thyroid disease; no hx diabetes

## 2019-12-20 NOTE — ED CDU PROVIDER DISPOSITION NOTE - PATIENT PORTAL LINK FT
You can access the FollowMyHealth Patient Portal offered by [facility] by registering at the following website: [email or web address]. By joining [employer]’s FollowMyHealth portal, you will also be able to view your health information using other applications (apps) compatible with our system.

## 2019-12-20 NOTE — ED CDU PROVIDER SUBSEQUENT DAY NOTE - ATTENDING CONTRIBUTION TO CARE
Pt w/ pmh above p/w urinary retention to ED last night, s/p IR placement suprapubic catheter. No acute overnight ED events. Dr. Altman discussed Gallo care with supervising RN at SNF. labs reviewed. Pt stable for dc home w/ pmd and f/up this week and,  fup as recc in 6wks. abd binder provided to staff w/ instruction for us in event pt pulls at catheter. dc to SNF.

## 2019-12-23 DIAGNOSIS — R33.9 RETENTION OF URINE, UNSPECIFIED: ICD-10-CM

## 2019-12-23 DIAGNOSIS — R56.9 UNSPECIFIED CONVULSIONS: ICD-10-CM

## 2019-12-23 DIAGNOSIS — Q90.9 DOWN SYNDROME, UNSPECIFIED: ICD-10-CM

## 2019-12-23 DIAGNOSIS — E03.9 HYPOTHYROIDISM, UNSPECIFIED: ICD-10-CM

## 2019-12-23 DIAGNOSIS — F02.80 DEMENTIA IN OTHER DISEASES CLASSIFIED ELSEWHERE, UNSPECIFIED SEVERITY, WITHOUT BEHAVIORAL DISTURBANCE, PSYCHOTIC DISTURBANCE, MOOD DISTURBANCE, AND ANXIETY: ICD-10-CM

## 2019-12-23 DIAGNOSIS — G30.9 ALZHEIMER'S DISEASE, UNSPECIFIED: ICD-10-CM

## 2019-12-26 ENCOUNTER — EMERGENCY (EMERGENCY)
Facility: HOSPITAL | Age: 54
LOS: 0 days | Discharge: HOME | End: 2019-12-26
Attending: EMERGENCY MEDICINE | Admitting: EMERGENCY MEDICINE
Payer: MEDICARE

## 2019-12-26 VITALS
DIASTOLIC BLOOD PRESSURE: 61 MMHG | RESPIRATION RATE: 18 BRPM | TEMPERATURE: 97 F | OXYGEN SATURATION: 98 % | HEART RATE: 57 BPM | SYSTOLIC BLOOD PRESSURE: 129 MMHG

## 2019-12-26 VITALS
HEART RATE: 71 BPM | RESPIRATION RATE: 18 BRPM | DIASTOLIC BLOOD PRESSURE: 74 MMHG | TEMPERATURE: 97 F | SYSTOLIC BLOOD PRESSURE: 162 MMHG | OXYGEN SATURATION: 100 %

## 2019-12-26 DIAGNOSIS — R31.9 HEMATURIA, UNSPECIFIED: ICD-10-CM

## 2019-12-26 PROCEDURE — 99282 EMERGENCY DEPT VISIT SF MDM: CPT | Mod: GC

## 2019-12-26 NOTE — CONSULT NOTE ADULT - ASSESSMENT
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   Case d/w Dr. Chavarria

## 2019-12-26 NOTE — ED PROVIDER NOTE - PHYSICAL EXAMINATION
CONSTITUTIONAL: Well-developed; well-nourished; in no acute distress.   SKIN: warm, dry  HEAD: Normocephalic; atraumatic.  EYES: PERRL, EOMI, normal sclera and conjunctiva   ENT: No nasal discharge; airway clear.  NECK: Supple; non tender.  CARD: S1, S2 normal; no murmurs, gallops, or rubs. Regular rate and rhythm.   RESP: No wheezes, rales or rhonchi.  ABD: soft ntnd. +suprapubic catheter in place and draining well. +pink tinged urine in leg bag  EXT: Normal ROM.  No clubbing, cyanosis or edema.   LYMPH: No acute cervical adenopathy.  NEURO: Alert, oriented, grossly unremarkable  PSYCH: Cooperative, appropriate.

## 2019-12-26 NOTE — CONSULT NOTE ADULT - SUBJECTIVE AND OBJECTIVE BOX
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 Pt. is poor historian all history taken from Pt's family and Pt's aid. Pt's aid denies fever, chills, N/V, SOB.     At the time of PE Pt. appears in NAD, SPT site with normal healing no evidence of bleeding. SPT 16 Fr. connected to leg bag with resolving hematuria.       PAST MEDICAL & SURGICAL HISTORY:  Seizures  Intellectual disability  Hypothyroid  Impulse control disorder in adult  Down's syndrome  No significant past surgical history      REVIEW OF SYSTEMS:    CONSTITUTIONAL: no fevers or chills  HEENT: No visual changes  ENDO: No sweating  NECK: No pain or stiffness  MUSCULOSKELETAL: No back pain, no joint pain  RESPIRATORY: No shortness of breath  CARDIOVASCULAR: No chest pain  GASTROINTESTINAL: No abdominal or epigastric pain. No nausea, vomiting,  No diarrhea or constipation.   NEUROLOGICAL: No mental status changes  PSYCH: No depression, no mood changes  SKIN: No itching    Home Medications:  Senna 8.6 mg oral tablet: 1 tab(s) orally once a day (at bedtime) (25 Nov 2019 20:10)  simvastatin 20 mg oral tablet: 1 tab(s) orally once a day (at bedtime) (30 Nov 2018 21:39)    Allergies: NKDA       SOCIAL HISTORY: No illicit drug use     FAMILY HISTORY:  No pertinent family history in first degree relatives      Vital Signs Last 24 Hrs  T(C): 36.1 (26 Dec 2019 15:59), Max: 36.2 (26 Dec 2019 14:03)  T(F): 96.9 (26 Dec 2019 15:59), Max: 97.1 (26 Dec 2019 14:03)  HR: 57 (26 Dec 2019 15:59) (57 - 71)  BP: 129/61 (26 Dec 2019 15:59) (129/61 - 162/74)  RR: 18 (26 Dec 2019 15:59) (18 - 18)  SpO2: 98% (26 Dec 2019 15:59) (98% - 100%)     PHYSICAL EXAM:    Constitutional: Awake and Alert in NAD  HEENT: NC, AT, EOMI, dry lips   Neck: no pain  Back: No CVA tenderness  Respiratory: No accessory respiratory muscle use  Abd: Soft, NT/ND, no suprapubic ttp, SPT site with mild erythema no evidence of active bleeding, suture in place intact. 16 Fr. catheter in place attached to leg bag with bloody tinged urine.   Extremities: no edema

## 2019-12-26 NOTE — ED PROVIDER NOTE - PATIENT PORTAL LINK FT
You can access the FollowMyHealth Patient Portal offered by [facility] by registering at the following website: [email or web address]. By joining [employer]’s FollowMyHealth portal, you will also be able to view your health information using other applications (apps) compatible with our system.

## 2019-12-26 NOTE — ED ADULT NURSE NOTE - NSIMPLEMENTINTERV_GEN_ALL_ED
Implemented All Fall with Harm Risk Interventions:  [place] to call system. Call bell, personal items and telephone within reach. Instruct patient to call for assistance. Room bathroom lighting operational. Non-slip footwear when patient is off stretcher. Physically safe environment: no spills, clutter or unnecessary equipment. Stretcher in lowest position, wheels locked, appropriate side rails in place. Provide visual cue, wrist band, yellow gown, etc. Monitor gait and stability. Monitor for mental status changes and reorient to person, place, and time. Review medications for side effects contributing to fall risk. Reinforce activity limits and safety measures with patient and family. Provide visual clues: red socks.

## 2019-12-26 NOTE — ED PROVIDER NOTE - NS ED ROS FT
Eyes:  No visual changes, eye pain or discharge.  ENMT:  No hearing changes, pain, discharge or infections. No neck pain or stiffness.  Cardiac:  No chest pain, SOB or edema. No chest pain with exertion.  Respiratory:  No cough or respiratory distress. No hemoptysis. No history of asthma or RAD.  GI:  -n/-v/-diarrhea  :  No dysuria, frequency or burning. +hematuria  MS:  No myalgia, muscle weakness, joint pain or back pain.  Neuro:  No headache or weakness.  No LOC.  Skin:  No skin rash.   Endocrine: No history of thyroid disease or diabetes.

## 2019-12-26 NOTE — ED PROVIDER NOTE - CLINICAL SUMMARY MEDICAL DECISION MAKING FREE TEXT BOX
55 yo female  with mild hematuria  s/p recent suprapubic catheter placement for urinary retention.  Site looks well,  no fever or other red flags, seen by urology service and cleared for discharge.  pt already has a proper outpatient follow up.

## 2019-12-26 NOTE — ED PROVIDER NOTE - OBJECTIVE STATEMENT
pt is a 54 yof w/ downs, urinary retention s/p suprapubic catheter placement 1 week ago here for blood in urine bag. pt asymptomatic. no cough, n/v/d, weakness,s yncope, abd pain

## 2019-12-26 NOTE — ED ADULT NURSE NOTE - OBJECTIVE STATEMENT
Pt sent in from group home for 1 day of hematuria. Pt has subrapubic placed last week. As per group home employee patient acting normal. At baseline neuro status.

## 2019-12-26 NOTE — ED PROVIDER NOTE - PROGRESS NOTE DETAILS
urology consulted. will d/c w/ f/u to Dr Chavarria The patient was evaluated by urology service and cleared for discharge.  Bleeding likley traumatic in nature, no clinical indication for labs at this time.  D/c to place of residence.  Patient is scheduled for catheter change in 3 weeks.

## 2019-12-26 NOTE — ED PROVIDER NOTE - NSFOLLOWUPINSTRUCTIONS_ED_ALL_ED_FT
You were noted to have blood in the urine. This sometimes is a benign condition, but the only way to know is to have it formerly evaluated. You should follow up with a specialist called a Urologist so that they can evaluate it further to be sure that there is no intervention that is needed.

## 2020-01-07 ENCOUNTER — EMERGENCY (EMERGENCY)
Facility: HOSPITAL | Age: 55
LOS: 0 days | Discharge: HOME | End: 2020-01-08
Attending: EMERGENCY MEDICINE | Admitting: EMERGENCY MEDICINE
Payer: MEDICARE

## 2020-01-07 VITALS
HEART RATE: 65 BPM | SYSTOLIC BLOOD PRESSURE: 182 MMHG | OXYGEN SATURATION: 99 % | RESPIRATION RATE: 18 BRPM | DIASTOLIC BLOOD PRESSURE: 90 MMHG | TEMPERATURE: 98 F

## 2020-01-07 DIAGNOSIS — K59.01 SLOW TRANSIT CONSTIPATION: ICD-10-CM

## 2020-01-07 DIAGNOSIS — F03.90 UNSPECIFIED DEMENTIA WITHOUT BEHAVIORAL DISTURBANCE: ICD-10-CM

## 2020-01-07 DIAGNOSIS — K59.00 CONSTIPATION, UNSPECIFIED: ICD-10-CM

## 2020-01-07 DIAGNOSIS — R53.83 OTHER FATIGUE: ICD-10-CM

## 2020-01-07 DIAGNOSIS — R63.8 OTHER SYMPTOMS AND SIGNS CONCERNING FOOD AND FLUID INTAKE: ICD-10-CM

## 2020-01-07 DIAGNOSIS — R45.1 RESTLESSNESS AND AGITATION: ICD-10-CM

## 2020-01-07 LAB
ALBUMIN SERPL ELPH-MCNC: 3.5 G/DL — SIGNIFICANT CHANGE UP (ref 3.5–5.2)
ALP SERPL-CCNC: 109 U/L — SIGNIFICANT CHANGE UP (ref 30–115)
ALT FLD-CCNC: 20 U/L — SIGNIFICANT CHANGE UP (ref 0–41)
ANION GAP SERPL CALC-SCNC: 13 MMOL/L — SIGNIFICANT CHANGE UP (ref 7–14)
APPEARANCE UR: ABNORMAL
AST SERPL-CCNC: 21 U/L — SIGNIFICANT CHANGE UP (ref 0–41)
BACTERIA # UR AUTO: SIGNIFICANT CHANGE UP
BASOPHILS # BLD AUTO: 0.06 K/UL — SIGNIFICANT CHANGE UP (ref 0–0.2)
BASOPHILS NFR BLD AUTO: 1.1 % — HIGH (ref 0–1)
BILIRUB DIRECT SERPL-MCNC: 0.2 MG/DL — SIGNIFICANT CHANGE UP (ref 0–0.2)
BILIRUB INDIRECT FLD-MCNC: 0.3 MG/DL — SIGNIFICANT CHANGE UP (ref 0.2–1.2)
BILIRUB SERPL-MCNC: 0.5 MG/DL — SIGNIFICANT CHANGE UP (ref 0.2–1.2)
BILIRUB UR-MCNC: NEGATIVE — SIGNIFICANT CHANGE UP
BUN SERPL-MCNC: 9 MG/DL — LOW (ref 10–20)
CALCIUM SERPL-MCNC: 9.2 MG/DL — SIGNIFICANT CHANGE UP (ref 8.5–10.1)
CHLORIDE SERPL-SCNC: 100 MMOL/L — SIGNIFICANT CHANGE UP (ref 98–110)
CO2 SERPL-SCNC: 27 MMOL/L — SIGNIFICANT CHANGE UP (ref 17–32)
COLOR SPEC: YELLOW — SIGNIFICANT CHANGE UP
CREAT SERPL-MCNC: 1.3 MG/DL — SIGNIFICANT CHANGE UP (ref 0.7–1.5)
DIFF PNL FLD: ABNORMAL
EOSINOPHIL # BLD AUTO: 0.15 K/UL — SIGNIFICANT CHANGE UP (ref 0–0.7)
EOSINOPHIL NFR BLD AUTO: 2.6 % — SIGNIFICANT CHANGE UP (ref 0–8)
EPI CELLS # UR: SIGNIFICANT CHANGE UP /HPF (ref 0–5)
GLUCOSE SERPL-MCNC: 93 MG/DL — SIGNIFICANT CHANGE UP (ref 70–99)
GLUCOSE UR QL: NEGATIVE — SIGNIFICANT CHANGE UP
HCT VFR BLD CALC: 38 % — SIGNIFICANT CHANGE UP (ref 37–47)
HGB BLD-MCNC: 12.7 G/DL — SIGNIFICANT CHANGE UP (ref 12–16)
IMM GRANULOCYTES NFR BLD AUTO: 0.7 % — HIGH (ref 0.1–0.3)
KETONES UR-MCNC: NEGATIVE — SIGNIFICANT CHANGE UP
LACTATE SERPL-SCNC: 1.5 MMOL/L — SIGNIFICANT CHANGE UP (ref 0.7–2)
LEUKOCYTE ESTERASE UR-ACNC: ABNORMAL
LIDOCAIN IGE QN: 27 U/L — SIGNIFICANT CHANGE UP (ref 7–60)
LYMPHOCYTES # BLD AUTO: 1.25 K/UL — SIGNIFICANT CHANGE UP (ref 1.2–3.4)
LYMPHOCYTES # BLD AUTO: 22 % — SIGNIFICANT CHANGE UP (ref 20.5–51.1)
MCHC RBC-ENTMCNC: 32.3 PG — HIGH (ref 27–31)
MCHC RBC-ENTMCNC: 33.4 G/DL — SIGNIFICANT CHANGE UP (ref 32–37)
MCV RBC AUTO: 96.7 FL — SIGNIFICANT CHANGE UP (ref 81–99)
MONOCYTES # BLD AUTO: 0.42 K/UL — SIGNIFICANT CHANGE UP (ref 0.1–0.6)
MONOCYTES NFR BLD AUTO: 7.4 % — SIGNIFICANT CHANGE UP (ref 1.7–9.3)
NEUTROPHILS # BLD AUTO: 3.76 K/UL — SIGNIFICANT CHANGE UP (ref 1.4–6.5)
NEUTROPHILS NFR BLD AUTO: 66.2 % — SIGNIFICANT CHANGE UP (ref 42.2–75.2)
NITRITE UR-MCNC: NEGATIVE — SIGNIFICANT CHANGE UP
NRBC # BLD: 0 /100 WBCS — SIGNIFICANT CHANGE UP (ref 0–0)
PH UR: 8.5 — HIGH (ref 5–8)
PLATELET # BLD AUTO: 199 K/UL — SIGNIFICANT CHANGE UP (ref 130–400)
POTASSIUM SERPL-MCNC: 4 MMOL/L — SIGNIFICANT CHANGE UP (ref 3.5–5)
POTASSIUM SERPL-SCNC: 4 MMOL/L — SIGNIFICANT CHANGE UP (ref 3.5–5)
PROT SERPL-MCNC: 6.5 G/DL — SIGNIFICANT CHANGE UP (ref 6–8)
PROT UR-MCNC: ABNORMAL
RBC # BLD: 3.93 M/UL — LOW (ref 4.2–5.4)
RBC # FLD: 13.9 % — SIGNIFICANT CHANGE UP (ref 11.5–14.5)
RBC CASTS # UR COMP ASSIST: >100 /HPF — HIGH (ref 0–4)
SODIUM SERPL-SCNC: 140 MMOL/L — SIGNIFICANT CHANGE UP (ref 135–146)
SP GR SPEC: 1 — SIGNIFICANT CHANGE UP (ref 1.01–1.02)
UROBILINOGEN FLD QL: SIGNIFICANT CHANGE UP
WBC # BLD: 5.68 K/UL — SIGNIFICANT CHANGE UP (ref 4.8–10.8)
WBC # FLD AUTO: 5.68 K/UL — SIGNIFICANT CHANGE UP (ref 4.8–10.8)
WBC UR QL: SIGNIFICANT CHANGE UP /HPF (ref 0–5)

## 2020-01-07 PROCEDURE — 99284 EMERGENCY DEPT VISIT MOD MDM: CPT

## 2020-01-07 PROCEDURE — 74021 RADEX ABDOMEN 3+ VIEWS: CPT | Mod: 26

## 2020-01-07 RX ORDER — SODIUM CHLORIDE 9 MG/ML
1000 INJECTION INTRAMUSCULAR; INTRAVENOUS; SUBCUTANEOUS ONCE
Refills: 0 | Status: COMPLETED | OUTPATIENT
Start: 2020-01-07 | End: 2020-01-07

## 2020-01-07 RX ORDER — QUETIAPINE FUMARATE 200 MG/1
25 TABLET, FILM COATED ORAL ONCE
Refills: 0 | Status: COMPLETED | OUTPATIENT
Start: 2020-01-07 | End: 2020-01-07

## 2020-01-07 RX ORDER — MIDAZOLAM HYDROCHLORIDE 1 MG/ML
2 INJECTION, SOLUTION INTRAMUSCULAR; INTRAVENOUS ONCE
Refills: 0 | Status: DISCONTINUED | OUTPATIENT
Start: 2020-01-07 | End: 2020-01-07

## 2020-01-07 RX ORDER — IOHEXOL 300 MG/ML
30 INJECTION, SOLUTION INTRAVENOUS ONCE
Refills: 0 | Status: COMPLETED | OUTPATIENT
Start: 2020-01-07 | End: 2020-01-07

## 2020-01-07 RX ORDER — CLONAZEPAM 1 MG
0.5 TABLET ORAL ONCE
Refills: 0 | Status: DISCONTINUED | OUTPATIENT
Start: 2020-01-07 | End: 2020-01-07

## 2020-01-07 RX ORDER — MIDAZOLAM HYDROCHLORIDE 1 MG/ML
4 INJECTION, SOLUTION INTRAMUSCULAR; INTRAVENOUS ONCE
Refills: 0 | Status: DISCONTINUED | OUTPATIENT
Start: 2020-01-07 | End: 2020-01-07

## 2020-01-07 RX ADMIN — Medication 1 ENEMA: at 20:50

## 2020-01-07 RX ADMIN — MIDAZOLAM HYDROCHLORIDE 2 MILLIGRAM(S): 1 INJECTION, SOLUTION INTRAMUSCULAR; INTRAVENOUS at 20:45

## 2020-01-07 RX ADMIN — QUETIAPINE FUMARATE 25 MILLIGRAM(S): 200 TABLET, FILM COATED ORAL at 21:52

## 2020-01-07 RX ADMIN — IOHEXOL 30 MILLILITER(S): 300 INJECTION, SOLUTION INTRAVENOUS at 23:04

## 2020-01-07 RX ADMIN — Medication 0.5 MILLIGRAM(S): at 21:52

## 2020-01-07 RX ADMIN — SODIUM CHLORIDE 1000 MILLILITER(S): 9 INJECTION INTRAMUSCULAR; INTRAVENOUS; SUBCUTANEOUS at 20:16

## 2020-01-07 NOTE — ED PROVIDER NOTE - CARE PROVIDER_API CALL
Carol Steiner)  Internal Medicine  [address] [place] [address]  Phone: [phone]  Fax: [phone]  Follow Up Time: 4-6 Days

## 2020-01-07 NOTE — ED PROVIDER NOTE - CLINICAL SUMMARY MEDICAL DECISION MAKING FREE TEXT BOX
53 yo female with sz d/o, MR, hypothyroid, DOwn Syndrome, urinary retention s/p suprapubic cath placement last month presents to ER for constipation, decreased po intake, agitation. Pt disimpacted and given enema, large passage of stool. Pt had gotten labs, CTs, urine--no acute findings except stool and leuko on urine. Will place on abx and benefiber and recommend follow up with PMD and Urology.

## 2020-01-07 NOTE — ED PROVIDER NOTE - PATIENT PORTAL LINK FT
You can access the FollowMyHealth Patient Portal offered by [facility] by registering at the following website: [email or web address]. By joining [employer]’s FollowMyHealth portal, you will also be able to view your health information using other applications (apps) compatible with our system.

## 2020-01-07 NOTE — ED PROVIDER NOTE - NSFOLLOWUPCLINICS_GEN_ALL_ED_FT
[facility]  Medicine  [address], [place]   Phone: [phone]  Fax:   Follow Up Time: 1-3 Days

## 2020-01-07 NOTE — ED PROVIDER NOTE - ATTENDING CONTRIBUTION TO CARE
54 year old F with hx of seizure d/o MR, hypothyroidism, Down syndrome, urinary retention s/p suprapubic catheter placement on 12/19/19 by IR sent in by group home for evaluation. + constipation (LBM 01/01/20, pt on daily docusate). + fatigue, decreased po intake. As per staff have also noted mild redness/discharge from suprapubic catheter site. No fever/vomiting, bloody stools, trauma/injuries. Pt is very agitated on exam and just moaning and screaming. Pt was disimpacted and given enema with passage of a lot of stool. After enema, abdomen is soft nt/nd +BS, suprapubic cath site with mild erythema to insertion site, but no discharge or cellulitis (still a fresh site). Remainder of exam benign. Will check xray, labs, urine and reassess.

## 2020-01-07 NOTE — ED PROVIDER NOTE - PROGRESS NOTE DETAILS
pt still agitated after disimpaction, enema, and neg xray. Will therefore order CT abdomen and CT head. To reassess. spoke to on call [facility] for [facility] [phone]. Spoke to Zahraa. Went over results and the plan for prescriptions. Discussed how we would like to dc pt however the nurse states she will discuss with nurse supervisor on call. I spoke to nurse supervisor who states it is an insurance issue for patient transport, and the facility does not accept outside transport services. States the earliest she can send someone to pick pt up is 0700. Will send pt with lab and CT results, will eprescribe abx and benefiber. Discussed some PT for pt to assist in bowel mobility.

## 2020-01-07 NOTE — ED ADULT NURSE NOTE - ED STAT RN HANDOFF DETAILS
Patient had stool prior to discharge, care done prior to transfer to wheelchair, discharge instructions provided to group home caregivers. Patient is discharge in stable condition.

## 2020-01-07 NOTE — ED PROVIDER NOTE - PHYSICAL EXAMINATION
CONSTITUTIONAL: Well-appearing; well-nourished; in no apparent distress.   NECK: Supple; non-tender; no cervical lymphadenopathy.   CARDIOVASCULAR: Normal S1, S2; no murmurs, rubs, or gallops.   RESPIRATORY: Normal chest excursion with respiration; breath sounds clear and equal bilaterally; no wheezes, rhonchi, or rales.  GI/: Normal bowel sounds; non-distended; + mild diffuse abdominal ttp; no palpable organomegaly.   Rectal exam: + stool in rectum  MS: No evidence of trauma or deformity. Normal ROM in all four extremities; non-tender to palpation; distal pulses are normal.   SKIN: Normal for age and race; warm; dry; good turgor; no apparent lesions or exudate.

## 2020-01-07 NOTE — ED ADULT TRIAGE NOTE - CHIEF COMPLAINT QUOTE
pt comes in with complaints of constipation for 1 week. no BM since 1/1/2020. hx of down syndrome and dementia

## 2020-01-08 VITALS
DIASTOLIC BLOOD PRESSURE: 70 MMHG | HEART RATE: 60 BPM | SYSTOLIC BLOOD PRESSURE: 157 MMHG | TEMPERATURE: 97 F | OXYGEN SATURATION: 98 % | RESPIRATION RATE: 18 BRPM

## 2020-01-08 PROCEDURE — 74177 CT ABD & PELVIS W/CONTRAST: CPT | Mod: 26

## 2020-01-08 PROCEDURE — 70450 CT HEAD/BRAIN W/O DYE: CPT | Mod: 26

## 2020-01-08 RX ORDER — NITROFURANTOIN MACROCRYSTAL 50 MG
1 CAPSULE ORAL
Qty: 20 | Refills: 0
Start: 2020-01-08 | End: 2020-01-17

## 2020-01-08 RX ORDER — WHEAT DEXTRIN 3 G/4 G
4 POWDER IN PACKET (EA) ORAL
Qty: 1 | Refills: 0
Start: 2020-01-08 | End: 2020-02-06

## 2020-01-08 RX ADMIN — MIDAZOLAM HYDROCHLORIDE 4 MILLIGRAM(S): 1 INJECTION, SOLUTION INTRAMUSCULAR; INTRAVENOUS at 01:10

## 2020-01-11 ENCOUNTER — INPATIENT (INPATIENT)
Facility: HOSPITAL | Age: 55
LOS: 24 days | End: 2020-02-05
Attending: INTERNAL MEDICINE | Admitting: INTERNAL MEDICINE
Payer: MEDICARE

## 2020-01-11 VITALS
HEART RATE: 67 BPM | RESPIRATION RATE: 18 BRPM | DIASTOLIC BLOOD PRESSURE: 90 MMHG | OXYGEN SATURATION: 100 % | SYSTOLIC BLOOD PRESSURE: 133 MMHG

## 2020-01-11 LAB
-  AMIKACIN: SIGNIFICANT CHANGE UP
-  AZTREONAM: SIGNIFICANT CHANGE UP
-  CEFEPIME: SIGNIFICANT CHANGE UP
-  CEFTAZIDIME: SIGNIFICANT CHANGE UP
-  CIPROFLOXACIN: SIGNIFICANT CHANGE UP
-  GENTAMICIN: SIGNIFICANT CHANGE UP
-  IMIPENEM: SIGNIFICANT CHANGE UP
-  LEVOFLOXACIN: SIGNIFICANT CHANGE UP
-  MEROPENEM: SIGNIFICANT CHANGE UP
-  PIPERACILLIN/TAZOBACTAM: SIGNIFICANT CHANGE UP
-  TOBRAMYCIN: SIGNIFICANT CHANGE UP
ALBUMIN SERPL ELPH-MCNC: 3.8 G/DL — SIGNIFICANT CHANGE UP (ref 3.5–5.2)
ALP SERPL-CCNC: 115 U/L — SIGNIFICANT CHANGE UP (ref 30–115)
ALT FLD-CCNC: 16 U/L — SIGNIFICANT CHANGE UP (ref 0–41)
ANION GAP SERPL CALC-SCNC: 13 MMOL/L — SIGNIFICANT CHANGE UP (ref 7–14)
AST SERPL-CCNC: 33 U/L — SIGNIFICANT CHANGE UP (ref 0–41)
BASOPHILS # BLD AUTO: 0.09 K/UL — SIGNIFICANT CHANGE UP (ref 0–0.2)
BASOPHILS NFR BLD AUTO: 1.8 % — HIGH (ref 0–1)
BILIRUB SERPL-MCNC: 0.4 MG/DL — SIGNIFICANT CHANGE UP (ref 0.2–1.2)
BUN SERPL-MCNC: 8 MG/DL — LOW (ref 10–20)
CALCIUM SERPL-MCNC: 9.3 MG/DL — SIGNIFICANT CHANGE UP (ref 8.5–10.1)
CHLORIDE SERPL-SCNC: 103 MMOL/L — SIGNIFICANT CHANGE UP (ref 98–110)
CO2 SERPL-SCNC: 25 MMOL/L — SIGNIFICANT CHANGE UP (ref 17–32)
CREAT SERPL-MCNC: 1.2 MG/DL — SIGNIFICANT CHANGE UP (ref 0.7–1.5)
CULTURE RESULTS: SIGNIFICANT CHANGE UP
EOSINOPHIL # BLD AUTO: 0.09 K/UL — SIGNIFICANT CHANGE UP (ref 0–0.7)
EOSINOPHIL NFR BLD AUTO: 1.8 % — SIGNIFICANT CHANGE UP (ref 0–8)
GLUCOSE SERPL-MCNC: 102 MG/DL — HIGH (ref 70–99)
HCT VFR BLD CALC: 41.5 % — SIGNIFICANT CHANGE UP (ref 37–47)
HGB BLD-MCNC: 13.5 G/DL — SIGNIFICANT CHANGE UP (ref 12–16)
IMM GRANULOCYTES NFR BLD AUTO: 1 % — HIGH (ref 0.1–0.3)
LACTATE SERPL-SCNC: 1.6 MMOL/L — SIGNIFICANT CHANGE UP (ref 0.7–2)
LYMPHOCYTES # BLD AUTO: 0.87 K/UL — LOW (ref 1.2–3.4)
LYMPHOCYTES # BLD AUTO: 17.4 % — LOW (ref 20.5–51.1)
MCHC RBC-ENTMCNC: 31.8 PG — HIGH (ref 27–31)
MCHC RBC-ENTMCNC: 32.5 G/DL — SIGNIFICANT CHANGE UP (ref 32–37)
MCV RBC AUTO: 97.9 FL — SIGNIFICANT CHANGE UP (ref 81–99)
METHOD TYPE: SIGNIFICANT CHANGE UP
MONOCYTES # BLD AUTO: 0.34 K/UL — SIGNIFICANT CHANGE UP (ref 0.1–0.6)
MONOCYTES NFR BLD AUTO: 6.8 % — SIGNIFICANT CHANGE UP (ref 1.7–9.3)
NEUTROPHILS # BLD AUTO: 3.55 K/UL — SIGNIFICANT CHANGE UP (ref 1.4–6.5)
NEUTROPHILS NFR BLD AUTO: 71.2 % — SIGNIFICANT CHANGE UP (ref 42.2–75.2)
NRBC # BLD: 0 /100 WBCS — SIGNIFICANT CHANGE UP (ref 0–0)
ORGANISM # SPEC MICROSCOPIC CNT: SIGNIFICANT CHANGE UP
ORGANISM # SPEC MICROSCOPIC CNT: SIGNIFICANT CHANGE UP
PLATELET # BLD AUTO: 240 K/UL — SIGNIFICANT CHANGE UP (ref 130–400)
POTASSIUM SERPL-MCNC: 5.2 MMOL/L — HIGH (ref 3.5–5)
POTASSIUM SERPL-SCNC: 5.2 MMOL/L — HIGH (ref 3.5–5)
PROT SERPL-MCNC: 7 G/DL — SIGNIFICANT CHANGE UP (ref 6–8)
RBC # BLD: 4.24 M/UL — SIGNIFICANT CHANGE UP (ref 4.2–5.4)
RBC # FLD: 14.3 % — SIGNIFICANT CHANGE UP (ref 11.5–14.5)
SODIUM SERPL-SCNC: 141 MMOL/L — SIGNIFICANT CHANGE UP (ref 135–146)
SPECIMEN SOURCE: SIGNIFICANT CHANGE UP
WBC # BLD: 4.99 K/UL — SIGNIFICANT CHANGE UP (ref 4.8–10.8)
WBC # FLD AUTO: 4.99 K/UL — SIGNIFICANT CHANGE UP (ref 4.8–10.8)

## 2020-01-11 PROCEDURE — 99284 EMERGENCY DEPT VISIT MOD MDM: CPT

## 2020-01-11 PROCEDURE — 71045 X-RAY EXAM CHEST 1 VIEW: CPT | Mod: 26

## 2020-01-11 RX ORDER — LAMOTRIGINE 25 MG/1
100 TABLET, ORALLY DISINTEGRATING ORAL AT BEDTIME
Refills: 0 | Status: DISCONTINUED | OUTPATIENT
Start: 2020-01-11 | End: 2020-01-28

## 2020-01-11 RX ORDER — SIMVASTATIN 20 MG/1
1 TABLET, FILM COATED ORAL
Qty: 0 | Refills: 0 | DISCHARGE

## 2020-01-11 RX ORDER — TAMSULOSIN HYDROCHLORIDE 0.4 MG/1
0.4 CAPSULE ORAL AT BEDTIME
Refills: 0 | Status: DISCONTINUED | OUTPATIENT
Start: 2020-01-11 | End: 2020-01-22

## 2020-01-11 RX ORDER — QUETIAPINE FUMARATE 200 MG/1
25 TABLET, FILM COATED ORAL AT BEDTIME
Refills: 0 | Status: DISCONTINUED | OUTPATIENT
Start: 2020-01-11 | End: 2020-01-23

## 2020-01-11 RX ORDER — MEROPENEM 1 G/30ML
500 INJECTION INTRAVENOUS ONCE
Refills: 0 | Status: COMPLETED | OUTPATIENT
Start: 2020-01-11 | End: 2020-01-11

## 2020-01-11 RX ORDER — LAMOTRIGINE 25 MG/1
75 TABLET, ORALLY DISINTEGRATING ORAL DAILY
Refills: 0 | Status: DISCONTINUED | OUTPATIENT
Start: 2020-01-11 | End: 2020-01-28

## 2020-01-11 RX ORDER — CLONAZEPAM 1 MG
0.5 TABLET ORAL EVERY 12 HOURS
Refills: 0 | Status: DISCONTINUED | OUTPATIENT
Start: 2020-01-11 | End: 2020-01-18

## 2020-01-11 RX ORDER — SIMVASTATIN 20 MG/1
20 TABLET, FILM COATED ORAL AT BEDTIME
Refills: 0 | Status: DISCONTINUED | OUTPATIENT
Start: 2020-01-11 | End: 2020-01-24

## 2020-01-11 RX ORDER — LEVOTHYROXINE SODIUM 125 MCG
112 TABLET ORAL DAILY
Refills: 0 | Status: DISCONTINUED | OUTPATIENT
Start: 2020-01-11 | End: 2020-01-28

## 2020-01-11 RX ORDER — MEROPENEM 1 G/30ML
500 INJECTION INTRAVENOUS ONCE
Refills: 0 | Status: DISCONTINUED | OUTPATIENT
Start: 2020-01-11 | End: 2020-01-11

## 2020-01-11 RX ORDER — ENOXAPARIN SODIUM 100 MG/ML
40 INJECTION SUBCUTANEOUS DAILY
Refills: 0 | Status: DISCONTINUED | OUTPATIENT
Start: 2020-01-11 | End: 2020-01-14

## 2020-01-11 RX ORDER — SODIUM CHLORIDE 9 MG/ML
1000 INJECTION, SOLUTION INTRAVENOUS ONCE
Refills: 0 | Status: COMPLETED | OUTPATIENT
Start: 2020-01-11 | End: 2020-01-11

## 2020-01-11 RX ORDER — SODIUM CHLORIDE 9 MG/ML
1000 INJECTION INTRAMUSCULAR; INTRAVENOUS; SUBCUTANEOUS
Refills: 0 | Status: DISCONTINUED | OUTPATIENT
Start: 2020-01-11 | End: 2020-01-18

## 2020-01-11 RX ORDER — SENNA PLUS 8.6 MG/1
1 TABLET ORAL
Qty: 0 | Refills: 0 | DISCHARGE

## 2020-01-11 RX ORDER — MEROPENEM 1 G/30ML
500 INJECTION INTRAVENOUS EVERY 8 HOURS
Refills: 0 | Status: DISCONTINUED | OUTPATIENT
Start: 2020-01-11 | End: 2020-01-12

## 2020-01-11 RX ADMIN — Medication 0.5 MILLIGRAM(S): at 21:44

## 2020-01-11 RX ADMIN — SODIUM CHLORIDE 1000 MILLILITER(S): 9 INJECTION, SOLUTION INTRAVENOUS at 17:15

## 2020-01-11 RX ADMIN — LAMOTRIGINE 100 MILLIGRAM(S): 25 TABLET, ORALLY DISINTEGRATING ORAL at 22:43

## 2020-01-11 RX ADMIN — MEROPENEM 100 MILLIGRAM(S): 1 INJECTION INTRAVENOUS at 22:44

## 2020-01-11 RX ADMIN — TAMSULOSIN HYDROCHLORIDE 0.4 MILLIGRAM(S): 0.4 CAPSULE ORAL at 22:43

## 2020-01-11 RX ADMIN — SIMVASTATIN 20 MILLIGRAM(S): 20 TABLET, FILM COATED ORAL at 22:43

## 2020-01-11 RX ADMIN — QUETIAPINE FUMARATE 25 MILLIGRAM(S): 200 TABLET, FILM COATED ORAL at 22:43

## 2020-01-11 RX ADMIN — MEROPENEM 100 MILLIGRAM(S): 1 INJECTION INTRAVENOUS at 14:47

## 2020-01-11 NOTE — ED PROVIDER NOTE - OBJECTIVE STATEMENT
53 yo female, pmh of mr, seizure, hypothyroidism, presents to ed for eval. recent ed visit for uti, culture grew pseudomonas, sent in for acting different from baseline and dec po intake and output. aid denies fever, vomiting.

## 2020-01-11 NOTE — ED PROVIDER NOTE - PROGRESS NOTE DETAILS
mar- dr. zenia jiménez. I have personally performed a history and physical exam on this patient and personally directed the management of the patient. 55yo woman h/o Down Syndrome and developmental delay, sz d/o, hypothyroid, recent suprapubic catheter placement due to urinary retention sent in by group home due to decreased PO and UO from the catheter. She was diagnosed with UTI earlier in the week and started on PO macrobid but has not seemed herself; normally she is alert and screaming and has been unusually quiet. On exam she is alert but ill appearing. Afebrile, VS as noted, lungs CTA, CVS1S2 RRR abd soft, catheter in place, no apparent tenderness. No rash. Review of cx from prior visit shows pseudomonas, but given lack PO intake and change from baseline behavior, will likely admit for hydration, IV abx.

## 2020-01-11 NOTE — H&P ADULT - NSHPLABSRESULTS_GEN_ALL_CORE
13.5   4.99   )----------(  240       ( 11 Jan 2020 14:10 )               41.5    01-11    141  |  103  |  8<L>  ----------------------------<  102<H>  5.2<H>   |  25  |  1.2    Ca    9.3      11 Jan 2020 14:10    TPro  7.0  /  Alb  3.8  /  TBili  0.4  /  DBili  x   /  AST  33  /  ALT  16  /  AlkPhos  115  01-11    < from: Xray Chest 1 View-PORTABLE IMMEDIATE (01.11.20 @ 12:48) >    Impression:    Support devices: None.    Cardiac/mediastinum/hilum: Unremarkable.    Lung parenchyma/Pleura: Linear opacity in left midlung field, atelectasis. No evidence of pneumothorax. Right lung is unremarkable.    Skeleton/soft tissues: Stable    < end of copied text >

## 2020-01-11 NOTE — ED ADULT NURSE NOTE - OBJECTIVE STATEMENT
Patient BIBA from [facility], patient's aide at bedside states patient has not been eating her meals and has been refusing to take prescribed medication for diagnosed UTI, states patient has been very weak in the past few days. Patient has Patient BIBA from [facility], patient's aide at bedside states patient has not been eating her meals and has been refusing to take prescribed medication for diagnosed UTI, states patient has been very weak in the past few days. Patient has hx of down syndrome and is nonverbal.

## 2020-01-11 NOTE — ED POST DISCHARGE NOTE - RESULT SUMMARY
+ psuedomonas on urine culture, macrobid was sent , called Nurse Pringle at home, to change axbx. nurse is sending patient to ED for worsening condition, and reevaluation

## 2020-01-11 NOTE — H&P ADULT - ATTENDING COMMENTS
I saw and evaluated patient  by bedside, as per caregiver, patient has very poor po intake, when she eats, food falls out of patient's mouth and she just not swallowing it at the facility, more weak, no fever, recent urin. retention with spc, pseudomonal on recent urine cxs, and fecal impaction with chronic constipation.     All labs, radiology studies, VS was reviewed  Vital Signs Last 24 Hrs  T(C): 37 (12 Jan 2020 04:30), Max: 37 (12 Jan 2020 04:30)  T(F): 98.6 (12 Jan 2020 04:30), Max: 98.6 (12 Jan 2020 04:30)  HR: 68 (12 Jan 2020 04:30) (58 - 82)  BP: 125/57 (12 Jan 2020 04:30) (125/57 - 153/96)  RR: 18 (12 Jan 2020 04:30) (18 - 18)  SpO2: 95% (12 Jan 2020 08:24) (90% - 100%)  GENERAL: NAD, sleeping comfortable in a bed, patient is laying comfortably in bed  HEENT: AT, NC, PERRLA, SUPPLE, NO JVD, NO CB  LUNG: CTA B/L  CVS: normal S1, S2, RRR, NO M/G/R  ABDOMEN: soft, bowel sounds present, normoactive in all 4 quadrants, non-tender, non-distended  EXT: no E/C/C, positive PP b/l extremities  NEURO: generalized body weakness, gait not tested  SKIN: no rash, no ecchymosis    I have reviewed the resident's note and agree with documented findings and  plan of care.  # Dysphagia with poor po intake- continue dysphagia diet as tolerated with aspiration precautions, get. speech eval.  # UTI due to Pseudomonas infection- sensitive to cipro- complete 7 days course of cipro tx.  # Fecal retention with chronic constipation- enemas x 2 daily for next 2 days, oral and suppository laxatives.  # meanwhile pt. has poor oral intake- continue IVF  #h/o down syndrome- caregiver by bedside, to assist with daily living activities  # h/o seizures- resumed on home regimen tx.  #h/o hypothyroidism- daily synthroid tx.  daily dvt proph.

## 2020-01-11 NOTE — H&P ADULT - NSHPPHYSICALEXAM_GEN_ALL_CORE
Vital Signs Last 24 Hrs  T(C): 36.8 (11 Jan 2020 14:00), Max: 36.8 (11 Jan 2020 14:00)  T(F): 98.3 (11 Jan 2020 14:00), Max: 98.3 (11 Jan 2020 14:00)  HR: 67 (11 Jan 2020 11:56) (67 - 67)  BP: 133/90 (11 Jan 2020 11:56) (133/90 - 133/90)  RR: 18 (11 Jan 2020 11:56) (18 - 18)  SpO2: 100% (11 Jan 2020 11:56) (100% - 100%)    General: well developed, well nourished, NAD  Neuro: alert and oriented but nonverbal, no focal deficits, moves all extremities spontaneously  HEENT: NCAT, EOMI, anicteric, mucosa moist  Respiratory: airway patent, respirations unlabored  CVS: regular rate and rhythm  Abdomen: soft, nontender, nondistended  Extremities: no edema, sensation and movement grossly intact

## 2020-01-11 NOTE — ED PROVIDER NOTE - PHYSICAL EXAMINATION
I am unable to obtain a comprehensive history, review of systems, past medical history, and/or physical exam due to constraints imposed by the urgency of the patient's clinical condition and/or mental status.     Physical Exam    Vital Signs: I have reviewed the initial vital signs.  Constitutional: no acute distress, + cough  Cardiovascular: S1 and S2, regular rate, regular rhythm, well-perfused extremities, radial pulses equal and 2+  Respiratory: unlabored respiratory effort, clear to auscultation bilaterally no wheezing, rales and rhonchi  Gastrointestinal: soft, non-tender abdomen, normal bowl sounds

## 2020-01-11 NOTE — H&P ADULT - HISTORY OF PRESENT ILLNESS
Patient is a 55 yo female with PMH Down's syndrome, nonverbal, Alzheimer's dementia, seizure d/o, hypothyroidism brought in from group home for evaluation of decreased energy. Patient was recently brought in to the ER for revaluation for urinary symptoms on 1/7/2019. Patient is a 53 yo female with PMH Down's syndrome, nonverbal, Alzheimer's dementia, seizure d/o, hypothyroidism brought in from group home for evaluation of decreased energy. Patient was recently brought in to the ER for revaluation for urinary symptoms on 1/7/2019. At the time, she was found to be positive for UTI and was discharged back to her group home on Macrobid. Since then, staff at the home have noticed she has been drinking less with decreased appetite and overall decreased energy. So, she was referred back to the ED.     In the ED, patient vitally was stable with BP of 133/90, HR 67, RR 18, T 98.3 8 (rectal), saturating 100% on room air. Her urine culture from the 7th of January shows pansensitive Pseudomonas. However, given her generalized weakness, both the group home staff and ED recommended admission for further management.

## 2020-01-11 NOTE — ED ADULT TRIAGE NOTE - CHIEF COMPLAINT QUOTE
pt biba from a group home, with uti  decreased po intake, weakness, on antibiotics with no improvement.

## 2020-01-11 NOTE — ED PROVIDER NOTE - ATTENDING CONTRIBUTION TO CARE
Pt is requesting an order for her mammogram to be in chart so she can tamara appt SEE PROGRESS NOTES FOR ATTENDING CONTRIBUTION TO CARE.

## 2020-01-11 NOTE — H&P ADULT - ASSESSMENT
Patient is a 55 yo female with PMH Down's syndrome, nonverbal, Alzheimer's dementia, seizure d/o, hypothyroidism brought in from group home for evaluation of decreased energy.     # Decreased po intake:  - no apparent metabolic encephalopathy  - blood work is unremarkable, cxr no pathology, FLU swab is negative  - received 2 L of LR in the ED   - UA is negative, will monitor urine out put, straight cath if needed   - follow up urine and blood cultures     # Down Syndrome With Dementia, seziure disroder:  - non verbal at baseline  - Continue with Lamictal, olanzapine, and ativan   - f/u lamictal level    # Hypothyroidism:- Continue with Synthroid 112 , Follow with TSH     DVT Prophylaxis: lovenox  GI ppx: not indicated  Soft diet   Disposition: from group home  HCP and legal guardian [phone] Madisyn ryanne (Sister) Patient is a 53 yo female with PMH Down's syndrome, nonverbal, Alzheimer's dementia, seizure d/o, hypothyroidism brought in from group home for evaluation of decreased energy.     # Decreased po intake and Weakness in the setting of Pseudomonas UTI   - no apparent metabolic encephalopathy  - blood work is unremarkable  - urine culture from 1/7/2019 positive for pseudomonas   - Will cont meropenem for now but can be switched to PO antibiotics based on sensitivities   - IV hydration     # Down Syndrome With Dementia, Seizure Disorder:  - non verbal at baseline  - Continue with Lamictal, olanzapine, and Klonopin   - f/u lamictal level    # Hypothyroidism:- Continue with Synthroid 112 , Follow with TSH     DVT Prophylaxis: lovenox  GI ppx: not indicated  Soft diet   Disposition: from group home    HCP and legal guardian [phone] Madisyn pearl (Sister)

## 2020-01-12 LAB
ALBUMIN SERPL ELPH-MCNC: 3.1 G/DL — LOW (ref 3.5–5.2)
ALP SERPL-CCNC: 93 U/L — SIGNIFICANT CHANGE UP (ref 30–115)
ALT FLD-CCNC: 13 U/L — SIGNIFICANT CHANGE UP (ref 0–41)
ANION GAP SERPL CALC-SCNC: 13 MMOL/L — SIGNIFICANT CHANGE UP (ref 7–14)
AST SERPL-CCNC: 19 U/L — SIGNIFICANT CHANGE UP (ref 0–41)
BASOPHILS # BLD AUTO: 0.1 K/UL — SIGNIFICANT CHANGE UP (ref 0–0.2)
BASOPHILS NFR BLD AUTO: 1.9 % — HIGH (ref 0–1)
BILIRUB SERPL-MCNC: 0.3 MG/DL — SIGNIFICANT CHANGE UP (ref 0.2–1.2)
BUN SERPL-MCNC: 10 MG/DL — SIGNIFICANT CHANGE UP (ref 10–20)
CALCIUM SERPL-MCNC: 8.6 MG/DL — SIGNIFICANT CHANGE UP (ref 8.5–10.1)
CHLORIDE SERPL-SCNC: 106 MMOL/L — SIGNIFICANT CHANGE UP (ref 98–110)
CO2 SERPL-SCNC: 25 MMOL/L — SIGNIFICANT CHANGE UP (ref 17–32)
CREAT SERPL-MCNC: 1.1 MG/DL — SIGNIFICANT CHANGE UP (ref 0.7–1.5)
EOSINOPHIL # BLD AUTO: 0.12 K/UL — SIGNIFICANT CHANGE UP (ref 0–0.7)
EOSINOPHIL NFR BLD AUTO: 2.2 % — SIGNIFICANT CHANGE UP (ref 0–8)
GLUCOSE SERPL-MCNC: 96 MG/DL — SIGNIFICANT CHANGE UP (ref 70–99)
HCT VFR BLD CALC: 34.3 % — LOW (ref 37–47)
HGB BLD-MCNC: 11.1 G/DL — LOW (ref 12–16)
IMM GRANULOCYTES NFR BLD AUTO: 0.6 % — HIGH (ref 0.1–0.3)
LYMPHOCYTES # BLD AUTO: 0.91 K/UL — LOW (ref 1.2–3.4)
LYMPHOCYTES # BLD AUTO: 17 % — LOW (ref 20.5–51.1)
MCHC RBC-ENTMCNC: 31.9 PG — HIGH (ref 27–31)
MCHC RBC-ENTMCNC: 32.4 G/DL — SIGNIFICANT CHANGE UP (ref 32–37)
MCV RBC AUTO: 98.6 FL — SIGNIFICANT CHANGE UP (ref 81–99)
MONOCYTES # BLD AUTO: 0.43 K/UL — SIGNIFICANT CHANGE UP (ref 0.1–0.6)
MONOCYTES NFR BLD AUTO: 8 % — SIGNIFICANT CHANGE UP (ref 1.7–9.3)
NEUTROPHILS # BLD AUTO: 3.76 K/UL — SIGNIFICANT CHANGE UP (ref 1.4–6.5)
NEUTROPHILS NFR BLD AUTO: 70.3 % — SIGNIFICANT CHANGE UP (ref 42.2–75.2)
NRBC # BLD: 0 /100 WBCS — SIGNIFICANT CHANGE UP (ref 0–0)
PLATELET # BLD AUTO: 224 K/UL — SIGNIFICANT CHANGE UP (ref 130–400)
POTASSIUM SERPL-MCNC: 4 MMOL/L — SIGNIFICANT CHANGE UP (ref 3.5–5)
POTASSIUM SERPL-SCNC: 4 MMOL/L — SIGNIFICANT CHANGE UP (ref 3.5–5)
PROT SERPL-MCNC: 5.7 G/DL — LOW (ref 6–8)
RBC # BLD: 3.48 M/UL — LOW (ref 4.2–5.4)
RBC # FLD: 14.3 % — SIGNIFICANT CHANGE UP (ref 11.5–14.5)
SODIUM SERPL-SCNC: 144 MMOL/L — SIGNIFICANT CHANGE UP (ref 135–146)
WBC # BLD: 5.35 K/UL — SIGNIFICANT CHANGE UP (ref 4.8–10.8)
WBC # FLD AUTO: 5.35 K/UL — SIGNIFICANT CHANGE UP (ref 4.8–10.8)

## 2020-01-12 PROCEDURE — 99223 1ST HOSP IP/OBS HIGH 75: CPT

## 2020-01-12 RX ORDER — CIPROFLOXACIN LACTATE 400MG/40ML
VIAL (ML) INTRAVENOUS
Refills: 0 | Status: DISCONTINUED | OUTPATIENT
Start: 2020-01-12 | End: 2020-01-14

## 2020-01-12 RX ORDER — CIPROFLOXACIN LACTATE 400MG/40ML
200 VIAL (ML) INTRAVENOUS ONCE
Refills: 0 | Status: COMPLETED | OUTPATIENT
Start: 2020-01-12 | End: 2020-01-12

## 2020-01-12 RX ORDER — POLYETHYLENE GLYCOL 3350 17 G/17G
17 POWDER, FOR SOLUTION ORAL DAILY
Refills: 0 | Status: DISCONTINUED | OUTPATIENT
Start: 2020-01-12 | End: 2020-01-28

## 2020-01-12 RX ORDER — CHLORHEXIDINE GLUCONATE 213 G/1000ML
1 SOLUTION TOPICAL
Refills: 0 | Status: DISCONTINUED | OUTPATIENT
Start: 2020-01-12 | End: 2020-02-05

## 2020-01-12 RX ORDER — CIPROFLOXACIN LACTATE 400MG/40ML
200 VIAL (ML) INTRAVENOUS EVERY 12 HOURS
Refills: 0 | Status: DISCONTINUED | OUTPATIENT
Start: 2020-01-12 | End: 2020-01-14

## 2020-01-12 RX ORDER — INFLUENZA VIRUS VACCINE 15; 15; 15; 15 UG/.5ML; UG/.5ML; UG/.5ML; UG/.5ML
0.5 SUSPENSION INTRAMUSCULAR ONCE
Refills: 0 | Status: DISCONTINUED | OUTPATIENT
Start: 2020-01-12 | End: 2020-02-05

## 2020-01-12 RX ADMIN — Medication 10 MILLIGRAM(S): at 17:29

## 2020-01-12 RX ADMIN — LAMOTRIGINE 100 MILLIGRAM(S): 25 TABLET, ORALLY DISINTEGRATING ORAL at 22:09

## 2020-01-12 RX ADMIN — LAMOTRIGINE 75 MILLIGRAM(S): 25 TABLET, ORALLY DISINTEGRATING ORAL at 17:41

## 2020-01-12 RX ADMIN — Medication 0.5 MILLIGRAM(S): at 17:31

## 2020-01-12 RX ADMIN — Medication 100 MILLIGRAM(S): at 23:29

## 2020-01-12 RX ADMIN — Medication 100 MILLIGRAM(S): at 12:22

## 2020-01-12 RX ADMIN — MEROPENEM 100 MILLIGRAM(S): 1 INJECTION INTRAVENOUS at 05:59

## 2020-01-12 RX ADMIN — CHLORHEXIDINE GLUCONATE 1 APPLICATION(S): 213 SOLUTION TOPICAL at 05:59

## 2020-01-12 RX ADMIN — SIMVASTATIN 20 MILLIGRAM(S): 20 TABLET, FILM COATED ORAL at 21:24

## 2020-01-12 RX ADMIN — SODIUM CHLORIDE 75 MILLILITER(S): 9 INJECTION INTRAMUSCULAR; INTRAVENOUS; SUBCUTANEOUS at 00:08

## 2020-01-12 RX ADMIN — Medication 112 MICROGRAM(S): at 05:59

## 2020-01-12 RX ADMIN — Medication 0.5 MILLIGRAM(S): at 06:04

## 2020-01-12 RX ADMIN — SODIUM CHLORIDE 75 MILLILITER(S): 9 INJECTION INTRAMUSCULAR; INTRAVENOUS; SUBCUTANEOUS at 11:27

## 2020-01-12 RX ADMIN — ENOXAPARIN SODIUM 40 MILLIGRAM(S): 100 INJECTION SUBCUTANEOUS at 11:26

## 2020-01-12 RX ADMIN — TAMSULOSIN HYDROCHLORIDE 0.4 MILLIGRAM(S): 0.4 CAPSULE ORAL at 21:24

## 2020-01-12 RX ADMIN — QUETIAPINE FUMARATE 25 MILLIGRAM(S): 200 TABLET, FILM COATED ORAL at 21:23

## 2020-01-12 NOTE — PROGRESS NOTE ADULT - ASSESSMENT
53 yo female with PMH Down's syndrome, nonverbal, Alzheimer's dementia, seizure d/o, hypothyroidism brought in from group home for evaluation of decreased energy.     # Decreased po intake and Weakness in the setting of Pseudomonas UTI   - no apparent metabolic encephalopathy  - blood work is unremarkable  - urine culture from 1/7/2019 positive for pseudomonas   - Will cont meropenem for now but can be switched to PO antibiotics based on sensitivities   - IV hydration     # Down Syndrome With Dementia, Seizure Disorder:  - non verbal at baseline  - Continue with Lamictal, olanzapine, and Klonopin   - f/u lamictal level    # Hypothyroidism:- Continue with Synthroid 112 , Follow with TSH     DVT Prophylaxis: lovenox  GI ppx: not indicated  Soft diet   Disposition: from group home    HCP and legal guardian [phone] Madisyn pearl (Sister)

## 2020-01-12 NOTE — PROGRESS NOTE ADULT - ATTENDING COMMENTS
I saw and evaluated patient  by bedside, as per caregiver patient did not have breakfast    All labs, radiology studies, VS was reviewed  I have reviewed the resident's note and agree with documented findings and  plan of care.    # Dysphagia with poor po intake- continue dysphagia diet as tolerated with aspiration precautions,  speech tx., monitor for po intake for next 24 hours  # UTI due to Pseudomonas infection- sensitive to cipro- complete 7 days course of cipro tx.  # Fecal retention with chronic constipation- enemas x 2 daily for next 24 hours, oral and suppository laxatives.  # meanwhile pt. has poor oral intake- continue IVF  #h/o down syndrome- caregiver by bedside, to assist with daily living activities  # h/o seizures- resumed on home regimen tx.  #h/o hypothyroidism- daily synthroid tx.  daily dvt proph.      #Progress Note Handoff: monitor for patient's po intake  Family discussion: caregiver Disposition: group home

## 2020-01-12 NOTE — PROGRESS NOTE ADULT - SUBJECTIVE AND OBJECTIVE BOX
Hospital Day:  1d    Subjective:    Pt was interviewed and examined at the bedside in the AM. No acute events overnight.   Unable to assess ROS.     Past Medical Hx:   Seizures  Intellectual disability  Hypothyroid  Impulse control disorder in adult  Down's syndrome    Past Sx:  No significant past surgical history    Allergies:  No Known Allergies    Current Meds:   Standng Meds:  calcium carbonate 1250 mG  + Vitamin D (OsCal 500 + D) 1 Tablet(s) Oral daily  chlorhexidine 4% Liquid 1 Application(s) Topical <User Schedule>  clonazePAM  Tablet 0.5 milliGRAM(s) Oral every 12 hours  enoxaparin Injectable 40 milliGRAM(s) SubCutaneous daily  influenza   Vaccine 0.5 milliLiter(s) IntraMuscular once  lamoTRIgine 75 milliGRAM(s) Oral daily  lamoTRIgine 100 milliGRAM(s) Oral at bedtime  levothyroxine 112 MICROGram(s) Oral daily  meropenem  IVPB 500 milliGRAM(s) IV Intermittent every 8 hours  QUEtiapine 25 milliGRAM(s) Oral at bedtime  simvastatin 20 milliGRAM(s) Oral at bedtime  sodium chloride 0.9%. 1000 milliLiter(s) (75 mL/Hr) IV Continuous <Continuous>  tamsulosin 0.4 milliGRAM(s) Oral at bedtime    PRN Meds:    HOME MEDICATIONS:  Senna 8.6 mg oral tablet: 1 tab(s) orally once a day (at bedtime)  simvastatin 20 mg oral tablet: 1 tab(s) orally once a day (at bedtime)      Vital Signs:   T(F): 98.6 (01-12-20 @ 04:30), Max: 98.6 (01-12-20 @ 04:30)  HR: 68 (01-12-20 @ 04:30) (58 - 82)  BP: 125/57 (01-12-20 @ 04:30) (125/57 - 153/96)  RR: 18 (01-12-20 @ 04:30) (18 - 18)  SpO2: 95% (01-12-20 @ 08:24) (90% - 100%)      01-11-20 @ 07:01  -  01-12-20 @ 07:00  --------------------------------------------------------  IN: 1000 mL / OUT: 475 mL / NET: 525 mL        Physical Exam:   CONSTITUTIONAL: NAD, sleeping in bed, downs facies   HEAD: board face; atraumatic  NECK: Supple; non tender, FROM  CARD: +S1, S2 Regular rate and rhythm.  RESP: CTABL  ABD: soft ntnd, no rebound, no guarding, no rigidity  EXT: moves all extremities  NEURO: Responsive grossly unremarkable, no focal deficits      Labs:                         11.1   5.35  )-----------( 224      ( 12 Jan 2020 07:37 )             34.3     Neutophil% 70.3, Lymphocyte% 17.0, Monocyte% 8.0, Bands% 0.6 01-12-20 @ 07:37    12 Jan 2020 07:37    144    |  106    |  10     ----------------------------<  96     4.0     |  25     |  1.1      Ca    8.6        12 Jan 2020 07:37    TPro  5.7    /  Alb  3.1    /  TBili  0.3    /  DBili  x      /  AST  19     /  ALT  13     /  AlkPhos  93     12 Jan 2020 07:37        Amylase --, Lipase 27, 01-07-20 @ 18:43

## 2020-01-13 ENCOUNTER — APPOINTMENT (OUTPATIENT)
Dept: UROLOGY | Facility: CLINIC | Age: 55
End: 2020-01-13

## 2020-01-13 PROCEDURE — 99233 SBSQ HOSP IP/OBS HIGH 50: CPT

## 2020-01-13 RX ORDER — ACETAMINOPHEN 500 MG
650 TABLET ORAL EVERY 6 HOURS
Refills: 0 | Status: DISCONTINUED | OUTPATIENT
Start: 2020-01-13 | End: 2020-02-05

## 2020-01-13 RX ORDER — DIPHENHYDRAMINE HCL 50 MG
25 CAPSULE ORAL ONCE
Refills: 0 | Status: DISCONTINUED | OUTPATIENT
Start: 2020-01-13 | End: 2020-02-05

## 2020-01-13 RX ADMIN — TAMSULOSIN HYDROCHLORIDE 0.4 MILLIGRAM(S): 0.4 CAPSULE ORAL at 21:06

## 2020-01-13 RX ADMIN — CHLORHEXIDINE GLUCONATE 1 APPLICATION(S): 213 SOLUTION TOPICAL at 05:12

## 2020-01-13 RX ADMIN — LAMOTRIGINE 75 MILLIGRAM(S): 25 TABLET, ORALLY DISINTEGRATING ORAL at 12:29

## 2020-01-13 RX ADMIN — Medication 1 TABLET(S): at 12:29

## 2020-01-13 RX ADMIN — Medication 10 MILLIGRAM(S): at 12:29

## 2020-01-13 RX ADMIN — Medication 650 MILLIGRAM(S): at 21:33

## 2020-01-13 RX ADMIN — ENOXAPARIN SODIUM 40 MILLIGRAM(S): 100 INJECTION SUBCUTANEOUS at 12:30

## 2020-01-13 RX ADMIN — LAMOTRIGINE 100 MILLIGRAM(S): 25 TABLET, ORALLY DISINTEGRATING ORAL at 21:06

## 2020-01-13 RX ADMIN — Medication 0.5 MILLIGRAM(S): at 19:19

## 2020-01-13 RX ADMIN — QUETIAPINE FUMARATE 25 MILLIGRAM(S): 200 TABLET, FILM COATED ORAL at 21:06

## 2020-01-13 RX ADMIN — Medication 112 MICROGRAM(S): at 05:09

## 2020-01-13 RX ADMIN — SIMVASTATIN 20 MILLIGRAM(S): 20 TABLET, FILM COATED ORAL at 21:06

## 2020-01-13 RX ADMIN — Medication 100 MILLIGRAM(S): at 12:29

## 2020-01-13 RX ADMIN — POLYETHYLENE GLYCOL 3350 17 GRAM(S): 17 POWDER, FOR SOLUTION ORAL at 12:30

## 2020-01-13 RX ADMIN — Medication 650 MILLIGRAM(S): at 21:03

## 2020-01-13 RX ADMIN — Medication 0.5 MILLIGRAM(S): at 05:09

## 2020-01-13 RX ADMIN — SODIUM CHLORIDE 75 MILLILITER(S): 9 INJECTION INTRAMUSCULAR; INTRAVENOUS; SUBCUTANEOUS at 02:37

## 2020-01-13 NOTE — PROGRESS NOTE ADULT - SUBJECTIVE AND OBJECTIVE BOX
Hospital Day:  2d    Subjective:    Pt was interviewed and examined at the bedside in the AM. No acute events overnight.     Denies headaches, changes in vision, chest pains, SOB, n/v/d, abd pain, constipation, changes in urination, pain on urination, weakness, fatigue, joint pain or muscle pain.       Past Medical Hx:   Seizures  Intellectual disability  Hypothyroid  Impulse control disorder in adult  Down's syndrome    Past Sx:  No significant past surgical history    Allergies:  No Known Allergies    Current Meds:   Stand Meds:  bisacodyl Suppository 10 milliGRAM(s) Rectal daily  calcium carbonate 1250 mG  + Vitamin D (OsCal 500 + D) 1 Tablet(s) Oral daily  chlorhexidine 4% Liquid 1 Application(s) Topical <User Schedule>  ciprofloxacin   IVPB      ciprofloxacin   IVPB 200 milliGRAM(s) IV Intermittent every 12 hours  clonazePAM  Tablet 0.5 milliGRAM(s) Oral every 12 hours  enoxaparin Injectable 40 milliGRAM(s) SubCutaneous daily  influenza   Vaccine 0.5 milliLiter(s) IntraMuscular once  lamoTRIgine 75 milliGRAM(s) Oral daily  lamoTRIgine 100 milliGRAM(s) Oral at bedtime  levothyroxine 112 MICROGram(s) Oral daily  polyethylene glycol 3350 17 Gram(s) Oral daily  QUEtiapine 25 milliGRAM(s) Oral at bedtime  simvastatin 20 milliGRAM(s) Oral at bedtime  sodium chloride 0.9%. 1000 milliLiter(s) (75 mL/Hr) IV Continuous <Continuous>  tamsulosin 0.4 milliGRAM(s) Oral at bedtime    PRN Meds:    HOME MEDICATIONS:  Senna 8.6 mg oral tablet: 1 tab(s) orally once a day (at bedtime)  simvastatin 20 mg oral tablet: 1 tab(s) orally once a day (at bedtime)      Vital Signs:   T(F): 96.8 (01-13-20 @ 05:00), Max: 98.6 (01-12-20 @ 20:00)  HR: 51 (01-13-20 @ 05:00) (51 - 71)  BP: 112/56 (01-13-20 @ 05:00) (100/54 - 112/56)  RR: 18 (01-13-20 @ 05:00) (17 - 18)  SpO2: --      01-12-20 @ 07:01  -  01-13-20 @ 07:00  --------------------------------------------------------  IN: 700 mL / OUT: 800 mL / NET: -100 mL        Physical Exam:   CONSTITUTIONAL: NAD, sleeping in bed, downs facies   HEAD: board face; atraumatic  NECK: Supple; non tender, FROM  CARD: +S1, S2 Regular rate and rhythm.  RESP: CTABL  ABD: soft ntnd, no rebound, no guarding, no rigidity  EXT: moves all extremities  NEURO: Responsive grossly unremarkable, no focal deficits        Labs:                         11.1   5.35  )-----------( 224      ( 12 Jan 2020 07:37 )             34.3       12 Jan 2020 07:37    144    |  106    |  10     ----------------------------<  96     4.0     |  25     |  1.1      Ca    8.6        12 Jan 2020 07:37    TPro  5.7    /  Alb  3.1    /  TBili  0.3    /  DBili  x      /  AST  19     /  ALT  13     /  AlkPhos  93     12 Jan 2020 07:37    Culture - Blood (collected 01-12-20 @ 00:30)  Source: .Blood Blood-Peripheral  Preliminary Report (01-13-20 @ 08:01):    No growth to date.

## 2020-01-13 NOTE — DIETITIAN INITIAL EVALUATION ADULT. - ENTERAL
If family agreeable for Enteral Nutrition Consider Jevity 1.2 @ 240mL q 4hrs (Hold 2AM feed for total 5 feeds/day). This provides 1440 kcals, 66 gms protein and 972mL free water flushes per LIP

## 2020-01-13 NOTE — DIETITIAN INITIAL EVALUATION ADULT. - ENERGY NEEDS
Energy needs: 1952 - 2343 kcal (Based on 25 - 30kcal/kg) Increased needs d/t Pressure Ulcer stage II  Protein: 93 - 101g (1.2 - 1.3g) Increased needs d/t Pressure Ulcer stage II  Fluid: 1mL/Kcal Energy needs:  kcal (1431 - 1669 Based on 30 - 35kcal/kg IBW)   Protein: 59 - 72g (1.25 - 1.5g/kg) Increased needs d/t Pressure Ulcer stage II  Fluid: 1mL/Kcal or per LIP Energy needs: 1431 - 1669 kcals/day (Based on 30 - 35kcal/kg IBW)   Protein: 59 - 72g (1.25 - 1.5g/kg IBW) Increased needs d/t Pressure Ulcer stage II  Fluid: 1mL/Kcal or per LIP

## 2020-01-13 NOTE — DIETITIAN INITIAL EVALUATION ADULT. - PERTINENT MEDS FT
Enoxaparin, Ciprofloxacin (avoid citrus/grapefruit), Bisacodyl, Polyethylene Glycol, Calcium carbonate + Vitamin D, Quetiapine(avoid citrus/grapefruit), Simvastatin, tamsulosin Enoxaparin, Ciprofloxacin (avoid citrus/grapefruit), Bisacodyl, Polyethylene Glycol, Calcium carbonate + Vitamin D, Quetiapine(avoid citrus/grapefruit), Simvastatin, tamsulosin, NS (75mL/hr)

## 2020-01-13 NOTE — PROGRESS NOTE ADULT - ASSESSMENT
55 yo female with PMH Down's syndrome, nonverbal, Alzheimer's dementia, seizure d/o, hypothyroidism brought in from group home for evaluation of decreased energy.     # Decreased PO intake and Weakness in the setting of  - no apparent metabolic encephalopathy  - blood work is unremarkable  - IV hydration   - Failed S+S   - Will discuss alternate modes of feeding with family     # Pseudomonas UTI   - urine culture from 1/7/2019 positive for pseudomonas   - Will cont meropenem for now since failed S+S but can be switched to PO Cipro based on sensitivities     #Constipation   - Enema BID x 2 days   - On suppository     # Down Syndrome With Dementia, Seizure Disorder:  - non verbal at baseline  - Continue with Lamictal, olanzapine, and Klonopin   - f/u Lamictal level    # Hypothyroidism:  - Continue with Synthroid 112  - TSH 0.25     DVT Prophylaxis: lovenox  GI ppx: not indicated  Soft diet   Disposition: from group home    HCP and legal guardian [phone] Madisyn pearl (Sister)

## 2020-01-13 NOTE — DIETITIAN INITIAL EVALUATION ADULT. - FACTORS AFF FOOD INTAKE
Alzheimer's Disease/Dimentia, intellectual disability/Shinto/ethnic/cultural/personal food preferences/developmental delay Alzheimer's Disease / Dementia, intellectual disability/developmental delay

## 2020-01-13 NOTE — DIETITIAN INITIAL EVALUATION ADULT. - RD TO REMAIN AVAILABLE
yes yes/Nutrition Intervention: Enteral Nutrition, Energy Intake; Nutrition Monitoring: Body Composition, Physical Focused Findings, Enteral Nutrition, Nutrition Intervention: Enteral Nutrition, RD to montitor energy intake, Body Composition, Physical Focused Findings, Pt at risk f/u 3 days/yes

## 2020-01-13 NOTE — DIETITIAN INITIAL EVALUATION ADULT. - NUTRITIONGOAL OUTCOME1
Pt to initiate Enteral Nutrition and meet 85 - 105% of needs in 3 days Pt to initiate Enteral Nutrition and meet 85 - 105% of needs via EN in 3 days

## 2020-01-13 NOTE — DIETITIAN INITIAL EVALUATION ADULT. - PHYSICAL APPEARANCE
BMI: 32.6; IBW: 47.7kg. Enema BID x 2 days d/t severe constipation and fecal impaction, oral and suppository laxatives for support. LBM: 1/12 Skin: pressure ulcer stage 2 (L Buttocks); obese/BMI: 32.6; IBW: 47.7kg. [facility] at [facility], pt weighed 179# on 11/25/19 - 4% weight loss in 1.5 months. Pt does not meet criteria for malnutrition. Enema BID x 2 days d/t severe constipation and fecal impaction, oral and suppository laxatives for support. LBM: 1/12 Skin: pressure ulcer stage 2 (L Buttocks); No edema noted

## 2020-01-13 NOTE — DIETITIAN INITIAL EVALUATION ADULT. - ENERGY INTAKE
Decreased/ <25% PO intake as per progress notes and RN 0- 25% PO intake as per progress notes and RN Poor (<50%)

## 2020-01-14 LAB
ALBUMIN SERPL ELPH-MCNC: 2.9 G/DL — LOW (ref 3.5–5.2)
ALP SERPL-CCNC: 83 U/L — SIGNIFICANT CHANGE UP (ref 30–115)
ALT FLD-CCNC: 12 U/L — SIGNIFICANT CHANGE UP (ref 0–41)
ANION GAP SERPL CALC-SCNC: 10 MMOL/L — SIGNIFICANT CHANGE UP (ref 7–14)
AST SERPL-CCNC: 25 U/L — SIGNIFICANT CHANGE UP (ref 0–41)
BASOPHILS # BLD AUTO: 0.09 K/UL — SIGNIFICANT CHANGE UP (ref 0–0.2)
BASOPHILS NFR BLD AUTO: 1.7 % — HIGH (ref 0–1)
BILIRUB SERPL-MCNC: 0.4 MG/DL — SIGNIFICANT CHANGE UP (ref 0.2–1.2)
BUN SERPL-MCNC: 8 MG/DL — LOW (ref 10–20)
CALCIUM SERPL-MCNC: 8.3 MG/DL — LOW (ref 8.5–10.1)
CHLORIDE SERPL-SCNC: 109 MMOL/L — SIGNIFICANT CHANGE UP (ref 98–110)
CO2 SERPL-SCNC: 24 MMOL/L — SIGNIFICANT CHANGE UP (ref 17–32)
CREAT SERPL-MCNC: 1 MG/DL — SIGNIFICANT CHANGE UP (ref 0.7–1.5)
EOSINOPHIL # BLD AUTO: 0.13 K/UL — SIGNIFICANT CHANGE UP (ref 0–0.7)
EOSINOPHIL NFR BLD AUTO: 2.4 % — SIGNIFICANT CHANGE UP (ref 0–8)
GLUCOSE SERPL-MCNC: 95 MG/DL — SIGNIFICANT CHANGE UP (ref 70–99)
HCT VFR BLD CALC: 34.4 % — LOW (ref 37–47)
HGB BLD-MCNC: 11 G/DL — LOW (ref 12–16)
IMM GRANULOCYTES NFR BLD AUTO: 0.6 % — HIGH (ref 0.1–0.3)
LYMPHOCYTES # BLD AUTO: 0.74 K/UL — LOW (ref 1.2–3.4)
LYMPHOCYTES # BLD AUTO: 13.9 % — LOW (ref 20.5–51.1)
MAGNESIUM SERPL-MCNC: 1.8 MG/DL — SIGNIFICANT CHANGE UP (ref 1.8–2.4)
MCHC RBC-ENTMCNC: 31.5 PG — HIGH (ref 27–31)
MCHC RBC-ENTMCNC: 32 G/DL — SIGNIFICANT CHANGE UP (ref 32–37)
MCV RBC AUTO: 98.6 FL — SIGNIFICANT CHANGE UP (ref 81–99)
MONOCYTES # BLD AUTO: 0.47 K/UL — SIGNIFICANT CHANGE UP (ref 0.1–0.6)
MONOCYTES NFR BLD AUTO: 8.8 % — SIGNIFICANT CHANGE UP (ref 1.7–9.3)
NEUTROPHILS # BLD AUTO: 3.88 K/UL — SIGNIFICANT CHANGE UP (ref 1.4–6.5)
NEUTROPHILS NFR BLD AUTO: 72.6 % — SIGNIFICANT CHANGE UP (ref 42.2–75.2)
NRBC # BLD: 0 /100 WBCS — SIGNIFICANT CHANGE UP (ref 0–0)
PHOSPHATE SERPL-MCNC: 3 MG/DL — SIGNIFICANT CHANGE UP (ref 2.1–4.9)
PLATELET # BLD AUTO: 218 K/UL — SIGNIFICANT CHANGE UP (ref 130–400)
POTASSIUM SERPL-MCNC: 4.1 MMOL/L — SIGNIFICANT CHANGE UP (ref 3.5–5)
POTASSIUM SERPL-SCNC: 4.1 MMOL/L — SIGNIFICANT CHANGE UP (ref 3.5–5)
PROT SERPL-MCNC: 5.5 G/DL — LOW (ref 6–8)
RBC # BLD: 3.49 M/UL — LOW (ref 4.2–5.4)
RBC # FLD: 14.1 % — SIGNIFICANT CHANGE UP (ref 11.5–14.5)
SODIUM SERPL-SCNC: 143 MMOL/L — SIGNIFICANT CHANGE UP (ref 135–146)
WBC # BLD: 5.34 K/UL — SIGNIFICANT CHANGE UP (ref 4.8–10.8)
WBC # FLD AUTO: 5.34 K/UL — SIGNIFICANT CHANGE UP (ref 4.8–10.8)

## 2020-01-14 PROCEDURE — 71045 X-RAY EXAM CHEST 1 VIEW: CPT | Mod: 26

## 2020-01-14 PROCEDURE — 71045 X-RAY EXAM CHEST 1 VIEW: CPT | Mod: 26,77

## 2020-01-14 PROCEDURE — 99223 1ST HOSP IP/OBS HIGH 75: CPT

## 2020-01-14 PROCEDURE — 99233 SBSQ HOSP IP/OBS HIGH 50: CPT

## 2020-01-14 RX ORDER — VANCOMYCIN HCL 1 G
1250 VIAL (EA) INTRAVENOUS EVERY 12 HOURS
Refills: 0 | Status: DISCONTINUED | OUTPATIENT
Start: 2020-01-15 | End: 2020-01-20

## 2020-01-14 RX ORDER — MEROPENEM 1 G/30ML
1000 INJECTION INTRAVENOUS ONCE
Refills: 0 | Status: COMPLETED | OUTPATIENT
Start: 2020-01-14 | End: 2020-01-14

## 2020-01-14 RX ORDER — VANCOMYCIN HCL 1 G
1250 VIAL (EA) INTRAVENOUS ONCE
Refills: 0 | Status: COMPLETED | OUTPATIENT
Start: 2020-01-14 | End: 2020-01-14

## 2020-01-14 RX ORDER — VANCOMYCIN HCL 1 G
VIAL (EA) INTRAVENOUS
Refills: 0 | Status: DISCONTINUED | OUTPATIENT
Start: 2020-01-14 | End: 2020-01-20

## 2020-01-14 RX ORDER — MEROPENEM 1 G/30ML
1000 INJECTION INTRAVENOUS EVERY 8 HOURS
Refills: 0 | Status: COMPLETED | OUTPATIENT
Start: 2020-01-15 | End: 2020-01-24

## 2020-01-14 RX ORDER — SODIUM CHLORIDE 9 MG/ML
3 INJECTION INTRAMUSCULAR; INTRAVENOUS; SUBCUTANEOUS EVERY 4 HOURS
Refills: 0 | Status: DISCONTINUED | OUTPATIENT
Start: 2020-01-14 | End: 2020-02-05

## 2020-01-14 RX ORDER — MEROPENEM 1 G/30ML
INJECTION INTRAVENOUS
Refills: 0 | Status: COMPLETED | OUTPATIENT
Start: 2020-01-14 | End: 2020-01-25

## 2020-01-14 RX ORDER — HALOPERIDOL DECANOATE 100 MG/ML
2 INJECTION INTRAMUSCULAR ONCE
Refills: 0 | Status: DISCONTINUED | OUTPATIENT
Start: 2020-01-14 | End: 2020-01-15

## 2020-01-14 RX ORDER — IPRATROPIUM/ALBUTEROL SULFATE 18-103MCG
3 AEROSOL WITH ADAPTER (GRAM) INHALATION EVERY 6 HOURS
Refills: 0 | Status: DISCONTINUED | OUTPATIENT
Start: 2020-01-14 | End: 2020-01-26

## 2020-01-14 RX ADMIN — LAMOTRIGINE 100 MILLIGRAM(S): 25 TABLET, ORALLY DISINTEGRATING ORAL at 21:15

## 2020-01-14 RX ADMIN — ENOXAPARIN SODIUM 40 MILLIGRAM(S): 100 INJECTION SUBCUTANEOUS at 15:19

## 2020-01-14 RX ADMIN — MEROPENEM 100 MILLIGRAM(S): 1 INJECTION INTRAVENOUS at 21:14

## 2020-01-14 RX ADMIN — TAMSULOSIN HYDROCHLORIDE 0.4 MILLIGRAM(S): 0.4 CAPSULE ORAL at 21:16

## 2020-01-14 RX ADMIN — SODIUM CHLORIDE 3 MILLILITER(S): 9 INJECTION INTRAMUSCULAR; INTRAVENOUS; SUBCUTANEOUS at 20:50

## 2020-01-14 RX ADMIN — QUETIAPINE FUMARATE 25 MILLIGRAM(S): 200 TABLET, FILM COATED ORAL at 21:15

## 2020-01-14 RX ADMIN — SODIUM CHLORIDE 75 MILLILITER(S): 9 INJECTION INTRAMUSCULAR; INTRAVENOUS; SUBCUTANEOUS at 06:34

## 2020-01-14 RX ADMIN — Medication 100 MILLIGRAM(S): at 16:49

## 2020-01-14 RX ADMIN — Medication 166.67 MILLIGRAM(S): at 21:21

## 2020-01-14 RX ADMIN — Medication 3 MILLILITER(S): at 19:32

## 2020-01-14 RX ADMIN — SIMVASTATIN 20 MILLIGRAM(S): 20 TABLET, FILM COATED ORAL at 21:15

## 2020-01-14 RX ADMIN — Medication 100 MILLIGRAM(S): at 00:11

## 2020-01-14 NOTE — PROGRESS NOTE ADULT - ASSESSMENT
53 yo female with PMH Down's syndrome, nonverbal, Alzheimer's dementia, seizure d/o, hypothyroidism brought in from group home for evaluation of decreased energy.     # Decreased PO intake and Weakness  - no apparent metabolic encephalopathy  - blood work is unremarkable  - IV hydration   - Failed S+S due to lethargy   - Family agreeable to PEG tube    # Pseudomonas UTI   - urine culture from 1/7/2019 positive for pseudomonas   - IV Cipro     #Constipation   - Enema BID x 2 days   - On suppository     # Down Syndrome With Dementia, Seizure Disorder:  - non verbal at baseline  - Continue with Lamictal, olanzapine, and Klonopin   - f/u Lamictal level    # Hypothyroidism:  - Continue with Synthroid 112  - TSH 0.25     DVT Prophylaxis: lovenox  GI ppx: not indicated  Soft diet   Disposition: from group home    HCP and legal guardian [phone] Madisyn pearl (Sister) 53 yo female with PMH Down's syndrome, nonverbal, Alzheimer's dementia, seizure d/o, hypothyroidism brought in from group home for evaluation of decreased energy.     # Decreased PO intake and Weakness  - no apparent metabolic encephalopathy  - blood work is unremarkable  - IV hydration   - Failed S+S due to lethargy   - Family agreeable to PEG tube  - GI consulted     # Pseudomonas UTI   - urine culture from 1/7/2019 positive for pseudomonas   - IV Cipro     #Constipation   - Enema BID x 2 days   - On suppository     # Down Syndrome With Dementia, Seizure Disorder:  - non verbal at baseline  - Continue with Lamictal, olanzapine, and Klonopin   - f/u Lamictal level    # Hypothyroidism:  - Continue with Synthroid 112  - TSH 0.25     DVT Prophylaxis: lovenox  GI ppx: not indicated  Soft diet   Disposition: from group home    HCP and legal guardian [phone] Madisyn pearl (Sister)

## 2020-01-14 NOTE — PROGRESS NOTE ADULT - ATTENDING COMMENTS
I saw and evaluated patient  by bedside, as per caregiver patient is sleepy, coughing and not eating.   All labs, radiology studies, VS was reviewed  I have reviewed the resident's note and agree with documented findings and  plan of care.    # Dysphagia with poor po intake- family agreed for peg placement, consulted GI today.  # UTI due to Pseudomonas infection- sensitive to cipro- complete 7 days course of cipro tx.  # Fecal retention with chronic constipation- enema prn daily , oral and suppository laxatives.  # new onset cough - repeat portable CXR today to r/o aspiration pneumonitis  # meanwhile pt. has poor oral intake- continue IVF  #h/o down syndrome- caregiver by bedside, to assist with daily living activities  # h/o seizures- resumed on home regimen tx.  #h/o hypothyroidism- daily synthroid tx.  daily dvt proph.      #Progress Note Handoff: f/up cxr today, f/up GI for peg  Family discussion: caregiver Disposition: group home .

## 2020-01-14 NOTE — CONSULT NOTE ADULT - ASSESSMENT
Patient is a 55 yo female with PMH Down's syndrome, nonverbal, Alzheimer's dementia, seizure d/o, hypothyroidism brought in from group home for evaluation of decreased energy.     Down Syndrome with Advanced Alzheimer's dementia  -Presents with severe lethargy and Decreased PO Intake  -Patient having active UTI with Pseudomonas in urine  -Yesterday had an episode of fever  -Barely responsive, decreased PO intake since last 3 months  -Speech and Swallow eval failed - due to lethargy  -Patient not on any anticoagulation or antiplatelet agents  -No surgical history    Plan:  -Will consider PEG Placement once the   infection resolves and patient has been afebrile      Will discuss with the attending Patient is a 53 yo female with PMH Down's syndrome, nonverbal, Alzheimer's dementia, seizure d/o, hypothyroidism brought in from group home for evaluation of decreased energy.     Down Syndrome with Advanced Alzheimer's dementia  -Presents with severe lethargy and Decreased PO Intake  -Yesterday had an episode of fever  -Barely responsive, decreased PO intake since last 3 months  -Speech and Swallow eval failed - due to lethargy  -Patient not on any anticoagulation or antiplatelet agents  -No surgical history    Plan:  -NPO after midnight.  -Will do the PEG placement tomorrow if remains afebrile  -Hold Lovenox in AM  -1GM Ancef Hold for the Endoscopy Patient is a 53 yo female with PMH Down's syndrome, nonverbal, Alzheimer's dementia, seizure d/o, hypothyroidism brought in from group home for evaluation of decreased energy     Down Syndrome with Advanced Alzheimer's dementia  Now with possible urosepsis and acute altered mental status, responding to ATB  New onset cough, awaiting workup to r/o pneumonia  GI consulted for Failure to thrive,  pt will ultimately need PEG placement once medically maximized    will f/up

## 2020-01-14 NOTE — CONSULT NOTE ADULT - ASSESSMENT
IMPRESSION:    Left mucous plug with lung collapse likely due to pneumonia    RECOMMEND:    Plan for bronchoscopy tomorrow  NPO after midnight  Meropenem and Vancomycin for now  Chest PT  Suctioning prn  Saline nebulizer  BIPAP prn  HOB >45  DVT prophylaxis IMPRESSION:    Left mucous plug with lung collapse likely due to pneumonia    RECOMMEND:    Plan for bronchoscopy tomorrow  NPO after midnight  Meropenem and Vancomycin for now  Chest PT  Suctioning prn  Saline nebulizer  HOB >45  DVT prophylaxis

## 2020-01-14 NOTE — PROGRESS NOTE ADULT - SUBJECTIVE AND OBJECTIVE BOX
Hospital Day:  3d    Subjective:    Pt was interviewed and examined at the bedside in the AM. No acute events overnight.   Pt lethargic and poor PO intake.   Denies headaches, changes in vision, chest pains, SOB, n/v/d, abd pain, constipation, changes in urination, pain on urination, weakness, fatigue, joint pain or muscle pain.       Past Medical Hx:   Seizures  Intellectual disability  Hypothyroid  Impulse control disorder in adult  Down's syndrome    Past Sx:  No significant past surgical history    Allergies:  No Known Allergies    Current Meds:   Standng Meds:  bisacodyl Suppository 10 milliGRAM(s) Rectal daily  calcium carbonate 1250 mG  + Vitamin D (OsCal 500 + D) 1 Tablet(s) Oral daily  chlorhexidine 4% Liquid 1 Application(s) Topical <User Schedule>  ciprofloxacin   IVPB      ciprofloxacin   IVPB 200 milliGRAM(s) IV Intermittent every 12 hours  clonazePAM  Tablet 0.5 milliGRAM(s) Oral every 12 hours  enoxaparin Injectable 40 milliGRAM(s) SubCutaneous daily  influenza   Vaccine 0.5 milliLiter(s) IntraMuscular once  lamoTRIgine 75 milliGRAM(s) Oral daily  lamoTRIgine 100 milliGRAM(s) Oral at bedtime  levothyroxine 112 MICROGram(s) Oral daily  polyethylene glycol 3350 17 Gram(s) Oral daily  QUEtiapine 25 milliGRAM(s) Oral at bedtime  simvastatin 20 milliGRAM(s) Oral at bedtime  sodium chloride 0.9%. 1000 milliLiter(s) (75 mL/Hr) IV Continuous <Continuous>  tamsulosin 0.4 milliGRAM(s) Oral at bedtime    PRN Meds:  acetaminophen   Tablet .. 650 milliGRAM(s) Oral every 6 hours PRN Temp greater or equal to 38C (100.4F)  diphenhydrAMINE 25 milliGRAM(s) Oral once PRN Rash and/or Itching    HOME MEDICATIONS:  Senna 8.6 mg oral tablet: 1 tab(s) orally once a day (at bedtime)  simvastatin 20 mg oral tablet: 1 tab(s) orally once a day (at bedtime)      Vital Signs:   T(F): 99 (01-14-20 @ 06:03), Max: 100.8 (01-13-20 @ 20:31)  HR: 70 (01-14-20 @ 06:03) (57 - 70)  BP: 145/60 (01-14-20 @ 06:03) (142/63 - 152/63)  RR: 18 (01-14-20 @ 06:03) (18 - 18)  SpO2: --      01-13-20 @ 07:01  -  01-14-20 @ 07:00  --------------------------------------------------------  IN: 0 mL / OUT: 700 mL / NET: -700 mL        Physical Exam:   CONSTITUTIONAL: NAD, sleeping in bed, downs facies, lethargic   HEAD: board face; atraumatic  NECK: Supple; non tender, FROM  CARD: +S1, S2 Regular rate and rhythm.  RESP: CTABL  ABD: soft ntnd, no rebound, no guarding, no rigidity  EXT: moves all extremities  NEURO: Responsive grossly unremarkable, no focal deficits        Labs:                         11.0   5.34  )-----------( 218      ( 14 Jan 2020 07:32 )             34.4     Neutophil% 72.6, Lymphocyte% 13.9, Monocyte% 8.8, Bands% 0.6 01-14-20 @ 07:32    14 Jan 2020 07:32    143    |  109    |  8      ----------------------------<  95     4.1     |  24     |  1.0      Ca    8.3        14 Jan 2020 07:32  Phos  3.0       14 Jan 2020 07:32  Mg     1.8       14 Jan 2020 07:32    TPro  5.5    /  Alb  2.9    /  TBili  0.4    /  DBili  x      /  AST  25     /  ALT  12     /  AlkPhos  83     14 Jan 2020 07:32    Culture - Blood (collected 01-12-20 @ 00:30)  Source: .Blood Blood-Peripheral  Preliminary Report (01-13-20 @ 08:01):    No growth to date.

## 2020-01-14 NOTE — CONSULT NOTE ADULT - SUBJECTIVE AND OBJECTIVE BOX
Gastroenterology Consultation:    Patient is a 54y old  Female who presents with a chief complaint of decreased energy    Admitted on: 01-11-20  HPI:  Patient is a 53 yo female with PMH Down's syndrome, nonverbal, Alzheimer's dementia, seizure d/o, hypothyroidism brought in from group home for evaluation of decreased energy. Patient was recently brought in to the ER for revaluation for urinary symptoms on 1/7/2019. At the time, she was found to be positive for UTI and was discharged back to her group home on Macrobid. Since then, staff at the home have noticed she has been drinking less with decreased appetite and overall decreased energy. So, she was referred back to the ED.     In the ED, patient vitally was stable with BP of 133/90, HR 67, RR 18, T 98.3 8 (rectal), saturating 100% on room air. Her urine culture from the 7th of January shows pansensitive Pseudomonas. However, given her generalized weakness, both the group home staff and ED recommended admission for further management. (11 Jan 2020 15:58)    PAST MEDICAL & SURGICAL HISTORY:  Seizures  Intellectual disability  Hypothyroid  Impulse control disorder in adult  Down's syndrome  No significant past surgical history      FAMILY HISTORY:  No pertinent family history in first degree relatives    Home Medications:  Senna 8.6 mg oral tablet: 1 tab(s) orally once a day (at bedtime) (11 Jan 2020 15:55)  simvastatin 20 mg oral tablet: 1 tab(s) orally once a day (at bedtime) (11 Jan 2020 15:55)    MEDICATIONS  (STANDING):  bisacodyl Suppository 10 milliGRAM(s) Rectal daily  calcium carbonate 1250 mG  + Vitamin D (OsCal 500 + D) 1 Tablet(s) Oral daily  chlorhexidine 4% Liquid 1 Application(s) Topical <User Schedule>  ciprofloxacin   IVPB      ciprofloxacin   IVPB 200 milliGRAM(s) IV Intermittent every 12 hours  clonazePAM  Tablet 0.5 milliGRAM(s) Oral every 12 hours  enoxaparin Injectable 40 milliGRAM(s) SubCutaneous daily  influenza   Vaccine 0.5 milliLiter(s) IntraMuscular once  lamoTRIgine 75 milliGRAM(s) Oral daily  lamoTRIgine 100 milliGRAM(s) Oral at bedtime  levothyroxine 112 MICROGram(s) Oral daily  polyethylene glycol 3350 17 Gram(s) Oral daily  QUEtiapine 25 milliGRAM(s) Oral at bedtime  simvastatin 20 milliGRAM(s) Oral at bedtime  sodium chloride 0.9%. 1000 milliLiter(s) (75 mL/Hr) IV Continuous <Continuous>  tamsulosin 0.4 milliGRAM(s) Oral at bedtime    MEDICATIONS  (PRN):  acetaminophen   Tablet .. 650 milliGRAM(s) Oral every 6 hours PRN Temp greater or equal to 38C (100.4F)  diphenhydrAMINE 25 milliGRAM(s) Oral once PRN Rash and/or Itching      Allergies  No Known Allergies      Review of Systems:   Constitutional:  FEVER  ENT/Mouth:  No Hearing Changes  Eyes:  No Eye Pain  Cardiovascular:  No Chest Pain  Respiratory: Cough  Gastrointestinal:  As described in HPI  Musculoskeletal:  No Joint Swelling  Skin:  No Skin Lesions  Neuro:  No Syncope  Heme/Lymph:  No Bruising    Physical Examination:  T(C): 37.2 (01-14-20 @ 06:03), Max: 38.2 (01-13-20 @ 20:31)  HR: 70 (01-14-20 @ 06:03) (57 - 70)  BP: 145/60 (01-14-20 @ 06:03) (142/63 - 152/63)  RR: 18 (01-14-20 @ 06:03) (18 - 18)      01-12-20 @ 07:01  -  01-13-20 @ 07:00  --------------------------------------------------------  IN: 700 mL / OUT: 800 mL / NET: -100 mL    01-13-20 @ 07:01  -  01-14-20 @ 07:00  --------------------------------------------------------  IN: 0 mL / OUT: 700 mL / NET: -700 mL        Constitutional: Very sleepy, barely responsive  Eyes:. Conjunctivae are clear  Ears Nose and Throat: The external ears are normal appearing  Respiratory:  No signs of respiratory distress.  Cardiovascular:  S1 S2, Regular rate and rhythm.  GI: Abdomen is soft, symmetric, and non-tender without distention.   There are no visible lesions or scars.   Bowel sounds are present and normoactive in all four quadrants.  No masses, hepatomegaly, or splenomegaly are noted.   Neuro: No Tremor, No involuntary movements  Skin: No rashes, No Jaundice.          Data: (reviewed by attending)                        11.0   5.34  )-----------( 218      ( 14 Jan 2020 07:32 )             34.4     Hgb Trend:  11.0  01-14-20 @ 07:32  11.1  01-12-20 @ 07:37  13.5  01-11-20 @ 14:10        01-14    143  |  109  |  8<L>  ----------------------------<  95  4.1   |  24  |  1.0    Ca    8.3<L>      14 Jan 2020 07:32  Phos  3.0     01-14  Mg     1.8     01-14    TPro  5.5<L>  /  Alb  2.9<L>  /  TBili  0.4  /  DBili  x   /  AST  25  /  ALT  12  /  AlkPhos  83  01-14    Liver panel trend:  TBili 0.4   /   AST 25   /   ALT 12   /   AlkP 83   /   Tptn 5.5   /   Alb 2.9    /   DBili --      01-14  TBili 0.3   /   AST 19   /   ALT 13   /   AlkP 93   /   Tptn 5.7   /   Alb 3.1    /   DBili --      01-12  TBili 0.4   /   AST 33   /   ALT 16   /   AlkP 115   /   Tptn 7.0   /   Alb 3.8    /   DBili --      01-11  TBili 0.5   /   AST 21   /   ALT 20   /   AlkP 109   /   Tptn 6.5   /   Alb 3.5    /   DBili 0.2      01-07          Culture - Blood (collected 12 Jan 2020 00:30)  Source: .Blood Blood-Peripheral  Preliminary Report (13 Jan 2020 08:01):    No growth to date.

## 2020-01-14 NOTE — CONSULT NOTE ADULT - SUBJECTIVE AND OBJECTIVE BOX
Patient is a 54y old  Female who presents with a chief complaint of Decrease Energy (14 Jan 2020 12:52)      HPI:  Patient is a 53 yo female with PMH Down's syndrome, nonverbal, Alzheimer's dementia, seizure d/o, hypothyroidism brought in from group home for evaluation of decreased energy. Patient was recently brought in to the ER for revaluation for urinary symptoms on 1/7/2019. At the time, she was found to be positive for UTI and was discharged back to her group home on Macrobid. Since then, staff at the home have noticed she has been drinking less with decreased appetite and overall decreased energy. So, she was referred back to the ED.     In the ED, patient vitally was stable with BP of 133/90, HR 67, RR 18, T 98.3 8 (rectal), saturating 100% on room air. Her urine culture from the 7th of January shows pansensitive Pseudomonas. However, given her generalized weakness, both the group home staff and ED recommended admission for further management. (11 Jan 2020 15:58)      PAST MEDICAL & SURGICAL HISTORY:  Seizures  Intellectual disability  Hypothyroid  Impulse control disorder in adult  Down's syndrome  No significant past surgical history      SOCIAL HX:  nonsmoker, no etoh    FAMILY HISTORY:  No pertinent family history in first degree relatives  .  No cardiovascular or pulmonary family history     REVIEW OF SYSTEMS:    CONSTITUTIONAL:   no fever   no chills.  no weight gain   + weight loss    EYES:   no discharge,   no pain  no redness,   no visual changes.    ENT:   Ears: no ear pain and no hearing problems.  Nose: no nasal congestion and no nasal drainage.  Mouth/Throat: no dysphagia,  no hoarseness and no throat pain.  Neck: no lumps, no pain, no stiffness and no swollen glands.     CARDIOVASCULAR:   no chest pain,   no swelling  no palpitaions  no syncope    RESPIRATORY:  no SOB,  no wheezing ,  + respiratory difficulty  + sputum production    GASTROINTESTINAL:   no abdominal pain,   no constipation,   no diarrhea,   no vomiting.    GENITOURINARY:  no dysuria,   no frequency,   no urgency  no hematuria.    MUSCULOSKELETAL:   no back pain,   no musculoskeletal pain,  no weakness.    SKIN:   no jaundice,   no lesions,   no pruritis,   no rashes.    NEURO:   no loss of consciousness,   no gait abnormality,   no headache,   no sensory deficits,   no weakness.    PSYCHIATRIC:   no known mental health issues  no anxiety  no depression    ALLERGIC/IMMUNOLOGIC:   No active allergic or immunologic issues      Allergies    No Known Allergies    Intolerances          PHYSICAL EXAM  Vital Signs Last 24 Hrs  T(C): 37.2 (14 Jan 2020 14:54), Max: 38.2 (13 Jan 2020 20:31)  T(F): 98.9 (14 Jan 2020 14:54), Max: 100.8 (13 Jan 2020 20:31)  HR: 65 (14 Jan 2020 14:54) (65 - 70)  BP: 167/63 (14 Jan 2020 14:54) (145/60 - 167/63)  BP(mean): --  RR: 18 (14 Jan 2020 14:54) (18 - 18)  SpO2: 98% (14 Jan 2020 16:49) (92% - 98%) RA    CONSTITUTIONAL:  Well nourished.  NAD    ENT:   Airway patent,   Nasal mucosa clear.  Mouth with normal mucosa.   Throat has no vesicles,  no oropharyngeal exudates and uvula is midline.  No thrush    EYES:   Clear bilaterally,   pupils equal,   round and reactive to light.    CARDIAC:   Normal rate,   regular rhythm.    Heart sounds S1, S2.   normal  cardiac impulse  no edema      CAROTID:   normal systolic impulse  no bruits    RESPIRATORY:   decreased breath sounds over left lung field  normal chest expansion  not tachypneic,  no use of accessory muscles    GASTROINTESTINAL:  Abdomen soft,   non-tender,   no guarding,   + BS      MUSCULOSKELETAL:   range of motion is not limited,  no muscle or joint tenderness  no clubbing, cyanosis    NEUROLOGICAL:   Alert and oriented x0            LABS:                          11.0   5.34  )-----------( 218      ( 14 Jan 2020 07:32 )             34.4                                               01-14    143  |  109  |  8<L>  ----------------------------<  95  4.1   |  24  |  1.0    Ca    8.3<L>      14 Jan 2020 07:32  Phos  3.0     01-14  Mg     1.8     01-14    TPro  5.5<L>  /  Alb  2.9<L>  /  TBili  0.4  /  DBili  x   /  AST  25  /  ALT  12  /  AlkPhos  83  01-14                                                                                           LIVER FUNCTIONS - ( 14 Jan 2020 07:32 )  Alb: 2.9 g/dL / Pro: 5.5 g/dL / ALK PHOS: 83 U/L / ALT: 12 U/L / AST: 25 U/L / GGT: x                                                  Culture - Blood (collected 12 Jan 2020 00:30)  Source: .Blood Blood-Peripheral  Preliminary Report (13 Jan 2020 08:01):    No growth to date.                                                    MEDICATIONS  (STANDING):  albuterol/ipratropium for Nebulization 3 milliLiter(s) Nebulizer every 6 hours  bisacodyl Suppository 10 milliGRAM(s) Rectal daily  calcium carbonate 1250 mG  + Vitamin D (OsCal 500 + D) 1 Tablet(s) Oral daily  chlorhexidine 4% Liquid 1 Application(s) Topical <User Schedule>  ciprofloxacin   IVPB      ciprofloxacin   IVPB 200 milliGRAM(s) IV Intermittent every 12 hours  clonazePAM  Tablet 0.5 milliGRAM(s) Oral every 12 hours  influenza   Vaccine 0.5 milliLiter(s) IntraMuscular once  lamoTRIgine 75 milliGRAM(s) Oral daily  lamoTRIgine 100 milliGRAM(s) Oral at bedtime  levothyroxine 112 MICROGram(s) Oral daily  polyethylene glycol 3350 17 Gram(s) Oral daily  QUEtiapine 25 milliGRAM(s) Oral at bedtime  simvastatin 20 milliGRAM(s) Oral at bedtime  sodium chloride 0.9% for Nebulization 3 milliLiter(s) Nebulizer every 4 hours  sodium chloride 0.9%. 1000 milliLiter(s) (75 mL/Hr) IV Continuous <Continuous>  tamsulosin 0.4 milliGRAM(s) Oral at bedtime    MEDICATIONS  (PRN):  acetaminophen   Tablet .. 650 milliGRAM(s) Oral every 6 hours PRN Temp greater or equal to 38C (100.4F)  diphenhydrAMINE 25 milliGRAM(s) Oral once PRN Rash and/or Itching  haloperidol    Injectable 2 milliGRAM(s) IntraMuscular once PRN agaitation      X-Rays reviewed:    CXR interpreted by me:

## 2020-01-15 LAB
ALBUMIN SERPL ELPH-MCNC: 2.7 G/DL — LOW (ref 3.5–5.2)
ALP SERPL-CCNC: 75 U/L — SIGNIFICANT CHANGE UP (ref 30–115)
ALT FLD-CCNC: 8 U/L — SIGNIFICANT CHANGE UP (ref 0–41)
ANION GAP SERPL CALC-SCNC: 9 MMOL/L — SIGNIFICANT CHANGE UP (ref 7–14)
APTT BLD: 31.7 SEC — SIGNIFICANT CHANGE UP (ref 27–39.2)
AST SERPL-CCNC: 14 U/L — SIGNIFICANT CHANGE UP (ref 0–41)
BASOPHILS # BLD AUTO: 0.07 K/UL — SIGNIFICANT CHANGE UP (ref 0–0.2)
BASOPHILS NFR BLD AUTO: 1.1 % — HIGH (ref 0–1)
BILIRUB SERPL-MCNC: 0.4 MG/DL — SIGNIFICANT CHANGE UP (ref 0.2–1.2)
BLD GP AB SCN SERPL QL: SIGNIFICANT CHANGE UP
BUN SERPL-MCNC: 7 MG/DL — LOW (ref 10–20)
CALCIUM SERPL-MCNC: 8.3 MG/DL — LOW (ref 8.5–10.1)
CHLORIDE SERPL-SCNC: 106 MMOL/L — SIGNIFICANT CHANGE UP (ref 98–110)
CO2 SERPL-SCNC: 26 MMOL/L — SIGNIFICANT CHANGE UP (ref 17–32)
CREAT SERPL-MCNC: 0.8 MG/DL — SIGNIFICANT CHANGE UP (ref 0.7–1.5)
EOSINOPHIL # BLD AUTO: 0.08 K/UL — SIGNIFICANT CHANGE UP (ref 0–0.7)
EOSINOPHIL NFR BLD AUTO: 1.3 % — SIGNIFICANT CHANGE UP (ref 0–8)
GLUCOSE SERPL-MCNC: 95 MG/DL — SIGNIFICANT CHANGE UP (ref 70–99)
HCT VFR BLD CALC: 32.3 % — LOW (ref 37–47)
HGB BLD-MCNC: 10.7 G/DL — LOW (ref 12–16)
IMM GRANULOCYTES NFR BLD AUTO: 0.5 % — HIGH (ref 0.1–0.3)
INR BLD: 1.18 RATIO — SIGNIFICANT CHANGE UP (ref 0.65–1.3)
LAMOTRIGINE SERPL-MCNC: 19.2 MCG/ML — HIGH (ref 4–18)
LYMPHOCYTES # BLD AUTO: 0.61 K/UL — LOW (ref 1.2–3.4)
LYMPHOCYTES # BLD AUTO: 9.6 % — LOW (ref 20.5–51.1)
MAGNESIUM SERPL-MCNC: 1.7 MG/DL — LOW (ref 1.8–2.4)
MCHC RBC-ENTMCNC: 32.7 PG — HIGH (ref 27–31)
MCHC RBC-ENTMCNC: 33.1 G/DL — SIGNIFICANT CHANGE UP (ref 32–37)
MCV RBC AUTO: 98.8 FL — SIGNIFICANT CHANGE UP (ref 81–99)
MONOCYTES # BLD AUTO: 0.43 K/UL — SIGNIFICANT CHANGE UP (ref 0.1–0.6)
MONOCYTES NFR BLD AUTO: 6.8 % — SIGNIFICANT CHANGE UP (ref 1.7–9.3)
NEUTROPHILS # BLD AUTO: 5.13 K/UL — SIGNIFICANT CHANGE UP (ref 1.4–6.5)
NEUTROPHILS NFR BLD AUTO: 80.7 % — HIGH (ref 42.2–75.2)
NRBC # BLD: 0 /100 WBCS — SIGNIFICANT CHANGE UP (ref 0–0)
PLATELET # BLD AUTO: 200 K/UL — SIGNIFICANT CHANGE UP (ref 130–400)
POTASSIUM SERPL-MCNC: 4 MMOL/L — SIGNIFICANT CHANGE UP (ref 3.5–5)
POTASSIUM SERPL-SCNC: 4 MMOL/L — SIGNIFICANT CHANGE UP (ref 3.5–5)
PROT SERPL-MCNC: 5.1 G/DL — LOW (ref 6–8)
PROTHROM AB SERPL-ACNC: 13.5 SEC — HIGH (ref 9.95–12.87)
RBC # BLD: 3.27 M/UL — LOW (ref 4.2–5.4)
RBC # FLD: 14 % — SIGNIFICANT CHANGE UP (ref 11.5–14.5)
SODIUM SERPL-SCNC: 141 MMOL/L — SIGNIFICANT CHANGE UP (ref 135–146)
WBC # BLD: 6.35 K/UL — SIGNIFICANT CHANGE UP (ref 4.8–10.8)
WBC # FLD AUTO: 6.35 K/UL — SIGNIFICANT CHANGE UP (ref 4.8–10.8)

## 2020-01-15 PROCEDURE — 71045 X-RAY EXAM CHEST 1 VIEW: CPT | Mod: 26

## 2020-01-15 PROCEDURE — 99233 SBSQ HOSP IP/OBS HIGH 50: CPT

## 2020-01-15 RX ORDER — SODIUM CHLORIDE 9 MG/ML
500 INJECTION INTRAMUSCULAR; INTRAVENOUS; SUBCUTANEOUS ONCE
Refills: 0 | Status: COMPLETED | OUTPATIENT
Start: 2020-01-15 | End: 2020-01-15

## 2020-01-15 RX ORDER — MAGNESIUM SULFATE 500 MG/ML
2 VIAL (ML) INJECTION ONCE
Refills: 0 | Status: COMPLETED | OUTPATIENT
Start: 2020-01-15 | End: 2020-01-15

## 2020-01-15 RX ADMIN — SODIUM CHLORIDE 500 MILLILITER(S): 9 INJECTION INTRAMUSCULAR; INTRAVENOUS; SUBCUTANEOUS at 10:29

## 2020-01-15 RX ADMIN — QUETIAPINE FUMARATE 25 MILLIGRAM(S): 200 TABLET, FILM COATED ORAL at 21:53

## 2020-01-15 RX ADMIN — SODIUM CHLORIDE 3 MILLILITER(S): 9 INJECTION INTRAMUSCULAR; INTRAVENOUS; SUBCUTANEOUS at 12:51

## 2020-01-15 RX ADMIN — SODIUM CHLORIDE 75 MILLILITER(S): 9 INJECTION INTRAMUSCULAR; INTRAVENOUS; SUBCUTANEOUS at 09:14

## 2020-01-15 RX ADMIN — LAMOTRIGINE 75 MILLIGRAM(S): 25 TABLET, ORALLY DISINTEGRATING ORAL at 13:27

## 2020-01-15 RX ADMIN — Medication 3 MILLILITER(S): at 07:35

## 2020-01-15 RX ADMIN — TAMSULOSIN HYDROCHLORIDE 0.4 MILLIGRAM(S): 0.4 CAPSULE ORAL at 21:54

## 2020-01-15 RX ADMIN — CHLORHEXIDINE GLUCONATE 1 APPLICATION(S): 213 SOLUTION TOPICAL at 05:29

## 2020-01-15 RX ADMIN — MEROPENEM 100 MILLIGRAM(S): 1 INJECTION INTRAVENOUS at 15:06

## 2020-01-15 RX ADMIN — MEROPENEM 100 MILLIGRAM(S): 1 INJECTION INTRAVENOUS at 21:51

## 2020-01-15 RX ADMIN — Medication 50 GRAM(S): at 12:50

## 2020-01-15 RX ADMIN — SODIUM CHLORIDE 1000 MILLILITER(S): 9 INJECTION INTRAMUSCULAR; INTRAVENOUS; SUBCUTANEOUS at 05:40

## 2020-01-15 RX ADMIN — Medication 166.67 MILLIGRAM(S): at 09:14

## 2020-01-15 RX ADMIN — LAMOTRIGINE 100 MILLIGRAM(S): 25 TABLET, ORALLY DISINTEGRATING ORAL at 21:53

## 2020-01-15 RX ADMIN — Medication 0.5 MILLIGRAM(S): at 17:41

## 2020-01-15 RX ADMIN — SIMVASTATIN 20 MILLIGRAM(S): 20 TABLET, FILM COATED ORAL at 21:53

## 2020-01-15 RX ADMIN — SODIUM CHLORIDE 3 MILLILITER(S): 9 INJECTION INTRAMUSCULAR; INTRAVENOUS; SUBCUTANEOUS at 17:28

## 2020-01-15 RX ADMIN — SODIUM CHLORIDE 3 MILLILITER(S): 9 INJECTION INTRAMUSCULAR; INTRAVENOUS; SUBCUTANEOUS at 09:13

## 2020-01-15 RX ADMIN — MEROPENEM 100 MILLIGRAM(S): 1 INJECTION INTRAVENOUS at 05:30

## 2020-01-15 RX ADMIN — Medication 166.67 MILLIGRAM(S): at 18:14

## 2020-01-15 RX ADMIN — Medication 3 MILLILITER(S): at 13:29

## 2020-01-15 RX ADMIN — Medication 3 MILLILITER(S): at 19:45

## 2020-01-15 RX ADMIN — Medication 10 MILLIGRAM(S): at 17:43

## 2020-01-15 NOTE — PROGRESS NOTE ADULT - ASSESSMENT
IMPRESSION:    Left mucous plug with lung collapse likely due to pneumonia; s/p bronchoscopy with mucous plug extraction    RECOMMEND:    Follow up bronchial wash cultures  Repeat CXR in 24-48hrs  Follow up GI for PEG tube placement  Aspiration precautions  Meropenem and Vancomycin for now  Chest PT  Suctioning prn  Saline nebulizer  BIPAP prn  HOB >45  DVT prophylaxis

## 2020-01-15 NOTE — PROGRESS NOTE ADULT - SUBJECTIVE AND OBJECTIVE BOX
Hospital Day:  4d    Subjective:    Pt was interviewed and examined at the bedside in the AM. No acute events overnight.   Pt had increased cough and mucus yesterday, CXR showed lung collapse on the L likely secondary to mucus plug. Pt requiring more O2 today.   Denies headaches, changes in vision, chest pains, n/v/d, abd pain, constipation, changes in urination, pain on urination, weakness, fatigue, joint pain or muscle pain.       Past Medical Hx:   Seizures  Intellectual disability  Hypothyroid  Impulse control disorder in adult  Down's syndrome    Past Sx:  No significant past surgical history    Allergies:  No Known Allergies    Current Meds:   Standng Meds:  albuterol/ipratropium for Nebulization 3 milliLiter(s) Nebulizer every 6 hours  bisacodyl Suppository 10 milliGRAM(s) Rectal daily  calcium carbonate 1250 mG  + Vitamin D (OsCal 500 + D) 1 Tablet(s) Oral daily  chlorhexidine 4% Liquid 1 Application(s) Topical <User Schedule>  clonazePAM  Tablet 0.5 milliGRAM(s) Oral every 12 hours  influenza   Vaccine 0.5 milliLiter(s) IntraMuscular once  lamoTRIgine 75 milliGRAM(s) Oral daily  lamoTRIgine 100 milliGRAM(s) Oral at bedtime  levothyroxine 112 MICROGram(s) Oral daily  meropenem  IVPB      meropenem  IVPB 1000 milliGRAM(s) IV Intermittent every 8 hours  polyethylene glycol 3350 17 Gram(s) Oral daily  QUEtiapine 25 milliGRAM(s) Oral at bedtime  simvastatin 20 milliGRAM(s) Oral at bedtime  sodium chloride 0.9% for Nebulization 3 milliLiter(s) Nebulizer every 4 hours  sodium chloride 0.9%. 1000 milliLiter(s) (75 mL/Hr) IV Continuous <Continuous>  tamsulosin 0.4 milliGRAM(s) Oral at bedtime  vancomycin  IVPB      vancomycin  IVPB 1250 milliGRAM(s) IV Intermittent every 12 hours    PRN Meds:  acetaminophen   Tablet .. 650 milliGRAM(s) Oral every 6 hours PRN Temp greater or equal to 38C (100.4F)  diphenhydrAMINE 25 milliGRAM(s) Oral once PRN Rash and/or Itching    HOME MEDICATIONS:  Senna 8.6 mg oral tablet: 1 tab(s) orally once a day (at bedtime)  simvastatin 20 mg oral tablet: 1 tab(s) orally once a day (at bedtime)      Vital Signs:   T(F): 97 (01-15-20 @ 12:28), Max: 99.8 (01-14-20 @ 21:43)  HR: 71 (01-15-20 @ 12:28) (65 - 87)  BP: 115/53 (01-15-20 @ 12:28) (82/43 - 167/63)  RR: 18 (01-15-20 @ 12:28) (17 - 18)  SpO2: 98% (01-14-20 @ 16:49) (98% - 98%)      01-14-20 @ 07:01  -  01-15-20 @ 07:00  --------------------------------------------------------  IN: 0 mL / OUT: 600 mL / NET: -600 mL        Physical Exam:   CONSTITUTIONAL: NAD, sleeping in bed, downs facies, lethargic   HEAD: board face; atraumatic  NECK: Supple; non tender, FROM  CARD: +S1, S2 Regular rate and rhythm.  RESP: absent BS on L   ABD: soft ntnd, no rebound, no guarding, no rigidity  EXT: moves all extremities  NEURO: Responsive grossly unremarkable, no focal deficits        Labs:                         10.7   6.35  )-----------( 200      ( 15 Mike 2020 06:09 )             32.3     Neutophil% 80.7, Lymphocyte% 9.6, Monocyte% 6.8, Bands% 0.5 01-15-20 @ 06:09    15 Mike 2020 06:09    141    |  106    |  7      ----------------------------<  95     4.0     |  26     |  0.8      Ca    8.3        15 Mike 2020 06:09  Phos  3.0       14 Jan 2020 07:32  Mg     1.7       15 Mike 2020 06:09    TPro  5.1    /  Alb  2.7    /  TBili  0.4    /  DBili  x      /  AST  14     /  ALT  8      /  AlkPhos  75     15 Mike 2020 06:09       pTT    31.7             ----< 1.18 INR  (01-15-20 @ 06:09)    13.50        PT        Culture - Blood (collected 01-12-20 @ 00:30)  Source: .Blood Blood-Peripheral  Preliminary Report (01-13-20 @ 08:01):    No growth to date.        Radiology:

## 2020-01-15 NOTE — SWALLOW BEDSIDE ASSESSMENT ADULT - SWALLOW EVAL: DIAGNOSIS
PO trials not appropriate at this time 2' pt lethargic, unable to sustain arousal >5 second intervals despite max cues.
Pt on O2 FM w/ humidified O2, pending possible bronch 2' mucus plug, progressive decreased po acceptance at , for possible PEG placment

## 2020-01-15 NOTE — SWALLOW BEDSIDE ASSESSMENT ADULT - SLP PERTINENT HISTORY OF CURRENT PROBLEM
Pt admitted with failure to thrive, decreased PO intake at group home. UTI. CXR: linear opacity L midlung field, atelectasis. PMHx: down syndrome, dementia, seizures. Known to SLP dept 11/2019, pt refusing PO, SLP recommended long term alternate means of nutrition and hydration however pt continued PO at group home.
Pt admitted with failure to thrive, decreased PO intake at group home. UTI. CXR: linear opacity L midlung field, atelectasis. PMHx: down syndrome, dementia, seizures. Known to SLP dept 11/2019, pt refusing PO, SLP recommended long term alternate means of nutrition and hydration however pt continued PO at group home.

## 2020-01-15 NOTE — PROGRESS NOTE ADULT - SUBJECTIVE AND OBJECTIVE BOX
Patient is a 54y old  Female who presents with a chief complaint of weakness (14 Jan 2020 18:35)        SUBJECTIVE:  nonverbal.     REVIEW OF SYSTEMS:    CONSTITUTIONAL:   no fever   no chills.  no weight gain   no weight loss    EYES:   no discharge,   no pain  no redness,   no visual changes.    ENT:   Ears: no ear pain and no hearing problems.  Nose: no nasal congestion and no nasal drainage.  Mouth/Throat: no dysphagia,  no hoarseness and no throat pain.  Neck: no lumps, no pain, no stiffness and no swollen glands.     CARDIOVASCULAR:   no chest pain,   no swelling  no palpitaions  no syncope    RESPIRATORY:  no SOB,  no wheezing ,  no respiratory difficulty  no sputum production    GASTROINTESTINAL:   no abdominal pain,   no constipation,   no diarrhea,   no vomiting.    GENITOURINARY:  no dysuria,   no frequency,   no urgency  no hematuria.    MUSCULOSKELETAL:   no back pain,   no musculoskeletal pain,  no weakness.    SKIN:   no jaundice,   no lesions,   no pruritis,   no rashes.    NEURO:   no loss of consciousness,   no gait abnormality,   no headache,   no sensory deficits,   no weakness.    PSYCHIATRIC:   +Dementia  no anxiety  no depression    ALLERGIC/IMMUNOLOGIC:   No active allergic or immunologic issues      PHYSICAL EXAM  Vital Signs Last 24 Hrs  T(C): 36.8 (15 Mike 2020 15:30), Max: 37.7 (14 Jan 2020 21:43)  T(F): 98.3 (15 Mike 2020 14:53), Max: 99.8 (14 Jan 2020 21:43)  HR: 80 (15 Mike 2020 15:30) (65 - 87)  BP: 115/56 (15 Mike 2020 15:30) (82/43 - 119/57)  BP(mean): --  RR: 18 (15 Mike 2020 15:30) (17 - 18)  SpO2: 98% (14 Jan 2020 16:49) (98% - 98%) Ventimask    CONSTITUTIONAL:   Ill appearing.  NAD    ENT:   Airway patent,   Nasal mucosa clear.  Mouth with normal mucosa.   Throat has no vesicles,  no oropharyngeal exudates and uvula is midline.  No thrush    EYES:   Clear bilaterally,   pupils equal,   round and reactive to light.    CARDIAC:   Normal rate,   regular rhythm.    Heart sounds S1, S2.   normal  cardiac impulse  no edema    CAROTID:   normal systolic impulse  no bruits    RESPIRATORY:   absent breath sounds over left lung  normal chest expansion  not tachypneic,  no use of accessory muscles    GASTROINTESTINAL:  Abdomen soft,   non-tender,   no guarding,   + BS    MUSCULOSKELETAL:   range of motion is not limited,  no muscle or joint tenderness  no clubbing, cyanosis    NEUROLOGICAL:   Alert and oriented x 1  no focal deficits in cranial nerve areas  no motor or sensory deficits.  pertinent DTRs normal    SKIN:   Skin normal color for race,   warm,   dry and intact.   No evidence of rash.          01-14-20 @ 07:01  -  01-15-20 @ 07:00  --------------------------------------------------------  IN:  Total IN: 0 mL    OUT:    Nephrostomy Tube: 600 mL  Total OUT: 600 mL    Total NET: -600 mL          LABS:                          10.7   6.35  )-----------( 200      ( 15 Mike 2020 06:09 )             32.3                                               01-15    141  |  106  |  7<L>  ----------------------------<  95  4.0   |  26  |  0.8    Ca    8.3<L>      15 Mike 2020 06:09  Phos  3.0     01-14  Mg     1.7     01-15    TPro  5.1<L>  /  Alb  2.7<L>  /  TBili  0.4  /  DBili  x   /  AST  14  /  ALT  8   /  AlkPhos  75  01-15      PT/INR - ( 15 Mike 2020 06:09 )   PT: 13.50 sec;   INR: 1.18 ratio         PTT - ( 15 Mike 2020 06:09 )  PTT:31.7 sec                                                                                     LIVER FUNCTIONS - ( 15 Mike 2020 06:09 )  Alb: 2.7 g/dL / Pro: 5.1 g/dL / ALK PHOS: 75 U/L / ALT: 8 U/L / AST: 14 U/L / GGT: x                                                                                                MEDICATIONS  (STANDING):  albuterol/ipratropium for Nebulization 3 milliLiter(s) Nebulizer every 6 hours  bisacodyl Suppository 10 milliGRAM(s) Rectal daily  calcium carbonate 1250 mG  + Vitamin D (OsCal 500 + D) 1 Tablet(s) Oral daily  chlorhexidine 4% Liquid 1 Application(s) Topical <User Schedule>  clonazePAM  Tablet 0.5 milliGRAM(s) Oral every 12 hours  influenza   Vaccine 0.5 milliLiter(s) IntraMuscular once  lamoTRIgine 75 milliGRAM(s) Oral daily  lamoTRIgine 100 milliGRAM(s) Oral at bedtime  levothyroxine 112 MICROGram(s) Oral daily  meropenem  IVPB      meropenem  IVPB 1000 milliGRAM(s) IV Intermittent every 8 hours  polyethylene glycol 3350 17 Gram(s) Oral daily  QUEtiapine 25 milliGRAM(s) Oral at bedtime  simvastatin 20 milliGRAM(s) Oral at bedtime  sodium chloride 0.9% for Nebulization 3 milliLiter(s) Nebulizer every 4 hours  sodium chloride 0.9%. 1000 milliLiter(s) (75 mL/Hr) IV Continuous <Continuous>  tamsulosin 0.4 milliGRAM(s) Oral at bedtime  vancomycin  IVPB      vancomycin  IVPB 1250 milliGRAM(s) IV Intermittent every 12 hours    MEDICATIONS  (PRN):  acetaminophen   Tablet .. 650 milliGRAM(s) Oral every 6 hours PRN Temp greater or equal to 38C (100.4F)  diphenhydrAMINE 25 milliGRAM(s) Oral once PRN Rash and/or Itching      X-Rays reviewed    CXR interpreted by me:

## 2020-01-15 NOTE — PROGRESS NOTE ADULT - ATTENDING COMMENTS
53 yo female with PMH Down's syndrome, nonverbal, Alzheimer's dementia, seizure d/o, hypothyroidism brought in from group home for evaluation of decreased energy.     #L Lung Collapse :  Likely 2/2 mucus plug.   No significant improvement on CXR with Chest PT, postural drainage and Suction.   Pulm taking patient for bronchoscopy today. They will try to avoid intubation.   maintain sats>92.       # Decreased PO intake and Weakness  - no apparent metabolic encephalopathy  -2/2 down's + early dementia:  - IV hydration   - Failed S+S due to lethargy   - Family agreeable to PEG tube  - GI following   - PEG once stable ?    # Pseudomonas UTI   - urine culture from 1/7/2019 positive for pseudomonas   - IV Cipro     #Constipation   - Enema BID x 2 days   - On suppository     # Down Syndrome With Dementia, Seizure Disorder:  - non verbal at baseline  - Continue with Lamictal, olanzapine, and Klonopin   - f/u Lamictal level    # Hypothyroidism:  - Continue with Synthroid 112  - TSH 0.25     DVT Prophylaxis: lovenox  GI ppx: not indicated  Soft diet   Disposition: from group home    HCP and legal guardian [phone] Madisyn pearl (Sister)  Discussed case thoroughly with family at bedside. 53 yo female with PMH Down's syndrome, nonverbal, Alzheimer's dementia, seizure d/o, hypothyroidism brought in from group home for evaluation of decreased energy.     #L Lung Collapse :  Likely 2/2 mucus plug.   No significant improvement on CXR with Chest PT, postural drainage and Suction.   Pulm taking patient for bronchoscopy today. They will try to avoid intubation.   maintain sats>92.     #Hypotension:  Today 2 times BP 80s/40s. Came up with 500cc boluses each time.   Now c/w NS @ 75.   There could be an underlying PNA in collapsed lung?   Antibiotics broadened to Vanc, Tomasa.   IF BP drops again, support with fluids upto 30cc/kg and then use pressors if need arises.     # Decreased PO intake and Weakness  - no apparent metabolic encephalopathy  -2/2 down's + early dementia:  - IV hydration   - Failed S+S due to lethargy   - Family agreeable to PEG tube  - GI following   - PEG once stable ?    # Pseudomonas UTI   - urine culture from 1/7/2019 positive for pseudomonas   - IV Cipro     #Constipation   - Enema BID x 2 days   - On suppository     # Down Syndrome With Dementia, Seizure Disorder:  - non verbal at baseline  - Continue with Lamictal, olanzapine, and Klonopin   - f/u Lamictal level    # Hypothyroidism:  - Continue with Synthroid 112  - TSH 0.25     DVT Prophylaxis: lovenox  GI ppx: not indicated  Soft diet   Disposition: from group home    HCP and legal guardian [phone] Madisyn pearl (Sister)  Discussed case thoroughly with family at bedside.

## 2020-01-15 NOTE — SWALLOW BEDSIDE ASSESSMENT ADULT - DIET PRIOR TO ADMI
Puree and nectar thick liquids per group home aid at bedside
Puree and nectar thick liquids per group home aid at bedside

## 2020-01-15 NOTE — SWALLOW BEDSIDE ASSESSMENT ADULT - SWALLOW EVAL: RECOMMENDED DIET
NPO with alternate means of nutrition and hydration
NPO, consider non-oral means of nutrition and hydration

## 2020-01-16 LAB
ALBUMIN SERPL ELPH-MCNC: 2.7 G/DL — LOW (ref 3.5–5.2)
ALP SERPL-CCNC: 80 U/L — SIGNIFICANT CHANGE UP (ref 30–115)
ALT FLD-CCNC: 8 U/L — SIGNIFICANT CHANGE UP (ref 0–41)
ANION GAP SERPL CALC-SCNC: 10 MMOL/L — SIGNIFICANT CHANGE UP (ref 7–14)
AST SERPL-CCNC: 14 U/L — SIGNIFICANT CHANGE UP (ref 0–41)
BASOPHILS # BLD AUTO: 0.08 K/UL — SIGNIFICANT CHANGE UP (ref 0–0.2)
BASOPHILS NFR BLD AUTO: 1 % — SIGNIFICANT CHANGE UP (ref 0–1)
BILIRUB SERPL-MCNC: 0.4 MG/DL — SIGNIFICANT CHANGE UP (ref 0.2–1.2)
BUN SERPL-MCNC: 8 MG/DL — LOW (ref 10–20)
CALCIUM SERPL-MCNC: 8 MG/DL — LOW (ref 8.5–10.1)
CHLORIDE SERPL-SCNC: 106 MMOL/L — SIGNIFICANT CHANGE UP (ref 98–110)
CO2 SERPL-SCNC: 25 MMOL/L — SIGNIFICANT CHANGE UP (ref 17–32)
CREAT SERPL-MCNC: 0.8 MG/DL — SIGNIFICANT CHANGE UP (ref 0.7–1.5)
EOSINOPHIL # BLD AUTO: 0.2 K/UL — SIGNIFICANT CHANGE UP (ref 0–0.7)
EOSINOPHIL NFR BLD AUTO: 2.6 % — SIGNIFICANT CHANGE UP (ref 0–8)
GLUCOSE SERPL-MCNC: 124 MG/DL — HIGH (ref 70–99)
HCT VFR BLD CALC: 31.8 % — LOW (ref 37–47)
HGB BLD-MCNC: 10.4 G/DL — LOW (ref 12–16)
IMM GRANULOCYTES NFR BLD AUTO: 0.8 % — HIGH (ref 0.1–0.3)
LYMPHOCYTES # BLD AUTO: 0.71 K/UL — LOW (ref 1.2–3.4)
LYMPHOCYTES # BLD AUTO: 9.1 % — LOW (ref 20.5–51.1)
MAGNESIUM SERPL-MCNC: 1.9 MG/DL — SIGNIFICANT CHANGE UP (ref 1.8–2.4)
MCHC RBC-ENTMCNC: 31.9 PG — HIGH (ref 27–31)
MCHC RBC-ENTMCNC: 32.7 G/DL — SIGNIFICANT CHANGE UP (ref 32–37)
MCV RBC AUTO: 97.5 FL — SIGNIFICANT CHANGE UP (ref 81–99)
MONOCYTES # BLD AUTO: 0.47 K/UL — SIGNIFICANT CHANGE UP (ref 0.1–0.6)
MONOCYTES NFR BLD AUTO: 6 % — SIGNIFICANT CHANGE UP (ref 1.7–9.3)
NEUTROPHILS # BLD AUTO: 6.26 K/UL — SIGNIFICANT CHANGE UP (ref 1.4–6.5)
NEUTROPHILS NFR BLD AUTO: 80.5 % — HIGH (ref 42.2–75.2)
NRBC # BLD: 0 /100 WBCS — SIGNIFICANT CHANGE UP (ref 0–0)
PHOSPHATE SERPL-MCNC: 2.5 MG/DL — SIGNIFICANT CHANGE UP (ref 2.1–4.9)
PLATELET # BLD AUTO: 206 K/UL — SIGNIFICANT CHANGE UP (ref 130–400)
POTASSIUM SERPL-MCNC: 3.9 MMOL/L — SIGNIFICANT CHANGE UP (ref 3.5–5)
POTASSIUM SERPL-SCNC: 3.9 MMOL/L — SIGNIFICANT CHANGE UP (ref 3.5–5)
PROT SERPL-MCNC: 5 G/DL — LOW (ref 6–8)
RBC # BLD: 3.26 M/UL — LOW (ref 4.2–5.4)
RBC # FLD: 14.4 % — SIGNIFICANT CHANGE UP (ref 11.5–14.5)
SODIUM SERPL-SCNC: 141 MMOL/L — SIGNIFICANT CHANGE UP (ref 135–146)
VANCOMYCIN FLD-MCNC: 19.9 UG/ML — HIGH (ref 5–10)
WBC # BLD: 7.78 K/UL — SIGNIFICANT CHANGE UP (ref 4.8–10.8)
WBC # FLD AUTO: 7.78 K/UL — SIGNIFICANT CHANGE UP (ref 4.8–10.8)

## 2020-01-16 PROCEDURE — 99233 SBSQ HOSP IP/OBS HIGH 50: CPT

## 2020-01-16 PROCEDURE — 71045 X-RAY EXAM CHEST 1 VIEW: CPT | Mod: 26

## 2020-01-16 RX ADMIN — LAMOTRIGINE 75 MILLIGRAM(S): 25 TABLET, ORALLY DISINTEGRATING ORAL at 11:40

## 2020-01-16 RX ADMIN — TAMSULOSIN HYDROCHLORIDE 0.4 MILLIGRAM(S): 0.4 CAPSULE ORAL at 21:29

## 2020-01-16 RX ADMIN — MEROPENEM 100 MILLIGRAM(S): 1 INJECTION INTRAVENOUS at 05:22

## 2020-01-16 RX ADMIN — Medication 3 MILLILITER(S): at 20:18

## 2020-01-16 RX ADMIN — Medication 0.5 MILLIGRAM(S): at 05:28

## 2020-01-16 RX ADMIN — QUETIAPINE FUMARATE 25 MILLIGRAM(S): 200 TABLET, FILM COATED ORAL at 21:29

## 2020-01-16 RX ADMIN — CHLORHEXIDINE GLUCONATE 1 APPLICATION(S): 213 SOLUTION TOPICAL at 05:28

## 2020-01-16 RX ADMIN — MEROPENEM 100 MILLIGRAM(S): 1 INJECTION INTRAVENOUS at 14:59

## 2020-01-16 RX ADMIN — Medication 1 TABLET(S): at 11:39

## 2020-01-16 RX ADMIN — Medication 0.5 MILLIGRAM(S): at 17:14

## 2020-01-16 RX ADMIN — Medication 112 MICROGRAM(S): at 05:26

## 2020-01-16 RX ADMIN — MEROPENEM 100 MILLIGRAM(S): 1 INJECTION INTRAVENOUS at 21:29

## 2020-01-16 RX ADMIN — Medication 166.67 MILLIGRAM(S): at 19:37

## 2020-01-16 RX ADMIN — Medication 10 MILLIGRAM(S): at 17:14

## 2020-01-16 RX ADMIN — SIMVASTATIN 20 MILLIGRAM(S): 20 TABLET, FILM COATED ORAL at 21:29

## 2020-01-16 RX ADMIN — POLYETHYLENE GLYCOL 3350 17 GRAM(S): 17 POWDER, FOR SOLUTION ORAL at 11:40

## 2020-01-16 RX ADMIN — Medication 166.67 MILLIGRAM(S): at 05:22

## 2020-01-16 RX ADMIN — LAMOTRIGINE 100 MILLIGRAM(S): 25 TABLET, ORALLY DISINTEGRATING ORAL at 21:29

## 2020-01-16 NOTE — PROGRESS NOTE ADULT - SUBJECTIVE AND OBJECTIVE BOX
Patient is a 54y old  Female who presents with a chief complaint of weakness (15 Mike 2020 16:35)        SUBJECTIVE:  No events overnight    REVIEW OF SYSTEMS:    CONSTITUTIONAL:   no fever   no chills.  no weight gain   no weight loss    EYES:   no discharge,   no pain  no redness,   no visual changes.    ENT:   Ears: no ear pain and no hearing problems.  Nose: no nasal congestion and no nasal drainage.  Mouth/Throat: no dysphagia,  no hoarseness and no throat pain.  Neck: no lumps, no pain, no stiffness and no swollen glands.     CARDIOVASCULAR:   no chest pain,   no swelling  no palpitations  no syncope    RESPIRATORY:  no SOB,  no wheezing ,  no respiratory difficulty  no sputum production    GASTROINTESTINAL:   no abdominal pain,   no constipation,   no diarrhea,   no vomiting.    GENITOURINARY:  no dysuria,   no frequency,   no urgency  no hematuria.    MUSCULOSKELETAL:   no back pain,   no musculoskeletal pain,  no weakness.    SKIN:   no jaundice,   no lesions,   no pruritis,   no rashes.    NEURO:   no loss of consciousness,   no gait abnormality,   no headache,   no sensory deficits,   no weakness.    PSYCHIATRIC:   + Alzheimer's  no anxiety  no depression    ALLERGIC/IMMUNOLOGIC:   No active allergic or immunologic issues      PHYSICAL EXAM  Vital Signs Last 24 Hrs  T(C): 37.7 (16 Jan 2020 12:16), Max: 37.7 (16 Jan 2020 12:16)  T(F): 99.8 (16 Jan 2020 12:16), Max: 99.8 (16 Jan 2020 12:16)  HR: 63 (16 Jan 2020 12:16) (63 - 80)  BP: 115/54 (16 Jan 2020 12:16) (114/53 - 117/55)  BP(mean): --  RR: 18 (16 Jan 2020 12:16) (17 - 18)  SpO2: 95% (16 Jan 2020 06:02) (95% - 96%)    CONSTITUTIONAL:  Well nourished.  NAD    ENT:   Airway patent,   Nasal mucosa clear.  Mouth with normal mucosa.   Throat has no vesicles,  no oropharyngeal exudates and uvula is midline.  No thrush    EYES:   Clear bilaterally,   pupils equal,   round and reactive to light.    CARDIAC:   Normal rate,   regular rhythm.    Heart sounds S1, S2.   normal  cardiac impulse  no edema      CAROTID:   normal systolic impulse  no bruits    RESPIRATORY:   decreased breath sounds over left lung field  normal chest expansion  not tachypneic,  no use of accessory muscles    GASTROINTESTINAL:  Abdomen soft,   non-tender,   no guarding,   + BS    GENITOURINARY  normal genitalia for sex  no edema    MUSCULOSKELETAL:   range of motion is not limited,  no muscle or joint tenderness  no clubbing, cyanosis    NEUROLOGICAL:   Alert and oriented x1  no focal deficits in cranial nerve areas  no motor or sensory deficits.  pertinent DTRs normal        01-15-20 @ 07:01  -  01-16-20 @ 07:00  --------------------------------------------------------  IN:  Total IN: 0 mL    OUT:    Nephrostomy Tube: 50 mL  Total OUT: 50 mL    Total NET: -50 mL      01-16-20 @ 07:01  -  01-16-20 @ 17:30  --------------------------------------------------------  IN:    Oral Fluid: 25 mL  Total IN: 25 mL    OUT:    Indwelling Catheter - Suprapubic: 550 mL  Total OUT: 550 mL    Total NET: -525 mL          LABS:                          10.4   7.78  )-----------( 206      ( 16 Jan 2020 06:41 )             31.8                                               01-16    141  |  106  |  8<L>  ----------------------------<  124<H>  3.9   |  25  |  0.8    Ca    8.0<L>      16 Jan 2020 06:41  Phos  2.5     01-16  Mg     1.9     01-16    TPro  5.0<L>  /  Alb  2.7<L>  /  TBili  0.4  /  DBili  x   /  AST  14  /  ALT  8   /  AlkPhos  80  01-16      PT/INR - ( 15 Mike 2020 06:09 )   PT: 13.50 sec;   INR: 1.18 ratio         PTT - ( 15 Mike 2020 06:09 )  PTT:31.7 sec                                                                                     LIVER FUNCTIONS - ( 16 Jan 2020 06:41 )  Alb: 2.7 g/dL / Pro: 5.0 g/dL / ALK PHOS: 80 U/L / ALT: 8 U/L / AST: 14 U/L / GGT: x                                                  Culture - Acid Fast - Bronchial w/Smear (collected 15 Mike 2020 16:00)  Source: .Bronchial None    Culture - Bronchial (collected 15 Mike 2020 16:00)  Source: .Bronchial None  Gram Stain (16 Jan 2020 13:53):    Moderate polymorphonuclear leukocytes per low power field    Few Squamous epithelial cells per low power field    No organisms seen per oil power field                                                    MEDICATIONS  (STANDING):  albuterol/ipratropium for Nebulization 3 milliLiter(s) Nebulizer every 6 hours  bisacodyl Suppository 10 milliGRAM(s) Rectal daily  calcium carbonate 1250 mG  + Vitamin D (OsCal 500 + D) 1 Tablet(s) Oral daily  chlorhexidine 4% Liquid 1 Application(s) Topical <User Schedule>  clonazePAM  Tablet 0.5 milliGRAM(s) Oral every 12 hours  influenza   Vaccine 0.5 milliLiter(s) IntraMuscular once  lamoTRIgine 75 milliGRAM(s) Oral daily  lamoTRIgine 100 milliGRAM(s) Oral at bedtime  levothyroxine 112 MICROGram(s) Oral daily  meropenem  IVPB      meropenem  IVPB 1000 milliGRAM(s) IV Intermittent every 8 hours  polyethylene glycol 3350 17 Gram(s) Oral daily  QUEtiapine 25 milliGRAM(s) Oral at bedtime  simvastatin 20 milliGRAM(s) Oral at bedtime  sodium chloride 0.9% for Nebulization 3 milliLiter(s) Nebulizer every 4 hours  sodium chloride 0.9%. 1000 milliLiter(s) (75 mL/Hr) IV Continuous <Continuous>  tamsulosin 0.4 milliGRAM(s) Oral at bedtime  vancomycin  IVPB      vancomycin  IVPB 1250 milliGRAM(s) IV Intermittent every 12 hours    MEDICATIONS  (PRN):  acetaminophen   Tablet .. 650 milliGRAM(s) Oral every 6 hours PRN Temp greater or equal to 38C (100.4F)  diphenhydrAMINE 25 milliGRAM(s) Oral once PRN Rash and/or Itching Patient is a 54y old  Female who presents with a chief complaint of weakness (15 Mike 2020 16:35)        SUBJECTIVE:  No events overnight remans on 3 liters O2.  SP bronch and repeat CXR    REVIEW OF SYSTEMS:    CONSTITUTIONAL:   no fever   no chills.  no weight gain   no weight loss    EYES:   no discharge,   no pain  no redness,   no visual changes.    ENT:   Ears: no ear pain and no hearing problems.  Nose: no nasal congestion and no nasal drainage.  Mouth/Throat: no dysphagia,  no hoarseness and no throat pain.  Neck: no lumps, no pain, no stiffness and no swollen glands.     CARDIOVASCULAR:   no chest pain,   no swelling  no palpitations  no syncope    RESPIRATORY:  no SOB,  no wheezing ,  no respiratory difficulty  no sputum production    GASTROINTESTINAL:   no abdominal pain,   no constipation,   no diarrhea,   no vomiting.    GENITOURINARY:  no dysuria,   no frequency,   no urgency  no hematuria.    MUSCULOSKELETAL:   no back pain,   no musculoskeletal pain,  no weakness.    SKIN:   no jaundice,   no lesions,   no pruritis,   no rashes.    NEURO:   no loss of consciousness,   no gait abnormality,   no headache,   no sensory deficits,   no weakness.    PSYCHIATRIC:   + Alzheimer's  no anxiety  no depression    ALLERGIC/IMMUNOLOGIC:   No active allergic or immunologic issues      PHYSICAL EXAM  Vital Signs Last 24 Hrs  T(C): 37.7 (16 Jan 2020 12:16), Max: 37.7 (16 Jan 2020 12:16)  T(F): 99.8 (16 Jan 2020 12:16), Max: 99.8 (16 Jan 2020 12:16)  HR: 63 (16 Jan 2020 12:16) (63 - 80)  BP: 115/54 (16 Jan 2020 12:16) (114/53 - 117/55)  BP(mean): --  RR: 18 (16 Jan 2020 12:16) (17 - 18)  SpO2: 95% (16 Jan 2020 06:02) (95% - 96%)    CONSTITUTIONAL:  Well nourished.  NAD    ENT:   Airway patent,   Nasal mucosa clear.  No thrush    EYES:   Clear bilaterally,   pupils equal,   round and reactive to light.    CARDIAC:   Normal rate,   regular rhythm.    Heart sounds S1, S2.   normal  cardiac impulse  no edema      CAROTID:   normal systolic impulse  no bruits    RESPIRATORY:   decreased breath sounds over left lung field  normal chest expansion  not tachypneic,  no use of accessory muscles    GASTROINTESTINAL:  Abdomen soft,   non-tender,   no guarding,   + BS    GENITOURINARY  normal genitalia for sex  no edema    MUSCULOSKELETAL:   range of motion is not limited,  no muscle or joint tenderness  no clubbing, cyanosis    NEUROLOGICAL:   Alert and oriented x1  no focal deficits in cranial nerve areas  no motor or sensory deficits.        01-15-20 @ 07:01  -  01-16-20 @ 07:00  --------------------------------------------------------  IN:  Total IN: 0 mL    OUT:    Nephrostomy Tube: 50 mL  Total OUT: 50 mL    Total NET: -50 mL      01-16-20 @ 07:01  -  01-16-20 @ 17:30  --------------------------------------------------------  IN:    Oral Fluid: 25 mL  Total IN: 25 mL    OUT:    Indwelling Catheter - Suprapubic: 550 mL  Total OUT: 550 mL    Total NET: -525 mL          LABS:                          10.4   7.78  )-----------( 206      ( 16 Jan 2020 06:41 )             31.8                                               01-16    141  |  106  |  8<L>  ----------------------------<  124<H>  3.9   |  25  |  0.8    Ca    8.0<L>      16 Jan 2020 06:41  Phos  2.5     01-16  Mg     1.9     01-16    TPro  5.0<L>  /  Alb  2.7<L>  /  TBili  0.4  /  DBili  x   /  AST  14  /  ALT  8   /  AlkPhos  80  01-16      PT/INR - ( 15 Mike 2020 06:09 )   PT: 13.50 sec;   INR: 1.18 ratio         PTT - ( 15 Mike 2020 06:09 )  PTT:31.7 sec                                                                                     LIVER FUNCTIONS - ( 16 Jan 2020 06:41 )  Alb: 2.7 g/dL / Pro: 5.0 g/dL / ALK PHOS: 80 U/L / ALT: 8 U/L / AST: 14 U/L / GGT: x                                                  Culture - Acid Fast - Bronchial w/Smear (collected 15 Mike 2020 16:00)  Source: .Bronchial None    Culture - Bronchial (collected 15 Mike 2020 16:00)  Source: .Bronchial None  Gram Stain (16 Jan 2020 13:53):    Moderate polymorphonuclear leukocytes per low power field    Few Squamous epithelial cells per low power field    No organisms seen per oil power field                                                    MEDICATIONS  (STANDING):  albuterol/ipratropium for Nebulization 3 milliLiter(s) Nebulizer every 6 hours  bisacodyl Suppository 10 milliGRAM(s) Rectal daily  calcium carbonate 1250 mG  + Vitamin D (OsCal 500 + D) 1 Tablet(s) Oral daily  chlorhexidine 4% Liquid 1 Application(s) Topical <User Schedule>  clonazePAM  Tablet 0.5 milliGRAM(s) Oral every 12 hours  influenza   Vaccine 0.5 milliLiter(s) IntraMuscular once  lamoTRIgine 75 milliGRAM(s) Oral daily  lamoTRIgine 100 milliGRAM(s) Oral at bedtime  levothyroxine 112 MICROGram(s) Oral daily  meropenem  IVPB      meropenem  IVPB 1000 milliGRAM(s) IV Intermittent every 8 hours  polyethylene glycol 3350 17 Gram(s) Oral daily  QUEtiapine 25 milliGRAM(s) Oral at bedtime  simvastatin 20 milliGRAM(s) Oral at bedtime  sodium chloride 0.9% for Nebulization 3 milliLiter(s) Nebulizer every 4 hours  sodium chloride 0.9%. 1000 milliLiter(s) (75 mL/Hr) IV Continuous <Continuous>  tamsulosin 0.4 milliGRAM(s) Oral at bedtime  vancomycin  IVPB      vancomycin  IVPB 1250 milliGRAM(s) IV Intermittent every 12 hours    MEDICATIONS  (PRN):  acetaminophen   Tablet .. 650 milliGRAM(s) Oral every 6 hours PRN Temp greater or equal to 38C (100.4F)  diphenhydrAMINE 25 milliGRAM(s) Oral once PRN Rash and/or Itching

## 2020-01-16 NOTE — PROGRESS NOTE ADULT - ASSESSMENT
IMPRESSION:    Left mucous plug with lung collapse likely due to pneumonia; s/p bronchoscopy with mucous plug extraction    RECOMMEND:    Aspiration precautions  Meropenem and Vancomycin for now  May need repeat bronchoscopy if no improvement  Follow up final cultures  Chest PT  Suctioning prn  Saline nebulizer  BIPAP prn  HOB >45  DVT prophylaxis IMPRESSION:    Left mucous plug with lung collapse likely due to pneumonia; s/p bronchoscopy with mucous plug extraction    RECOMMEND:    Aspiration precautions  Meropenem and Vancomycin for now  Plan for repeat bronchoscopy Monday  Repeat CXR on Sunday 1/19/2020  Follow up final cultures  Aggressive Chest PT  Suctioning prn  Nebs prn  Saline nebulizer  BIPAP prn  HOB >45  DVT prophylaxis IMPRESSION:    Left mucous plug with lung collapse likely due to pneumonia; s/p bronchoscopy with mucous plug extraction.  Lung still looks collapsed on CXR     RECOMMEND:    Aspiration precautions  Continue ABX.  Deescalate Vanc if MRSA negative.  FU Vanc level   Follow up final cultures  Aggressive pulmonary toilet   Chest PT   Suctioning prn  Nebs prn  Saline nebulizer  Repeat CXR in 48 hours.  If left lung remains collapsed, we will repeat bronchoscopy   HOB >45  DVT prophylaxis

## 2020-01-16 NOTE — PROGRESS NOTE ADULT - ATTENDING COMMENTS
55 yo female with PMH Down's syndrome, nonverbal, Alzheimer's dementia, seizure d/o, hypothyroidism brought in from group home for evaluation of decreased energy.     #L Lung Collapse :  Likely 2/2 mucus plug.   No significant improvement on CXR with Chest PT, postural drainage and Suction.   Pulm took patient for bronchoscopy 1/15. Able to remove a mucus plug on left. f/u CXR today shows better aeration.   c/w Chest PT, postural drainage and Suction  maintain sats>92.     #Hypotension:  1/15 - 2 times BP 80s/40s. Came up with 500cc boluses each time.   Now c/w NS @ 75.   #Suspected GNR PNA:  There could be an underlying PNA in collapsed lung?   Antibiotics broadened to Vanc, Tomasa.   IF BP drops again, support with fluids upto 30cc/kg and then use pressors if need arises.     # Decreased PO intake and Weakness  - no apparent metabolic encephalopathy  -2/2 down's + early dementia:  - IV hydration   - Failed S+S due to lethargy   - Family agreeable to PEG tube  - GI following   - PEG once stable ? maybe monday.    # Pseudomonas UTI   - urine culture from 1/7/2019 positive for pseudomonas   - IV Cipro     #Constipation   - Enema BID x 2 days   - On suppository     # Down Syndrome With Dementia, Seizure Disorder:  - non verbal at baseline  - Continue with Lamictal, olanzapine, and Klonopin   - f/u Lamictal level    # Hypothyroidism:  - Continue with Synthroid 112  - TSH 0.25     DVT Prophylaxis: lovenox  GI ppx: not indicated  Soft diet   Disposition: from group home    HCP and legal guardian [phone] Madisyn pearl (Sister)  Discussed case thoroughly with family at bedside.

## 2020-01-16 NOTE — PROGRESS NOTE ADULT - ASSESSMENT
53 yo female with PMH Down's syndrome, nonverbal, Alzheimer's dementia, seizure d/o, hypothyroidism brought in from group home for evaluation of decreased energy.     #L Lung Collapse   - Pulmonary consulted  - Chest PT   - Deep suctioning  - neb saline   - s/p bronchoscopy   - Repeat CXR today     # Decreased PO intake and Weakness  - no apparent metabolic encephalopathy  - blood work is unremarkable  - IV hydration   - Failed S+S due to lethargy   - Family agreeable to PEG tube  - GI following   - PEG once stable     # Pseudomonas UTI   - urine culture from 1/7/2019 positive for pseudomonas   - IV Cipro     #Constipation   - Enema BID x 2 days   - On suppository     # Down Syndrome With Dementia, Seizure Disorder:  - non verbal at baseline  - Continue with Lamictal, olanzapine, and Klonopin   - f/u Lamictal level    # Hypothyroidism:  - Continue with Synthroid 112  - TSH 0.25     DVT Prophylaxis: lovenox  GI ppx: not indicated  Soft diet   Disposition: from group home    HCP and legal guardian [phone] Madisyn pearl (Sister) 53 yo female with PMH Down's syndrome, nonverbal, Alzheimer's dementia, seizure d/o, hypothyroidism brought in from group home for evaluation of decreased energy.     #L Lung Collapse   - Pulmonary consulted  - Chest PT   - Deep suctioning  - neb saline   - s/p bronchoscopy   - Repeat CXR today     # Decreased PO intake and Weakness  - no apparent metabolic encephalopathy  - blood work is unremarkable  - IV hydration   - Failed S+S due to lethargy   - Family agreeable to PEG tube  - GI following   - PEG once stable will need med and pulm clearance    # Pseudomonas UTI   - urine culture from 1/7/2019 positive for pseudomonas   - IV Cipro     #Constipation   - Enema BID x 2 days   - On suppository     # Down Syndrome With Dementia, Seizure Disorder:  - non verbal at baseline  - Continue with Lamictal, olanzapine, and Klonopin   - f/u Lamictal level    # Hypothyroidism:  - Continue with Synthroid 112  - TSH 0.25     DVT Prophylaxis: lovenox  GI ppx: not indicated  Soft diet   Disposition: from group home    HCP and legal guardian [phone] Madisyn pearl (Sister)

## 2020-01-17 LAB
CULTURE RESULTS: SIGNIFICANT CHANGE UP
SPECIMEN SOURCE: SIGNIFICANT CHANGE UP

## 2020-01-17 PROCEDURE — 99233 SBSQ HOSP IP/OBS HIGH 50: CPT

## 2020-01-17 PROCEDURE — 71045 X-RAY EXAM CHEST 1 VIEW: CPT | Mod: 26

## 2020-01-17 RX ORDER — HALOPERIDOL DECANOATE 100 MG/ML
1 INJECTION INTRAMUSCULAR ONCE
Refills: 0 | Status: COMPLETED | OUTPATIENT
Start: 2020-01-17 | End: 2020-01-17

## 2020-01-17 RX ORDER — SODIUM CHLORIDE 9 MG/ML
500 INJECTION INTRAMUSCULAR; INTRAVENOUS; SUBCUTANEOUS ONCE
Refills: 0 | Status: COMPLETED | OUTPATIENT
Start: 2020-01-17 | End: 2020-01-17

## 2020-01-17 RX ADMIN — SODIUM CHLORIDE 3 MILLILITER(S): 9 INJECTION INTRAMUSCULAR; INTRAVENOUS; SUBCUTANEOUS at 09:07

## 2020-01-17 RX ADMIN — Medication 10 MILLIGRAM(S): at 11:26

## 2020-01-17 RX ADMIN — Medication 3 MILLILITER(S): at 20:16

## 2020-01-17 RX ADMIN — MEROPENEM 100 MILLIGRAM(S): 1 INJECTION INTRAVENOUS at 22:00

## 2020-01-17 RX ADMIN — LAMOTRIGINE 100 MILLIGRAM(S): 25 TABLET, ORALLY DISINTEGRATING ORAL at 22:00

## 2020-01-17 RX ADMIN — MEROPENEM 100 MILLIGRAM(S): 1 INJECTION INTRAVENOUS at 14:34

## 2020-01-17 RX ADMIN — Medication 3 MILLILITER(S): at 01:33

## 2020-01-17 RX ADMIN — Medication 166.67 MILLIGRAM(S): at 17:22

## 2020-01-17 RX ADMIN — QUETIAPINE FUMARATE 25 MILLIGRAM(S): 200 TABLET, FILM COATED ORAL at 22:00

## 2020-01-17 RX ADMIN — CHLORHEXIDINE GLUCONATE 1 APPLICATION(S): 213 SOLUTION TOPICAL at 05:36

## 2020-01-17 RX ADMIN — SODIUM CHLORIDE 75 MILLILITER(S): 9 INJECTION INTRAMUSCULAR; INTRAVENOUS; SUBCUTANEOUS at 13:53

## 2020-01-17 RX ADMIN — Medication 1 TABLET(S): at 11:26

## 2020-01-17 RX ADMIN — Medication 3 MILLILITER(S): at 07:41

## 2020-01-17 RX ADMIN — SODIUM CHLORIDE 500 MILLILITER(S): 9 INJECTION INTRAMUSCULAR; INTRAVENOUS; SUBCUTANEOUS at 10:19

## 2020-01-17 RX ADMIN — Medication 3 MILLILITER(S): at 13:56

## 2020-01-17 RX ADMIN — SIMVASTATIN 20 MILLIGRAM(S): 20 TABLET, FILM COATED ORAL at 22:00

## 2020-01-17 RX ADMIN — Medication 166.67 MILLIGRAM(S): at 05:36

## 2020-01-17 RX ADMIN — MEROPENEM 100 MILLIGRAM(S): 1 INJECTION INTRAVENOUS at 05:36

## 2020-01-17 RX ADMIN — LAMOTRIGINE 75 MILLIGRAM(S): 25 TABLET, ORALLY DISINTEGRATING ORAL at 11:26

## 2020-01-17 RX ADMIN — TAMSULOSIN HYDROCHLORIDE 0.4 MILLIGRAM(S): 0.4 CAPSULE ORAL at 21:59

## 2020-01-17 RX ADMIN — Medication 0.5 MILLIGRAM(S): at 17:22

## 2020-01-17 NOTE — PROGRESS NOTE ADULT - SUBJECTIVE AND OBJECTIVE BOX
Hospital Day:  6d    Subjective:    Pt was interviewed and examined at the bedside in the AM. No acute events overnight.   Unable to assess ROS    Past Medical Hx:   Seizures  Intellectual disability  Hypothyroid  Impulse control disorder in adult  Down's syndrome    Past Sx:  No significant past surgical history    Allergies:  No Known Allergies    Current Meds:   Standng Meds:  albuterol/ipratropium for Nebulization 3 milliLiter(s) Nebulizer every 6 hours  bisacodyl Suppository 10 milliGRAM(s) Rectal daily  calcium carbonate 1250 mG  + Vitamin D (OsCal 500 + D) 1 Tablet(s) Oral daily  chlorhexidine 4% Liquid 1 Application(s) Topical <User Schedule>  clonazePAM  Tablet 0.5 milliGRAM(s) Oral every 12 hours  influenza   Vaccine 0.5 milliLiter(s) IntraMuscular once  lamoTRIgine 75 milliGRAM(s) Oral daily  lamoTRIgine 100 milliGRAM(s) Oral at bedtime  levothyroxine 112 MICROGram(s) Oral daily  meropenem  IVPB      meropenem  IVPB 1000 milliGRAM(s) IV Intermittent every 8 hours  polyethylene glycol 3350 17 Gram(s) Oral daily  QUEtiapine 25 milliGRAM(s) Oral at bedtime  simvastatin 20 milliGRAM(s) Oral at bedtime  sodium chloride 0.9% Bolus 500 milliLiter(s) IV Bolus once  sodium chloride 0.9% for Nebulization 3 milliLiter(s) Nebulizer every 4 hours  sodium chloride 0.9%. 1000 milliLiter(s) (75 mL/Hr) IV Continuous <Continuous>  tamsulosin 0.4 milliGRAM(s) Oral at bedtime  vancomycin  IVPB      vancomycin  IVPB 1250 milliGRAM(s) IV Intermittent every 12 hours    PRN Meds:  acetaminophen   Tablet .. 650 milliGRAM(s) Oral every 6 hours PRN Temp greater or equal to 38C (100.4F)  diphenhydrAMINE 25 milliGRAM(s) Oral once PRN Rash and/or Itching    HOME MEDICATIONS:  Senna 8.6 mg oral tablet: 1 tab(s) orally once a day (at bedtime)  simvastatin 20 mg oral tablet: 1 tab(s) orally once a day (at bedtime)      Vital Signs:   T(F): 97.1 (01-17-20 @ 05:33), Max: 99.8 (01-16-20 @ 12:16)  HR: 56 (01-17-20 @ 05:33) (56 - 80)  BP: 91/53 (01-17-20 @ 05:33) (91/53 - 135/62)  RR: 18 (01-17-20 @ 05:33) (18 - 18)  SpO2: 94% (01-17-20 @ 07:00) (94% - 96%)      01-16-20 @ 07:01  -  01-17-20 @ 07:00  --------------------------------------------------------  IN: 25 mL / OUT: 1050 mL / NET: -1025 mL        Physical Exam:   CONSTITUTIONAL: NAD, sleeping in bed, downs facies, lethargic   HEAD: board face; atraumatic  NECK: Supple; non tender, FROM  CARD: +S1, S2 Regular rate and rhythm.  RESP: absent BS on L   ABD: soft ntnd, no rebound, no guarding, no rigidity  EXT: moves all extremities  NEURO: Responsive grossly unremarkable, no focal deficits      Labs:                         10.4   7.78  )-----------( 206      ( 16 Jan 2020 06:41 )             31.8       16 Jan 2020 06:41    141    |  106    |  8      ----------------------------<  124    3.9     |  25     |  0.8      Ca    8.0        16 Jan 2020 06:41  Phos  2.5       16 Jan 2020 06:41  Mg     1.9       16 Jan 2020 06:41    TPro  5.0    /  Alb  2.7    /  TBili  0.4    /  DBili  x      /  AST  14     /  ALT  8      /  AlkPhos  80     16 Jan 2020 06:41    Culture - Blood (collected 01-12-20 @ 00:30)  Source: .Blood Blood-Peripheral  Final Report (01-17-20 @ 08:00):    No growth at 5 days.

## 2020-01-17 NOTE — PROGRESS NOTE ADULT - ASSESSMENT
55 yo female with PMH Down's syndrome, nonverbal, Alzheimer's dementia, seizure d/o, hypothyroidism brought in from group home for evaluation of decreased energy.     #L Lung Collapse   - Pulmonary following   - Chest PT   - Deep suctioning  - Neb saline   - s/p bronchoscopy   - Daily CXR   - If no improvement will need repeat bronchoscopy     # Decreased PO intake and Weakness  - no apparent metabolic encephalopathy  - blood work is unremarkable  - IV hydration   - Failed S+S due to lethargy   - Family agreeable to PEG tube  - GI following   - PEG once stable will need med and pulm clearance    # Pseudomonas UTI   - urine culture from 1/7/2019 positive for pseudomonas   - IV Cipro     #Constipation   - On suppository     # Down Syndrome With Dementia, Seizure Disorder:  - non verbal at baseline  - Continue with Lamictal, olanzapine, and Klonopin   - f/u Lamictal level    # Hypothyroidism:  - Continue with Synthroid 112  - TSH 0.25     DVT Prophylaxis: lovenox  GI ppx: not indicated  Soft diet   Disposition: from group home    HCP and legal guardian [phone] Madisyn pearl (Sister)

## 2020-01-17 NOTE — PROGRESS NOTE ADULT - ATTENDING COMMENTS
53 yo female with PMH Down's syndrome, nonverbal, Alzheimer's dementia, seizure d/o, hypothyroidism brought in from group home for evaluation of Lethargy. Found to have PNA (suspected GNR).      #Suspected GNR PNA  #L Lung Collapse :  Likely 2/2 mucus plug.  s/p Bronch and removal of plug on 1/15.   Now again left lung collapsing.   c/w Chest PT, postural drainage and Suction.   Might need repeat Bronch. Pulm may do it on monday.  maintain sats>92.     IV VAnc, Tomasa. f/u Cultures.     #Hypotension:  1/15 - 2 times BP 80s/40s. Came up with 500cc boluses each time.   Now c/w NS @ 75. U  IF BP drops again, support with 250cc boluses. May use fluids upto 30cc/kg and then use pressors if need arises.     # Decreased PO intake and Weakness  - metabolic encephalopathy + 2/2 down's early dementia:  - IV hydration   - PEG once stable ? maybe monday.    # Pseudomonas UTI   - urine culture from 1/7/2019 positive for pseudomonas   - IV Cipro initially now on vanc, tomasa as above.    #Constipation   - Enema BID x 2 days   - On suppository     # Down Syndrome With Dementia, Seizure Disorder:  - non verbal at baseline  - Continue with Lamictal, olanzapine, and Klonopin   - f/u Lamictal level    # Hypothyroidism:  - Continue with Synthroid 112  - TSH 0.25     DVT Prophylaxis: lovenox  GI ppx: not indicated  Soft diet   Disposition: from group home    HCP and legal guardian [phone] Madisyn ryanne (Sister)  Discussed case thoroughly with family at bedside.

## 2020-01-18 LAB
ALBUMIN SERPL ELPH-MCNC: 2.3 G/DL — LOW (ref 3.5–5.2)
ALP SERPL-CCNC: 79 U/L — SIGNIFICANT CHANGE UP (ref 30–115)
ALT FLD-CCNC: 11 U/L — SIGNIFICANT CHANGE UP (ref 0–41)
ANION GAP SERPL CALC-SCNC: 6 MMOL/L — LOW (ref 7–14)
AST SERPL-CCNC: 16 U/L — SIGNIFICANT CHANGE UP (ref 0–41)
BASOPHILS # BLD AUTO: 0.09 K/UL — SIGNIFICANT CHANGE UP (ref 0–0.2)
BASOPHILS NFR BLD AUTO: 1.9 % — HIGH (ref 0–1)
BILIRUB SERPL-MCNC: 0.3 MG/DL — SIGNIFICANT CHANGE UP (ref 0.2–1.2)
BUN SERPL-MCNC: 7 MG/DL — LOW (ref 10–20)
CALCIUM SERPL-MCNC: 7.9 MG/DL — LOW (ref 8.5–10.1)
CHLORIDE SERPL-SCNC: 109 MMOL/L — SIGNIFICANT CHANGE UP (ref 98–110)
CO2 SERPL-SCNC: 28 MMOL/L — SIGNIFICANT CHANGE UP (ref 17–32)
CREAT SERPL-MCNC: 0.7 MG/DL — SIGNIFICANT CHANGE UP (ref 0.7–1.5)
EOSINOPHIL # BLD AUTO: 0.29 K/UL — SIGNIFICANT CHANGE UP (ref 0–0.7)
EOSINOPHIL NFR BLD AUTO: 6.1 % — SIGNIFICANT CHANGE UP (ref 0–8)
GLUCOSE BLDC GLUCOMTR-MCNC: 97 MG/DL — SIGNIFICANT CHANGE UP (ref 70–99)
GLUCOSE SERPL-MCNC: 113 MG/DL — HIGH (ref 70–99)
HCT VFR BLD CALC: 28.8 % — LOW (ref 37–47)
HGB BLD-MCNC: 9.5 G/DL — LOW (ref 12–16)
IMM GRANULOCYTES NFR BLD AUTO: 0.6 % — HIGH (ref 0.1–0.3)
LYMPHOCYTES # BLD AUTO: 0.84 K/UL — LOW (ref 1.2–3.4)
LYMPHOCYTES # BLD AUTO: 17.6 % — LOW (ref 20.5–51.1)
MAGNESIUM SERPL-MCNC: 1.8 MG/DL — SIGNIFICANT CHANGE UP (ref 1.8–2.4)
MCHC RBC-ENTMCNC: 33 G/DL — SIGNIFICANT CHANGE UP (ref 32–37)
MCHC RBC-ENTMCNC: 33 PG — HIGH (ref 27–31)
MCV RBC AUTO: 100 FL — HIGH (ref 81–99)
MONOCYTES # BLD AUTO: 0.41 K/UL — SIGNIFICANT CHANGE UP (ref 0.1–0.6)
MONOCYTES NFR BLD AUTO: 8.6 % — SIGNIFICANT CHANGE UP (ref 1.7–9.3)
NEUTROPHILS # BLD AUTO: 3.1 K/UL — SIGNIFICANT CHANGE UP (ref 1.4–6.5)
NEUTROPHILS NFR BLD AUTO: 65.2 % — SIGNIFICANT CHANGE UP (ref 42.2–75.2)
NRBC # BLD: 0 /100 WBCS — SIGNIFICANT CHANGE UP (ref 0–0)
PHOSPHATE SERPL-MCNC: 2.4 MG/DL — SIGNIFICANT CHANGE UP (ref 2.1–4.9)
PLATELET # BLD AUTO: 207 K/UL — SIGNIFICANT CHANGE UP (ref 130–400)
POTASSIUM SERPL-MCNC: 3.5 MMOL/L — SIGNIFICANT CHANGE UP (ref 3.5–5)
POTASSIUM SERPL-SCNC: 3.5 MMOL/L — SIGNIFICANT CHANGE UP (ref 3.5–5)
PROT SERPL-MCNC: 4.3 G/DL — LOW (ref 6–8)
RBC # BLD: 2.88 M/UL — LOW (ref 4.2–5.4)
RBC # FLD: 14.4 % — SIGNIFICANT CHANGE UP (ref 11.5–14.5)
SODIUM SERPL-SCNC: 143 MMOL/L — SIGNIFICANT CHANGE UP (ref 135–146)
WBC # BLD: 4.76 K/UL — LOW (ref 4.8–10.8)
WBC # FLD AUTO: 4.76 K/UL — LOW (ref 4.8–10.8)

## 2020-01-18 PROCEDURE — 71045 X-RAY EXAM CHEST 1 VIEW: CPT | Mod: 26

## 2020-01-18 PROCEDURE — 99233 SBSQ HOSP IP/OBS HIGH 50: CPT

## 2020-01-18 RX ORDER — HALOPERIDOL DECANOATE 100 MG/ML
1 INJECTION INTRAMUSCULAR ONCE
Refills: 0 | Status: COMPLETED | OUTPATIENT
Start: 2020-01-18 | End: 2020-01-18

## 2020-01-18 RX ORDER — SODIUM CHLORIDE 9 MG/ML
1000 INJECTION, SOLUTION INTRAVENOUS
Refills: 0 | Status: DISCONTINUED | OUTPATIENT
Start: 2020-01-18 | End: 2020-01-23

## 2020-01-18 RX ADMIN — SODIUM CHLORIDE 75 MILLILITER(S): 9 INJECTION, SOLUTION INTRAVENOUS at 12:46

## 2020-01-18 RX ADMIN — Medication 166.67 MILLIGRAM(S): at 05:28

## 2020-01-18 RX ADMIN — Medication 0.5 MILLIGRAM(S): at 06:28

## 2020-01-18 RX ADMIN — SODIUM CHLORIDE 3 MILLILITER(S): 9 INJECTION INTRAMUSCULAR; INTRAVENOUS; SUBCUTANEOUS at 21:23

## 2020-01-18 RX ADMIN — Medication 0.5 MILLIGRAM(S): at 17:13

## 2020-01-18 RX ADMIN — MEROPENEM 100 MILLIGRAM(S): 1 INJECTION INTRAVENOUS at 21:22

## 2020-01-18 RX ADMIN — LAMOTRIGINE 75 MILLIGRAM(S): 25 TABLET, ORALLY DISINTEGRATING ORAL at 11:26

## 2020-01-18 RX ADMIN — MEROPENEM 100 MILLIGRAM(S): 1 INJECTION INTRAVENOUS at 14:57

## 2020-01-18 RX ADMIN — SIMVASTATIN 20 MILLIGRAM(S): 20 TABLET, FILM COATED ORAL at 21:22

## 2020-01-18 RX ADMIN — TAMSULOSIN HYDROCHLORIDE 0.4 MILLIGRAM(S): 0.4 CAPSULE ORAL at 21:22

## 2020-01-18 RX ADMIN — Medication 10 MILLIGRAM(S): at 11:26

## 2020-01-18 RX ADMIN — MEROPENEM 100 MILLIGRAM(S): 1 INJECTION INTRAVENOUS at 05:29

## 2020-01-18 RX ADMIN — POLYETHYLENE GLYCOL 3350 17 GRAM(S): 17 POWDER, FOR SOLUTION ORAL at 11:26

## 2020-01-18 RX ADMIN — LAMOTRIGINE 100 MILLIGRAM(S): 25 TABLET, ORALLY DISINTEGRATING ORAL at 21:22

## 2020-01-18 RX ADMIN — CHLORHEXIDINE GLUCONATE 1 APPLICATION(S): 213 SOLUTION TOPICAL at 05:29

## 2020-01-18 RX ADMIN — QUETIAPINE FUMARATE 25 MILLIGRAM(S): 200 TABLET, FILM COATED ORAL at 21:22

## 2020-01-18 RX ADMIN — SODIUM CHLORIDE 75 MILLILITER(S): 9 INJECTION, SOLUTION INTRAVENOUS at 20:02

## 2020-01-18 RX ADMIN — Medication 3 MILLILITER(S): at 19:46

## 2020-01-18 RX ADMIN — Medication 1 TABLET(S): at 11:26

## 2020-01-18 RX ADMIN — Medication 112 MICROGRAM(S): at 06:28

## 2020-01-18 RX ADMIN — Medication 166.67 MILLIGRAM(S): at 17:13

## 2020-01-18 NOTE — PROGRESS NOTE ADULT - SUBJECTIVE AND OBJECTIVE BOX
S: No new e vents/complaints  sleepy but arousable.   sleeps most of the day.   Very poor appetite    All other pertinent ROS negative.      01-17-20 @ 07:01  -  01-18-20 @ 07:00  --------------------------------------------------------  IN: 0 mL / OUT: 1100 mL / NET: -1100 mL      Vital Signs Last 24 Hrs  T(C): 36.1 (18 Jan 2020 12:58), Max: 36.7 (18 Jan 2020 06:05)  T(F): 97 (18 Jan 2020 12:58), Max: 98.1 (18 Jan 2020 06:05)  HR: 57 (18 Jan 2020 12:58) (57 - 88)  BP: 110/57 (18 Jan 2020 12:58) (95/47 - 146/78)  BP(mean): --  RR: 17 (18 Jan 2020 12:58) (17 - 18)  SpO2: 95% (18 Jan 2020 10:31) (92% - 99%)  PHYSICAL EXAM:    Constitutional: NAD, awake and alert, well-developed  HEENT: PERR, EOMI, Normal Hearing, MMM  Neck: Soft and supple, No LAD, No JVD  Respiratory: expi rhonchi from junky lungs  Cardiovascular: S1 and S2, regular rate and rhythm, no Murmurs, gallops or rubs  Gastrointestinal: Bowel Sounds present, soft, nontender, nondistended, no guarding, no rebound  Extremities: No peripheral edema      MEDICATIONS:  MEDICATIONS  (STANDING):  albuterol/ipratropium for Nebulization 3 milliLiter(s) Nebulizer every 6 hours  bisacodyl Suppository 10 milliGRAM(s) Rectal daily  calcium carbonate 1250 mG  + Vitamin D (OsCal 500 + D) 1 Tablet(s) Oral daily  chlorhexidine 4% Liquid 1 Application(s) Topical <User Schedule>  clonazePAM  Tablet 0.5 milliGRAM(s) Oral every 12 hours  dextrose 5% + sodium chloride 0.9%. 1000 milliLiter(s) (75 mL/Hr) IV Continuous <Continuous>  influenza   Vaccine 0.5 milliLiter(s) IntraMuscular once  lamoTRIgine 75 milliGRAM(s) Oral daily  lamoTRIgine 100 milliGRAM(s) Oral at bedtime  levothyroxine 112 MICROGram(s) Oral daily  meropenem  IVPB      meropenem  IVPB 1000 milliGRAM(s) IV Intermittent every 8 hours  polyethylene glycol 3350 17 Gram(s) Oral daily  QUEtiapine 25 milliGRAM(s) Oral at bedtime  simvastatin 20 milliGRAM(s) Oral at bedtime  sodium chloride 0.9% for Nebulization 3 milliLiter(s) Nebulizer every 4 hours  tamsulosin 0.4 milliGRAM(s) Oral at bedtime  vancomycin  IVPB      vancomycin  IVPB 1250 milliGRAM(s) IV Intermittent every 12 hours      LABS: All Labs Reviewed:                        9.5    4.76  )-----------( 207      ( 18 Jan 2020 07:32 )             28.8     01-18    143  |  109  |  7<L>  ----------------------------<  113<H>  3.5   |  28  |  0.7    Ca    7.9<L>      18 Jan 2020 07:32  Phos  2.4     01-18  Mg     1.8     01-18    TPro  4.3<L>  /  Alb  2.3<L>  /  TBili  0.3  /  DBili  x   /  AST  16  /  ALT  11  /  AlkPhos  79  01-18          Blood Culture: 01-15 @ 16:00  Organism --  Gram Stain Blood -- Gram Stain   Moderate polymorphonuclear leukocytes per low power field  Few Squamous epithelial cells per low power field  No organisms seen per oil power field  Specimen Source .Bronchial None  Culture-Blood --        Radiology: reviewed

## 2020-01-18 NOTE — PROGRESS NOTE ADULT - ASSESSMENT
53 yo female with PMH Down's syndrome, nonverbal, Alzheimer's dementia, seizure d/o, hypothyroidism brought in from group home for evaluation of Lethargy. Found to have PNA (suspected GNR).      #Suspected GNR PNA  #L Lung Collapse :  Likely 2/2 mucus plug.  s/p Bronch and removal of plug on 1/15.   Now again left lung collapsing.   c/w Chest PT, postural drainage and Suction.   Might need repeat Bronch. Pulm may do it on monday.  maintain sats>92.     IV VAnc, Tomasa. f/u Cultures.     Barely eating. Family refused for NG tube since she may rip it out.   Change to D5NS @ 75cc/hr.  eventual PEG.     #Hypotension:  1/15 - 2 times BP 80s/40s. Came up with 500cc boluses each time.   IF BP drops again, support with 250cc boluses. May use fluids upto 30cc/kg and then use pressors if need arises.     # Decreased PO intake and Weakness  - metabolic encephalopathy + 2/2 down's early dementia:  - IV hydration   - PEG once stable    # Pseudomonas UTI   - urine culture from 1/7/2019 positive for pseudomonas   - IV Cipro initially now on vanc, tomasa as above.    #Constipation   - Enema BID x 2 days   - On suppository     # Down Syndrome With Dementia, Seizure Disorder:  - non verbal at baseline  - Continue with Lamictal, olanzapine, and Klonopin   - f/u Lamictal level    # Hypothyroidism:  - Continue with Synthroid 112  - TSH 0.25     DVT Prophylaxis: lovenox  GI ppx: not indicated  Soft diet   Disposition: from group home    HCP and legal guardian [phone] Madisyn pearl (Sister)  Discussed case thoroughly with family at bedside.

## 2020-01-19 LAB
ALBUMIN SERPL ELPH-MCNC: 2.2 G/DL — LOW (ref 3.5–5.2)
ALP SERPL-CCNC: 76 U/L — SIGNIFICANT CHANGE UP (ref 30–115)
ALT FLD-CCNC: 13 U/L — SIGNIFICANT CHANGE UP (ref 0–41)
ANION GAP SERPL CALC-SCNC: 9 MMOL/L — SIGNIFICANT CHANGE UP (ref 7–14)
AST SERPL-CCNC: 18 U/L — SIGNIFICANT CHANGE UP (ref 0–41)
BASOPHILS # BLD AUTO: 0.09 K/UL — SIGNIFICANT CHANGE UP (ref 0–0.2)
BASOPHILS NFR BLD AUTO: 2.1 % — HIGH (ref 0–1)
BILIRUB SERPL-MCNC: 0.3 MG/DL — SIGNIFICANT CHANGE UP (ref 0.2–1.2)
BUN SERPL-MCNC: 6 MG/DL — LOW (ref 10–20)
CALCIUM SERPL-MCNC: 7.8 MG/DL — LOW (ref 8.5–10.1)
CHLORIDE SERPL-SCNC: 110 MMOL/L — SIGNIFICANT CHANGE UP (ref 98–110)
CO2 SERPL-SCNC: 25 MMOL/L — SIGNIFICANT CHANGE UP (ref 17–32)
CREAT SERPL-MCNC: 0.7 MG/DL — SIGNIFICANT CHANGE UP (ref 0.7–1.5)
EOSINOPHIL # BLD AUTO: 0.29 K/UL — SIGNIFICANT CHANGE UP (ref 0–0.7)
EOSINOPHIL NFR BLD AUTO: 6.9 % — SIGNIFICANT CHANGE UP (ref 0–8)
GLUCOSE SERPL-MCNC: 127 MG/DL — HIGH (ref 70–99)
HCT VFR BLD CALC: 29.1 % — LOW (ref 37–47)
HGB BLD-MCNC: 9.5 G/DL — LOW (ref 12–16)
IMM GRANULOCYTES NFR BLD AUTO: 0.2 % — SIGNIFICANT CHANGE UP (ref 0.1–0.3)
LYMPHOCYTES # BLD AUTO: 0.99 K/UL — LOW (ref 1.2–3.4)
LYMPHOCYTES # BLD AUTO: 23.6 % — SIGNIFICANT CHANGE UP (ref 20.5–51.1)
MCHC RBC-ENTMCNC: 32 PG — HIGH (ref 27–31)
MCHC RBC-ENTMCNC: 32.6 G/DL — SIGNIFICANT CHANGE UP (ref 32–37)
MCV RBC AUTO: 98 FL — SIGNIFICANT CHANGE UP (ref 81–99)
MONOCYTES # BLD AUTO: 0.35 K/UL — SIGNIFICANT CHANGE UP (ref 0.1–0.6)
MONOCYTES NFR BLD AUTO: 8.4 % — SIGNIFICANT CHANGE UP (ref 1.7–9.3)
NEUTROPHILS # BLD AUTO: 2.46 K/UL — SIGNIFICANT CHANGE UP (ref 1.4–6.5)
NEUTROPHILS NFR BLD AUTO: 58.8 % — SIGNIFICANT CHANGE UP (ref 42.2–75.2)
NRBC # BLD: 0 /100 WBCS — SIGNIFICANT CHANGE UP (ref 0–0)
PLATELET # BLD AUTO: 240 K/UL — SIGNIFICANT CHANGE UP (ref 130–400)
POTASSIUM SERPL-MCNC: 3.5 MMOL/L — SIGNIFICANT CHANGE UP (ref 3.5–5)
POTASSIUM SERPL-SCNC: 3.5 MMOL/L — SIGNIFICANT CHANGE UP (ref 3.5–5)
PROT SERPL-MCNC: 4.1 G/DL — LOW (ref 6–8)
RBC # BLD: 2.97 M/UL — LOW (ref 4.2–5.4)
RBC # FLD: 14.5 % — SIGNIFICANT CHANGE UP (ref 11.5–14.5)
SODIUM SERPL-SCNC: 144 MMOL/L — SIGNIFICANT CHANGE UP (ref 135–146)
VANCOMYCIN TROUGH SERPL-MCNC: 25.6 UG/ML — HIGH (ref 5–10)
WBC # BLD: 4.19 K/UL — LOW (ref 4.8–10.8)
WBC # FLD AUTO: 4.19 K/UL — LOW (ref 4.8–10.8)

## 2020-01-19 PROCEDURE — 71045 X-RAY EXAM CHEST 1 VIEW: CPT | Mod: 26

## 2020-01-19 PROCEDURE — 99232 SBSQ HOSP IP/OBS MODERATE 35: CPT

## 2020-01-19 RX ORDER — ENOXAPARIN SODIUM 100 MG/ML
40 INJECTION SUBCUTANEOUS DAILY
Refills: 0 | Status: DISCONTINUED | OUTPATIENT
Start: 2020-01-19 | End: 2020-02-04

## 2020-01-19 RX ORDER — CLONAZEPAM 1 MG
0.5 TABLET ORAL EVERY 12 HOURS
Refills: 0 | Status: DISCONTINUED | OUTPATIENT
Start: 2020-01-19 | End: 2020-01-22

## 2020-01-19 RX ADMIN — Medication 166.67 MILLIGRAM(S): at 05:22

## 2020-01-19 RX ADMIN — Medication 112 MICROGRAM(S): at 06:31

## 2020-01-19 RX ADMIN — TAMSULOSIN HYDROCHLORIDE 0.4 MILLIGRAM(S): 0.4 CAPSULE ORAL at 21:23

## 2020-01-19 RX ADMIN — Medication 1 TABLET(S): at 11:18

## 2020-01-19 RX ADMIN — Medication 10 MILLIGRAM(S): at 11:18

## 2020-01-19 RX ADMIN — MEROPENEM 100 MILLIGRAM(S): 1 INJECTION INTRAVENOUS at 14:15

## 2020-01-19 RX ADMIN — Medication 3 MILLILITER(S): at 19:23

## 2020-01-19 RX ADMIN — LAMOTRIGINE 100 MILLIGRAM(S): 25 TABLET, ORALLY DISINTEGRATING ORAL at 21:23

## 2020-01-19 RX ADMIN — MEROPENEM 100 MILLIGRAM(S): 1 INJECTION INTRAVENOUS at 21:25

## 2020-01-19 RX ADMIN — Medication 0.5 MILLIGRAM(S): at 17:55

## 2020-01-19 RX ADMIN — LAMOTRIGINE 75 MILLIGRAM(S): 25 TABLET, ORALLY DISINTEGRATING ORAL at 11:18

## 2020-01-19 RX ADMIN — SODIUM CHLORIDE 75 MILLILITER(S): 9 INJECTION, SOLUTION INTRAVENOUS at 15:20

## 2020-01-19 RX ADMIN — SIMVASTATIN 20 MILLIGRAM(S): 20 TABLET, FILM COATED ORAL at 21:23

## 2020-01-19 RX ADMIN — ENOXAPARIN SODIUM 40 MILLIGRAM(S): 100 INJECTION SUBCUTANEOUS at 14:14

## 2020-01-19 RX ADMIN — MEROPENEM 100 MILLIGRAM(S): 1 INJECTION INTRAVENOUS at 06:31

## 2020-01-19 RX ADMIN — QUETIAPINE FUMARATE 25 MILLIGRAM(S): 200 TABLET, FILM COATED ORAL at 21:23

## 2020-01-19 RX ADMIN — CHLORHEXIDINE GLUCONATE 1 APPLICATION(S): 213 SOLUTION TOPICAL at 06:31

## 2020-01-19 RX ADMIN — Medication 166.67 MILLIGRAM(S): at 21:25

## 2020-01-19 NOTE — PROGRESS NOTE ADULT - ASSESSMENT
53 yo female with PMH Down's syndrome, nonverbal, Alzheimer's dementia, seizure d/o, hypothyroidism brought in from group home for evaluation of decreased energy.     #L Lung Collapse   - Pulmonary following   - Chest PT   - Deep suctioning  - Neb saline   - s/p bronchoscopy   - vanco and nataliia for possible PNA  - Daily CXR   - CXR improving   - If no improvement will need repeat bronchoscopy     # Decreased PO intake and Weakness  - no apparent metabolic encephalopathy  - blood work is unremarkable  - IV hydration   - Failed S+S due to lethargy   - Family agreeable to PEG tube  - GI following   - PEG once stable will need med and pulm clearance    # Pseudomonas UTI   - resolved     #Constipation   - On suppository     # Down Syndrome With Dementia, Seizure Disorder:  - non verbal at baseline  - Continue with Lamictal, olanzapine, and Klonopin   - f/u repeat Lamictal level    # Hypothyroidism:  - Continue with Synthroid 112  - TSH 0.25     DVT Prophylaxis: lovenox  GI ppx: not indicated  Soft diet   Disposition: from group home    HCP and legal guardian [phone] Madisyn ryanne (Sister)

## 2020-01-19 NOTE — PROGRESS NOTE ADULT - ATTENDING COMMENTS
53 yo female with PMH Down's syndrome, nonverbal, Alzheimer's dementia, seizure d/o, hypothyroidism brought in from group home for evaluation of decreased energy.     #L Lung Collapse   - Pulmonary following   - Chest PT   - Deep suctioning  - Neb saline   - s/p bronchoscopy   - vanco and nataliia for possible PNA  - Daily CXR   - CXR improving   - If no improvement will need repeat bronchoscopy     # Decreased PO intake and Weakness  - no apparent metabolic encephalopathy  - blood work is unremarkable  - IV hydration   - Failed S+S due to lethargy   - Family agreeable to PEG tube  - GI following   - PEG once stable will need med and pulm clearance    # Pseudomonas UTI   - resolved     #Constipation   - On suppository     # Down Syndrome With Dementia, Seizure Disorder:  - non verbal at baseline  - Continue with Lamictal, olanzapine, and Klonopin   - f/u repeat Lamictal level    # Hypothyroidism:  - Continue with Synthroid 112  - TSH 0.25     DVT Prophylaxis: lovenox  GI ppx: not indicated  Soft diet   Disposition: from group home    HCP and legal guardian [phone] Madisyn ryanne (Sister)

## 2020-01-19 NOTE — PROGRESS NOTE ADULT - SUBJECTIVE AND OBJECTIVE BOX
Hospital Day:  8d    Subjective:    Pt was interviewed and examined at the bedside in the AM. No acute events overnight.   Unable to assess ROS. Saturating well.       Past Medical Hx:   Seizures  Intellectual disability  Hypothyroid  Impulse control disorder in adult  Down's syndrome    Past Sx:  No significant past surgical history    Allergies:  No Known Allergies    Current Meds:   Standng Meds:  albuterol/ipratropium for Nebulization 3 milliLiter(s) Nebulizer every 6 hours  bisacodyl Suppository 10 milliGRAM(s) Rectal daily  calcium carbonate 1250 mG  + Vitamin D (OsCal 500 + D) 1 Tablet(s) Oral daily  chlorhexidine 4% Liquid 1 Application(s) Topical <User Schedule>  dextrose 5% + sodium chloride 0.9%. 1000 milliLiter(s) (75 mL/Hr) IV Continuous <Continuous>  influenza   Vaccine 0.5 milliLiter(s) IntraMuscular once  lamoTRIgine 75 milliGRAM(s) Oral daily  lamoTRIgine 100 milliGRAM(s) Oral at bedtime  levothyroxine 112 MICROGram(s) Oral daily  meropenem  IVPB      meropenem  IVPB 1000 milliGRAM(s) IV Intermittent every 8 hours  polyethylene glycol 3350 17 Gram(s) Oral daily  QUEtiapine 25 milliGRAM(s) Oral at bedtime  simvastatin 20 milliGRAM(s) Oral at bedtime  sodium chloride 0.9% for Nebulization 3 milliLiter(s) Nebulizer every 4 hours  tamsulosin 0.4 milliGRAM(s) Oral at bedtime  vancomycin  IVPB      vancomycin  IVPB 1250 milliGRAM(s) IV Intermittent every 12 hours    PRN Meds:  acetaminophen   Tablet .. 650 milliGRAM(s) Oral every 6 hours PRN Temp greater or equal to 38C (100.4F)  diphenhydrAMINE 25 milliGRAM(s) Oral once PRN Rash and/or Itching    HOME MEDICATIONS:  Senna 8.6 mg oral tablet: 1 tab(s) orally once a day (at bedtime)  simvastatin 20 mg oral tablet: 1 tab(s) orally once a day (at bedtime)      Vital Signs:   T(F): 98.9 (01-19-20 @ 05:00), Max: 98.9 (01-19-20 @ 05:00)  HR: 107 (01-19-20 @ 05:00) (67 - 107)  BP: 171/92 (01-19-20 @ 05:00) (109/91 - 171/92)  RR: 18 (01-19-20 @ 05:00) (18 - 18)  SpO2: 92% (01-19-20 @ 09:22) (92% - 92%)      01-18-20 @ 07:01  -  01-19-20 @ 07:00  --------------------------------------------------------  IN: 2150 mL / OUT: 500 mL / NET: 1650 mL        Physical Exam:   CONSTITUTIONAL: NAD, sleeping in bed, downs facies, lethargic   HEAD: board face; atraumatic  NECK: Supple; non tender, FROM  CARD: +S1, S2 Regular rate and rhythm.  RESP: decreased BS on L   ABD: soft ntnd, no rebound, no guarding, no rigidity  EXT: moves all extremities  NEURO: Responsive grossly unremarkable, no focal deficits        Labs:                         9.5    4.19  )-----------( 240      ( 19 Jan 2020 05:50 )             29.1     Neutophil% 58.8, Lymphocyte% 23.6, Monocyte% 8.4, Bands% 0.2 01-19-20 @ 05:50    19 Jan 2020 05:50    144    |  110    |  6      ----------------------------<  127    3.5     |  25     |  0.7      Ca    7.8        19 Jan 2020 05:50  Phos  2.4       18 Jan 2020 07:32  Mg     1.8       18 Jan 2020 07:32    TPro  4.1    /  Alb  2.2    /  TBili  0.3    /  DBili  x      /  AST  18     /  ALT  13     /  AlkPhos  76     19 Jan 2020 05:50

## 2020-01-20 ENCOUNTER — TRANSCRIPTION ENCOUNTER (OUTPATIENT)
Age: 55
End: 2020-01-20

## 2020-01-20 ENCOUNTER — RESULT REVIEW (OUTPATIENT)
Age: 55
End: 2020-01-20

## 2020-01-20 PROCEDURE — 88305 TISSUE EXAM BY PATHOLOGIST: CPT | Mod: 26

## 2020-01-20 PROCEDURE — 99233 SBSQ HOSP IP/OBS HIGH 50: CPT

## 2020-01-20 PROCEDURE — 71045 X-RAY EXAM CHEST 1 VIEW: CPT | Mod: 26,76

## 2020-01-20 PROCEDURE — 88112 CYTOPATH CELL ENHANCE TECH: CPT | Mod: 26

## 2020-01-20 RX ORDER — VANCOMYCIN HCL 1 G
1000 VIAL (EA) INTRAVENOUS ONCE
Refills: 0 | Status: COMPLETED | OUTPATIENT
Start: 2020-01-20 | End: 2020-01-20

## 2020-01-20 RX ORDER — VANCOMYCIN HCL 1 G
1000 VIAL (EA) INTRAVENOUS EVERY 12 HOURS
Refills: 0 | Status: DISCONTINUED | OUTPATIENT
Start: 2020-01-21 | End: 2020-01-23

## 2020-01-20 RX ORDER — MEPERIDINE HYDROCHLORIDE 50 MG/ML
25 INJECTION INTRAMUSCULAR; INTRAVENOUS; SUBCUTANEOUS ONCE
Refills: 0 | Status: DISCONTINUED | OUTPATIENT
Start: 2020-01-20 | End: 2020-01-20

## 2020-01-20 RX ORDER — VANCOMYCIN HCL 1 G
VIAL (EA) INTRAVENOUS
Refills: 0 | Status: DISCONTINUED | OUTPATIENT
Start: 2020-01-20 | End: 2020-01-23

## 2020-01-20 RX ORDER — MIDAZOLAM HYDROCHLORIDE 1 MG/ML
1 INJECTION, SOLUTION INTRAMUSCULAR; INTRAVENOUS ONCE
Refills: 0 | Status: DISCONTINUED | OUTPATIENT
Start: 2020-01-20 | End: 2020-01-20

## 2020-01-20 RX ADMIN — CHLORHEXIDINE GLUCONATE 1 APPLICATION(S): 213 SOLUTION TOPICAL at 05:23

## 2020-01-20 RX ADMIN — Medication 3 MILLILITER(S): at 13:12

## 2020-01-20 RX ADMIN — POLYETHYLENE GLYCOL 3350 17 GRAM(S): 17 POWDER, FOR SOLUTION ORAL at 14:57

## 2020-01-20 RX ADMIN — Medication 0.5 MILLIGRAM(S): at 05:23

## 2020-01-20 RX ADMIN — TAMSULOSIN HYDROCHLORIDE 0.4 MILLIGRAM(S): 0.4 CAPSULE ORAL at 21:03

## 2020-01-20 RX ADMIN — ENOXAPARIN SODIUM 40 MILLIGRAM(S): 100 INJECTION SUBCUTANEOUS at 11:32

## 2020-01-20 RX ADMIN — Medication 112 MICROGRAM(S): at 05:23

## 2020-01-20 RX ADMIN — MEROPENEM 100 MILLIGRAM(S): 1 INJECTION INTRAVENOUS at 05:23

## 2020-01-20 RX ADMIN — LAMOTRIGINE 100 MILLIGRAM(S): 25 TABLET, ORALLY DISINTEGRATING ORAL at 21:03

## 2020-01-20 RX ADMIN — MEROPENEM 100 MILLIGRAM(S): 1 INJECTION INTRAVENOUS at 21:03

## 2020-01-20 RX ADMIN — Medication 1 TABLET(S): at 14:57

## 2020-01-20 RX ADMIN — MEPERIDINE HYDROCHLORIDE 25 MILLIGRAM(S): 50 INJECTION INTRAMUSCULAR; INTRAVENOUS; SUBCUTANEOUS at 08:33

## 2020-01-20 RX ADMIN — Medication 3 MILLILITER(S): at 20:36

## 2020-01-20 RX ADMIN — MEROPENEM 100 MILLIGRAM(S): 1 INJECTION INTRAVENOUS at 13:24

## 2020-01-20 RX ADMIN — LAMOTRIGINE 75 MILLIGRAM(S): 25 TABLET, ORALLY DISINTEGRATING ORAL at 14:58

## 2020-01-20 RX ADMIN — MIDAZOLAM HYDROCHLORIDE 1 MILLIGRAM(S): 1 INJECTION, SOLUTION INTRAMUSCULAR; INTRAVENOUS at 08:36

## 2020-01-20 RX ADMIN — Medication 250 MILLIGRAM(S): at 18:00

## 2020-01-20 RX ADMIN — Medication 10 MILLIGRAM(S): at 13:24

## 2020-01-20 RX ADMIN — Medication 0.5 MILLIGRAM(S): at 17:16

## 2020-01-20 RX ADMIN — SIMVASTATIN 20 MILLIGRAM(S): 20 TABLET, FILM COATED ORAL at 21:03

## 2020-01-20 RX ADMIN — QUETIAPINE FUMARATE 25 MILLIGRAM(S): 200 TABLET, FILM COATED ORAL at 21:03

## 2020-01-20 NOTE — PRE-OP CHECKLIST - PATIENT PROBLEMS/NEEDS
Patient expressed no known problems or needs
Patient expressed no known problems or needs/Pt special needs

## 2020-01-20 NOTE — PROGRESS NOTE ADULT - SUBJECTIVE AND OBJECTIVE BOX
Patient is a 54y old  Female who presents with a chief complaint of weakness (16 Jan 2020 17:28)        SUBJECTIVE:  No events overnight. s/p repeat bronchoscopy today    REVIEW OF SYSTEMS:    CONSTITUTIONAL:   no fever   no chills.  no weight gain   no weight loss    EYES:   no discharge,   no pain  no redness,   no visual changes.    ENT:   Ears: no ear pain and no hearing problems.  Nose: no nasal congestion and no nasal drainage.  Mouth/Throat: no dysphagia,  no hoarseness and no throat pain.  Neck: no lumps, no pain, no stiffness and no swollen glands.     CARDIOVASCULAR:   no chest pain,   no swelling  no palpitaions  no syncope    RESPIRATORY:  no SOB,  no wheezing ,  no respiratory difficulty  + sputum production    GASTROINTESTINAL:   no abdominal pain,   no constipation,   no diarrhea,   no vomiting.    GENITOURINARY:  no dysuria,   no frequency,   no urgency  no hematuria.    MUSCULOSKELETAL:   no back pain,   no musculoskeletal pain,  no weakness.    SKIN:   no jaundice,   no lesions,   no pruritis,   no rashes.    NEURO:   no loss of consciousness,   no gait abnormality,   no headache,   no sensory deficits,   no weakness.    PSYCHIATRIC:   + dementia  no anxiety  no depression    ALLERGIC/IMMUNOLOGIC:   No active allergic or immunologic issues      PHYSICAL EXAM  Vital Signs Last 24 Hrs  T(C): 36.8 (20 Jan 2020 07:51), Max: 37.1 (19 Jan 2020 14:00)  T(F): 98.2 (20 Jan 2020 07:51), Max: 98.8 (19 Jan 2020 14:00)  HR: 54 (20 Jan 2020 07:51) (54 - 65)  BP: 112/61 (20 Jan 2020 07:51) (96/51 - 112/61)  BP(mean): --  RR: 18 (20 Jan 2020 07:51) (18 - 18)  SpO2: 95% (20 Jan 2020 07:51) (92% - 95%)    CONSTITUTIONAL:  Well nourished.  NAD    ENT:   Airway patent,   Nasal mucosa clear.  Mouth with normal mucosa.   Throat has no vesicles,  no oropharyngeal exudates and uvula is midline.  No thrush    EYES:   Clear bilaterally,   pupils equal,   round and reactive to light.    CARDIAC:   Normal rate,   regular rhythm.    Heart sounds S1, S2.   normal  cardiac impulse  no edema    CAROTID:   normal systolic impulse  no bruits    RESPIRATORY:   decreased breath sounds on left lung  normal chest expansion  not tachypneic,  no use of accessory muscles    GASTROINTESTINAL:  Abdomen soft,   non-tender,   no guarding,   + BS    MUSCULOSKELETAL:   range of motion is not limited,  no muscle or joint tenderness  no clubbing, cyanosis    NEUROLOGICAL:   Alert and oriented x1  no focal deficits in cranial nerve areas  no motor or sensory deficits.  pertinent DTRs normal      01-19-20 @ 07:01  -  01-20-20 @ 07:00  --------------------------------------------------------  IN:    dextrose 5% + sodium chloride 0.9%.: 900 mL  Total IN: 900 mL    OUT:    Indwelling Catheter - Suprapubic: 500 mL    Nephrostomy Tube: 200 mL  Total OUT: 700 mL    Total NET: 200 mL          LABS:                          9.5    4.19  )-----------( 240      ( 19 Jan 2020 05:50 )             29.1                                               01-19    144  |  110  |  6<L>  ----------------------------<  127<H>  3.5   |  25  |  0.7    Ca    7.8<L>      19 Jan 2020 05:50    TPro  4.1<L>  /  Alb  2.2<L>  /  TBili  0.3  /  DBili  x   /  AST  18  /  ALT  13  /  AlkPhos  76  01-19                                                                                           LIVER FUNCTIONS - ( 19 Jan 2020 05:50 )  Alb: 2.2 g/dL / Pro: 4.1 g/dL / ALK PHOS: 76 U/L / ALT: 13 U/L / AST: 18 U/L / GGT: x                                                                                                MEDICATIONS  (STANDING):  albuterol/ipratropium for Nebulization 3 milliLiter(s) Nebulizer every 6 hours  bisacodyl Suppository 10 milliGRAM(s) Rectal daily  calcium carbonate 1250 mG  + Vitamin D (OsCal 500 + D) 1 Tablet(s) Oral daily  chlorhexidine 4% Liquid 1 Application(s) Topical <User Schedule>  clonazePAM  Tablet 0.5 milliGRAM(s) Oral every 12 hours  dextrose 5% + sodium chloride 0.9%. 1000 milliLiter(s) (75 mL/Hr) IV Continuous <Continuous>  enoxaparin Injectable 40 milliGRAM(s) SubCutaneous daily  influenza   Vaccine 0.5 milliLiter(s) IntraMuscular once  lamoTRIgine 75 milliGRAM(s) Oral daily  lamoTRIgine 100 milliGRAM(s) Oral at bedtime  levothyroxine 112 MICROGram(s) Oral daily  meropenem  IVPB      meropenem  IVPB 1000 milliGRAM(s) IV Intermittent every 8 hours  polyethylene glycol 3350 17 Gram(s) Oral daily  QUEtiapine 25 milliGRAM(s) Oral at bedtime  simvastatin 20 milliGRAM(s) Oral at bedtime  sodium chloride 0.9% for Nebulization 3 milliLiter(s) Nebulizer every 4 hours  tamsulosin 0.4 milliGRAM(s) Oral at bedtime  vancomycin  IVPB      vancomycin  IVPB 1250 milliGRAM(s) IV Intermittent every 12 hours    MEDICATIONS  (PRN):  acetaminophen   Tablet .. 650 milliGRAM(s) Oral every 6 hours PRN Temp greater or equal to 38C (100.4F)  diphenhydrAMINE 25 milliGRAM(s) Oral once PRN Rash and/or Itching

## 2020-01-20 NOTE — CHART NOTE - NSCHARTNOTEFT_GEN_A_CORE
Registered Dietitian Follow-Up     ****Scroll for RD Recommendations at bottom of note****    Patient Profile Reviewed                           Yes [x]   No []     Nutrition History Previously Obtained        Yes [x]  No []       Pertinent Subjective Information: Pending plan for PEG placement as family is agreeable. Pt currently without diet order, previously pureed nectar diet consistency with very poor PO intake. Noted in EMR, yesterday received/consumed pureed diet with good PO intake 0-75%.     Pertinent Medical Interventions: L lung collapse, pulm follows, went for repeat bronchoscopy today, deep suctioning. PEG placement when pulm status improved.     Diet order: no active diet order (previously pureed nectar)     Anthropometrics:  - Ht.154.9 cm  - Wt. 78.1kg (1/15) and (1/20)  - %wt change:   - BMI: 32.6  - IBW: 47.7 kg+/-10%     Pertinent Lab Data: (1/19) H/H 9.5/29.1, BUN 6, s gluc 127     Pertinent Meds: lovenox, abx, D5NS 75ml/h, albuterol, dulcolax, miralax, oscal 500 + D, synthroid, zocor, flomax     Physical Findings:  - Appearance: Disoriented X4 per MD, non-verbal   - GI function: last BM 1/19, fecal incont  - Tubes:  - Oral/Mouth cavity: 1/15  SLP recommended NPO w/ non-oral means of nutrition.   - Skin: healing stage II L buttocks     Nutrition Requirements: (As per previous RD assessment (1/13)  Weight Used: Ideal BW 47.7 kg     Estimated Energy Needs    Continue [X]  Adjust [] 1431 - 1669 kcals/day (Based on 30 - 35kcal/kg IBW)     Estimated Protein Needs    Continue [X]  Adjust [] 59 - 72g (1.25 - 1.5g/kg IBW) Increased needs d/t Pressure Ulcer stage II    Estimated Fluid Needs        Continue []  Adjust [] Fluid: 1mL/Kcal or per LIP    Nutrient Intake: Not meeting needs, plan for PEG         [] Previous Nutrition Diagnosis: inadequate energy intake            [x] Ongoing          [] Resolved    [] No active nutrition diagnosis identified at this time        Nutrition Intervention Enteral nutrition  RECOMMENDATION: Once PEG placed,  consider Jevity 1.2 @ 240mL q 4hrs (Hold 2AM feed for total 5 feeds/day). This provides 1440 kcal, 66gms protein and 972mL free water. Additional flushes per LIP.    Goal/Expected Outcome: Once PEG placed and EN intitiated, pt to tolerate and meet 85 - 105% of needs via EN in 3 days.     Indicator/Monitoring: RD to monitor Energy Intake, Body Composition, Physical Focused Findings, Diet Order.     Pt at risk f/u 3 days

## 2020-01-20 NOTE — PROGRESS NOTE ADULT - ATTENDING COMMENTS
Patient seen and examined independently. I agree with the resident's note, physical exam, and plan except as below.  Vital Signs Last 24 Hrs  T(C): 36.5 (20 Jan 2020 12:35), Max: 37.1 (19 Jan 2020 21:03)  T(F): 97.7 (20 Jan 2020 12:35), Max: 98.8 (20 Jan 2020 05:00)  HR: 59 (20 Jan 2020 12:35) (52 - 65)  BP: 104/52 (20 Jan 2020 12:35) (95/54 - 112/61)  BP(mean): --  RR: 19 (20 Jan 2020 12:35) (18 - 19)  SpO2: 91% (20 Jan 2020 09:17) (91% - 99%)    #lleft lung collapse - mucous plugging - going for repeat bronch today - follow up with pulmonary recs after  cont abxs for possible GNR pna , check MRSA nasal swab  #decreased po intake - possible PEG when pulmonary status improved - NUtrional eval Dr forte  #psuedomonas UTI - on nataliia  #downs - at baseline     discussed with resident and rn on rounds

## 2020-01-20 NOTE — PROGRESS NOTE ADULT - ASSESSMENT
IMPRESSION:    Left mucous plug with lung collapse likely due to pneumonia; s/p repeat bronchoscopy with mucous plug extraction     RECOMMEND:    Aspiration precautions  Continue ABX  Follow up final cultures/cytology  Aggressive pulmonary toilet   Suctioning prn  Nebs prn  Saline nebulizer  HOB >45  DVT prophylaxis

## 2020-01-20 NOTE — PROGRESS NOTE ADULT - SUBJECTIVE AND OBJECTIVE BOX
Hospital Day:  9d    Subjective:    Pt was interviewed and examined at the bedside in the AM. No acute events overnight.   Unable to assess ROS.   Went for repeat bronchoscopy today.       Past Medical Hx:   Seizures  Intellectual disability  Hypothyroid  Impulse control disorder in adult  Down's syndrome    Past Sx:  No significant past surgical history    Allergies:  No Known Allergies    Current Meds:   Standng Meds:  albuterol/ipratropium for Nebulization 3 milliLiter(s) Nebulizer every 6 hours  bisacodyl Suppository 10 milliGRAM(s) Rectal daily  calcium carbonate 1250 mG  + Vitamin D (OsCal 500 + D) 1 Tablet(s) Oral daily  chlorhexidine 4% Liquid 1 Application(s) Topical <User Schedule>  clonazePAM  Tablet 0.5 milliGRAM(s) Oral every 12 hours  dextrose 5% + sodium chloride 0.9%. 1000 milliLiter(s) (75 mL/Hr) IV Continuous <Continuous>  enoxaparin Injectable 40 milliGRAM(s) SubCutaneous daily  influenza   Vaccine 0.5 milliLiter(s) IntraMuscular once  lamoTRIgine 75 milliGRAM(s) Oral daily  lamoTRIgine 100 milliGRAM(s) Oral at bedtime  levothyroxine 112 MICROGram(s) Oral daily  meropenem  IVPB      meropenem  IVPB 1000 milliGRAM(s) IV Intermittent every 8 hours  midazolam Injectable 1 milliGRAM(s) IV Push once  polyethylene glycol 3350 17 Gram(s) Oral daily  QUEtiapine 25 milliGRAM(s) Oral at bedtime  simvastatin 20 milliGRAM(s) Oral at bedtime  sodium chloride 0.9% for Nebulization 3 milliLiter(s) Nebulizer every 4 hours  tamsulosin 0.4 milliGRAM(s) Oral at bedtime  vancomycin  IVPB      vancomycin  IVPB 1250 milliGRAM(s) IV Intermittent every 12 hours    PRN Meds:  acetaminophen   Tablet .. 650 milliGRAM(s) Oral every 6 hours PRN Temp greater or equal to 38C (100.4F)  diphenhydrAMINE 25 milliGRAM(s) Oral once PRN Rash and/or Itching    HOME MEDICATIONS:  Senna 8.6 mg oral tablet: 1 tab(s) orally once a day (at bedtime)  simvastatin 20 mg oral tablet: 1 tab(s) orally once a day (at bedtime)      Vital Signs:   T(F): 98.2 (01-20-20 @ 07:51), Max: 98.8 (01-19-20 @ 14:00)  HR: 52 (01-20-20 @ 09:17) (52 - 65)  BP: 95/54 (01-20-20 @ 09:17) (95/54 - 112/61)  RR: 18 (01-20-20 @ 09:17) (18 - 19)  SpO2: 91% (01-20-20 @ 09:17) (91% - 99%)      01-19-20 @ 07:01  -  01-20-20 @ 07:00  --------------------------------------------------------  IN: 900 mL / OUT: 700 mL / NET: 200 mL        Physical Exam:   CONSTITUTIONAL: NAD, sleeping in bed, downs facies, lethargic   HEAD: board face; atraumatic  NECK: Supple; non tender, FROM  CARD: +S1, S2 Regular rate and rhythm.  RESP: decreased BS on L   ABD: soft ntnd, no rebound, no guarding, no rigidity  EXT: moves all extremities  NEURO: Responsive grossly unremarkable, no focal deficits          Labs:                         9.5    4.19  )-----------( 240      ( 19 Jan 2020 05:50 )             29.1       19 Jan 2020 05:50    144    |  110    |  6      ----------------------------<  127    3.5     |  25     |  0.7      Ca    7.8        19 Jan 2020 05:50    TPro  4.1    /  Alb  2.2    /  TBili  0.3    /  DBili  x      /  AST  18     /  ALT  13     /  AlkPhos  76     19 Jan 2020 05:50                            Radiology:

## 2020-01-21 LAB
ALBUMIN SERPL ELPH-MCNC: 2.1 G/DL — LOW (ref 3.5–5.2)
ALP SERPL-CCNC: 82 U/L — SIGNIFICANT CHANGE UP (ref 30–115)
ALT FLD-CCNC: 20 U/L — SIGNIFICANT CHANGE UP (ref 0–41)
ANION GAP SERPL CALC-SCNC: 8 MMOL/L — SIGNIFICANT CHANGE UP (ref 7–14)
AST SERPL-CCNC: 20 U/L — SIGNIFICANT CHANGE UP (ref 0–41)
BASOPHILS # BLD AUTO: 0.1 K/UL — SIGNIFICANT CHANGE UP (ref 0–0.2)
BASOPHILS NFR BLD AUTO: 2.2 % — HIGH (ref 0–1)
BILIRUB SERPL-MCNC: 0.2 MG/DL — SIGNIFICANT CHANGE UP (ref 0.2–1.2)
BUN SERPL-MCNC: 4 MG/DL — LOW (ref 10–20)
CALCIUM SERPL-MCNC: 7.9 MG/DL — LOW (ref 8.5–10.1)
CHLORIDE SERPL-SCNC: 109 MMOL/L — SIGNIFICANT CHANGE UP (ref 98–110)
CO2 SERPL-SCNC: 26 MMOL/L — SIGNIFICANT CHANGE UP (ref 17–32)
CREAT SERPL-MCNC: 0.9 MG/DL — SIGNIFICANT CHANGE UP (ref 0.7–1.5)
EOSINOPHIL # BLD AUTO: 0.26 K/UL — SIGNIFICANT CHANGE UP (ref 0–0.7)
EOSINOPHIL NFR BLD AUTO: 5.6 % — SIGNIFICANT CHANGE UP (ref 0–8)
GLUCOSE SERPL-MCNC: 103 MG/DL — HIGH (ref 70–99)
HCT VFR BLD CALC: 31.3 % — LOW (ref 37–47)
HGB BLD-MCNC: 10.1 G/DL — LOW (ref 12–16)
IMM GRANULOCYTES NFR BLD AUTO: 0.9 % — HIGH (ref 0.1–0.3)
LYMPHOCYTES # BLD AUTO: 1.02 K/UL — LOW (ref 1.2–3.4)
LYMPHOCYTES # BLD AUTO: 22 % — SIGNIFICANT CHANGE UP (ref 20.5–51.1)
MCHC RBC-ENTMCNC: 31.6 PG — HIGH (ref 27–31)
MCHC RBC-ENTMCNC: 32.3 G/DL — SIGNIFICANT CHANGE UP (ref 32–37)
MCV RBC AUTO: 97.8 FL — SIGNIFICANT CHANGE UP (ref 81–99)
MONOCYTES # BLD AUTO: 0.44 K/UL — SIGNIFICANT CHANGE UP (ref 0.1–0.6)
MONOCYTES NFR BLD AUTO: 9.5 % — HIGH (ref 1.7–9.3)
NEUTROPHILS # BLD AUTO: 2.78 K/UL — SIGNIFICANT CHANGE UP (ref 1.4–6.5)
NEUTROPHILS NFR BLD AUTO: 59.8 % — SIGNIFICANT CHANGE UP (ref 42.2–75.2)
NRBC # BLD: 0 /100 WBCS — SIGNIFICANT CHANGE UP (ref 0–0)
PLATELET # BLD AUTO: 271 K/UL — SIGNIFICANT CHANGE UP (ref 130–400)
POTASSIUM SERPL-MCNC: 3.4 MMOL/L — LOW (ref 3.5–5)
POTASSIUM SERPL-SCNC: 3.4 MMOL/L — LOW (ref 3.5–5)
PROT SERPL-MCNC: 4.3 G/DL — LOW (ref 6–8)
RBC # BLD: 3.2 M/UL — LOW (ref 4.2–5.4)
RBC # FLD: 14.2 % — SIGNIFICANT CHANGE UP (ref 11.5–14.5)
SODIUM SERPL-SCNC: 143 MMOL/L — SIGNIFICANT CHANGE UP (ref 135–146)
WBC # BLD: 4.64 K/UL — LOW (ref 4.8–10.8)
WBC # FLD AUTO: 4.64 K/UL — LOW (ref 4.8–10.8)

## 2020-01-21 PROCEDURE — 71045 X-RAY EXAM CHEST 1 VIEW: CPT | Mod: 26

## 2020-01-21 PROCEDURE — 99233 SBSQ HOSP IP/OBS HIGH 50: CPT

## 2020-01-21 RX ORDER — POTASSIUM CHLORIDE 20 MEQ
20 PACKET (EA) ORAL
Refills: 0 | Status: COMPLETED | OUTPATIENT
Start: 2020-01-21 | End: 2020-01-21

## 2020-01-21 RX ADMIN — SODIUM CHLORIDE 3 MILLILITER(S): 9 INJECTION INTRAMUSCULAR; INTRAVENOUS; SUBCUTANEOUS at 21:00

## 2020-01-21 RX ADMIN — TAMSULOSIN HYDROCHLORIDE 0.4 MILLIGRAM(S): 0.4 CAPSULE ORAL at 22:56

## 2020-01-21 RX ADMIN — Medication 0.5 MILLIGRAM(S): at 05:20

## 2020-01-21 RX ADMIN — Medication 250 MILLIGRAM(S): at 17:27

## 2020-01-21 RX ADMIN — QUETIAPINE FUMARATE 25 MILLIGRAM(S): 200 TABLET, FILM COATED ORAL at 22:56

## 2020-01-21 RX ADMIN — Medication 50 MILLIEQUIVALENT(S): at 11:10

## 2020-01-21 RX ADMIN — MEROPENEM 100 MILLIGRAM(S): 1 INJECTION INTRAVENOUS at 13:14

## 2020-01-21 RX ADMIN — Medication 250 MILLIGRAM(S): at 05:18

## 2020-01-21 RX ADMIN — POLYETHYLENE GLYCOL 3350 17 GRAM(S): 17 POWDER, FOR SOLUTION ORAL at 11:11

## 2020-01-21 RX ADMIN — Medication 50 MILLIEQUIVALENT(S): at 08:45

## 2020-01-21 RX ADMIN — Medication 3 MILLILITER(S): at 19:57

## 2020-01-21 RX ADMIN — Medication 3 MILLILITER(S): at 08:07

## 2020-01-21 RX ADMIN — MEROPENEM 100 MILLIGRAM(S): 1 INJECTION INTRAVENOUS at 22:56

## 2020-01-21 RX ADMIN — LAMOTRIGINE 75 MILLIGRAM(S): 25 TABLET, ORALLY DISINTEGRATING ORAL at 11:09

## 2020-01-21 RX ADMIN — Medication 1 TABLET(S): at 11:09

## 2020-01-21 RX ADMIN — Medication 3 MILLILITER(S): at 13:16

## 2020-01-21 RX ADMIN — ENOXAPARIN SODIUM 40 MILLIGRAM(S): 100 INJECTION SUBCUTANEOUS at 11:11

## 2020-01-21 RX ADMIN — SODIUM CHLORIDE 50 MILLILITER(S): 9 INJECTION, SOLUTION INTRAVENOUS at 09:11

## 2020-01-21 RX ADMIN — LAMOTRIGINE 100 MILLIGRAM(S): 25 TABLET, ORALLY DISINTEGRATING ORAL at 22:56

## 2020-01-21 RX ADMIN — Medication 112 MICROGRAM(S): at 05:18

## 2020-01-21 RX ADMIN — SIMVASTATIN 20 MILLIGRAM(S): 20 TABLET, FILM COATED ORAL at 22:56

## 2020-01-21 RX ADMIN — CHLORHEXIDINE GLUCONATE 1 APPLICATION(S): 213 SOLUTION TOPICAL at 05:18

## 2020-01-21 RX ADMIN — Medication 0.5 MILLIGRAM(S): at 17:27

## 2020-01-21 RX ADMIN — MEROPENEM 100 MILLIGRAM(S): 1 INJECTION INTRAVENOUS at 05:18

## 2020-01-21 RX ADMIN — Medication 10 MILLIGRAM(S): at 11:11

## 2020-01-21 NOTE — PROGRESS NOTE ADULT - SUBJECTIVE AND OBJECTIVE BOX
Hospital Day:  10d    Subjective:    Pt was interviewed and examined at the bedside in the AM. No acute events overnight.   Unable to assess ROS. s/p bronchoscopy yesterday. Requiring less supplemental O2.       Past Medical Hx:   Seizures  Intellectual disability  Hypothyroid  Impulse control disorder in adult  Down's syndrome    Past Sx:  No significant past surgical history    Allergies:  No Known Allergies    Current Meds:   Standng Meds:  albuterol/ipratropium for Nebulization 3 milliLiter(s) Nebulizer every 6 hours  bisacodyl Suppository 10 milliGRAM(s) Rectal daily  calcium carbonate 1250 mG  + Vitamin D (OsCal 500 + D) 1 Tablet(s) Oral daily  chlorhexidine 4% Liquid 1 Application(s) Topical <User Schedule>  clonazePAM  Tablet 0.5 milliGRAM(s) Oral every 12 hours  dextrose 5% + sodium chloride 0.9%. 1000 milliLiter(s) (50 mL/Hr) IV Continuous <Continuous>  enoxaparin Injectable 40 milliGRAM(s) SubCutaneous daily  influenza   Vaccine 0.5 milliLiter(s) IntraMuscular once  lamoTRIgine 75 milliGRAM(s) Oral daily  lamoTRIgine 100 milliGRAM(s) Oral at bedtime  levothyroxine 112 MICROGram(s) Oral daily  meropenem  IVPB      meropenem  IVPB 1000 milliGRAM(s) IV Intermittent every 8 hours  polyethylene glycol 3350 17 Gram(s) Oral daily  potassium chloride  20 mEq/100 mL IVPB 20 milliEquivalent(s) IV Intermittent every 2 hours  QUEtiapine 25 milliGRAM(s) Oral at bedtime  simvastatin 20 milliGRAM(s) Oral at bedtime  sodium chloride 0.9% for Nebulization 3 milliLiter(s) Nebulizer every 4 hours  tamsulosin 0.4 milliGRAM(s) Oral at bedtime  vancomycin  IVPB      vancomycin  IVPB 1000 milliGRAM(s) IV Intermittent every 12 hours    PRN Meds:  acetaminophen   Tablet .. 650 milliGRAM(s) Oral every 6 hours PRN Temp greater or equal to 38C (100.4F)  diphenhydrAMINE 25 milliGRAM(s) Oral once PRN Rash and/or Itching    HOME MEDICATIONS:  Senna 8.6 mg oral tablet: 1 tab(s) orally once a day (at bedtime)  simvastatin 20 mg oral tablet: 1 tab(s) orally once a day (at bedtime)      Vital Signs:   T(F): 99.2 (01-21-20 @ 06:18), Max: 99.2 (01-21-20 @ 06:18)  HR: 100 (01-21-20 @ 06:18) (59 - 100)  BP: 155/55 (01-21-20 @ 06:18) (104/52 - 155/55)  RR: 17 (01-21-20 @ 06:18) (17 - 20)  SpO2: 94% (01-20-20 @ 20:00) (94% - 94%)      01-20-20 @ 07:01  -  01-21-20 @ 07:00  --------------------------------------------------------  IN: 1025 mL / OUT: 525 mL / NET: 500 mL    01-21-20 @ 07:01  -  01-21-20 @ 10:09  --------------------------------------------------------  IN: 50 mL / OUT: 0 mL / NET: 50 mL        Physical Exam:   CONSTITUTIONAL: NAD, sleeping in bed, downs facies, lethargic   HEAD: board face; atraumatic  NECK: Supple; non tender, FROM  CARD: +S1, S2 Regular rate and rhythm.  RESP: decreased BS on L   ABD: soft ntnd, no rebound, no guarding, no rigidity  EXT: moves all extremities  NEURO: Responsive grossly unremarkable, no focal deficits    Labs:                         10.1   4.64  )-----------( 271      ( 21 Jan 2020 05:47 )             31.3     Neutophil% 59.8, Lymphocyte% 22.0, Monocyte% 9.5, Bands% 0.9 01-21-20 @ 05:47    21 Jan 2020 05:47    143    |  109    |  4      ----------------------------<  103    3.4     |  26     |  0.9      Ca    7.9        21 Jan 2020 05:47    TPro  4.3    /  Alb  2.1    /  TBili  0.2    /  DBili  x      /  AST  20     /  ALT  20     /  AlkPhos  82     21 Jan 2020 05:47

## 2020-01-21 NOTE — PROGRESS NOTE ADULT - ASSESSMENT
55 yo female with PMH Down's syndrome, nonverbal, Alzheimer's dementia, seizure d/o, hypothyroidism brought in from group home for evaluation of decreased energy.     #L Lung Collapse   - Pulmonary following   - Chest PT   - Deep suctioning  - Neb saline   - s/p bronchoscopy x2  - vanco and nataliia for possible PNA  - Daily CXR   - CXR significantly improved post 2nd bronch  - f/u repeat bronchial cultures     # Decreased PO intake and Weakness  - no apparent metabolic encephalopathy  - blood work is unremarkable  - IV hydration   - Failed S+S due to lethargy   - Family agreeable to PEG tube  - GI following   - PEG once stable will need med and pulm clearance    # Pseudomonas UTI   - resolved     #Constipation   - On suppository     # Down Syndrome With Dementia, Seizure Disorder:  - non verbal at baseline  - Continue with Lamictal, olanzapine, and Klonopin   - f/u repeat Lamictal level    # Hypothyroidism:  - Continue with Synthroid 112  - TSH 0.25     DVT Prophylaxis: lovenox  GI ppx: not indicated  Soft diet   Disposition: from group home    HCP and legal guardian [phone] Madisyn pearl (Sister) 55 yo female with PMH Down's syndrome, nonverbal, Alzheimer's dementia, seizure d/o, hypothyroidism brought in from group home for evaluation of decreased energy.     #L Lung Collapse   - Pulmonary following   - Chest PT   - Deep suctioning  - Neb saline   - s/p bronchoscopy x2  - vanco and nataliia for possible PNA until 24th  - Daily CXR   - CXR significantly improved post 2nd bronch  - f/u repeat bronchial cultures     # Decreased PO intake and Weakness  - no apparent metabolic encephalopathy  - blood work is unremarkable  - IV hydration   - Failed S+S due to lethargy   - Family agreeable to PEG tube  - GI following   - PEG once stable will need med and pulm clearance    # Pseudomonas UTI   - resolved     #Constipation   - On suppository     # Down Syndrome With Dementia, Seizure Disorder:  - non verbal at baseline  - Continue with Lamictal, olanzapine, and Klonopin   - f/u repeat Lamictal level    # Hypothyroidism:  - Continue with Synthroid 112  - TSH 0.25     DVT Prophylaxis: lovenox  GI ppx: not indicated  Soft diet   Disposition: from group home    HCP and legal guardian [phone] Madisyn pearl (Sister) 53 yo female with PMH Down's syndrome, nonverbal, Alzheimer's dementia, seizure d/o, hypothyroidism brought in from group home for evaluation of decreased energy.     #L Lung Collapse   - Pulmonary following   - Chest PT   - Deep suctioning  - Neb saline   - s/p bronchoscopy x2  - vanco and nataliia for possible PNA until 24th  - Daily CXR   - CXR significantly improved post 2nd bronch  - f/u repeat bronchial cultures     # Decreased PO intake and Weakness  - no apparent metabolic encephalopathy  - blood work is unremarkable  - IV hydration   - Failed S+S due to lethargy   - Family agreeable to PEG tube  - GI following   - PEG once stable will need med and pulm clearance    # Pseudomonas UTI   - resolved     #Constipation   - On suppository     # Down Syndrome With Dementia, Seizure Disorder:  - non verbal at baseline  - Continue with Lamictal, olanzapine, and Klonopin   - f/u repeat Lamictal level    # Hypothyroidism:  - Continue with Synthroid 112  - TSH 0.25     Stage 2 decubitus present on admission    DVT Prophylaxis: lovenox  GI ppx: not indicated  Soft diet   Disposition: from group home    HCP and legal guardian [phone] Madisyn pearl (Sister)

## 2020-01-21 NOTE — PROGRESS NOTE ADULT - SUBJECTIVE AND OBJECTIVE BOX
Patient is a 54y old  Female who presents with a chief complaint of weakness (20 Jan 2020 08:51)        SUBJECTIVE:  No events overnight. unchanged status. afebrile    REVIEW OF SYSTEMS:    CONSTITUTIONAL:   no fever   no chills.  no weight gain   no weight loss    EYES:   no discharge,   no pain  no redness,   no visual changes.    ENT:   Ears: no ear pain and no hearing problems.  Nose: no nasal congestion and no nasal drainage.  Mouth/Throat: no dysphagia,  no hoarseness and no throat pain.  Neck: no lumps, no pain, no stiffness and no swollen glands.     CARDIOVASCULAR:   no chest pain,   no swelling  no palpitaions  no syncope    RESPIRATORY:  no SOB,  no wheezing ,  no respiratory difficulty  no sputum production    GASTROINTESTINAL:   no abdominal pain,   no constipation,   no diarrhea,   no vomiting.    GENITOURINARY:  no dysuria,   no frequency,   no urgency  no hematuria.    MUSCULOSKELETAL:   no back pain,   no musculoskeletal pain,  no weakness.    SKIN:   no jaundice,   no lesions,   no pruritis,   no rashes.    NEURO:   no loss of consciousness,   no gait abnormality,   no headache,   no sensory deficits,   no weakness.    PSYCHIATRIC:   + alzheimer's disease    ALLERGIC/IMMUNOLOGIC:   No active allergic or immunologic issues      PHYSICAL EXAM  Vital Signs Last 24 Hrs  T(C): 37.3 (21 Jan 2020 06:18), Max: 37.3 (21 Jan 2020 06:18)  T(F): 99.2 (21 Jan 2020 06:18), Max: 99.2 (21 Jan 2020 06:18)  HR: 100 (21 Jan 2020 06:18) (59 - 100)  BP: 155/55 (21 Jan 2020 06:18) (104/52 - 155/55)  BP(mean): --  RR: 17 (21 Jan 2020 06:18) (17 - 20)  SpO2: 94% (20 Jan 2020 20:00) (94% - 94%) 3L NC    CONSTITUTIONAL:  Well nourished.  NAD    ENT:   Airway patent,   Nasal mucosa clear.  Mouth with normal mucosa.   Throat has no vesicles,  no oropharyngeal exudates and uvula is midline.  No thrush    EYES:   Clear bilaterally,   pupils equal,   round and reactive to light.    CARDIAC:   Normal rate,   regular rhythm.    Heart sounds S1, S2.   normal  cardiac impulse  no edema      CAROTID:   normal systolic impulse  no bruits    RESPIRATORY:   decreased breath sounds over left base  normal chest expansion  not tachypneic,  no use of accessory muscles    GASTROINTESTINAL:  Abdomen soft,   non-tender,   no guarding,   + BS    MUSCULOSKELETAL:   range of motion is not limited,  no muscle or joint tenderness  no clubbing, cyanosis    NEUROLOGICAL:  nonverbal  Alert and oriented x1  no focal deficits in cranial nerve areas  no motor or sensory deficits.  pertinent DTRs normal      01-20-20 @ 07:01  -  01-21-20 @ 07:00  --------------------------------------------------------  IN:    dextrose 5% + sodium chloride 0.9%.: 975 mL    IV PiggyBack: 50 mL  Total IN: 1025 mL    OUT:    Indwelling Catheter - Suprapubic: 525 mL  Total OUT: 525 mL    Total NET: 500 mL      01-21-20 @ 07:01  -  01-21-20 @ 10:33  --------------------------------------------------------  IN:    Oral Fluid: 50 mL  Total IN: 50 mL    OUT:  Total OUT: 0 mL    Total NET: 50 mL          LABS:                          10.1   4.64  )-----------( 271      ( 21 Jan 2020 05:47 )             31.3                                               01-21    143  |  109  |  4<L>  ----------------------------<  103<H>  3.4<L>   |  26  |  0.9    Ca    7.9<L>      21 Jan 2020 05:47    TPro  4.3<L>  /  Alb  2.1<L>  /  TBili  0.2  /  DBili  x   /  AST  20  /  ALT  20  /  AlkPhos  82  01-21                                                                                           LIVER FUNCTIONS - ( 21 Jan 2020 05:47 )  Alb: 2.1 g/dL / Pro: 4.3 g/dL / ALK PHOS: 82 U/L / ALT: 20 U/L / AST: 20 U/L / GGT: x                                                  Culture - Bronchial (collected 20 Jan 2020 08:30)  Source: .Bronchial None  Gram Stain (21 Jan 2020 04:07):    Numerous polymorphonuclear leukocytes seen per low power field    Rare Squamous epithelial cells seen per low power field    Rare Yeast like cells seen per oil power field                                                    MEDICATIONS  (STANDING):  albuterol/ipratropium for Nebulization 3 milliLiter(s) Nebulizer every 6 hours  bisacodyl Suppository 10 milliGRAM(s) Rectal daily  calcium carbonate 1250 mG  + Vitamin D (OsCal 500 + D) 1 Tablet(s) Oral daily  chlorhexidine 4% Liquid 1 Application(s) Topical <User Schedule>  clonazePAM  Tablet 0.5 milliGRAM(s) Oral every 12 hours  dextrose 5% + sodium chloride 0.9%. 1000 milliLiter(s) (50 mL/Hr) IV Continuous <Continuous>  enoxaparin Injectable 40 milliGRAM(s) SubCutaneous daily  influenza   Vaccine 0.5 milliLiter(s) IntraMuscular once  lamoTRIgine 75 milliGRAM(s) Oral daily  lamoTRIgine 100 milliGRAM(s) Oral at bedtime  levothyroxine 112 MICROGram(s) Oral daily  meropenem  IVPB      meropenem  IVPB 1000 milliGRAM(s) IV Intermittent every 8 hours  polyethylene glycol 3350 17 Gram(s) Oral daily  potassium chloride  20 mEq/100 mL IVPB 20 milliEquivalent(s) IV Intermittent every 2 hours  QUEtiapine 25 milliGRAM(s) Oral at bedtime  simvastatin 20 milliGRAM(s) Oral at bedtime  sodium chloride 0.9% for Nebulization 3 milliLiter(s) Nebulizer every 4 hours  tamsulosin 0.4 milliGRAM(s) Oral at bedtime  vancomycin  IVPB      vancomycin  IVPB 1000 milliGRAM(s) IV Intermittent every 12 hours    MEDICATIONS  (PRN):  acetaminophen   Tablet .. 650 milliGRAM(s) Oral every 6 hours PRN Temp greater or equal to 38C (100.4F)  diphenhydrAMINE 25 milliGRAM(s) Oral once PRN Rash and/or Itching

## 2020-01-21 NOTE — PROGRESS NOTE ADULT - ATTENDING COMMENTS
53 yo female with PMH Down's syndrome, nonverbal, Alzheimer's dementia, seizure d/o, hypothyroidism brought in from group home for evaluation of decreased energy.     #L Lung Collapse :  s/p 2 bronchoscopies this admission.  Now getting better on Vanc (day 7)/Tomasa (day 11) [suspected GNR PNA]  mucinex, chest PT PRN.   Take off all Abx on 1/24.     Get pulm clearance for PEG insertion now.     #Metabolic encephalopathy:   more awake now that PNA better.   encourage to eat more.    # Pseudomonas UTI   - resolved (with initial Cipro and now Tomasa)    #Constipation   - On suppository     # Down Syndrome With Dementia, Seizure Disorder:  - non verbal at baseline  - Continue with Lamictal, olanzapine, and Klonopin   - f/u repeat Lamictal level    # Hypothyroidism:  - Continue with Synthroid 112  - TSH 0.25     DVT Prophylaxis: lovenox  GI ppx: not indicated  Soft diet   Disposition: from group home    HCP and legal guardian [phone] Madisyn pearl (Sister) .

## 2020-01-21 NOTE — PROGRESS NOTE ADULT - ASSESSMENT
IMPRESSION:    Left mucous plug with lung collapse likely due to pneumonia; s/p repeat bronchoscopy with mucous plug extraction  Moderate to high risk for post-op pulmonary complications     RECOMMEND:    Aspiration precautions  Continue Vanco/Tomasa  Follow up final cultures/cytology  Aggressive pulmonary toilet   Suctioning prn  Nebs prn  Saline nebulizer  HOB >45  DVT prophylaxis IMPRESSION:    Left mucous plug with lung collapse likely due to pneumonia; s/p repeat bronchoscopy with mucous plug extraction  Not cleared for procedure from pulmonary standpoint    RECOMMEND:    Aspiration precautions  Repeat CXR in 48 hrs  Finish Vanc/Tomasa course  Follow up final cultures/cytology  Aggressive pulmonary toilet   Suctioning prn  Nebs prn  Saline nebulizer  HOB >45  DVT prophylaxis IMPRESSION:    Left mucous plug with lung collapse likely due to pneumonia; s/p repeat bronchoscopy with mucous plug extraction  Not cleared for procedure from pulmonary standpoint    RECOMMEND:    Aspiration precautions  Repeat CXR in 48 hrs  Continue ABX.  DC Vanc if MRSA negative   Follow up final cultures/cytology  Aggressive pulmonary toilet   Oral care   Nebs prn  Saline nebulizer  HOB >45  DVT prophylaxis

## 2020-01-22 LAB
ALBUMIN SERPL ELPH-MCNC: 2 G/DL — LOW (ref 3.5–5.2)
ALP SERPL-CCNC: 82 U/L — SIGNIFICANT CHANGE UP (ref 30–115)
ALT FLD-CCNC: 20 U/L — SIGNIFICANT CHANGE UP (ref 0–41)
ANION GAP SERPL CALC-SCNC: 9 MMOL/L — SIGNIFICANT CHANGE UP (ref 7–14)
AST SERPL-CCNC: 27 U/L — SIGNIFICANT CHANGE UP (ref 0–41)
BASE EXCESS BLDA CALC-SCNC: 3.3 MMOL/L — HIGH (ref -2–2)
BASOPHILS # BLD AUTO: 0.08 K/UL — SIGNIFICANT CHANGE UP (ref 0–0.2)
BASOPHILS NFR BLD AUTO: 2.3 % — HIGH (ref 0–1)
BILIRUB SERPL-MCNC: 0.2 MG/DL — SIGNIFICANT CHANGE UP (ref 0.2–1.2)
BUN SERPL-MCNC: 4 MG/DL — LOW (ref 10–20)
CALCIUM SERPL-MCNC: 8 MG/DL — LOW (ref 8.5–10.1)
CHLORIDE SERPL-SCNC: 111 MMOL/L — HIGH (ref 98–110)
CO2 SERPL-SCNC: 27 MMOL/L — SIGNIFICANT CHANGE UP (ref 17–32)
CREAT SERPL-MCNC: 0.8 MG/DL — SIGNIFICANT CHANGE UP (ref 0.7–1.5)
EOSINOPHIL # BLD AUTO: 0.27 K/UL — SIGNIFICANT CHANGE UP (ref 0–0.7)
EOSINOPHIL NFR BLD AUTO: 7.9 % — SIGNIFICANT CHANGE UP (ref 0–8)
GLUCOSE SERPL-MCNC: 88 MG/DL — SIGNIFICANT CHANGE UP (ref 70–99)
HCO3 BLDA-SCNC: 28 MMOL/L — HIGH (ref 23–27)
HCT VFR BLD CALC: 30.8 % — LOW (ref 37–47)
HGB BLD-MCNC: 10.2 G/DL — LOW (ref 12–16)
HOROWITZ INDEX BLDA+IHG-RTO: 32 — SIGNIFICANT CHANGE UP
IMM GRANULOCYTES NFR BLD AUTO: 0.9 % — HIGH (ref 0.1–0.3)
LYMPHOCYTES # BLD AUTO: 0.93 K/UL — LOW (ref 1.2–3.4)
LYMPHOCYTES # BLD AUTO: 27.2 % — SIGNIFICANT CHANGE UP (ref 20.5–51.1)
MAGNESIUM SERPL-MCNC: 1.6 MG/DL — LOW (ref 1.8–2.4)
MCHC RBC-ENTMCNC: 32.7 PG — HIGH (ref 27–31)
MCHC RBC-ENTMCNC: 33.1 G/DL — SIGNIFICANT CHANGE UP (ref 32–37)
MCV RBC AUTO: 98.7 FL — SIGNIFICANT CHANGE UP (ref 81–99)
MONOCYTES # BLD AUTO: 0.34 K/UL — SIGNIFICANT CHANGE UP (ref 0.1–0.6)
MONOCYTES NFR BLD AUTO: 9.9 % — HIGH (ref 1.7–9.3)
MRSA PCR RESULT.: NEGATIVE — SIGNIFICANT CHANGE UP
NEUTROPHILS # BLD AUTO: 1.77 K/UL — SIGNIFICANT CHANGE UP (ref 1.4–6.5)
NEUTROPHILS NFR BLD AUTO: 51.8 % — SIGNIFICANT CHANGE UP (ref 42.2–75.2)
NON-GYNECOLOGICAL CYTOLOGY STUDY: SIGNIFICANT CHANGE UP
NRBC # BLD: 0 /100 WBCS — SIGNIFICANT CHANGE UP (ref 0–0)
PCO2 BLDA: 43 MMHG — HIGH (ref 38–42)
PH BLDA: 7.43 — HIGH (ref 7.38–7.42)
PHOSPHATE SERPL-MCNC: 3.2 MG/DL — SIGNIFICANT CHANGE UP (ref 2.1–4.9)
PLATELET # BLD AUTO: 272 K/UL — SIGNIFICANT CHANGE UP (ref 130–400)
PO2 BLDA: 61 MMHG — LOW (ref 78–95)
POTASSIUM SERPL-MCNC: 4.1 MMOL/L — SIGNIFICANT CHANGE UP (ref 3.5–5)
POTASSIUM SERPL-SCNC: 4.1 MMOL/L — SIGNIFICANT CHANGE UP (ref 3.5–5)
PROT SERPL-MCNC: 4.2 G/DL — LOW (ref 6–8)
RBC # BLD: 3.12 M/UL — LOW (ref 4.2–5.4)
RBC # FLD: 14.4 % — SIGNIFICANT CHANGE UP (ref 11.5–14.5)
SAO2 % BLDA: 93 % — LOW (ref 94–98)
SODIUM SERPL-SCNC: 147 MMOL/L — HIGH (ref 135–146)
WBC # BLD: 3.42 K/UL — LOW (ref 4.8–10.8)
WBC # FLD AUTO: 3.42 K/UL — LOW (ref 4.8–10.8)

## 2020-01-22 PROCEDURE — 99233 SBSQ HOSP IP/OBS HIGH 50: CPT

## 2020-01-22 PROCEDURE — 71045 X-RAY EXAM CHEST 1 VIEW: CPT | Mod: 26

## 2020-01-22 RX ORDER — SODIUM CHLORIDE 9 MG/ML
500 INJECTION INTRAMUSCULAR; INTRAVENOUS; SUBCUTANEOUS ONCE
Refills: 0 | Status: COMPLETED | OUTPATIENT
Start: 2020-01-22 | End: 2020-01-23

## 2020-01-22 RX ORDER — SODIUM CHLORIDE 9 MG/ML
500 INJECTION INTRAMUSCULAR; INTRAVENOUS; SUBCUTANEOUS ONCE
Refills: 0 | Status: DISCONTINUED | OUTPATIENT
Start: 2020-01-22 | End: 2020-01-22

## 2020-01-22 RX ORDER — MAGNESIUM SULFATE 500 MG/ML
2 VIAL (ML) INJECTION ONCE
Refills: 0 | Status: COMPLETED | OUTPATIENT
Start: 2020-01-22 | End: 2020-01-22

## 2020-01-22 RX ORDER — ACETYLCYSTEINE 200 MG/ML
4 VIAL (ML) MISCELLANEOUS
Refills: 0 | Status: COMPLETED | OUTPATIENT
Start: 2020-01-22 | End: 2020-01-23

## 2020-01-22 RX ORDER — SODIUM CHLORIDE 9 MG/ML
1000 INJECTION INTRAMUSCULAR; INTRAVENOUS; SUBCUTANEOUS ONCE
Refills: 0 | Status: COMPLETED | OUTPATIENT
Start: 2020-01-22 | End: 2020-01-22

## 2020-01-22 RX ORDER — SCOPALAMINE 1 MG/3D
1 PATCH, EXTENDED RELEASE TRANSDERMAL
Refills: 0 | Status: DISCONTINUED | OUTPATIENT
Start: 2020-01-22 | End: 2020-02-05

## 2020-01-22 RX ADMIN — SCOPALAMINE 1 PATCH: 1 PATCH, EXTENDED RELEASE TRANSDERMAL at 11:26

## 2020-01-22 RX ADMIN — QUETIAPINE FUMARATE 25 MILLIGRAM(S): 200 TABLET, FILM COATED ORAL at 22:02

## 2020-01-22 RX ADMIN — Medication 3 MILLILITER(S): at 13:59

## 2020-01-22 RX ADMIN — Medication 112 MICROGRAM(S): at 06:00

## 2020-01-22 RX ADMIN — ENOXAPARIN SODIUM 40 MILLIGRAM(S): 100 INJECTION SUBCUTANEOUS at 11:29

## 2020-01-22 RX ADMIN — Medication 50 GRAM(S): at 18:16

## 2020-01-22 RX ADMIN — Medication 250 MILLIGRAM(S): at 18:20

## 2020-01-22 RX ADMIN — SODIUM CHLORIDE 166.67 MILLILITER(S): 9 INJECTION INTRAMUSCULAR; INTRAVENOUS; SUBCUTANEOUS at 09:37

## 2020-01-22 RX ADMIN — MEROPENEM 100 MILLIGRAM(S): 1 INJECTION INTRAVENOUS at 15:54

## 2020-01-22 RX ADMIN — LAMOTRIGINE 100 MILLIGRAM(S): 25 TABLET, ORALLY DISINTEGRATING ORAL at 22:04

## 2020-01-22 RX ADMIN — SIMVASTATIN 20 MILLIGRAM(S): 20 TABLET, FILM COATED ORAL at 22:01

## 2020-01-22 RX ADMIN — SODIUM CHLORIDE 50 MILLILITER(S): 9 INJECTION, SOLUTION INTRAVENOUS at 14:56

## 2020-01-22 RX ADMIN — Medication 250 MILLIGRAM(S): at 06:01

## 2020-01-22 RX ADMIN — Medication 0.5 MILLIGRAM(S): at 06:00

## 2020-01-22 RX ADMIN — MEROPENEM 100 MILLIGRAM(S): 1 INJECTION INTRAVENOUS at 06:01

## 2020-01-22 RX ADMIN — Medication 3 MILLILITER(S): at 07:42

## 2020-01-22 RX ADMIN — MEROPENEM 100 MILLIGRAM(S): 1 INJECTION INTRAVENOUS at 22:05

## 2020-01-22 RX ADMIN — SODIUM CHLORIDE 3 MILLILITER(S): 9 INJECTION INTRAMUSCULAR; INTRAVENOUS; SUBCUTANEOUS at 00:00

## 2020-01-22 RX ADMIN — SODIUM CHLORIDE 3 MILLILITER(S): 9 INJECTION INTRAMUSCULAR; INTRAVENOUS; SUBCUTANEOUS at 04:00

## 2020-01-22 RX ADMIN — Medication 4 MILLILITER(S): at 19:53

## 2020-01-22 RX ADMIN — Medication 3 MILLILITER(S): at 19:54

## 2020-01-22 RX ADMIN — Medication 4 MILLILITER(S): at 13:59

## 2020-01-22 RX ADMIN — CHLORHEXIDINE GLUCONATE 1 APPLICATION(S): 213 SOLUTION TOPICAL at 06:01

## 2020-01-22 RX ADMIN — Medication 10 MILLIGRAM(S): at 18:19

## 2020-01-22 NOTE — PROGRESS NOTE ADULT - SUBJECTIVE AND OBJECTIVE BOX
Patient is a 54y old  Female who presents with a chief complaint of Weakness (21 Jan 2020 10:32)        SUBJECTIVE:  No events overnight    REVIEW OF SYSTEMS:    CONSTITUTIONAL:   no fever   no chills.  no weight gain   no weight loss    EYES:   no discharge,   no pain  no redness,   no visual changes.    ENT:   Ears: no ear pain and no hearing problems.  Nose: no nasal congestion and no nasal drainage.  Mouth/Throat: no dysphagia,  no hoarseness and no throat pain.  Neck: no lumps, no pain, no stiffness and no swollen glands.    CARDIOVASCULAR:   no chest pain,   no swelling  no palpitaions  no syncope    RESPIRATORY:  + SOB,  no wheezing  + respiratory difficulty  no sputum production    GASTROINTESTINAL:   no abdominal pain,   no constipation,   no diarrhea,   no vomiting.    GENITOURINARY:  no dysuria,   no frequency,   no urgency  no hematuria.    MUSCULOSKELETAL:   no back pain,   no musculoskeletal pain,  no weakness.    SKIN:   no jaundice,   no lesions,   no pruritis,   no rashes.    NEURO:   no loss of consciousness,   no gait abnormality,   no headache,   no sensory deficits,   no weakness.    PSYCHIATRIC:   + alzheimer's  no anxiety  no depression    ALLERGIC/IMMUNOLOGIC:   No active allergic or immunologic issues      PHYSICAL EXAM  Vital Signs Last 24 Hrs  T(C): 35.6 (22 Jan 2020 12:25), Max: 37 (21 Jan 2020 20:49)  T(F): 96.1 (22 Jan 2020 12:25), Max: 98.6 (21 Jan 2020 20:49)  HR: 51 (22 Jan 2020 12:25) (51 - 73)  BP: 91/45 (22 Jan 2020 12:25) (84/40 - 138/64)  BP(mean): --  RR: 18 (22 Jan 2020 12:25) (17 - 18)  SpO2: --    CONSTITUTIONAL:  Well nourished.  NAD    ENT:   Airway patent,   Nasal mucosa clear.  Mouth with normal mucosa.   Throat has no vesicles,  no oropharyngeal exudates and uvula is midline.  No thrush    EYES:   Clear bilaterally,   pupils equal,   round and reactive to light.    CARDIAC:   Normal rate,   regular rhythm.    Heart sounds S1, S2.   normal  cardiac impulse  no edema    CAROTID:   normal systolic impulse  no bruits    RESPIRATORY:   decreased breath sounds over left lung field  normal chest expansion  not tachypneic,  no use of accessory muscles    GASTROINTESTINAL:  Abdomen soft,   non-tender,   no guarding,   + BS    GENITOURINARY  normal genitalia for sex  no edema    MUSCULOSKELETAL:   range of motion is not limited,  no muscle or joint tenderness  no clubbing, cyanosis    NEUROLOGICAL:   Alert and oriented x1   no focal deficits in cranial nerve areas  no motor or sensory deficits.  pertinent DTRs normal        01-21-20 @ 07:01  -  01-22-20 @ 07:00  --------------------------------------------------------  IN:    Oral Fluid: 50 mL  Total IN: 50 mL    OUT:    Nephrostomy Tube: 1225 mL    Voided: 200 mL  Total OUT: 1425 mL    Total NET: -1375 mL          LABS:                          10.2   3.42  )-----------( 272      ( 22 Jan 2020 06:40 )             30.8                                               01-22    147<H>  |  111<H>  |  4<L>  ----------------------------<  88  4.1   |  27  |  0.8    Ca    8.0<L>      22 Jan 2020 06:40  Phos  3.2     01-22  Mg     1.6     01-22    TPro  4.2<L>  /  Alb  2.0<L>  /  TBili  0.2  /  DBili  x   /  AST  27  /  ALT  20  /  AlkPhos  82  01-22                                                                                           LIVER FUNCTIONS - ( 22 Jan 2020 06:40 )  Alb: 2.0 g/dL / Pro: 4.2 g/dL / ALK PHOS: 82 U/L / ALT: 20 U/L / AST: 27 U/L / GGT: x                                                  Culture - Fungal, Bronchial (collected 20 Jan 2020 08:30)  Source: .Bronchial None  Preliminary Report (21 Jan 2020 11:16):    Testing in progress    Culture - Acid Fast - Bronchial w/Smear (collected 20 Jan 2020 08:30)  Source: .Bronchial None    Culture - Bronchial (collected 20 Jan 2020 08:30)  Source: .Bronchial None  Gram Stain (21 Jan 2020 04:07):    Numerous polymorphonuclear leukocytes seen per low power field    Rare Squamous epithelial cells seen per low power field    Rare Yeast like cells seen per oil power field  Preliminary Report (21 Jan 2020 20:06):    Normal Respiratory Paula present                                                  MEDICATIONS  (STANDING):  acetylcysteine 20%  Inhalation 4 milliLiter(s) Inhalation four times a day  albuterol/ipratropium for Nebulization 3 milliLiter(s) Nebulizer every 6 hours  bisacodyl Suppository 10 milliGRAM(s) Rectal daily  calcium carbonate 1250 mG  + Vitamin D (OsCal 500 + D) 1 Tablet(s) Oral daily  chlorhexidine 4% Liquid 1 Application(s) Topical <User Schedule>  dextrose 5% + sodium chloride 0.9%. 1000 milliLiter(s) (50 mL/Hr) IV Continuous <Continuous>  enoxaparin Injectable 40 milliGRAM(s) SubCutaneous daily  influenza   Vaccine 0.5 milliLiter(s) IntraMuscular once  lamoTRIgine 75 milliGRAM(s) Oral daily  lamoTRIgine 100 milliGRAM(s) Oral at bedtime  levothyroxine 112 MICROGram(s) Oral daily  meropenem  IVPB      meropenem  IVPB 1000 milliGRAM(s) IV Intermittent every 8 hours  polyethylene glycol 3350 17 Gram(s) Oral daily  QUEtiapine 25 milliGRAM(s) Oral at bedtime  scopolamine   Patch 1 Patch Transdermal every 72 hours  simvastatin 20 milliGRAM(s) Oral at bedtime  sodium chloride 0.9% for Nebulization 3 milliLiter(s) Nebulizer every 4 hours  vancomycin  IVPB      vancomycin  IVPB 1000 milliGRAM(s) IV Intermittent every 12 hours    MEDICATIONS  (PRN):  acetaminophen   Tablet .. 650 milliGRAM(s) Oral every 6 hours PRN Temp greater or equal to 38C (100.4F)  diphenhydrAMINE 25 milliGRAM(s) Oral once PRN Rash and/or Itching

## 2020-01-22 NOTE — PROGRESS NOTE ADULT - SUBJECTIVE AND OBJECTIVE BOX
Hospital Day:  11d    Subjective:    Pt was interviewed and examined at the bedside in the AM. No acute events overnight.   Unable to assess ROS. Appears comfortable     Past Medical Hx:   Seizures  Intellectual disability  Hypothyroid  Impulse control disorder in adult  Down's syndrome    Past Sx:  No significant past surgical history    Allergies:  No Known Allergies    Current Meds:   Standng Meds:  acetylcysteine 20%  Inhalation 4 milliLiter(s) Inhalation four times a day  albuterol/ipratropium for Nebulization 3 milliLiter(s) Nebulizer every 6 hours  bisacodyl Suppository 10 milliGRAM(s) Rectal daily  calcium carbonate 1250 mG  + Vitamin D (OsCal 500 + D) 1 Tablet(s) Oral daily  chlorhexidine 4% Liquid 1 Application(s) Topical <User Schedule>  clonazePAM  Tablet 0.5 milliGRAM(s) Oral every 12 hours  dextrose 5% + sodium chloride 0.9%. 1000 milliLiter(s) (50 mL/Hr) IV Continuous <Continuous>  enoxaparin Injectable 40 milliGRAM(s) SubCutaneous daily  influenza   Vaccine 0.5 milliLiter(s) IntraMuscular once  lamoTRIgine 75 milliGRAM(s) Oral daily  lamoTRIgine 100 milliGRAM(s) Oral at bedtime  levothyroxine 112 MICROGram(s) Oral daily  meropenem  IVPB      meropenem  IVPB 1000 milliGRAM(s) IV Intermittent every 8 hours  polyethylene glycol 3350 17 Gram(s) Oral daily  QUEtiapine 25 milliGRAM(s) Oral at bedtime  scopolamine   Patch 1 Patch Transdermal every 72 hours  simvastatin 20 milliGRAM(s) Oral at bedtime  sodium chloride 0.9% for Nebulization 3 milliLiter(s) Nebulizer every 4 hours  tamsulosin 0.4 milliGRAM(s) Oral at bedtime  vancomycin  IVPB      vancomycin  IVPB 1000 milliGRAM(s) IV Intermittent every 12 hours    PRN Meds:  acetaminophen   Tablet .. 650 milliGRAM(s) Oral every 6 hours PRN Temp greater or equal to 38C (100.4F)  diphenhydrAMINE 25 milliGRAM(s) Oral once PRN Rash and/or Itching    HOME MEDICATIONS:  Senna 8.6 mg oral tablet: 1 tab(s) orally once a day (at bedtime)  simvastatin 20 mg oral tablet: 1 tab(s) orally once a day (at bedtime)      Vital Signs:   T(F): 98.4 (01-22-20 @ 06:19), Max: 98.6 (01-21-20 @ 20:49)  HR: 55 (01-22-20 @ 08:42) (55 - 73)  BP: 94/47 (01-22-20 @ 08:42) (84/40 - 138/64)  RR: 17 (01-22-20 @ 06:19) (17 - 18)  SpO2: --      01-21-20 @ 07:01  -  01-22-20 @ 07:00  --------------------------------------------------------  IN: 50 mL / OUT: 1425 mL / NET: -1375 mL        Physical Exam:   CONSTITUTIONAL: NAD, sleeping in bed, downs facies, lethargic   HEAD: board face; atraumatic  NECK: Supple; non tender, FROM  CARD: +S1, S2 Regular rate and rhythm.  RESP: decreased BS b/l, absent on L   ABD: soft ntnd, no rebound, no guarding, no rigidity  EXT: moves all extremities  NEURO: Responsive grossly unremarkable, no focal deficits        Labs:                         10.2   3.42  )-----------( 272      ( 22 Jan 2020 06:40 )             30.8     Neutophil% 51.8, Lymphocyte% 27.2, Monocyte% 9.9, Bands% 0.9 01-22-20 @ 06:40    22 Jan 2020 06:40    147    |  111    |  4      ----------------------------<  88     4.1     |  27     |  0.8      Ca    8.0        22 Jan 2020 06:40  Phos  3.2       22 Jan 2020 06:40  Mg     1.6       22 Jan 2020 06:40    TPro  4.2    /  Alb  2.0    /  TBili  0.2    /  DBili  x      /  AST  27     /  ALT  20     /  AlkPhos  82     22 Jan 2020 06:40

## 2020-01-22 NOTE — PROGRESS NOTE ADULT - ASSESSMENT
IMPRESSION:    Left mucous plug with lung collapse likely due to pneumonia; s/p repeat bronchoscopy with mucous plug extraction  Not cleared for procedure from pulmonary standpoint    RECOMMEND:    Aspiration precautions  Repeat CXR in 48 hrs  Continue ABX.  DC Vanc if MRSA negative   Follow up bronchoscopy cytology  Aggressive pulmonary toilet   Oral care   Nebs prn  Saline nebulizer  HOB >45  DVT prophylaxis IMPRESSION:    Left mucous plug with lung collapse likely due to pneumonia; s/p repeat bronchoscopy with mucous plug extraction  Not cleared for an elective procedure from pulmonary standpoint    RECOMMEND:    Aspiration precautions  Repeat CXR in 48 hrs  Continue ABX.  DC Vanc if MRSA negative   Follow up bronchoscopy results   Aggressive pulmonary toilet   Oral care   Nebs prn  Saline nebulizer  HOB >45  DVT prophylaxis

## 2020-01-22 NOTE — PROGRESS NOTE ADULT - ATTENDING COMMENTS
I saw and evaluated patient  by bedside, remains hypoxic, requiring higher oxygen supply via NC at 4-5 L/Min, lethargic  All labs, radiology studies, VS was reviewed  I have reviewed the resident's note and agree with documented findings and  plan of care.    53 yo female with PMH Down's syndrome, nonverbal, Alzheimer's dementia, seizure d/o, hypothyroidism brought in from group home for evaluation of decreased energy.     #L Lung Collapse :  s/p 2 bronchoscopies this admission.  Now getting better on Vanc (day 8)/Tomasa (day 12) [suspected GNR PNA]  mucinex, chest PT PRN.   Take off all Abx on 1/24.   - repeated CXR- left lung remains collapsed, with congestion noted in right lung, obtain ABG today. patient is at high risk to require intubation.   - She is not medically stable for peg placement  - continue chest PT, start pt. on trial of scopolamine patch tx. to decrease secretions, add mucomyst with duonebs  -close pulse ox monitoring    acute hypoxic respiratory failure due to above  - uptitrate oxygen tx. to keep oxygen sat 95% or above.       #Metabolic encephalopathy:   lethargic today   NPO on IVF    # Pseudomonas UTI   - resolved (with initial Cipro and now Tomasa)    #Constipation   - On suppository     # Down Syndrome With Dementia, Seizure Disorder:  - non verbal at baseline  - Continue with Lamictal, olanzapine, and Klonopin       # Hypothyroidism:  - Continue with Synthroid 112  - TSH 0.25     # Hypotension- given additional IV bolus of NS today.     DVT Prophylaxis: lovenox  GI ppx: not indicated  Soft diet   Disposition: from group home    HCP and legal guardian [phone] Madisyn pearl (Sister) .      patient carries very poor overall prognosis

## 2020-01-22 NOTE — PROGRESS NOTE ADULT - ASSESSMENT
55 yo female with PMH Down's syndrome, nonverbal, Alzheimer's dementia, seizure d/o, hypothyroidism brought in from group home for evaluation of decreased energy.     #L Lung Collapse   - Pulmonary following   - Chest PT   - Deep suctioning  - Neb saline   - s/p bronchoscopy x2  - vanco and nataliia for possible PNA until 24th  - MRSA   - will d/c vanco if MRSA neg   - scopolamine and Mucomyst   - Daily CXR   - CXR looks worse then yesterday   - f/u repeat bronchial cultures     # Decreased PO intake and Weakness  - no apparent metabolic encephalopathy  - blood work is unremarkable  - IV hydration   - Failed S+S due to lethargy   - Family agreeable to PEG tube  - GI following   - PEG once stable will need med and pulm clearance    # Pseudomonas UTI   - resolved     #Constipation   - On suppository     # Down Syndrome With Dementia, Seizure Disorder:  - non verbal at baseline  - Continue with Lamictal, olanzapine, and Klonopin     # Hypothyroidism:  - Continue with Synthroid 112  - TSH 0.25     Stage 2 decubitus present on admission    DVT Prophylaxis: lovenox  GI ppx: not indicated  Soft diet   Disposition: from group home    HCP and legal guardian [phone] Madisyn pearl (Sister)

## 2020-01-23 LAB
ALBUMIN SERPL ELPH-MCNC: 2.2 G/DL — LOW (ref 3.5–5.2)
ALP SERPL-CCNC: 83 U/L — SIGNIFICANT CHANGE UP (ref 30–115)
ALT FLD-CCNC: 17 U/L — SIGNIFICANT CHANGE UP (ref 0–41)
ANION GAP SERPL CALC-SCNC: 10 MMOL/L — SIGNIFICANT CHANGE UP (ref 7–14)
ANION GAP SERPL CALC-SCNC: 9 MMOL/L — SIGNIFICANT CHANGE UP (ref 7–14)
AST SERPL-CCNC: 21 U/L — SIGNIFICANT CHANGE UP (ref 0–41)
BASOPHILS # BLD AUTO: 0.11 K/UL — SIGNIFICANT CHANGE UP (ref 0–0.2)
BASOPHILS NFR BLD AUTO: 2.6 % — HIGH (ref 0–1)
BILIRUB SERPL-MCNC: 0.3 MG/DL — SIGNIFICANT CHANGE UP (ref 0.2–1.2)
BUN SERPL-MCNC: 6 MG/DL — LOW (ref 10–20)
BUN SERPL-MCNC: 6 MG/DL — LOW (ref 10–20)
CALCIUM SERPL-MCNC: 7.6 MG/DL — LOW (ref 8.5–10.1)
CALCIUM SERPL-MCNC: 7.8 MG/DL — LOW (ref 8.5–10.1)
CHLORIDE SERPL-SCNC: 110 MMOL/L — SIGNIFICANT CHANGE UP (ref 98–110)
CHLORIDE SERPL-SCNC: 113 MMOL/L — HIGH (ref 98–110)
CO2 SERPL-SCNC: 25 MMOL/L — SIGNIFICANT CHANGE UP (ref 17–32)
CO2 SERPL-SCNC: 25 MMOL/L — SIGNIFICANT CHANGE UP (ref 17–32)
CREAT SERPL-MCNC: 0.8 MG/DL — SIGNIFICANT CHANGE UP (ref 0.7–1.5)
CREAT SERPL-MCNC: 0.9 MG/DL — SIGNIFICANT CHANGE UP (ref 0.7–1.5)
EOSINOPHIL # BLD AUTO: 0.47 K/UL — SIGNIFICANT CHANGE UP (ref 0–0.7)
EOSINOPHIL NFR BLD AUTO: 11 % — HIGH (ref 0–8)
GLUCOSE SERPL-MCNC: 105 MG/DL — HIGH (ref 70–99)
GLUCOSE SERPL-MCNC: 84 MG/DL — SIGNIFICANT CHANGE UP (ref 70–99)
HCT VFR BLD CALC: 31.9 % — LOW (ref 37–47)
HGB BLD-MCNC: 10.3 G/DL — LOW (ref 12–16)
IMM GRANULOCYTES NFR BLD AUTO: 0.7 % — HIGH (ref 0.1–0.3)
LYMPHOCYTES # BLD AUTO: 0.73 K/UL — LOW (ref 1.2–3.4)
LYMPHOCYTES # BLD AUTO: 17.1 % — LOW (ref 20.5–51.1)
MCHC RBC-ENTMCNC: 32.2 PG — HIGH (ref 27–31)
MCHC RBC-ENTMCNC: 32.3 G/DL — SIGNIFICANT CHANGE UP (ref 32–37)
MCV RBC AUTO: 99.7 FL — HIGH (ref 81–99)
MONOCYTES # BLD AUTO: 0.36 K/UL — SIGNIFICANT CHANGE UP (ref 0.1–0.6)
MONOCYTES NFR BLD AUTO: 8.4 % — SIGNIFICANT CHANGE UP (ref 1.7–9.3)
NEUTROPHILS # BLD AUTO: 2.58 K/UL — SIGNIFICANT CHANGE UP (ref 1.4–6.5)
NEUTROPHILS NFR BLD AUTO: 60.2 % — SIGNIFICANT CHANGE UP (ref 42.2–75.2)
NRBC # BLD: 0 /100 WBCS — SIGNIFICANT CHANGE UP (ref 0–0)
PLATELET # BLD AUTO: 283 K/UL — SIGNIFICANT CHANGE UP (ref 130–400)
POTASSIUM SERPL-MCNC: 4.2 MMOL/L — SIGNIFICANT CHANGE UP (ref 3.5–5)
POTASSIUM SERPL-MCNC: 5.2 MMOL/L — HIGH (ref 3.5–5)
POTASSIUM SERPL-SCNC: 4.2 MMOL/L — SIGNIFICANT CHANGE UP (ref 3.5–5)
POTASSIUM SERPL-SCNC: 5.2 MMOL/L — HIGH (ref 3.5–5)
PROT SERPL-MCNC: 4.3 G/DL — LOW (ref 6–8)
RBC # BLD: 3.2 M/UL — LOW (ref 4.2–5.4)
RBC # FLD: 14.3 % — SIGNIFICANT CHANGE UP (ref 11.5–14.5)
SODIUM SERPL-SCNC: 144 MMOL/L — SIGNIFICANT CHANGE UP (ref 135–146)
SODIUM SERPL-SCNC: 148 MMOL/L — HIGH (ref 135–146)
WBC # BLD: 4.28 K/UL — LOW (ref 4.8–10.8)
WBC # FLD AUTO: 4.28 K/UL — LOW (ref 4.8–10.8)

## 2020-01-23 PROCEDURE — 99233 SBSQ HOSP IP/OBS HIGH 50: CPT

## 2020-01-23 PROCEDURE — 71045 X-RAY EXAM CHEST 1 VIEW: CPT | Mod: 26

## 2020-01-23 RX ORDER — CLONAZEPAM 1 MG
0.5 TABLET ORAL EVERY 12 HOURS
Refills: 0 | Status: DISCONTINUED | OUTPATIENT
Start: 2020-01-23 | End: 2020-01-23

## 2020-01-23 RX ORDER — KETOROLAC TROMETHAMINE 30 MG/ML
15 SYRINGE (ML) INJECTION ONCE
Refills: 0 | Status: DISCONTINUED | OUTPATIENT
Start: 2020-01-23 | End: 2020-01-23

## 2020-01-23 RX ORDER — SODIUM CHLORIDE 9 MG/ML
500 INJECTION INTRAMUSCULAR; INTRAVENOUS; SUBCUTANEOUS ONCE
Refills: 0 | Status: COMPLETED | OUTPATIENT
Start: 2020-01-23 | End: 2020-01-23

## 2020-01-23 RX ORDER — SODIUM CHLORIDE 9 MG/ML
1000 INJECTION INTRAMUSCULAR; INTRAVENOUS; SUBCUTANEOUS ONCE
Refills: 0 | Status: COMPLETED | OUTPATIENT
Start: 2020-01-23 | End: 2020-01-23

## 2020-01-23 RX ORDER — SODIUM CHLORIDE 9 MG/ML
1000 INJECTION, SOLUTION INTRAVENOUS
Refills: 0 | Status: DISCONTINUED | OUTPATIENT
Start: 2020-01-23 | End: 2020-01-24

## 2020-01-23 RX ADMIN — Medication 10 MILLIGRAM(S): at 11:04

## 2020-01-23 RX ADMIN — Medication 15 MILLIGRAM(S): at 22:59

## 2020-01-23 RX ADMIN — Medication 3 MILLILITER(S): at 21:09

## 2020-01-23 RX ADMIN — SIMVASTATIN 20 MILLIGRAM(S): 20 TABLET, FILM COATED ORAL at 22:05

## 2020-01-23 RX ADMIN — Medication 112 MICROGRAM(S): at 05:21

## 2020-01-23 RX ADMIN — SODIUM CHLORIDE 250 MILLILITER(S): 9 INJECTION INTRAMUSCULAR; INTRAVENOUS; SUBCUTANEOUS at 01:48

## 2020-01-23 RX ADMIN — CHLORHEXIDINE GLUCONATE 1 APPLICATION(S): 213 SOLUTION TOPICAL at 05:21

## 2020-01-23 RX ADMIN — Medication 3 MILLILITER(S): at 09:15

## 2020-01-23 RX ADMIN — SCOPALAMINE 1 PATCH: 1 PATCH, EXTENDED RELEASE TRANSDERMAL at 07:00

## 2020-01-23 RX ADMIN — SODIUM CHLORIDE 1000 MILLILITER(S): 9 INJECTION INTRAMUSCULAR; INTRAVENOUS; SUBCUTANEOUS at 10:32

## 2020-01-23 RX ADMIN — SCOPALAMINE 1 PATCH: 1 PATCH, EXTENDED RELEASE TRANSDERMAL at 19:52

## 2020-01-23 RX ADMIN — SODIUM CHLORIDE 500 MILLILITER(S): 9 INJECTION INTRAMUSCULAR; INTRAVENOUS; SUBCUTANEOUS at 08:38

## 2020-01-23 RX ADMIN — LAMOTRIGINE 100 MILLIGRAM(S): 25 TABLET, ORALLY DISINTEGRATING ORAL at 22:05

## 2020-01-23 RX ADMIN — MEROPENEM 100 MILLIGRAM(S): 1 INJECTION INTRAVENOUS at 22:04

## 2020-01-23 RX ADMIN — SODIUM CHLORIDE 60 MILLILITER(S): 9 INJECTION, SOLUTION INTRAVENOUS at 15:25

## 2020-01-23 RX ADMIN — ENOXAPARIN SODIUM 40 MILLIGRAM(S): 100 INJECTION SUBCUTANEOUS at 11:04

## 2020-01-23 RX ADMIN — MEROPENEM 100 MILLIGRAM(S): 1 INJECTION INTRAVENOUS at 13:22

## 2020-01-23 RX ADMIN — MEROPENEM 100 MILLIGRAM(S): 1 INJECTION INTRAVENOUS at 05:21

## 2020-01-23 RX ADMIN — SODIUM CHLORIDE 50 MILLILITER(S): 9 INJECTION, SOLUTION INTRAVENOUS at 05:22

## 2020-01-23 NOTE — PROGRESS NOTE ADULT - SUBJECTIVE AND OBJECTIVE BOX
SALVATORE STEELE  [facility]            Patient was evaluated and examined  by bedside, lethargic, hypotensive, on IVF, hypoxic requiring continuous oxygen tx. at 4-5 L /Min., no fever, no dyspnea at rest.         REVIEW OF SYSTEMS:  unable to obtain due to patient's lethargic state.      T(C): , Max: 36.4 (01-23-20 @ 12:02)  HR: 59 (01-23-20 @ 12:02)  BP: 98/39 (01-23-20 @ 12:02)  RR: 17 (01-23-20 @ 06:30)  SpO2: 95% (01-23-20 @ 08:54)  CAPILLARY BLOOD GLUCOSE          PHYSICAL EXAM:  General: NAD, lethargic, patient is laying comfortably in bed  HEENT: AT, NC, Supple, NO JVD, NO CB  Lungs: decreased breath sounds  B/L, no wheezing, no rhonchi  CVS: normal S1, S2, RRR, NO M/G/R  Abdomen: soft, bowel sounds present, non-tender, non-distended, SPC present.   Extremities: no edema, no clubbing, no cyanosis, positive peripheral pulses b/l  Neuro: lethargic state.  Skin: no rash, no ecchymosis      LAB  CBC  Date: 01-23-20 @ 06:05  Mean cell Fgfklskjpf98.2  Mean cell Hemoglobin Conc32.3  Mean cell Volum 99.7  Platelet count-Automate 283  RBC Count 3.20  Red Cell Distrib Width14.3  WBC Count4.28  % Albumin, Urine--  Hematocrit 31.9  Hemoglobin 10.3  CBC  Date: 01-22-20 @ 06:40  Mean cell Gwaqvhbquw84.7  Mean cell Hemoglobin Conc33.1  Mean cell Volum 98.7  Platelet count-Automate 272  RBC Count 3.12  Red Cell Distrib Width14.4  WBC Count3.42  % Albumin, Urine--  Hematocrit 30.8  Hemoglobin 10.2  CBC  Date: 01-21-20 @ 05:47  Mean cell Raomfhfiei03.6  Mean cell Hemoglobin Conc32.3  Mean cell Volum 97.8  Platelet count-Automate 271  RBC Count 3.20  Red Cell Distrib Width14.2  WBC Count4.64  % Albumin, Urine--  Hematocrit 31.3  Hemoglobin 10.1  CBC  Date: 01-19-20 @ 05:50  Mean cell Skcifwuffb05.0  Mean cell Hemoglobin Conc32.6  Mean cell Volum 98.0  Platelet count-Automate 240  RBC Count 2.97  Red Cell Distrib Width14.5  WBC Count4.19  % Albumin, Urine--  Hematocrit 29.1  Hemoglobin 9.5  CBC  Date: 01-18-20 @ 07:32  Mean cell Clspoodqfp82.0  Mean cell Hemoglobin Conc33.0  Mean cell Volum 100.0  Platelet count-Automate 207  RBC Count 2.88  Red Cell Distrib Width14.4  WBC Count4.76  % Albumin, Urine--  Hematocrit 28.8  Hemoglobin 9.5    [facility]  01-23-20 @ 06:05  Blood Gas Arterial-Calcium,Ionized--  Blood Urea Nitrogen, Serum 6 mg/dL<L> [10 - 20]  Carbon Dioxide, Serum25 mmol/L [17 - 32]  Chloride, Mcbrc637 mmol/L<H> [98 - 110]  Creatinie, Serum0.8 mg/dL [0.7 - 1.5]  Glucose, Serum84 mg/dL [70 - 99]  Potassium, Serum4.2 mmol/L [3.5 - 5.0]  Sodium, Serum 148 mmol/L<H> [135 - 146]  [facility]  01-22-20 @ 06:40  Blood Gas Arterial-Calcium,Ionized--  Blood Urea Nitrogen, Serum 4 mg/dL<L> [10 - 20]  Carbon Dioxide, Serum27 mmol/L [17 - 32]  Chloride, Gusls120 mmol/L<H> [98 - 110]  Creatinie, Serum0.8 mg/dL [0.7 - 1.5]  Glucose, Serum88 mg/dL [70 - 99]  Potassium, Serum4.1 mmol/L [3.5 - 5.0]  Sodium, Serum 147 mmol/L<H> [135 - 146]  [facility]  01-21-20 @ 05:47  Blood Gas Arterial-Calcium,Ionized--  Blood Urea Nitrogen, Serum 4 mg/dL<L> [10 - 20]  Carbon Dioxide, Serum26 mmol/L [17 - 32]  Chloride, Dryuz079 mmol/L [98 - 110]  Creatinie, Serum0.9 mg/dL [0.7 - 1.5]  Glucose, Vuzmn898 mg/dL<H> [70 - 99]  Potassium, Serum3.4 mmol/L<L> [3.5 - 5.0]  Sodium, Serum 143 mmol/L [135 - 146]  BMP  01-19-20 @ 05:50  Blood Gas Arterial-Calcium,Ionized--  Blood Urea Nitrogen, Serum 6 mg/dL<L> [10 - 20]  Carbon Dioxide, Serum25 mmol/L [17 - 32]  Chloride, Mekve538 mmol/L [98 - 110]  Creatinie, Serum0.7 mg/dL [0.7 - 1.5]  Glucose, Ahotr822 mg/dL<H> [70 - 99]  Potassium, Serum3.5 mmol/L [3.5 - 5.0]  Sodium, Serum 144 mmol/L [135 - 146]              Microbiology:    Culture - Fungal, Bronchial (collected 01-20-20 @ 08:30)  Source: .Bronchial None  Preliminary Report (01-23-20 @ 11:05):    Few Yeast    Culture - Acid Fast - Bronchial w/Smear (collected 01-20-20 @ 08:30)  Source: .Bronchial None  Preliminary Report (01-22-20 @ 15:04):    Culture is being performed.    Culture - Bronchial (collected 01-20-20 @ 08:30)  Source: .Bronchial None  Gram Stain (01-21-20 @ 04:07):    Numerous polymorphonuclear leukocytes seen per low power field    Rare Squamous epithelial cells seen per low power field    Rare Yeast like cells seen per oil power field  Final Report (01-22-20 @ 16:58):    Normal Respiratory Paula present    Culture - Fungal, Bronchial (collected 01-15-20 @ 16:00)  Source: .Bronchial None  Preliminary Report (01-21-20 @ 09:22):    Few Yeast    Culture - Acid Fast - Bronchial w/Smear (collected 01-15-20 @ 16:00)  Source: .Bronchial None  Preliminary Report (01-18-20 @ 15:03):    Culture is being performed.    Culture - Bronchial (collected 01-15-20 @ 16:00)  Source: .Bronchial None  Gram Stain (01-16-20 @ 13:53):    Moderate polymorphonuclear leukocytes per low power field    Few Squamous epithelial cells per low power field    No organisms seen per oil power field  Final Report (01-18-20 @ 07:19):    Normal Respiratory Paula present    Culture - Blood (collected 01-12-20 @ 00:30)  Source: .Blood Blood-Peripheral  Final Report (01-17-20 @ 08:00):    No growth at 5 days.    Culture - Urine (collected 01-07-20 @ 18:43)  Source: .Urine Catheterized  Final Report (01-11-20 @ 10:47):    >100,000 CFU/ml Pseudomonas aeruginosa  Organism: Pseudomonas aeruginosa (01-11-20 @ 10:47)  Organism: Pseudomonas aeruginosa (01-11-20 @ 10:47)      -  Amikacin: S <=16      -  Aztreonam: S <=4      -  Cefepime: S <=4      -  Ceftazidime: S 4      -  Ciprofloxacin: S <=1      -  Gentamicin: S <=4      -  Imipenem: S <=1      -  Levofloxacin: S <=2      -  Meropenem: S <=1      -  Piperacillin/Tazobactam: S <=16      -  Tobramycin: S <=4      Method Type: DREW        Medications:  acetaminophen   Tablet .. 650 milliGRAM(s) Oral every 6 hours PRN  albuterol/ipratropium for Nebulization 3 milliLiter(s) Nebulizer every 6 hours  bisacodyl Suppository 10 milliGRAM(s) Rectal daily  calcium carbonate 1250 mG  + Vitamin D (OsCal 500 + D) 1 Tablet(s) Oral daily  chlorhexidine 4% Liquid 1 Application(s) Topical <User Schedule>  clonazePAM  Tablet 0.5 milliGRAM(s) Oral every 12 hours  dextrose 5% + sodium chloride 0.9%. 1000 milliLiter(s) IV Continuous <Continuous>  diphenhydrAMINE 25 milliGRAM(s) Oral once PRN  enoxaparin Injectable 40 milliGRAM(s) SubCutaneous daily  influenza   Vaccine 0.5 milliLiter(s) IntraMuscular once  lamoTRIgine 75 milliGRAM(s) Oral daily  lamoTRIgine 100 milliGRAM(s) Oral at bedtime  levothyroxine 112 MICROGram(s) Oral daily  meropenem  IVPB      meropenem  IVPB 1000 milliGRAM(s) IV Intermittent every 8 hours  polyethylene glycol 3350 17 Gram(s) Oral daily  QUEtiapine 25 milliGRAM(s) Oral at bedtime  scopolamine   Patch 1 Patch Transdermal every 72 hours  simvastatin 20 milliGRAM(s) Oral at bedtime  sodium chloride 0.9% for Nebulization 3 milliLiter(s) Nebulizer every 4 hours        Assessment and Plan:  53 yo female with PMH Down's syndrome, nonverbal, Alzheimer's dementia, seizure d/o, hypothyroidism brought in from group home for evaluation of decreased energy.     #L Lung Collapse :  s/p 2 bronchoscopies this admission.  - completed IV Vanco  - completing IV Tomasa   mucinex, chest PT    Take off all Abx on 1/24.   - repeated CXR- left lung slightly reopened today.   - She is not medically stable for peg placement  - continue chest PT, started pt. on trial of scopolamine patch tx. to decrease secretions, added mucomyst with duonebs, NS nebs.  -close pulse ox monitoring    #acute hypoxic respiratory failure due to above  - uptitrate oxygen tx. to keep oxygen sat 95% or above.     # Poor PO intake- s/p NGT insertion- started on NGT feedings    # Hypernatremia- although pt. was on IVF, still getting slightly dehydrated, IV bolus given today, change IVF to D5W at 60 ml/hr, next BMP at 8 pm, start free water flushes 250 mg every 6 hours.      #Metabolic encephalopathy:   lethargic today   correct underlying problems    # Pseudomonas UTI   - resolved (with initial Cipro and now Tomasa)    #Constipation   - On suppository  laxative tx.    # Down Syndrome With Dementia, Seizure Disorder:  - non verbal at baseline  - Continue with Lamictal,   - due to patient's lethargic state hold patients- olanzapine, and Klonopin       # Hypothyroidism:  - Continue with Synthroid 112  - TSH 0.25     # Hypotension- on IVF, started on NGT feedings    DVT Prophylaxis: lovenox  GI ppx: not indicated  Soft diet   Disposition: from group home    HCP and legal guardian [phone] Madisyn pearl (Sister) .      patient carries very poor overall prognosis .

## 2020-01-23 NOTE — PROGRESS NOTE ADULT - SUBJECTIVE AND OBJECTIVE BOX
Patient is a 54y old  Female who presents with a chief complaint of Weakness (22 Jan 2020 14:29)        SUBJECTIVE:  no events overnight    REVIEW OF SYSTEMS:    CONSTITUTIONAL:   no fever   no chills.  no weight gain   no weight loss    EYES:   no discharge,   no pain  no redness,   no visual changes.    ENT:   Ears: no ear pain and no hearing problems.  Nose: no nasal congestion and no nasal drainage.  Mouth/Throat: no dysphagia,  no hoarseness and no throat pain.  Neck: no lumps, no pain, no stiffness and no swollen glands.     CARDIOVASCULAR:   no chest pain,   no swelling  no palpitaions  no syncope    RESPIRATORY:  no SOB,  no wheezing ,  no respiratory difficulty  no sputum production    GASTROINTESTINAL:   no abdominal pain,   no constipation,   no diarrhea,   no vomiting.    GENITOURINARY:  no dysuria,   no frequency,   no urgency  no hematuria.    MUSCULOSKELETAL:   no back pain,   no musculoskeletal pain,  no weakness.    SKIN:   no jaundice,   no lesions,   no pruritis,   no rashes.    NEURO:   no loss of consciousness,   no gait abnormality,   no headache,   no sensory deficits,   no weakness.    PSYCHIATRIC:   + alzheimer's disease  no anxiety  no depression    ALLERGIC/IMMUNOLOGIC:   No active allergic or immunologic issues      PHYSICAL EXAM  Vital Signs Last 24 Hrs  T(C): 35.9 (23 Jan 2020 06:30), Max: 35.9 (23 Jan 2020 06:30)  T(F): 96.6 (23 Jan 2020 06:30), Max: 96.6 (23 Jan 2020 06:30)  HR: 51 (23 Jan 2020 06:30) (51 - 60)  BP: 87/45 (23 Jan 2020 06:30) (83/41 - 113/57)  BP(mean): --  RR: 17 (23 Jan 2020 06:30) (17 - 18)  SpO2: 95% (23 Jan 2020 08:54) (91% - 96%) 4L nC    CONSTITUTIONAL:  Well nourished.  NAD    ENT:   Airway patent,   Nasal mucosa clear.  Mouth with normal mucosa.   Throat has no vesicles,  no oropharyngeal exudates and uvula is midline.  No thrush    EYES:   Clear bilaterally,   pupils equal,   round and reactive to light.    CARDIAC:   Normal rate,   regular rhythm.    Heart sounds S1, S2.   normal  cardiac impulse  no edema    CAROTID:   normal systolic impulse  no bruits    RESPIRATORY:   decreased breath sounds over left base  normal chest expansion  not tachypneic,  no use of accessory muscles    GASTROINTESTINAL:  Abdomen soft,   non-tender,   no guarding,   + BS    GENITOURINARY  normal genitalia for sex  no edema    MUSCULOSKELETAL:   range of motion is not limited,  no muscle or joint tenderness  no clubbing, cyanosis    NEUROLOGICAL:   Alert and oriented   no focal deficits in cranial nerve areas  no motor or sensory deficits.  pertinent DTRs normal        01-22-20 @ 07:01  -  01-23-20 @ 07:00  --------------------------------------------------------  IN:  Total IN: 0 mL    OUT:    Indwelling Catheter - Suprapubic: 300 mL    Nephrostomy Tube: 500 mL  Total OUT: 800 mL    Total NET: -800 mL          LABS:                          10.3   4.28  )-----------( 283      ( 23 Jan 2020 06:05 )             31.9                                               01-23    148<H>  |  113<H>  |  6<L>  ----------------------------<  84  4.2   |  25  |  0.8    Ca    7.6<L>      23 Jan 2020 06:05  Phos  3.2     01-22  Mg     1.6     01-22    TPro  4.3<L>  /  Alb  2.2<L>  /  TBili  0.3  /  DBili  x   /  AST  21  /  ALT  17  /  AlkPhos  83  01-23                                                                                           LIVER FUNCTIONS - ( 23 Jan 2020 06:05 )  Alb: 2.2 g/dL / Pro: 4.3 g/dL / ALK PHOS: 83 U/L / ALT: 17 U/L / AST: 21 U/L / GGT: x                                                                                            ABG - ( 22 Jan 2020 16:00 )  pH, Arterial: 7.43  pH, Blood: x     /  pCO2: 43    /  pO2: 61    / HCO3: 28    / Base Excess: 3.3   /  SaO2: 93                  MEDICATIONS  (STANDING):  albuterol/ipratropium for Nebulization 3 milliLiter(s) Nebulizer every 6 hours  bisacodyl Suppository 10 milliGRAM(s) Rectal daily  calcium carbonate 1250 mG  + Vitamin D (OsCal 500 + D) 1 Tablet(s) Oral daily  chlorhexidine 4% Liquid 1 Application(s) Topical <User Schedule>  dextrose 5% + sodium chloride 0.9%. 1000 milliLiter(s) (50 mL/Hr) IV Continuous <Continuous>  enoxaparin Injectable 40 milliGRAM(s) SubCutaneous daily  influenza   Vaccine 0.5 milliLiter(s) IntraMuscular once  lamoTRIgine 75 milliGRAM(s) Oral daily  lamoTRIgine 100 milliGRAM(s) Oral at bedtime  levothyroxine 112 MICROGram(s) Oral daily  meropenem  IVPB      meropenem  IVPB 1000 milliGRAM(s) IV Intermittent every 8 hours  polyethylene glycol 3350 17 Gram(s) Oral daily  QUEtiapine 25 milliGRAM(s) Oral at bedtime  scopolamine   Patch 1 Patch Transdermal every 72 hours  simvastatin 20 milliGRAM(s) Oral at bedtime  sodium chloride 0.9% for Nebulization 3 milliLiter(s) Nebulizer every 4 hours    MEDICATIONS  (PRN):  acetaminophen   Tablet .. 650 milliGRAM(s) Oral every 6 hours PRN Temp greater or equal to 38C (100.4F)  diphenhydrAMINE 25 milliGRAM(s) Oral once PRN Rash and/or Itching      X-Rays reviewed    CXR interpreted by me: Patient is a 54y old  Female who presents with a chief complaint of Weakness (22 Jan 2020 14:29)        SUBJECTIVE:  no events overnight    REVIEW OF SYSTEMS:    CONSTITUTIONAL:   no fever   no chills.  no weight gain   no weight loss    EYES:   no discharge,   no pain  no redness,   no visual changes.    ENT:   Ears: no ear pain and no hearing problems.  Nose: no nasal congestion and no nasal drainage.  Mouth/Throat: no dysphagia,  no hoarseness and no throat pain.  Neck: no lumps, no pain, no stiffness and no swollen glands.     CARDIOVASCULAR:   no chest pain,   no swelling  no palpitaions  no syncope    RESPIRATORY:  no SOB,  no wheezing ,  no respiratory difficulty  no sputum production    GASTROINTESTINAL:   no abdominal pain,   no constipation,   no diarrhea,   no vomiting.    GENITOURINARY:  no dysuria,   no frequency,   no urgency  no hematuria.    MUSCULOSKELETAL:   no back pain,   no musculoskeletal pain,  no weakness.    SKIN:   no jaundice,   no lesions,   no pruritis,   no rashes.    NEURO:   no loss of consciousness,   no gait abnormality,   no headache,   no sensory deficits,   no weakness.    PSYCHIATRIC:   + alzheimer's disease  no anxiety  no depression    ALLERGIC/IMMUNOLOGIC:   No active allergic or immunologic issues      PHYSICAL EXAM  Vital Signs Last 24 Hrs  T(C): 35.9 (23 Jan 2020 06:30), Max: 35.9 (23 Jan 2020 06:30)  T(F): 96.6 (23 Jan 2020 06:30), Max: 96.6 (23 Jan 2020 06:30)  HR: 51 (23 Jan 2020 06:30) (51 - 60)  BP: 87/45 (23 Jan 2020 06:30) (83/41 - 113/57)  BP(mean): --  RR: 17 (23 Jan 2020 06:30) (17 - 18)  SpO2: 95% (23 Jan 2020 08:54) (91% - 96%) 4L nC    CONSTITUTIONAL:  Well nourished.  NAD    ENT:   Airway patent,   Nasal mucosa clear.  No thrush    EYES:   Clear bilaterally,   pupils equal,   round and reactive to light.    CARDIAC:   Normal rate,   regular rhythm.    Heart sounds S1, S2.   normal  cardiac impulse  no edema    CAROTID:   normal systolic impulse  no bruits    RESPIRATORY:   decreased breath sounds over left base  normal chest expansion  not tachypneic,  no use of accessory muscles    GASTROINTESTINAL:  Abdomen soft,   non-tender,   no guarding,   + BS    GENITOURINARY  normal genitalia for sex  no edema    MUSCULOSKELETAL:   range of motion is not limited,  no muscle or joint tenderness  no clubbing, cyanosis    NEUROLOGICAL:   Awake confused  Moves extremities           01-22-20 @ 07:01  -  01-23-20 @ 07:00  --------------------------------------------------------  IN:  Total IN: 0 mL    OUT:    Indwelling Catheter - Suprapubic: 300 mL    Nephrostomy Tube: 500 mL  Total OUT: 800 mL    Total NET: -800 mL          LABS:                          10.3   4.28  )-----------( 283      ( 23 Jan 2020 06:05 )             31.9                                               01-23    148<H>  |  113<H>  |  6<L>  ----------------------------<  84  4.2   |  25  |  0.8    Ca    7.6<L>      23 Jan 2020 06:05  Phos  3.2     01-22  Mg     1.6     01-22    TPro  4.3<L>  /  Alb  2.2<L>  /  TBili  0.3  /  DBili  x   /  AST  21  /  ALT  17  /  AlkPhos  83  01-23                                                                                           LIVER FUNCTIONS - ( 23 Jan 2020 06:05 )  Alb: 2.2 g/dL / Pro: 4.3 g/dL / ALK PHOS: 83 U/L / ALT: 17 U/L / AST: 21 U/L / GGT: x                                                                                            ABG - ( 22 Jan 2020 16:00 )  pH, Arterial: 7.43  pH, Blood: x     /  pCO2: 43    /  pO2: 61    / HCO3: 28    / Base Excess: 3.3   /  SaO2: 93                  MEDICATIONS  (STANDING):  albuterol/ipratropium for Nebulization 3 milliLiter(s) Nebulizer every 6 hours  bisacodyl Suppository 10 milliGRAM(s) Rectal daily  calcium carbonate 1250 mG  + Vitamin D (OsCal 500 + D) 1 Tablet(s) Oral daily  chlorhexidine 4% Liquid 1 Application(s) Topical <User Schedule>  dextrose 5% + sodium chloride 0.9%. 1000 milliLiter(s) (50 mL/Hr) IV Continuous <Continuous>  enoxaparin Injectable 40 milliGRAM(s) SubCutaneous daily  influenza   Vaccine 0.5 milliLiter(s) IntraMuscular once  lamoTRIgine 75 milliGRAM(s) Oral daily  lamoTRIgine 100 milliGRAM(s) Oral at bedtime  levothyroxine 112 MICROGram(s) Oral daily  meropenem  IVPB      meropenem  IVPB 1000 milliGRAM(s) IV Intermittent every 8 hours  polyethylene glycol 3350 17 Gram(s) Oral daily  QUEtiapine 25 milliGRAM(s) Oral at bedtime  scopolamine   Patch 1 Patch Transdermal every 72 hours  simvastatin 20 milliGRAM(s) Oral at bedtime  sodium chloride 0.9% for Nebulization 3 milliLiter(s) Nebulizer every 4 hours    MEDICATIONS  (PRN):  acetaminophen   Tablet .. 650 milliGRAM(s) Oral every 6 hours PRN Temp greater or equal to 38C (100.4F)  diphenhydrAMINE 25 milliGRAM(s) Oral once PRN Rash and/or Itching      X-Rays reviewed    CXR interpreted by me:

## 2020-01-23 NOTE — PROGRESS NOTE ADULT - ASSESSMENT
55 yo female with PMH Down's syndrome, nonverbal, Alzheimer's dementia, seizure d/o, hypothyroidism brought in from group home for evaluation of decreased energy.     #L Lung Collapse   - Pulmonary following   - Chest PT   - Deep suctioning  - Neb saline   - s/p bronchoscopy x2  - nataliia for possible PNA until 24th  - MRSA neg vanco stopped   - scopolamine and Mucomyst   - Daily CXR   - CXR looks improved   - Bronchial cultures neg    # Decreased PO intake and Weakness  - no apparent metabolic encephalopathy  - blood work is unremarkable  - IV hydration   - Failed S+S due to lethargy   - Family agreeable to PEG tube  - GI following   - holding sedatives   - PEG once stable will need med and pulm clearance    # Pseudomonas UTI   - resolved     #Constipation   - On suppository     # Down Syndrome With Dementia, Seizure Disorder:  - non verbal at baseline  - Continue with Lamictal, olanzapine, and Klonopin     # Hypothyroidism:  - Continue with Synthroid 112  - TSH 0.25     Stage 2 decubitus present on admission    DVT Prophylaxis: lovenox  GI ppx: not indicated  Tube feeds   Disposition: from group home    HCP and legal guardian [phone] Madisyn pearl (Sister) 55 yo female with PMH Down's syndrome, nonverbal, Alzheimer's dementia, seizure d/o, hypothyroidism brought in from group home for evaluation of decreased energy.     #L Lung Collapse   - Pulmonary following   - Chest PT   - Deep suctioning  - Neb saline   - supplemental O2  - s/p bronchoscopy x2  - nataliia for possible PNA until 24th  - MRSA neg vanco stopped   - scopolamine and Mucomyst   - Daily CXR   - CXR looks improved   - Bronchial cultures neg    # Decreased PO intake and Weakness  - no apparent metabolic encephalopathy  - blood work is unremarkable  - IV hydration   - Failed S+S due to lethargy   - Family agreeable to PEG tube  - GI following   - holding sedatives   - PEG once stable will need med and pulm clearance    # Pseudomonas UTI   - resolved     #Constipation   - On suppository     # Down Syndrome With Dementia, Seizure Disorder:  - non verbal at baseline  - Continue with Lamictal,   - Holding olanzapine, and Klonopin     # Hypothyroidism:  - Continue with Synthroid 112  - TSH 0.25     Stage 2 decubitus present on admission    DVT Prophylaxis: lovenox  GI ppx: not indicated  Tube feeds   Disposition: from group home    HCP and legal guardian [phone] Madisyn pearl (Sister)

## 2020-01-23 NOTE — PROGRESS NOTE ADULT - SUBJECTIVE AND OBJECTIVE BOX
Hospital Day:  12d    Subjective:    Pt was interviewed and examined at the bedside in the AM. No acute events overnight.   Unable to assess ROS     Past Medical Hx:   Seizures  Intellectual disability  Hypothyroid  Impulse control disorder in adult  Down's syndrome    Past Sx:  No significant past surgical history    Allergies:  No Known Allergies    Current Meds:   Standng Meds:  albuterol/ipratropium for Nebulization 3 milliLiter(s) Nebulizer every 6 hours  bisacodyl Suppository 10 milliGRAM(s) Rectal daily  calcium carbonate 1250 mG  + Vitamin D (OsCal 500 + D) 1 Tablet(s) Oral daily  chlorhexidine 4% Liquid 1 Application(s) Topical <User Schedule>  dextrose 5% + sodium chloride 0.9%. 1000 milliLiter(s) (50 mL/Hr) IV Continuous <Continuous>  enoxaparin Injectable 40 milliGRAM(s) SubCutaneous daily  influenza   Vaccine 0.5 milliLiter(s) IntraMuscular once  lamoTRIgine 75 milliGRAM(s) Oral daily  lamoTRIgine 100 milliGRAM(s) Oral at bedtime  levothyroxine 112 MICROGram(s) Oral daily  meropenem  IVPB      meropenem  IVPB 1000 milliGRAM(s) IV Intermittent every 8 hours  polyethylene glycol 3350 17 Gram(s) Oral daily  scopolamine   Patch 1 Patch Transdermal every 72 hours  simvastatin 20 milliGRAM(s) Oral at bedtime  sodium chloride 0.9% for Nebulization 3 milliLiter(s) Nebulizer every 4 hours    PRN Meds:  acetaminophen   Tablet .. 650 milliGRAM(s) Oral every 6 hours PRN Temp greater or equal to 38C (100.4F)  diphenhydrAMINE 25 milliGRAM(s) Oral once PRN Rash and/or Itching    HOME MEDICATIONS:  Senna 8.6 mg oral tablet: 1 tab(s) orally once a day (at bedtime)  simvastatin 20 mg oral tablet: 1 tab(s) orally once a day (at bedtime)      Vital Signs:   T(F): 97.6 (01-23-20 @ 12:02), Max: 97.6 (01-23-20 @ 12:02)  HR: 59 (01-23-20 @ 12:02) (51 - 60)  BP: 98/39 (01-23-20 @ 12:02) (80/50 - 113/57)  RR: 17 (01-23-20 @ 06:30) (17 - 18)  SpO2: 95% (01-23-20 @ 08:54) (91% - 96%)      01-22-20 @ 07:01  -  01-23-20 @ 07:00  --------------------------------------------------------  IN: 0 mL / OUT: 800 mL / NET: -800 mL    01-23-20 @ 07:01  -  01-23-20 @ 15:04  --------------------------------------------------------  IN: 240 mL / OUT: 450 mL / NET: -210 mL        Physical Exam:   CONSTITUTIONAL: NAD, sleeping in bed, downs facies, lethargic   HEAD: board face; atraumatic, NG+   NECK: Supple; non tender, FROM  CARD: +S1, S2 Regular rate and rhythm.  RESP: decreased BS b/l, absent on L   ABD: soft ntnd, no rebound, no guarding, no rigidity  EXT: moves all extremities  NEURO: Responsive grossly unremarkable, no focal deficits      Labs:                         10.3   4.28  )-----------( 283      ( 23 Jan 2020 06:05 )             31.9     Neutophil% 60.2, Lymphocyte% 17.1, Monocyte% 8.4, Bands% 0.7 01-23-20 @ 06:05    23 Jan 2020 06:05    148    |  113    |  6      ----------------------------<  84     4.2     |  25     |  0.8      Ca    7.6        23 Jan 2020 06:05  Phos  3.2       22 Jan 2020 06:40  Mg     1.6       22 Jan 2020 06:40    TPro  4.3    /  Alb  2.2    /  TBili  0.3    /  DBili  x      /  AST  21     /  ALT  17     /  AlkPhos  83     23 Jan 2020 06:05                            Radiology:

## 2020-01-23 NOTE — CHART NOTE - NSCHARTNOTEFT_GEN_A_CORE
Registered Dietitian Follow-Up     ****Scroll for RD Recommendations at bottom of note****    Patient Profile Reviewed                           Yes [x]   No []     Nutrition History Previously Obtained        Yes [x]  No []       Pertinent Subjective Information: NGT placed, pending PEG placement when pulm status improved. Appropriate diet order in place, will monitor tolerance.     Pertinent Medical Interventions: L lung collapse, pulm follows, s/p bronchoscopies x2 this admit. . PEG placement when pulm status improved.     Diet order: Jevity 1.2 240ml x5 feeds daily     Anthropometrics:  - Ht.154.9 cm  - Wt. 78.1kg (1/15) and (1/20) no new wts  - %wt change:   - BMI: 32.6  - IBW: 47.7 kg+/-10%     Pertinent Lab Data: (1/23) H/H 10.3/31.9, Na 148, Cl 113, BUN 6    Pertinent Meds: lovenox, abx, D5NS 50ml/h, albuterol, dulcolax, miralax, oscal 500 + D, synthroid, zocor     Physical Findings:  - Appearance: Disoriented X4 per MD, non-verbal   - GI function: last BM 1/19, constipation throughout admission, LIP and RN aware  - Tubes: NGT placement  - Oral/Mouth cavity: 1/15  SLP recommended NPO w/ non-oral means of nutrition.   - Skin: healing stage II L buttocks     Nutrition Requirements: (As per previous RD assessment (1/13)  Weight Used: Ideal BW 47.7 kg     Estimated Energy Needs    Continue [X]  Adjust [] 1431 - 1669 kcals/day (Based on 30 - 35kcal/kg IBW)     Estimated Protein Needs    Continue [X]  Adjust [] 59 - 72g (1.25 - 1.5g/kg IBW) Increased needs d/t Pressure Ulcer stage II    Estimated Fluid Needs        Continue []  Adjust [] Fluid: 1mL/Kcal or per LIP    Nutrient Intake: to meet needs, started on NGT feeds, will continue to monitor over next three days as TF initiated today        [] Previous Nutrition Diagnosis: inadequate energy intake            [x] Ongoing          [] Resolved    [] No active nutrition diagnosis identified at this time        Nutrition Intervention Enteral nutrition  RECOMMENDATION: Continue Jevity 1.2 @ 240mL q 4hrs (Hold 2AM feed for total 5 feeds/day). This provides 1440 kcal, 66gms protein and 972mL free water. Additional flushes per LIP.    Goal/Expected Outcome: Pt to tolerate/meet >85% and <105% of estimated needs within 4 days     Indicator/Monitoring: RD to monitor Energy Intake, Body Composition, Physical Focused Findings, Diet Order.     Pt at risk f/u 4 days.

## 2020-01-23 NOTE — PROGRESS NOTE ADULT - ASSESSMENT
IMPRESSION:    Left mucous plug with lung collapse likely due to pneumonia; s/p repeat bronchoscopy with mucous plug extraction  Not cleared for an elective procedure from pulmonary standpoint    RECOMMEND:    Aspiration precautions  Repeat CXR in 48 hrs  Continue ABX  Aggressive pulmonary toilet   Oral care   Nebs prn  Saline nebulizer  HOB >45  DVT prophylaxis

## 2020-01-24 LAB
ALBUMIN SERPL ELPH-MCNC: 2.2 G/DL — LOW (ref 3.5–5.2)
ALP SERPL-CCNC: 122 U/L — HIGH (ref 30–115)
ALT FLD-CCNC: 146 U/L — HIGH (ref 0–41)
ANION GAP SERPL CALC-SCNC: 8 MMOL/L — SIGNIFICANT CHANGE UP (ref 7–14)
AST SERPL-CCNC: 220 U/L — HIGH (ref 0–41)
BASOPHILS # BLD AUTO: 0.1 K/UL — SIGNIFICANT CHANGE UP (ref 0–0.2)
BASOPHILS NFR BLD AUTO: 2.5 % — HIGH (ref 0–1)
BILIRUB SERPL-MCNC: 0.2 MG/DL — SIGNIFICANT CHANGE UP (ref 0.2–1.2)
BUN SERPL-MCNC: 7 MG/DL — LOW (ref 10–20)
CALCIUM SERPL-MCNC: 7.7 MG/DL — LOW (ref 8.5–10.1)
CHLORIDE SERPL-SCNC: 110 MMOL/L — SIGNIFICANT CHANGE UP (ref 98–110)
CO2 SERPL-SCNC: 27 MMOL/L — SIGNIFICANT CHANGE UP (ref 17–32)
CREAT SERPL-MCNC: 0.9 MG/DL — SIGNIFICANT CHANGE UP (ref 0.7–1.5)
EOSINOPHIL # BLD AUTO: 0.37 K/UL — SIGNIFICANT CHANGE UP (ref 0–0.7)
EOSINOPHIL NFR BLD AUTO: 9.2 % — HIGH (ref 0–8)
GLUCOSE SERPL-MCNC: 107 MG/DL — HIGH (ref 70–99)
HCT VFR BLD CALC: 29.8 % — LOW (ref 37–47)
HGB BLD-MCNC: 9.9 G/DL — LOW (ref 12–16)
IMM GRANULOCYTES NFR BLD AUTO: 0.5 % — HIGH (ref 0.1–0.3)
LYMPHOCYTES # BLD AUTO: 0.78 K/UL — LOW (ref 1.2–3.4)
LYMPHOCYTES # BLD AUTO: 19.5 % — LOW (ref 20.5–51.1)
MAGNESIUM SERPL-MCNC: 2 MG/DL — SIGNIFICANT CHANGE UP (ref 1.8–2.4)
MCHC RBC-ENTMCNC: 32.7 PG — HIGH (ref 27–31)
MCHC RBC-ENTMCNC: 33.2 G/DL — SIGNIFICANT CHANGE UP (ref 32–37)
MCV RBC AUTO: 98.3 FL — SIGNIFICANT CHANGE UP (ref 81–99)
MONOCYTES # BLD AUTO: 0.29 K/UL — SIGNIFICANT CHANGE UP (ref 0.1–0.6)
MONOCYTES NFR BLD AUTO: 7.2 % — SIGNIFICANT CHANGE UP (ref 1.7–9.3)
NEUTROPHILS # BLD AUTO: 2.45 K/UL — SIGNIFICANT CHANGE UP (ref 1.4–6.5)
NEUTROPHILS NFR BLD AUTO: 61.1 % — SIGNIFICANT CHANGE UP (ref 42.2–75.2)
NRBC # BLD: 0 /100 WBCS — SIGNIFICANT CHANGE UP (ref 0–0)
PHOSPHATE SERPL-MCNC: 2.6 MG/DL — SIGNIFICANT CHANGE UP (ref 2.1–4.9)
PLATELET # BLD AUTO: 311 K/UL — SIGNIFICANT CHANGE UP (ref 130–400)
POTASSIUM SERPL-MCNC: 4.1 MMOL/L — SIGNIFICANT CHANGE UP (ref 3.5–5)
POTASSIUM SERPL-SCNC: 4.1 MMOL/L — SIGNIFICANT CHANGE UP (ref 3.5–5)
PROT SERPL-MCNC: 4.4 G/DL — LOW (ref 6–8)
RBC # BLD: 3.03 M/UL — LOW (ref 4.2–5.4)
RBC # FLD: 14.3 % — SIGNIFICANT CHANGE UP (ref 11.5–14.5)
SODIUM SERPL-SCNC: 145 MMOL/L — SIGNIFICANT CHANGE UP (ref 135–146)
WBC # BLD: 4.01 K/UL — LOW (ref 4.8–10.8)
WBC # FLD AUTO: 4.01 K/UL — LOW (ref 4.8–10.8)

## 2020-01-24 PROCEDURE — 99233 SBSQ HOSP IP/OBS HIGH 50: CPT

## 2020-01-24 PROCEDURE — 71045 X-RAY EXAM CHEST 1 VIEW: CPT | Mod: 26

## 2020-01-24 RX ORDER — CLONAZEPAM 1 MG
0.5 TABLET ORAL ONCE
Refills: 0 | Status: DISCONTINUED | OUTPATIENT
Start: 2020-01-24 | End: 2020-01-24

## 2020-01-24 RX ORDER — IPRATROPIUM/ALBUTEROL SULFATE 18-103MCG
3 AEROSOL WITH ADAPTER (GRAM) INHALATION ONCE
Refills: 0 | Status: DISCONTINUED | OUTPATIENT
Start: 2020-01-24 | End: 2020-01-24

## 2020-01-24 RX ORDER — HALOPERIDOL DECANOATE 100 MG/ML
1 INJECTION INTRAMUSCULAR ONCE
Refills: 0 | Status: COMPLETED | OUTPATIENT
Start: 2020-01-24 | End: 2020-01-24

## 2020-01-24 RX ORDER — MIDODRINE HYDROCHLORIDE 2.5 MG/1
5 TABLET ORAL ONCE
Refills: 0 | Status: COMPLETED | OUTPATIENT
Start: 2020-01-24 | End: 2020-01-24

## 2020-01-24 RX ORDER — SODIUM,POTASSIUM PHOSPHATES 278-250MG
1 POWDER IN PACKET (EA) ORAL ONCE
Refills: 0 | Status: COMPLETED | OUTPATIENT
Start: 2020-01-24 | End: 2020-01-24

## 2020-01-24 RX ADMIN — Medication 1 TABLET(S): at 11:01

## 2020-01-24 RX ADMIN — Medication 3 MILLILITER(S): at 13:24

## 2020-01-24 RX ADMIN — CHLORHEXIDINE GLUCONATE 1 APPLICATION(S): 213 SOLUTION TOPICAL at 05:03

## 2020-01-24 RX ADMIN — POLYETHYLENE GLYCOL 3350 17 GRAM(S): 17 POWDER, FOR SOLUTION ORAL at 11:02

## 2020-01-24 RX ADMIN — Medication 3 MILLILITER(S): at 22:55

## 2020-01-24 RX ADMIN — LAMOTRIGINE 100 MILLIGRAM(S): 25 TABLET, ORALLY DISINTEGRATING ORAL at 22:10

## 2020-01-24 RX ADMIN — MEROPENEM 100 MILLIGRAM(S): 1 INJECTION INTRAVENOUS at 13:04

## 2020-01-24 RX ADMIN — Medication 112 MICROGRAM(S): at 05:03

## 2020-01-24 RX ADMIN — MEROPENEM 100 MILLIGRAM(S): 1 INJECTION INTRAVENOUS at 05:03

## 2020-01-24 RX ADMIN — HALOPERIDOL DECANOATE 1 MILLIGRAM(S): 100 INJECTION INTRAMUSCULAR at 01:27

## 2020-01-24 RX ADMIN — SCOPALAMINE 1 PATCH: 1 PATCH, EXTENDED RELEASE TRANSDERMAL at 06:29

## 2020-01-24 RX ADMIN — SODIUM CHLORIDE 3 MILLILITER(S): 9 INJECTION INTRAMUSCULAR; INTRAVENOUS; SUBCUTANEOUS at 21:39

## 2020-01-24 RX ADMIN — ENOXAPARIN SODIUM 40 MILLIGRAM(S): 100 INJECTION SUBCUTANEOUS at 11:03

## 2020-01-24 RX ADMIN — Medication 10 MILLIGRAM(S): at 11:02

## 2020-01-24 RX ADMIN — Medication 0.5 MILLIGRAM(S): at 17:41

## 2020-01-24 RX ADMIN — MEROPENEM 100 MILLIGRAM(S): 1 INJECTION INTRAVENOUS at 22:10

## 2020-01-24 RX ADMIN — LAMOTRIGINE 75 MILLIGRAM(S): 25 TABLET, ORALLY DISINTEGRATING ORAL at 11:01

## 2020-01-24 RX ADMIN — Medication 3 MILLILITER(S): at 08:29

## 2020-01-24 RX ADMIN — Medication 1 PACKET(S): at 08:38

## 2020-01-24 RX ADMIN — MIDODRINE HYDROCHLORIDE 5 MILLIGRAM(S): 2.5 TABLET ORAL at 22:10

## 2020-01-24 NOTE — PROGRESS NOTE ADULT - SUBJECTIVE AND OBJECTIVE BOX
Patient is a 54y old  Female who presents with a chief complaint of poor po intake (23 Jan 2020 13:54)        SUBJECTIVE:  NO events overnight    REVIEW OF SYSTEMS:    CONSTITUTIONAL:   no fever   no chills  no weight gain   no weight loss    EYES:   no discharge   no pain  no redness,   no visual changes.    ENT:   Ears: no ear pain and no hearing problems.  Nose: no nasal congestion and no nasal drainage.  Mouth/Throat: no dysphagia,  no hoarseness and no throat pain.  Neck: no lumps, no pain, no stiffness and no swollen glands.     CARDIOVASCULAR:   no chest pain,   no swelling  no palpitaions  no syncope    RESPIRATORY:  no SOB,  no wheezing ,  no respiratory difficulty  no sputum production    GASTROINTESTINAL:   no abdominal pain,   no constipation,   no diarrhea,   no vomiting.    GENITOURINARY:  no dysuria,   no frequency,   no urgency  no hematuria.    MUSCULOSKELETAL:   no back pain,   no musculoskeletal pain,  no weakness.    SKIN:   no jaundice,   no lesions,   no pruritis,   no rashes.    NEURO:   no loss of consciousness,   no gait abnormality,   no headache,   no sensory deficits,   no weakness.    PSYCHIATRIC:   + Alzheimer's disease  no anxiety  no depression    ALLERGIC/IMMUNOLOGIC:   No active allergic or immunologic issues      PHYSICAL EXAM  Vital Signs Last 24 Hrs  T(C): 36.9 (24 Jan 2020 05:58), Max: 37.1 (23 Jan 2020 20:33)  T(F): 98.5 (24 Jan 2020 05:58), Max: 98.8 (23 Jan 2020 20:33)  HR: 63 (24 Jan 2020 06:43) (58 - 66)  BP: 94/42 (24 Jan 2020 06:43) (80/50 - 149/58)  BP(mean): --  RR: 17 (24 Jan 2020 05:58) (17 - 17)  SpO2: 95% (23 Jan 2020 20:15) (95% - 95%)    CONSTITUTIONAL:  Well nourished.  NAD    ENT:   +NGT  Airway patent,   Mouth with normal mucosa.       EYES:   Clear bilaterally,   pupils equal,   round and reactive to light.    CARDIAC:   Normal rate,   regular rhythm.    Heart sounds S1, S2.   normal  cardiac impulse  no edema      CAROTID:   normal systolic impulse  no bruits    RESPIRATORY:   decreased breath sounds at left base  normal chest expansion  not tachypneic,  no use of accessory muscles    GASTROINTESTINAL:  Abdomen soft,   non-tender,   no guarding,   + BS    MUSCULOSKELETAL:   range of motion is not limited,  no muscle or joint tenderness  no clubbing, cyanosis    NEUROLOGICAL:   Alert and oriented x1  no focal deficits in cranial nerve areas  no motor or sensory deficits.  pertinent DTRs normal        01-23-20 @ 07:01  -  01-24-20 @ 07:00  --------------------------------------------------------  IN:    Enteral Tube Flush: 30 mL    Free Water: 250 mL    ns in tub fed  drwnen15: 480 mL  Total IN: 760 mL    OUT:    Indwelling Catheter - Suprapubic: 800 mL    Nephrostomy Tube: 450 mL  Total OUT: 1250 mL    Total NET: -490 mL          LABS:                          9.9    4.01  )-----------( 311      ( 24 Jan 2020 05:45 )             29.8                                               01-24    145  |  110  |  7<L>  ----------------------------<  107<H>  4.1   |  27  |  0.9    Ca    7.7<L>      24 Jan 2020 05:45  Phos  2.6     01-24  Mg     2.0     01-24    TPro  4.4<L>  /  Alb  2.2<L>  /  TBili  0.2  /  DBili  x   /  AST  220<H>  /  ALT  146<H>  /  AlkPhos  122<H>  01-24                                                                                           LIVER FUNCTIONS - ( 24 Jan 2020 05:45 )  Alb: 2.2 g/dL / Pro: 4.4 g/dL / ALK PHOS: 122 U/L / ALT: 146 U/L / AST: 220 U/L / GGT: x                                                                                            ABG - ( 22 Jan 2020 16:00 )  pH, Arterial: 7.43  pH, Blood: x     /  pCO2: 43    /  pO2: 61    / HCO3: 28    / Base Excess: 3.3   /  SaO2: 93                  MEDICATIONS  (STANDING):  albuterol/ipratropium for Nebulization 3 milliLiter(s) Nebulizer every 6 hours  bisacodyl Suppository 10 milliGRAM(s) Rectal daily  calcium carbonate 1250 mG  + Vitamin D (OsCal 500 + D) 1 Tablet(s) Oral daily  chlorhexidine 4% Liquid 1 Application(s) Topical <User Schedule>  enoxaparin Injectable 40 milliGRAM(s) SubCutaneous daily  influenza   Vaccine 0.5 milliLiter(s) IntraMuscular once  lamoTRIgine 75 milliGRAM(s) Oral daily  lamoTRIgine 100 milliGRAM(s) Oral at bedtime  levothyroxine 112 MICROGram(s) Oral daily  meropenem  IVPB      meropenem  IVPB 1000 milliGRAM(s) IV Intermittent every 8 hours  polyethylene glycol 3350 17 Gram(s) Oral daily  scopolamine   Patch 1 Patch Transdermal every 72 hours  simvastatin 20 milliGRAM(s) Oral at bedtime  sodium chloride 0.9% for Nebulization 3 milliLiter(s) Nebulizer every 4 hours    MEDICATIONS  (PRN):  acetaminophen   Tablet .. 650 milliGRAM(s) Oral every 6 hours PRN Temp greater or equal to 38C (100.4F)  diphenhydrAMINE 25 milliGRAM(s) Oral once PRN Rash and/or Itching

## 2020-01-24 NOTE — PROGRESS NOTE ADULT - SUBJECTIVE AND OBJECTIVE BOX
Hospital Day:  13d    Subjective:    Pt was interviewed and examined at the bedside in the AM. No acute events overnight.   Unable to assess ROS       Past Medical Hx:   Seizures  Intellectual disability  Hypothyroid  Impulse control disorder in adult  Down's syndrome    Past Sx:  No significant past surgical history    Allergies:  No Known Allergies    Current Meds:   Standng Meds:  albuterol/ipratropium for Nebulization 3 milliLiter(s) Nebulizer every 6 hours  bisacodyl Suppository 10 milliGRAM(s) Rectal daily  calcium carbonate 1250 mG  + Vitamin D (OsCal 500 + D) 1 Tablet(s) Oral daily  chlorhexidine 4% Liquid 1 Application(s) Topical <User Schedule>  enoxaparin Injectable 40 milliGRAM(s) SubCutaneous daily  influenza   Vaccine 0.5 milliLiter(s) IntraMuscular once  lamoTRIgine 75 milliGRAM(s) Oral daily  lamoTRIgine 100 milliGRAM(s) Oral at bedtime  levothyroxine 112 MICROGram(s) Oral daily  meropenem  IVPB      meropenem  IVPB 1000 milliGRAM(s) IV Intermittent every 8 hours  polyethylene glycol 3350 17 Gram(s) Oral daily  scopolamine   Patch 1 Patch Transdermal every 72 hours  sodium chloride 0.9% for Nebulization 3 milliLiter(s) Nebulizer every 4 hours    PRN Meds:  acetaminophen   Tablet .. 650 milliGRAM(s) Oral every 6 hours PRN Temp greater or equal to 38C (100.4F)  diphenhydrAMINE 25 milliGRAM(s) Oral once PRN Rash and/or Itching    HOME MEDICATIONS:  Senna 8.6 mg oral tablet: 1 tab(s) orally once a day (at bedtime)  simvastatin 20 mg oral tablet: 1 tab(s) orally once a day (at bedtime)      Vital Signs:   T(F): 98.5 (01-24-20 @ 05:58), Max: 98.8 (01-23-20 @ 20:33)  HR: 63 (01-24-20 @ 06:43) (59 - 66)  BP: 94/42 (01-24-20 @ 06:43) (94/42 - 149/58)  RR: 17 (01-24-20 @ 05:58) (17 - 17)  SpO2: 95% (01-23-20 @ 20:15) (95% - 95%)      01-23-20 @ 07:01  -  01-24-20 @ 07:00  --------------------------------------------------------  IN: 760 mL / OUT: 1250 mL / NET: -490 mL    01-24-20 @ 07:01 - 01-24-20 @ 10:56  --------------------------------------------------------  IN: 490 mL / OUT: 0 mL / NET: 490 mL        Physical Exam:   CONSTITUTIONAL: NAD, sleeping in bed, downs facies, lethargic   HEAD: board face; atraumatic, NG+   NECK: Supple; non tender, FROM  CARD: +S1, S2 Regular rate and rhythm.  RESP: decreased BS b/l, absent on L   ABD: soft ntnd, no rebound, no guarding, no rigidity  EXT: moves all extremities  NEURO: Responsive grossly unremarkable, no focal deficits    Labs:                         9.9    4.01  )-----------( 311      ( 24 Jan 2020 05:45 )             29.8     Neutophil% 61.1, Lymphocyte% 19.5, Monocyte% 7.2, Bands% 0.5 01-24-20 @ 05:45    24 Jan 2020 05:45    145    |  110    |  7      ----------------------------<  107    4.1     |  27     |  0.9      Ca    7.7        24 Jan 2020 05:45  Phos  2.6       24 Jan 2020 05:45  Mg     2.0       24 Jan 2020 05:45    TPro  4.4    /  Alb  2.2    /  TBili  0.2    /  DBili  x      /  AST  220    /  ALT  146    /  AlkPhos  122    24 Jan 2020 05:45

## 2020-01-24 NOTE — PROGRESS NOTE ADULT - ASSESSMENT
IMPRESSION:    Left mucous plug with lung collapse likely due to pneumonia; s/p repeat bronchoscopy with mucous plug extraction  Not cleared for an elective procedure from pulmonary standpoint    RECOMMEND:    Aspiration precautions  Repeat CXR in 24 hrs  Continue ABX  Aggressive pulmonary toilet   Oral care   Nebs prn  Saline nebulizer  HOB >45  DVT prophylaxis IMPRESSION:    Left mucous plug with lung collapse likely due to pneumonia; s/p repeat bronchoscopy with mucous plug extraction    RECOMMEND:    Aspiration precautions  Repeat CXR on Monday   Continue ABX  Aggressive pulmonary toilet   Oral care   Nebs prn  Saline nebulizer  HOB >45  DVT prophylaxis

## 2020-01-24 NOTE — PROGRESS NOTE ADULT - ASSESSMENT
53 yo female with PMH Down's syndrome, nonverbal, Alzheimer's dementia, seizure d/o, hypothyroidism brought in from group home for evaluation of decreased energy.     #L Lung Collapse   - Pulmonary following   - Chest PT   - Deep suctioning  - Neb saline   - supplemental O2  - s/p bronchoscopy x2  - nataliia course finished   - MRSA neg vanco stopped   - scopolamine and Mucomyst   - Daily CXR   - CXR today showed complete occlusion   - Bronchial cultures neg    # Decreased PO intake and Weakness  - Failed S+S due to lethargy   - Family agreeable to PEG tube  - GI following   - holding sedatives   - PEG once stable will need med and pulm clearance  - NG feeds   - Aspiration precautions     # Pseudomonas UTI   - resolved     #Constipation   - On suppository     # Down Syndrome With Dementia, Seizure Disorder:  - non verbal at baseline  - Continue with Lamictal,   - Holding olanzapine, and Klonopin     # Hypothyroidism:  - Continue with Synthroid 112  - TSH 0.25     Stage 2 decubitus present on admission    DVT Prophylaxis: lovenox  GI ppx: not indicated  Tube feeds   Disposition: from group home    HCP and legal guardian [phone] Madisyn pearl (Sister) 55 yo female with PMH Down's syndrome, nonverbal, Alzheimer's dementia, seizure d/o, hypothyroidism brought in from group home for evaluation of decreased energy.     #L Lung Collapse   - Pulmonary following   - Chest PT   - Deep suctioning  - Neb saline   - supplemental O2  - s/p bronchoscopy x2  - nataliia course finished   - MRSA neg vanco stopped   - scopolamine and Mucomyst   - Daily CXR   - CXR today showed complete occlusion   - Bronchial cultures neg    #Acute Transaminitis   - Likely secondary to feeding   - No RUQ pain on exam   - will monitor for now   - Trend LFTs     # Decreased PO intake and Weakness  - Failed S+S due to lethargy   - Family agreeable to PEG tube  - GI following   - holding sedatives   - PEG once stable will need med and pulm clearance  - NG feeds   - Aspiration precautions     # Pseudomonas UTI   - resolved     #Constipation   - On suppository     # Down Syndrome With Dementia, Seizure Disorder:  - non verbal at baseline  - Continue with Lamictal,   - Holding olanzapine, and Klonopin     # Hypothyroidism:  - Continue with Synthroid 112  - TSH 0.25     Stage 2 decubitus present on admission    DVT Prophylaxis: lovenox  GI ppx: not indicated  Tube feeds   Disposition: from group home    HCP and legal guardian [phone] Madisyn pearl (Sister)

## 2020-01-24 NOTE — PROGRESS NOTE ADULT - ATTENDING COMMENTS
53 yo female with PMH Down's syndrome, nonverbal, Alzheimer's dementia, seizure d/o, hypothyroidism brought in from group home for evaluation of decreased energy.     #L Lung Collapse :  s/p 2 bronchoscopies this admission.  - completed IV Vanco  - completing IV Tomasa today  mucinex, chest PT      - repeated CXR- left lung still looks bad.   - She is not medically stable for peg placement  - continue chest PT, started pt. on trial of scopolamine patch tx. to decrease secretions, added mucomyst with duonebs, NS nebs.  -close pulse ox monitoring    #acute hypoxic respiratory failure due to above  - uptitrate oxygen tx. to keep oxygen sat 95% or above.     # Poor PO intake- s/p NGT insertion- tolerating  NGT feedings well    # Hypernatremia- resolved      #Metabolic encephalopathy:   functions close to baseline today  correct underlying problems    # Pseudomonas UTI   - resolved (with initial Cipro and now Tomasa)    #Constipation   - On suppository  laxative tx.    # Down Syndrome With Dementia, Seizure Disorder:  - non verbal at baseline  - Continue with Lamictal,   - due to patient's lethargic state hold patients- olanzapine, and Klonopin       # Hypothyroidism:  - Continue with Synthroid 112  - TSH 0.25     # Hypotension- b/p better today, cont.  NGT feedings    DVT Prophylaxis: lovenox  GI ppx: not indicated  Soft diet   Disposition: from group home    HCP and legal guardian [phone] Madisyn pearl (Sister) .      patient carries very poor overall prognosis .  once pulmonary function will stabilize patient will need peg placement

## 2020-01-25 LAB
ALBUMIN SERPL ELPH-MCNC: 2.5 G/DL — LOW (ref 3.5–5.2)
ALP SERPL-CCNC: 175 U/L — HIGH (ref 30–115)
ALT FLD-CCNC: 388 U/L — HIGH (ref 0–41)
ANION GAP SERPL CALC-SCNC: 9 MMOL/L — SIGNIFICANT CHANGE UP (ref 7–14)
AST SERPL-CCNC: 411 U/L — HIGH (ref 0–41)
BASE EXCESS BLDA CALC-SCNC: 7.8 MMOL/L — HIGH (ref -2–2)
BILIRUB SERPL-MCNC: 0.3 MG/DL — SIGNIFICANT CHANGE UP (ref 0.2–1.2)
BUN SERPL-MCNC: 9 MG/DL — LOW (ref 10–20)
CALCIUM SERPL-MCNC: 8 MG/DL — LOW (ref 8.5–10.1)
CHLORIDE SERPL-SCNC: 110 MMOL/L — SIGNIFICANT CHANGE UP (ref 98–110)
CO2 SERPL-SCNC: 28 MMOL/L — SIGNIFICANT CHANGE UP (ref 17–32)
CREAT SERPL-MCNC: 1 MG/DL — SIGNIFICANT CHANGE UP (ref 0.7–1.5)
GLUCOSE SERPL-MCNC: 91 MG/DL — SIGNIFICANT CHANGE UP (ref 70–99)
HCO3 BLDA-SCNC: 32 MMOL/L — HIGH (ref 23–27)
PCO2 BLDA: 42 MMHG — SIGNIFICANT CHANGE UP (ref 38–42)
PH BLDA: 7.49 — HIGH (ref 7.38–7.42)
PO2 BLDA: 55 MMHG — LOW (ref 78–95)
POTASSIUM SERPL-MCNC: 4.3 MMOL/L — SIGNIFICANT CHANGE UP (ref 3.5–5)
POTASSIUM SERPL-SCNC: 4.3 MMOL/L — SIGNIFICANT CHANGE UP (ref 3.5–5)
PROT SERPL-MCNC: 5.1 G/DL — LOW (ref 6–8)
SAO2 % BLDA: 91 % — LOW (ref 94–98)
SODIUM SERPL-SCNC: 147 MMOL/L — HIGH (ref 135–146)

## 2020-01-25 PROCEDURE — 71045 X-RAY EXAM CHEST 1 VIEW: CPT | Mod: 26

## 2020-01-25 PROCEDURE — 99233 SBSQ HOSP IP/OBS HIGH 50: CPT

## 2020-01-25 PROCEDURE — 71045 X-RAY EXAM CHEST 1 VIEW: CPT | Mod: 26,77

## 2020-01-25 RX ORDER — QUETIAPINE FUMARATE 200 MG/1
25 TABLET, FILM COATED ORAL AT BEDTIME
Refills: 0 | Status: DISCONTINUED | OUTPATIENT
Start: 2020-01-25 | End: 2020-01-28

## 2020-01-25 RX ORDER — CLONAZEPAM 1 MG
0.5 TABLET ORAL EVERY 12 HOURS
Refills: 0 | Status: DISCONTINUED | OUTPATIENT
Start: 2020-01-25 | End: 2020-01-28

## 2020-01-25 RX ADMIN — Medication 0.5 MILLIGRAM(S): at 11:28

## 2020-01-25 RX ADMIN — SCOPALAMINE 1 PATCH: 1 PATCH, EXTENDED RELEASE TRANSDERMAL at 11:29

## 2020-01-25 RX ADMIN — SODIUM CHLORIDE 3 MILLILITER(S): 9 INJECTION INTRAMUSCULAR; INTRAVENOUS; SUBCUTANEOUS at 04:48

## 2020-01-25 RX ADMIN — ENOXAPARIN SODIUM 40 MILLIGRAM(S): 100 INJECTION SUBCUTANEOUS at 11:30

## 2020-01-25 RX ADMIN — SODIUM CHLORIDE 3 MILLILITER(S): 9 INJECTION INTRAMUSCULAR; INTRAVENOUS; SUBCUTANEOUS at 00:40

## 2020-01-25 RX ADMIN — QUETIAPINE FUMARATE 25 MILLIGRAM(S): 200 TABLET, FILM COATED ORAL at 23:39

## 2020-01-25 RX ADMIN — Medication 3 MILLILITER(S): at 20:08

## 2020-01-25 RX ADMIN — Medication 1 TABLET(S): at 11:29

## 2020-01-25 RX ADMIN — SCOPALAMINE 1 PATCH: 1 PATCH, EXTENDED RELEASE TRANSDERMAL at 08:26

## 2020-01-25 RX ADMIN — LAMOTRIGINE 100 MILLIGRAM(S): 25 TABLET, ORALLY DISINTEGRATING ORAL at 23:39

## 2020-01-25 RX ADMIN — LAMOTRIGINE 75 MILLIGRAM(S): 25 TABLET, ORALLY DISINTEGRATING ORAL at 11:30

## 2020-01-25 RX ADMIN — Medication 112 MICROGRAM(S): at 06:18

## 2020-01-25 RX ADMIN — CHLORHEXIDINE GLUCONATE 1 APPLICATION(S): 213 SOLUTION TOPICAL at 06:18

## 2020-01-25 RX ADMIN — SCOPALAMINE 1 PATCH: 1 PATCH, EXTENDED RELEASE TRANSDERMAL at 11:31

## 2020-01-25 NOTE — PROGRESS NOTE ADULT - SUBJECTIVE AND OBJECTIVE BOX
SALVATORE STEELE  [facility] T2-3A 023 A ([facility] T2-3A)            Patient was evaluated and examined  by bedside, remains confused at baseline, tolerating NGT feedings, occasional cough, hypoxic with collapsed left lung, requiring to be on continuous oxygen tx. , no fever.          REVIEW OF SYSTEMS:  unable to obtain due to chronic confused state      T(C): , Max: 37.4 (01-24-20 @ 20:35)  HR: 69 (01-25-20 @ 12:10)  BP: 123/57 (01-25-20 @ 12:10)  RR: 17 (01-25-20 @ 12:10)  SpO2: --  CAPILLARY BLOOD GLUCOSE          PHYSICAL EXAM:  General: NAD, Awake, confused, patient is laying comfortably in bed  HEENT: AT, NC, Supple, NO JVD, NO CB  Lungs: decreased breath sounds B/L, mild wheezing, no rhonchi  CVS: normal S1, S2, RRR, NO M/G/R  Abdomen: soft, bowel sounds present, non-tender, non-distended  Extremities: b/l upper ext. distal pitting edema present, no clubbing, no cyanosis, positive peripheral pulses b/l  Neuro: chronic bed confinement status.   Skin: no rash, no ecchymosis, sacral decub stage 2 (poa)      LAB  CBC  Date: 01-24-20 @ 05:45  Mean cell Xsdykkpjeb03.7  Mean cell Hemoglobin Conc33.2  Mean cell Volum 98.3  Platelet count-Automate 311  RBC Count 3.03  Red Cell Distrib Width14.3  WBC Count4.01  % Albumin, Urine--  Hematocrit 29.8  Hemoglobin 9.9  CBC  Date: 01-23-20 @ 06:05  Mean cell Rfxxvmtods87.2  Mean cell Hemoglobin Conc32.3  Mean cell Volum 99.7  Platelet count-Automate 283  RBC Count 3.20  Red Cell Distrib Width14.3  WBC Count4.28  % Albumin, Urine--  Hematocrit 31.9  Hemoglobin 10.3  CBC  Date: 01-22-20 @ 06:40  Mean cell Mrnbvkjbap85.7  Mean cell Hemoglobin Conc33.1  Mean cell Volum 98.7  Platelet count-Automate 272  RBC Count 3.12  Red Cell Distrib Width14.4  WBC Count3.42  % Albumin, Urine--  Hematocrit 30.8  Hemoglobin 10.2  CBC  Date: 01-21-20 @ 05:47  Mean cell Tjhjhoblrd92.6  Mean cell Hemoglobin Conc32.3  Mean cell Volum 97.8  Platelet count-Automate 271  RBC Count 3.20  Red Cell Distrib Width14.2  WBC Count4.64  % Albumin, Urine--  Hematocrit 31.3  Hemoglobin 10.1  CBC  Date: 01-19-20 @ 05:50  Mean cell Hbotduaawk41.0  Mean cell Hemoglobin Conc32.6  Mean cell Volum 98.0  Platelet count-Automate 240  RBC Count 2.97  Red Cell Distrib Width14.5  WBC Count4.19  % Albumin, Urine--  Hematocrit 29.1  Hemoglobin 9.5    [facility]  01-25-20 @ 06:13  Blood Gas Arterial-Calcium,Ionized--  Blood Urea Nitrogen, Serum 9 mg/dL<L> [10 - 20]  Carbon Dioxide, Serum28 mmol/L [17 - 32]  Chloride, Lloff062 mmol/L [98 - 110]  Creatinie, Serum1.0 mg/dL [0.7 - 1.5]  Glucose, Serum91 mg/dL [70 - 99]  Potassium, Serum4.3 mmol/L [3.5 - 5.0]  Sodium, Serum 147 mmol/L<H> [135 - 146]  [facility]  01-24-20 @ 05:45  Blood Gas Arterial-Calcium,Ionized--  Blood Urea Nitrogen, Serum 7 mg/dL<L> [10 - 20]  Carbon Dioxide, Serum27 mmol/L [17 - 32]  Chloride, Nnske019 mmol/L [98 - 110]  Creatinie, Serum0.9 mg/dL [0.7 - 1.5]  Glucose, Htuof349 mg/dL<H> [70 - 99]  Potassium, Serum4.1 mmol/L [3.5 - 5.0]  Sodium, Serum 145 mmol/L [135 - 146]  [facility]  01-23-20 @ 22:15  Blood Gas Arterial-Calcium,Ionized--  Blood Urea Nitrogen, Serum 6 mg/dL<L> [10 - 20]  Carbon Dioxide, Serum25 mmol/L [17 - 32]  Chloride, Lzpez482 mmol/L [98 - 110]  Creatinie, Serum0.9 mg/dL [0.7 - 1.5]  Glucose, Nlbvv713 mg/dL<H> [70 - 99]  Potassium, Serum5.2 mmol/L<H> [3.5 - 5.0] [Hemolyzed. Interpret with caution]  Sodium, Serum 144 mmol/L [135 - 146]  [facility]  01-23-20 @ 06:05  Blood Gas Arterial-Calcium,Ionized--  Blood Urea Nitrogen, Serum 6 mg/dL<L> [10 - 20]  Carbon Dioxide, Serum25 mmol/L [17 - 32]  Chloride, Cpfmc862 mmol/L<H> [98 - 110]  Creatinie, Serum0.8 mg/dL [0.7 - 1.5]  Glucose, Serum84 mg/dL [70 - 99]  Potassium, Serum4.2 mmol/L [3.5 - 5.0]  Sodium, Serum 148 mmol/L<H> [135 - 146]  [facility]  01-22-20 @ 06:40  Blood Gas Arterial-Calcium,Ionized--  Blood Urea Nitrogen, Serum 4 mg/dL<L> [10 - 20]  Carbon Dioxide, Serum27 mmol/L [17 - 32]  Chloride, Zvbko908 mmol/L<H> [98 - 110]  Creatinie, Serum0.8 mg/dL [0.7 - 1.5]  Glucose, Serum88 mg/dL [70 - 99]  Potassium, Serum4.1 mmol/L [3.5 - 5.0]  Sodium, Serum 147 mmol/L<H> [135 - 146]  [facility]  01-21-20 @ 05:47  Blood Gas Arterial-Calcium,Ionized--  Blood Urea Nitrogen, Serum 4 mg/dL<L> [10 - 20]  Carbon Dioxide, Serum26 mmol/L [17 - 32]  Chloride, Exssc449 mmol/L [98 - 110]  Creatinie, Serum0.9 mg/dL [0.7 - 1.5]  Glucose, Vawfw149 mg/dL<H> [70 - 99]  Potassium, Serum3.4 mmol/L<L> [3.5 - 5.0]  Sodium, Serum 143 mmol/L [135 - 146]              Microbiology:    Culture - Fungal, Bronchial (collected 01-20-20 @ 08:30)  Source: .Bronchial None  Preliminary Report (01-23-20 @ 11:05):    Few Yeast    Culture - Acid Fast - Bronchial w/Smear (collected 01-20-20 @ 08:30)  Source: .Bronchial None  Preliminary Report (01-22-20 @ 15:04):    Culture is being performed.    Culture - Bronchial (collected 01-20-20 @ 08:30)  Source: .Bronchial None  Gram Stain (01-21-20 @ 04:07):    Numerous polymorphonuclear leukocytes seen per low power field    Rare Squamous epithelial cells seen per low power field    Rare Yeast like cells seen per oil power field  Final Report (01-22-20 @ 16:58):    Normal Respiratory Paula present    Culture - Fungal, Bronchial (collected 01-15-20 @ 16:00)  Source: .Bronchial None  Preliminary Report (01-21-20 @ 09:22):    Few Yeast    Culture - Acid Fast - Bronchial w/Smear (collected 01-15-20 @ 16:00)  Source: .Bronchial None  Preliminary Report (01-18-20 @ 15:03):    Culture is being performed.    Culture - Bronchial (collected 01-15-20 @ 16:00)  Source: .Bronchial None  Gram Stain (01-16-20 @ 13:53):    Moderate polymorphonuclear leukocytes per low power field    Few Squamous epithelial cells per low power field    No organisms seen per oil power field  Final Report (01-18-20 @ 07:19):    Normal Respiratory Paula present    Culture - Blood (collected 01-12-20 @ 00:30)  Source: .Blood Blood-Peripheral  Final Report (01-17-20 @ 08:00):    No growth at 5 days.    Culture - Urine (collected 01-07-20 @ 18:43)  Source: .Urine Catheterized  Final Report (01-11-20 @ 10:47):    >100,000 CFU/ml Pseudomonas aeruginosa  Organism: Pseudomonas aeruginosa (01-11-20 @ 10:47)  Organism: Pseudomonas aeruginosa (01-11-20 @ 10:47)      -  Amikacin: S <=16      -  Aztreonam: S <=4      -  Cefepime: S <=4      -  Ceftazidime: S 4      -  Ciprofloxacin: S <=1      -  Gentamicin: S <=4      -  Imipenem: S <=1      -  Levofloxacin: S <=2      -  Meropenem: S <=1      -  Piperacillin/Tazobactam: S <=16      -  Tobramycin: S <=4      Method Type: DREW      Medications:  acetaminophen   Tablet .. 650 milliGRAM(s) Oral every 6 hours PRN  albuterol/ipratropium for Nebulization 3 milliLiter(s) Nebulizer every 6 hours  bisacodyl Suppository 10 milliGRAM(s) Rectal daily  calcium carbonate 1250 mG  + Vitamin D (OsCal 500 + D) 1 Tablet(s) Oral daily  chlorhexidine 4% Liquid 1 Application(s) Topical <User Schedule>  clonazePAM  Tablet 0.5 milliGRAM(s) Oral every 12 hours  diphenhydrAMINE 25 milliGRAM(s) Oral once PRN  enoxaparin Injectable 40 milliGRAM(s) SubCutaneous daily  influenza   Vaccine 0.5 milliLiter(s) IntraMuscular once  lamoTRIgine 75 milliGRAM(s) Oral daily  lamoTRIgine 100 milliGRAM(s) Oral at bedtime  levothyroxine 112 MICROGram(s) Oral daily  polyethylene glycol 3350 17 Gram(s) Oral daily  QUEtiapine 25 milliGRAM(s) Oral at bedtime  scopolamine   Patch 1 Patch Transdermal every 72 hours  sodium chloride 0.9% for Nebulization 3 milliLiter(s) Nebulizer every 4 hours        Assessment and Plan:  55 yo female with PMH Down's syndrome, nonverbal, Alzheimer's dementia, seizure d/o, hypothyroidism brought in from group home for evaluation of decreased energy.     #Persistent Left Lung Collapse :  s/p 2 bronchoscopies this admission.  - completed IV Vanco  - completed IV Tomasa  mucinex, chest PT    - She is not medically stable for peg placement  - continue chest PT, started pt. on trial of scopolamine patch tx. to decrease secretions,  deepthi, NS nebs.  -close pulse ox monitoring    #acute hypoxic respiratory failure due to above  - uptitrate oxygen tx. to keep oxygen sat 95% or above.     # Poor PO intake- s/p NGT insertion- tolerating  NGT feedings well    # Acute Transaminitis- worsening LFT's- obtain liver US, f/up LFT's in am. hold statins.    # Hypernatremia- resolved      #Metabolic encephalopathy:   functions close to baseline today  correct underlying problems    # Pseudomonas UTI   - resolved (with initial Cipro and now Tomasa)    #Constipation   - On suppository  laxative tx.    # Down Syndrome With Dementia, Seizure Disorder:  - non verbal at baseline  - Continue with Lamictal,   - restarted on olanzapine, and Klonopin       # Hypothyroidism:  - Continue with Synthroid 112  - TSH 0.25     # Hypotension- b/p better today, cont.  NGT feedings    DVT Prophylaxis: lovenox  GI ppx: not indicated  Soft diet   Disposition: from group home, will need to go to SNF    All abnormal results and medical plan was d/w patient's sister by bedside     HCP and legal guardian [phone] Madisyn pearl (Sister) .      patient carries very poor overall prognosis .  once pulmonary function will stabilize patient will need peg placement .

## 2020-01-26 LAB
ALBUMIN SERPL ELPH-MCNC: 2.3 G/DL — LOW (ref 3.5–5.2)
ALP SERPL-CCNC: 155 U/L — HIGH (ref 30–115)
ALT FLD-CCNC: 250 U/L — HIGH (ref 0–41)
ANION GAP SERPL CALC-SCNC: 10 MMOL/L — SIGNIFICANT CHANGE UP (ref 7–14)
AST SERPL-CCNC: 171 U/L — HIGH (ref 0–41)
BASOPHILS # BLD AUTO: 0.13 K/UL — SIGNIFICANT CHANGE UP (ref 0–0.2)
BASOPHILS NFR BLD AUTO: 2.6 % — HIGH (ref 0–1)
BILIRUB DIRECT SERPL-MCNC: <0.2 MG/DL — SIGNIFICANT CHANGE UP (ref 0–0.2)
BILIRUB INDIRECT FLD-MCNC: >0 MG/DL — LOW (ref 0.2–1.2)
BILIRUB SERPL-MCNC: 0.2 MG/DL — SIGNIFICANT CHANGE UP (ref 0.2–1.2)
BUN SERPL-MCNC: 9 MG/DL — LOW (ref 10–20)
CALCIUM SERPL-MCNC: 8 MG/DL — LOW (ref 8.5–10.1)
CHLORIDE SERPL-SCNC: 105 MMOL/L — SIGNIFICANT CHANGE UP (ref 98–110)
CO2 SERPL-SCNC: 27 MMOL/L — SIGNIFICANT CHANGE UP (ref 17–32)
CREAT SERPL-MCNC: 1 MG/DL — SIGNIFICANT CHANGE UP (ref 0.7–1.5)
EOSINOPHIL # BLD AUTO: 0.33 K/UL — SIGNIFICANT CHANGE UP (ref 0–0.7)
EOSINOPHIL NFR BLD AUTO: 6.7 % — SIGNIFICANT CHANGE UP (ref 0–8)
GAS PNL BLDA: SIGNIFICANT CHANGE UP
GLUCOSE BLDC GLUCOMTR-MCNC: 78 MG/DL — SIGNIFICANT CHANGE UP (ref 70–99)
GLUCOSE BLDC GLUCOMTR-MCNC: 80 MG/DL — SIGNIFICANT CHANGE UP (ref 70–99)
GLUCOSE BLDC GLUCOMTR-MCNC: 80 MG/DL — SIGNIFICANT CHANGE UP (ref 70–99)
GLUCOSE SERPL-MCNC: 85 MG/DL — SIGNIFICANT CHANGE UP (ref 70–99)
HCT VFR BLD CALC: 30.1 % — LOW (ref 37–47)
HCT VFR BLD CALC: 30.3 % — LOW (ref 37–47)
HGB BLD-MCNC: 10.2 G/DL — LOW (ref 12–16)
HGB BLD-MCNC: 9.8 G/DL — LOW (ref 12–16)
IMM GRANULOCYTES NFR BLD AUTO: 0.8 % — HIGH (ref 0.1–0.3)
LACTATE SERPL-SCNC: 0.9 MMOL/L — SIGNIFICANT CHANGE UP (ref 0.7–2)
LYMPHOCYTES # BLD AUTO: 1.22 K/UL — SIGNIFICANT CHANGE UP (ref 1.2–3.4)
LYMPHOCYTES # BLD AUTO: 24.8 % — SIGNIFICANT CHANGE UP (ref 20.5–51.1)
MAGNESIUM SERPL-MCNC: 2 MG/DL — SIGNIFICANT CHANGE UP (ref 1.8–2.4)
MCHC RBC-ENTMCNC: 31.6 PG — HIGH (ref 27–31)
MCHC RBC-ENTMCNC: 32.6 G/DL — SIGNIFICANT CHANGE UP (ref 32–37)
MCHC RBC-ENTMCNC: 32.7 PG — HIGH (ref 27–31)
MCHC RBC-ENTMCNC: 33.7 G/DL — SIGNIFICANT CHANGE UP (ref 32–37)
MCV RBC AUTO: 97.1 FL — SIGNIFICANT CHANGE UP (ref 81–99)
MCV RBC AUTO: 97.1 FL — SIGNIFICANT CHANGE UP (ref 81–99)
MONOCYTES # BLD AUTO: 0.37 K/UL — SIGNIFICANT CHANGE UP (ref 0.1–0.6)
MONOCYTES NFR BLD AUTO: 7.5 % — SIGNIFICANT CHANGE UP (ref 1.7–9.3)
NEUTROPHILS # BLD AUTO: 2.83 K/UL — SIGNIFICANT CHANGE UP (ref 1.4–6.5)
NEUTROPHILS NFR BLD AUTO: 57.6 % — SIGNIFICANT CHANGE UP (ref 42.2–75.2)
NRBC # BLD: 0 /100 WBCS — SIGNIFICANT CHANGE UP (ref 0–0)
NRBC # BLD: 0 /100 WBCS — SIGNIFICANT CHANGE UP (ref 0–0)
PHOSPHATE SERPL-MCNC: 3.6 MG/DL — SIGNIFICANT CHANGE UP (ref 2.1–4.9)
PLATELET # BLD AUTO: 292 K/UL — SIGNIFICANT CHANGE UP (ref 130–400)
PLATELET # BLD AUTO: 303 K/UL — SIGNIFICANT CHANGE UP (ref 130–400)
POTASSIUM SERPL-MCNC: 4.5 MMOL/L — SIGNIFICANT CHANGE UP (ref 3.5–5)
POTASSIUM SERPL-SCNC: 4.5 MMOL/L — SIGNIFICANT CHANGE UP (ref 3.5–5)
PROT SERPL-MCNC: 4.5 G/DL — LOW (ref 6–8)
RBC # BLD: 3.1 M/UL — LOW (ref 4.2–5.4)
RBC # BLD: 3.12 M/UL — LOW (ref 4.2–5.4)
RBC # FLD: 14.8 % — HIGH (ref 11.5–14.5)
RBC # FLD: 14.9 % — HIGH (ref 11.5–14.5)
SODIUM SERPL-SCNC: 142 MMOL/L — SIGNIFICANT CHANGE UP (ref 135–146)
WBC # BLD: 4.09 K/UL — LOW (ref 4.8–10.8)
WBC # BLD: 4.92 K/UL — SIGNIFICANT CHANGE UP (ref 4.8–10.8)
WBC # FLD AUTO: 4.09 K/UL — LOW (ref 4.8–10.8)
WBC # FLD AUTO: 4.92 K/UL — SIGNIFICANT CHANGE UP (ref 4.8–10.8)

## 2020-01-26 PROCEDURE — 93010 ELECTROCARDIOGRAM REPORT: CPT

## 2020-01-26 PROCEDURE — 99233 SBSQ HOSP IP/OBS HIGH 50: CPT

## 2020-01-26 PROCEDURE — 71045 X-RAY EXAM CHEST 1 VIEW: CPT | Mod: 26

## 2020-01-26 RX ORDER — SODIUM CHLORIDE 9 MG/ML
1000 INJECTION INTRAMUSCULAR; INTRAVENOUS; SUBCUTANEOUS ONCE
Refills: 0 | Status: COMPLETED | OUTPATIENT
Start: 2020-01-26 | End: 2020-01-26

## 2020-01-26 RX ORDER — IPRATROPIUM/ALBUTEROL SULFATE 18-103MCG
3 AEROSOL WITH ADAPTER (GRAM) INHALATION EVERY 6 HOURS
Refills: 0 | Status: DISCONTINUED | OUTPATIENT
Start: 2020-01-26 | End: 2020-02-05

## 2020-01-26 RX ORDER — MEROPENEM 1 G/30ML
1000 INJECTION INTRAVENOUS EVERY 8 HOURS
Refills: 0 | Status: COMPLETED | OUTPATIENT
Start: 2020-01-26 | End: 2020-01-26

## 2020-01-26 RX ORDER — ACETYLCYSTEINE 200 MG/ML
4 VIAL (ML) MISCELLANEOUS THREE TIMES A DAY
Refills: 0 | Status: COMPLETED | OUTPATIENT
Start: 2020-01-26 | End: 2020-01-27

## 2020-01-26 RX ORDER — IPRATROPIUM/ALBUTEROL SULFATE 18-103MCG
3 AEROSOL WITH ADAPTER (GRAM) INHALATION EVERY 4 HOURS
Refills: 0 | Status: DISCONTINUED | OUTPATIENT
Start: 2020-01-26 | End: 2020-02-05

## 2020-01-26 RX ORDER — DEXTROSE 50 % IN WATER 50 %
50 SYRINGE (ML) INTRAVENOUS ONCE
Refills: 0 | Status: COMPLETED | OUTPATIENT
Start: 2020-01-26 | End: 2020-01-26

## 2020-01-26 RX ORDER — ACETAMINOPHEN 500 MG
650 TABLET ORAL ONCE
Refills: 0 | Status: COMPLETED | OUTPATIENT
Start: 2020-01-26 | End: 2020-01-26

## 2020-01-26 RX ADMIN — LAMOTRIGINE 100 MILLIGRAM(S): 25 TABLET, ORALLY DISINTEGRATING ORAL at 21:22

## 2020-01-26 RX ADMIN — Medication 3 MILLILITER(S): at 13:39

## 2020-01-26 RX ADMIN — Medication 3 MILLILITER(S): at 02:04

## 2020-01-26 RX ADMIN — Medication 0.5 MILLIGRAM(S): at 17:15

## 2020-01-26 RX ADMIN — POLYETHYLENE GLYCOL 3350 17 GRAM(S): 17 POWDER, FOR SOLUTION ORAL at 11:45

## 2020-01-26 RX ADMIN — SODIUM CHLORIDE 3 MILLILITER(S): 9 INJECTION INTRAMUSCULAR; INTRAVENOUS; SUBCUTANEOUS at 17:40

## 2020-01-26 RX ADMIN — SODIUM CHLORIDE 1000 MILLILITER(S): 9 INJECTION INTRAMUSCULAR; INTRAVENOUS; SUBCUTANEOUS at 11:30

## 2020-01-26 RX ADMIN — CHLORHEXIDINE GLUCONATE 1 APPLICATION(S): 213 SOLUTION TOPICAL at 06:51

## 2020-01-26 RX ADMIN — Medication 0.5 MILLIGRAM(S): at 05:17

## 2020-01-26 RX ADMIN — Medication 3 MILLILITER(S): at 08:19

## 2020-01-26 RX ADMIN — Medication 112 MICROGRAM(S): at 05:17

## 2020-01-26 RX ADMIN — MEROPENEM 100 MILLIGRAM(S): 1 INJECTION INTRAVENOUS at 04:22

## 2020-01-26 RX ADMIN — Medication 10 MILLIGRAM(S): at 11:46

## 2020-01-26 RX ADMIN — Medication 1 TABLET(S): at 11:45

## 2020-01-26 RX ADMIN — LAMOTRIGINE 75 MILLIGRAM(S): 25 TABLET, ORALLY DISINTEGRATING ORAL at 12:16

## 2020-01-26 RX ADMIN — ENOXAPARIN SODIUM 40 MILLIGRAM(S): 100 INJECTION SUBCUTANEOUS at 11:47

## 2020-01-26 RX ADMIN — Medication 3 MILLILITER(S): at 20:44

## 2020-01-26 RX ADMIN — Medication 3 MILLILITER(S): at 16:35

## 2020-01-26 RX ADMIN — Medication 650 MILLIGRAM(S): at 01:16

## 2020-01-26 RX ADMIN — SCOPALAMINE 1 PATCH: 1 PATCH, EXTENDED RELEASE TRANSDERMAL at 09:42

## 2020-01-26 RX ADMIN — Medication 50 MILLILITER(S): at 11:30

## 2020-01-26 RX ADMIN — Medication 4 MILLILITER(S): at 16:34

## 2020-01-26 RX ADMIN — QUETIAPINE FUMARATE 25 MILLIGRAM(S): 200 TABLET, FILM COATED ORAL at 21:22

## 2020-01-26 NOTE — PROGRESS NOTE ADULT - ASSESSMENT
IMPRESSION:    Left mucous plug with lung collapse, s/p repeat bronchoscopy with mucous plug extraction  B/l opacities(better aeration)    RECOMMEND:  Check 2d echo, repeat probnp.  keep on HHFNC  Aspiration precautions   Aggressive pulmonary toilet   Oral care   Nebs prn  Saline nebulizer  HOB >45  DVT prophylaxis  if patient is intubated upgrade to vent unit  Case discussed with the sister at the bedside.     Attending attestation to follow. IMPRESSION:  Acute hypoxemic respiratory failure   Worsening pneumonia / ARDS  Left mucous plug with lung collapse, s/p repeat bronchoscopy with mucous plug extraction      RECOMMEND:  Check 2d echo, repeat probnp.  keep on HHFNC  Aspiration precautions   Aggressive pulmonary toilet   Oral care   Nebs prn  Saline nebulizer  HOB >45  DVT prophylaxis  if patient is intubated transfer to vent unit  Case discussed with the sister at the bedside. Sister is trying to get power of .    [place] hospitalist   Prognosis poor

## 2020-01-26 NOTE — PROGRESS NOTE ADULT - SUBJECTIVE AND OBJECTIVE BOX
SALVATORE STEELE 54y Female  MRN#: [id number]   CODE STATUS:_FULL_______      SUBJECTIVE  Patient is a 54y old Female who presents with a chief complaint of dysphagia (25 Jan 2020 12:30)  Currently admitted to medicine with the primary diagnosis of UTI (urinary tract infection)    rapid response called today- patient desatting on 100% high flow.   suctioned, with improvement of oxygenation. ICU eval pending.     OBJECTIVE  PAST MEDICAL & SURGICAL HISTORY  Seizures  Intellectual disability  Hypothyroid  Impulse control disorder in adult  Down's syndrome  No significant past surgical history    ALLERGIES:  No Known Allergies    MEDICATIONS:  STANDING MEDICATIONS  acetylcysteine 20%  Inhalation 4 milliLiter(s) Inhalation three times a day  albuterol/ipratropium for Nebulization 3 milliLiter(s) Nebulizer every 6 hours  bisacodyl Suppository 10 milliGRAM(s) Rectal daily  calcium carbonate 1250 mG  + Vitamin D (OsCal 500 + D) 1 Tablet(s) Oral daily  chlorhexidine 4% Liquid 1 Application(s) Topical <User Schedule>  clonazePAM  Tablet 0.5 milliGRAM(s) Oral every 12 hours  enoxaparin Injectable 40 milliGRAM(s) SubCutaneous daily  influenza   Vaccine 0.5 milliLiter(s) IntraMuscular once  lamoTRIgine 75 milliGRAM(s) Oral daily  lamoTRIgine 100 milliGRAM(s) Oral at bedtime  levothyroxine 112 MICROGram(s) Oral daily  polyethylene glycol 3350 17 Gram(s) Oral daily  QUEtiapine 25 milliGRAM(s) Oral at bedtime  scopolamine   Patch 1 Patch Transdermal every 72 hours  sodium chloride 0.9% for Nebulization 3 milliLiter(s) Nebulizer every 4 hours    PRN MEDICATIONS  acetaminophen   Tablet .. 650 milliGRAM(s) Oral every 6 hours PRN  diphenhydrAMINE 25 milliGRAM(s) Oral once PRN      VITAL SIGNS: Last 24 Hours  T(C): 37.1 (26 Jan 2020 05:00), Max: 38 (26 Jan 2020 01:05)  T(F): 98.7 (26 Jan 2020 05:00), Max: 100.4 (26 Jan 2020 01:05)  HR: 57 (26 Jan 2020 05:00) (57 - 69)  BP: 108/52 (26 Jan 2020 05:00) (102/57 - 123/57)  BP(mean): --  RR: 16 (26 Jan 2020 05:00) (16 - 17)  SpO2: 98% (25 Jan 2020 22:45) (90% - 98%)    LABS:                        9.8    4.09  )-----------( 292      ( 26 Jan 2020 05:16 )             30.1     01-26    142  |  105  |  9<L>  ----------------------------<  85  4.5   |  27  |  1.0    Ca    8.0<L>      26 Jan 2020 05:16  Phos  3.6     01-26  Mg     2.0     01-26    TPro  4.5<L>  /  Alb  2.3<L>  /  TBili  0.2  /  DBili  <0.2  /  AST  171<H>  /  ALT  250<H>  /  AlkPhos  155<H>  01-26        ABG - ( 26 Jan 2020 07:54 )  pH, Arterial: 7.46  pH, Blood: x     /  pCO2: 45    /  pO2: 67    / HCO3: 31    / Base Excess: 6.7   /  SaO2: 95                Lactate, Blood: 0.9 mmol/L (01-26-20 @ 05:16)          RADIOLOGY:      PHYSICAL EXAM:  General: awake, opens eyes, nonverbal  Lungs: decreased breath sounds B/L, mild wheezing, no rhonchi  CVS: normal S1, S2, RRR, NO M/G/R  Abdomen: soft, bowel sounds present, non-tender, non-distended  Extremities: b/l upper ext. distal pitting edema present, no clubbing, no cyanosis, positive peripheral pulses b/l  Neuro: not AAO, non focal  Skin: no rash, no ecchymosis, sacral decub stage 2 (poa)      ASSESSMENT AND PLAN:  53 yo female with PMH Down's syndrome, nonverbal, Alzheimer's dementia, seizure d/o, hypothyroidism brought in from group home for evaluation of decreased energy.     #Left Lung Collapse 2/2 Mucous plugging- acute hypoxic resp failure  -s/p 2 bronchoscopies this admission.  -completed IV Vanco and Tomasa  -cont mucinex, chest PT, frequent suctioning  -hypoxic- cont high flow oxygen with possible need for intubation (pending ICU eval)  -scopolamine patch  -duonebs  -monitor pulse ox closely  -not medically stable for peg  -keep O2 sat > 85%    #Poor PO intake- s/p NGT insertion  -tolerating  NGT feedings well  -will need PEG when stable    # Acute Transaminitis  -worsening LFT's  -liver US pending  -hold statin      #Pseudomonas UTI   -resolved  -s/p cipro and tomasa    #Constipation   -bowel regimen    #Down Syndrome With Dementia, Seizure Disorder:  - non verbal at baseline  - Continue with Lamictal,   - restarted on olanzapine, and Klonopin     # Hypothyroidism:  - Continue with Synthroid 112  - TSH 0.25       DVT Prophylaxis: lovenox  GI ppx: not indicated  Soft diet   Disposition: Acute, may be ICU upgrade.  from group home, will need to go to SNF

## 2020-01-26 NOTE — CHART NOTE - NSCHARTNOTEFT_GEN_A_CORE
Rapid response called on patient at 8:23 AM.  Patient was desatting to 70% on 100% high flow.  Patient also less responsive.    Vitals: /52 HR 61 O2 Sat 70%  Patient seen and examined.  She is nonverbal at baseline due to alzheimer's dementia.  She appeared more lethargic but is awake and opening her eyes.  Patient has complete collapse of left lung secondary to mucous plugging which has lead to acute hypoxic respiratory failure with frequent desaturations.  Patient has been requiring high flow oxygen since yesterday.    Patient was suctioned and her oxygen saturation improved to >90%.  She requires frequent suctioning and chest PT to clear secretions. Chest xray ordered, stat ABG, stat EKG.  Labs reviewed.  Blood cultures pending.  Patient pending ICU evaluation for possible upgrade.  She may need to be intubated if she continues to desaturate on 100% high flow.    Spoke with the patient's sister at bedside.  The patient is a haney of the [facility]; the sister said to proceed with management as the medical team sees fit and that it is OK to intubate.      Patient's oxygenation improved and we will continue to monitor her.    Follow up: repeat CXR, blood cultures.   Plan: continue to suction, chest PT, cont high flow oxygen.  may require intubation if further decline-will continue to observe.  possible ICU upgrade Rapid response called on patient at 8:23 AM.  Patient was desatting to 70% on 100% high flow.  Patient also less responsive.    Vitals: /52 HR 61 O2 Sat 70%  Patient seen and examined.  She is nonverbal at baseline due to alzheimer's dementia.  She appeared more lethargic but is awake and opening her eyes.  Patient has complete collapse of left lung secondary to mucous plugging which has lead to acute hypoxic respiratory failure with frequent desaturations.  Patient has been requiring high flow oxygen since yesterday.    Patient was suctioned and her oxygen saturation improved to >90%.  She requires frequent suctioning and chest PT to clear secretions. Chest xray ordered, stat ABG, stat EKG.  Labs reviewed.  Blood cultures pending.  Patient pending ICU evaluation for possible upgrade.  She may need to be intubated if she continues to desaturate on 100% high flow.    Spoke with the patient's sister at bedside.  The patient is a haney of the [facility]; the sister said to proceed with management as the medical team sees fit and that it is OK to intubate.      Patient's oxygenation improved and we will continue to monitor her.    Follow up: repeat CXR, blood cultures.   Plan: continue to suction, chest PT, cont high flow oxygen.  may require intubation if further decline-will continue to monitor.  possible ICU upgrade

## 2020-01-26 NOTE — PROGRESS NOTE ADULT - ATTENDING COMMENTS
I saw and evaluated patient  by bedside, she was decompensating today again , RRT was called by a covering nurse, patient had bedside nebulizer tx. with nasal suctioning, post which her saturation has improved to 100%, hypotensive, receiving IVF bolus. Family by bedside, critical condition of patient was d/w family. Patient will need to be upgraded to ICU level of care, with high risk of needing intubation today.    All labs, radiology studies, VS was reviewed  I have reviewed the resident's note and agree with documented findings and  plan of care.    55 yo female with PMH Down's syndrome, nonverbal, Alzheimer's dementia, seizure d/o, hypothyroidism brought in from group home for evaluation of decreased energy.     #Persistent Left Lung Collapse :  s/p 2 bronchoscopies this admission.  - completed IV Vanco  - completed IV Tomasa  mucinex, chest PT    - She is not medically stable for peg placement  - continue chest PT, started pt. on trial of scopolamine patch tx. to decrease secretions,  deepthi, NS nebs.  -close pulse ox monitoring    #acute hypoxic respiratory failure due to above  - patient is on high flow  - needs continuous respir. hygiene with frequent suctioning, remains critical with high risk of intubation today.   -needs to be upgraded to ICU level of care    # Hypotension - review b/p post IV bolus infusion, consider starting pressors if MAP 65 or below.    # Poor PO intake- s/p NGT insertion- hold NGT feedings.    # Acute Transaminitis- worsening LFT's- obtain liver US, continue to monitor LFT's,  hold statins.    # Hypernatremia- resolved      #Metabolic encephalopathy:   due to above mentioned medical conditions.  correct underlying problems    # Pseudomonas UTI   - resolved (with initial Cipro and now Tomasa)    #Constipation   - On suppository  laxative tx.    # Down Syndrome With Dementia, Seizure Disorder:  - non verbal at baseline  - Continue with Lamictal,   - restarted on olanzapine, and Klonopin       # Hypothyroidism:  - Continue with Synthroid 112  - TSH 0.25     DVT Prophylaxis: lovenox  GI ppx: not indicated  Soft diet   Disposition: from group home, will need to go to SNF    All abnormal results and critical patient's condition was  d/w patient's sister by bedside     HCP and legal guardian [phone] Madisyndonald pearl (Sister) .      patient carries very poor overall prognosis .  will submit MOLST form to Nedla Guzman,  [facility] Legal Service office tel [phone] fax [phone]     Patient to be transferred to ICU level of care. possible initiation of pressors support and intubation for airway protection.

## 2020-01-26 NOTE — PROGRESS NOTE ADULT - SUBJECTIVE AND OBJECTIVE BOX
Patient is a 54y old  Female who presents with a chief complaint of dysphagia (25 Jan 2020 12:30)        SUBJECTIVE: desaturation on HHFNC      REVIEW OF SYSTEMS: patient is lethargic unable to obtain full ROS.    CONSTITUTIONAL:     no fever   no chills.  no weight gain   no weight loss    EYES:   no discharge,       ENT:   Ears: no ear pain and no hearing problems.  Nose: no nasal congestion and no nasal drainage.    CARDIOVASCULAR:   Regular  no edema    RESPIRATORY:  +ve SOB,  no wheezing ,  +ve respiratory difficulty  +ve secretions.    GASTROINTESTINAL:   no abdominal pain,   no diarrhea,   no vomiting.    GENITOURINARY:  no dysuria,   no hematuria.    MUSCULOSKELETAL:   no back pain,   no musculoskeletal pain,  no weakness.    SKIN:   no jaundice,    no rashes.    NEURO:   no loss of consciousness,   no headache,   no weakness.    PSYCHIATRIC:   no known mental health issues  no anxiety  no depression    ALLERGIC/IMMUNOLOGIC:   No active allergic or immunologic issues      PHYSICAL EXAM  Vital Signs Last 24 Hrs  T(C): 37.1 (26 Jan 2020 05:00), Max: 38 (26 Jan 2020 01:05)  T(F): 98.7 (26 Jan 2020 05:00), Max: 100.4 (26 Jan 2020 01:05)  HR: 57 (26 Jan 2020 05:00) (57 - 60)  BP: 108/52 (26 Jan 2020 05:00) (102/57 - 119/58)  BP(mean): --  RR: 22 (26 Jan 2020 05:00)   SpO2: 96% (26 Jan 2020 13:40) (90% - 98%)    CONSTITUTIONAL:   Patient is lethargic, responds to verbal stimulus.    ENT:   Airway patent,   No thrush    EYES:   Clear bilaterally,   pupils equal, round and reactive to light.    CARDIAC:   Normal rate,   regular rhythm.    no edema      CAROTID:   normal systolic impulse  no bruits    RESPIRATORY:   No wheezing  Normal chest expansion  mildly tachypneic,  +ve ronchi.  decreased air entry.    GASTROINTESTINAL:  Abdomen soft,   non-tender,   no guarding,   + BS    GENITOURINARY  normal genitalia for sex  no edema    MUSCULOSKELETAL:   able to move all ext.  no clubbing, cyanosis    NEUROLOGICAL:   not Alert and oriented   no motor  deficits.    SKIN:   Skin normal color for race,   No evidence of rash.    PSYCHIATRIC:   normal mood and affect.   no apparent risk to self or others.          01-25-20 @ 07:01  -  01-26-20 @ 07:00  --------------------------------------------------------  IN:    Enteral Tube Flush: 90 mL    Free Water: 250 mL    ns in tub fed  [id number]: 240 mL  Total IN: 580 mL    OUT:    Indwelling Catheter - Suprapubic: 1770 mL  Total OUT: 1770 mL    Total NET: -1190 mL      01-26-20 @ 07:01  -  01-26-20 @ 14:48  --------------------------------------------------------  IN:  Total IN: 0 mL    OUT:    Indwelling Catheter - Suprapubic: 450 mL  Total OUT: 450 mL    Total NET: -450 mL          LABS:                          9.8    4.09  )-----------( 292      ( 26 Jan 2020 05:16 )             30.1                                               01-26    142  |  105  |  9<L>  ----------------------------<  85  4.5   |  27  |  1.0    Ca    8.0<L>      26 Jan 2020 05:16  Phos  3.6     01-26  Mg     2.0     01-26    TPro  4.5<L>  /  Alb  2.3<L>  /  TBili  0.2  /  DBili  <0.2  /  AST  171<H>  /  ALT  250<H>  /  AlkPhos  155<H>  01-26                                                                                           LIVER FUNCTIONS - ( 26 Jan 2020 05:16 )  Alb: 2.3 g/dL / Pro: 4.5 g/dL / ALK PHOS: 155 U/L / ALT: 250 U/L / AST: 171 U/L / GGT: x                                                                                            ABG - ( 26 Jan 2020 07:54 )  pH, Arterial: 7.46  pH, Blood: x     /  pCO2: 45    /  pO2: 67    / HCO3: 31    / Base Excess: 6.7   /  SaO2: 95                  MEDICATIONS  (STANDING):  acetylcysteine 20%  Inhalation 4 milliLiter(s) Inhalation three times a day  albuterol/ipratropium for Nebulization 3 milliLiter(s) Nebulizer every 6 hours  bisacodyl Suppository 10 milliGRAM(s) Rectal daily  calcium carbonate 1250 mG  + Vitamin D (OsCal 500 + D) 1 Tablet(s) Oral daily  chlorhexidine 4% Liquid 1 Application(s) Topical <User Schedule>  clonazePAM  Tablet 0.5 milliGRAM(s) Oral every 12 hours  enoxaparin Injectable 40 milliGRAM(s) SubCutaneous daily  influenza   Vaccine 0.5 milliLiter(s) IntraMuscular once  lamoTRIgine 75 milliGRAM(s) Oral daily  lamoTRIgine 100 milliGRAM(s) Oral at bedtime  levothyroxine 112 MICROGram(s) Oral daily  polyethylene glycol 3350 17 Gram(s) Oral daily  QUEtiapine 25 milliGRAM(s) Oral at bedtime  scopolamine   Patch 1 Patch Transdermal every 72 hours  sodium chloride 0.9% for Nebulization 3 milliLiter(s) Nebulizer every 4 hours    MEDICATIONS  (PRN):  acetaminophen   Tablet .. 650 milliGRAM(s) Oral every 6 hours PRN Temp greater or equal to 38C (100.4F)  diphenhydrAMINE 25 milliGRAM(s) Oral once PRN Rash and/or Itching      X-Rays reviewed    CXR interpreted by me: Patient is a 54y old  Female who presents with a chief complaint of dysphagia (25 Jan 2020 12:30)        SUBJECTIVE: desaturation on HHFNC.  Now on 100% 60 liters      REVIEW OF SYSTEMS: patient is lethargic unable to obtain full ROS.    CONSTITUTIONAL:     no fever   no chills.  no weight gain   no weight loss    EYES:   no discharge,       ENT:   Ears: no ear pain and no hearing problems.  Nose: no nasal congestion and no nasal drainage.    CARDIOVASCULAR:   Regular  no edema    RESPIRATORY:  +ve SOB,  no wheezing ,  +ve respiratory difficulty  +ve secretions.    GASTROINTESTINAL:   no abdominal pain,   no diarrhea,   no vomiting.    GENITOURINARY:  no dysuria,   no hematuria.    MUSCULOSKELETAL:   no back pain,   no musculoskeletal pain,  no weakness.    SKIN:   no jaundice,    no rashes.    NEURO:   no loss of consciousness,   no headache,   no weakness.    PSYCHIATRIC:   no known mental health issues  no anxiety  no depression    ALLERGIC/IMMUNOLOGIC:   No active allergic or immunologic issues      PHYSICAL EXAM  Vital Signs Last 24 Hrs  T(C): 37.1 (26 Jan 2020 05:00), Max: 38 (26 Jan 2020 01:05)  T(F): 98.7 (26 Jan 2020 05:00), Max: 100.4 (26 Jan 2020 01:05)  HR: 57 (26 Jan 2020 05:00) (57 - 60)  BP: 108/52 (26 Jan 2020 05:00) (102/57 - 119/58)  BP(mean): --  RR: 22 (26 Jan 2020 05:00)   SpO2: 96% (26 Jan 2020 13:40) (90% - 98%)    CONSTITUTIONAL:   Patient is lethargic, responds to verbal stimulus.    ENT:   Airway patent,   No thrush    EYES:   Clear bilaterally,   pupils equal, round and reactive to light.    CARDIAC:   Normal rate,   regular rhythm.    no edema      CAROTID:   normal systolic impulse  no bruits    RESPIRATORY:   No wheezing  Normal chest expansion  +ve ronchi.  decreased air entry.    GASTROINTESTINAL:  Abdomen soft,   non-tender,   no guarding,   + BS    GENITOURINARY  normal genitalia for sex  no edema    MUSCULOSKELETAL:   able to move all ext.  no clubbing, cyanosis    NEUROLOGICAL:   not Alert and oriented   no motor  deficits.    SKIN:   Skin normal color for race,   No evidence of rash.    PSYCHIATRIC:   normal mood and affect.   no apparent risk to self or others.          01-25-20 @ 07:01  -  01-26-20 @ 07:00  --------------------------------------------------------  IN:    Enteral Tube Flush: 90 mL    Free Water: 250 mL    ns in tub fed  [id number]: 240 mL  Total IN: 580 mL    OUT:    Indwelling Catheter - Suprapubic: 1770 mL  Total OUT: 1770 mL    Total NET: -1190 mL      01-26-20 @ 07:01  -  01-26-20 @ 14:48  --------------------------------------------------------  IN:  Total IN: 0 mL    OUT:    Indwelling Catheter - Suprapubic: 450 mL  Total OUT: 450 mL    Total NET: -450 mL          LABS:                          9.8    4.09  )-----------( 292      ( 26 Jan 2020 05:16 )             30.1                                               01-26    142  |  105  |  9<L>  ----------------------------<  85  4.5   |  27  |  1.0    Ca    8.0<L>      26 Jan 2020 05:16  Phos  3.6     01-26  Mg     2.0     01-26    TPro  4.5<L>  /  Alb  2.3<L>  /  TBili  0.2  /  DBili  <0.2  /  AST  171<H>  /  ALT  250<H>  /  AlkPhos  155<H>  01-26                                                                                           LIVER FUNCTIONS - ( 26 Jan 2020 05:16 )  Alb: 2.3 g/dL / Pro: 4.5 g/dL / ALK PHOS: 155 U/L / ALT: 250 U/L / AST: 171 U/L / GGT: x                                                                                            ABG - ( 26 Jan 2020 07:54 )  pH, Arterial: 7.46  pH, Blood: x     /  pCO2: 45    /  pO2: 67    / HCO3: 31    / Base Excess: 6.7   /  SaO2: 95                  MEDICATIONS  (STANDING):  acetylcysteine 20%  Inhalation 4 milliLiter(s) Inhalation three times a day  albuterol/ipratropium for Nebulization 3 milliLiter(s) Nebulizer every 6 hours  bisacodyl Suppository 10 milliGRAM(s) Rectal daily  calcium carbonate 1250 mG  + Vitamin D (OsCal 500 + D) 1 Tablet(s) Oral daily  chlorhexidine 4% Liquid 1 Application(s) Topical <User Schedule>  clonazePAM  Tablet 0.5 milliGRAM(s) Oral every 12 hours  enoxaparin Injectable 40 milliGRAM(s) SubCutaneous daily  influenza   Vaccine 0.5 milliLiter(s) IntraMuscular once  lamoTRIgine 75 milliGRAM(s) Oral daily  lamoTRIgine 100 milliGRAM(s) Oral at bedtime  levothyroxine 112 MICROGram(s) Oral daily  polyethylene glycol 3350 17 Gram(s) Oral daily  QUEtiapine 25 milliGRAM(s) Oral at bedtime  scopolamine   Patch 1 Patch Transdermal every 72 hours  sodium chloride 0.9% for Nebulization 3 milliLiter(s) Nebulizer every 4 hours    MEDICATIONS  (PRN):  acetaminophen   Tablet .. 650 milliGRAM(s) Oral every 6 hours PRN Temp greater or equal to 38C (100.4F)  diphenhydrAMINE 25 milliGRAM(s) Oral once PRN Rash and/or Itching      X-Rays reviewed    CXR interpreted by me:  Bilateral infiltrates.  L>R

## 2020-01-27 LAB
ALBUMIN SERPL ELPH-MCNC: 2.5 G/DL — LOW (ref 3.5–5.2)
ALP SERPL-CCNC: 141 U/L — HIGH (ref 30–115)
ALT FLD-CCNC: 153 U/L — HIGH (ref 0–41)
ANION GAP SERPL CALC-SCNC: 10 MMOL/L — SIGNIFICANT CHANGE UP (ref 7–14)
AST SERPL-CCNC: 71 U/L — HIGH (ref 0–41)
BILIRUB SERPL-MCNC: 0.4 MG/DL — SIGNIFICANT CHANGE UP (ref 0.2–1.2)
BUN SERPL-MCNC: 9 MG/DL — LOW (ref 10–20)
CALCIUM SERPL-MCNC: 8.3 MG/DL — LOW (ref 8.5–10.1)
CHLORIDE SERPL-SCNC: 104 MMOL/L — SIGNIFICANT CHANGE UP (ref 98–110)
CO2 SERPL-SCNC: 28 MMOL/L — SIGNIFICANT CHANGE UP (ref 17–32)
CREAT SERPL-MCNC: 1 MG/DL — SIGNIFICANT CHANGE UP (ref 0.7–1.5)
GLUCOSE SERPL-MCNC: 84 MG/DL — SIGNIFICANT CHANGE UP (ref 70–99)
HCT VFR BLD CALC: 28.9 % — LOW (ref 37–47)
HGB BLD-MCNC: 9.8 G/DL — LOW (ref 12–16)
MCHC RBC-ENTMCNC: 32.7 PG — HIGH (ref 27–31)
MCHC RBC-ENTMCNC: 33.9 G/DL — SIGNIFICANT CHANGE UP (ref 32–37)
MCV RBC AUTO: 96.3 FL — SIGNIFICANT CHANGE UP (ref 81–99)
NRBC # BLD: 0 /100 WBCS — SIGNIFICANT CHANGE UP (ref 0–0)
NT-PROBNP SERPL-SCNC: 508 PG/ML — HIGH (ref 0–300)
PLATELET # BLD AUTO: 327 K/UL — SIGNIFICANT CHANGE UP (ref 130–400)
POTASSIUM SERPL-MCNC: 4.3 MMOL/L — SIGNIFICANT CHANGE UP (ref 3.5–5)
POTASSIUM SERPL-SCNC: 4.3 MMOL/L — SIGNIFICANT CHANGE UP (ref 3.5–5)
PROT SERPL-MCNC: 5.2 G/DL — LOW (ref 6–8)
RBC # BLD: 3 M/UL — LOW (ref 4.2–5.4)
RBC # FLD: 14.6 % — HIGH (ref 11.5–14.5)
SODIUM SERPL-SCNC: 142 MMOL/L — SIGNIFICANT CHANGE UP (ref 135–146)
WBC # BLD: 6.22 K/UL — SIGNIFICANT CHANGE UP (ref 4.8–10.8)
WBC # FLD AUTO: 6.22 K/UL — SIGNIFICANT CHANGE UP (ref 4.8–10.8)

## 2020-01-27 PROCEDURE — 76705 ECHO EXAM OF ABDOMEN: CPT | Mod: 26

## 2020-01-27 PROCEDURE — 71045 X-RAY EXAM CHEST 1 VIEW: CPT | Mod: 26

## 2020-01-27 PROCEDURE — 99233 SBSQ HOSP IP/OBS HIGH 50: CPT

## 2020-01-27 RX ADMIN — Medication 112 MICROGRAM(S): at 05:07

## 2020-01-27 RX ADMIN — ENOXAPARIN SODIUM 40 MILLIGRAM(S): 100 INJECTION SUBCUTANEOUS at 12:01

## 2020-01-27 RX ADMIN — LAMOTRIGINE 75 MILLIGRAM(S): 25 TABLET, ORALLY DISINTEGRATING ORAL at 12:02

## 2020-01-27 RX ADMIN — Medication 10 MILLIGRAM(S): at 12:00

## 2020-01-27 RX ADMIN — Medication 3 MILLILITER(S): at 19:45

## 2020-01-27 RX ADMIN — Medication 1 TABLET(S): at 12:02

## 2020-01-27 RX ADMIN — Medication 4 MILLILITER(S): at 08:12

## 2020-01-27 RX ADMIN — Medication 3 MILLILITER(S): at 08:13

## 2020-01-27 RX ADMIN — SCOPALAMINE 1 PATCH: 1 PATCH, EXTENDED RELEASE TRANSDERMAL at 19:00

## 2020-01-27 RX ADMIN — Medication 0.5 MILLIGRAM(S): at 17:53

## 2020-01-27 RX ADMIN — Medication 650 MILLIGRAM(S): at 01:03

## 2020-01-27 RX ADMIN — QUETIAPINE FUMARATE 25 MILLIGRAM(S): 200 TABLET, FILM COATED ORAL at 21:39

## 2020-01-27 RX ADMIN — POLYETHYLENE GLYCOL 3350 17 GRAM(S): 17 POWDER, FOR SOLUTION ORAL at 12:02

## 2020-01-27 RX ADMIN — Medication 3 MILLILITER(S): at 13:10

## 2020-01-27 RX ADMIN — CHLORHEXIDINE GLUCONATE 1 APPLICATION(S): 213 SOLUTION TOPICAL at 05:12

## 2020-01-27 RX ADMIN — Medication 0.5 MILLIGRAM(S): at 05:07

## 2020-01-27 RX ADMIN — LAMOTRIGINE 100 MILLIGRAM(S): 25 TABLET, ORALLY DISINTEGRATING ORAL at 21:39

## 2020-01-27 RX ADMIN — SCOPALAMINE 1 PATCH: 1 PATCH, EXTENDED RELEASE TRANSDERMAL at 06:01

## 2020-01-27 NOTE — PROGRESS NOTE ADULT - SUBJECTIVE AND OBJECTIVE BOX
Patient is a 54y old  Female who presents with a chief complaint of dysphagia (25 Jan 2020 12:30)        SUBJECTIVE:  No events overnight    REVIEW OF SYSTEMS:    CONSTITUTIONAL:   no fever   no chills.  no weight gain   no weight loss    EYES:   no discharge,   no pain  no redness,   no visual changes.    ENT:   Ears: no ear pain and no hearing problems.  Nose: no nasal congestion and no nasal drainage.  Mouth/Throat: no dysphagia,  no hoarseness and no throat pain.  Neck: no lumps, no pain, no stiffness and no swollen glands.     CARDIOVASCULAR:   no chest pain,   no swelling  no palpitaions  no syncope    RESPIRATORY:  no SOB,  no wheezing ,  no respiratory difficulty  no sputum production    GASTROINTESTINAL:   no abdominal pain,   no constipation,   no diarrhea,   no vomiting.    GENITOURINARY:  no dysuria,   no frequency,   no urgency  no hematuria.    MUSCULOSKELETAL:   no back pain,   no musculoskeletal pain,  no weakness.    SKIN:   no jaundice,   no lesions,   no pruritis,   no rashes.    NEURO:   no loss of consciousness,   no gait abnormality,   no headache,   no sensory deficits,   no weakness.    PSYCHIATRIC:   no known mental health issues  no anxiety  no depression    ALLERGIC/IMMUNOLOGIC:   No active allergic or immunologic issues      PHYSICAL EXAM  Vital Signs Last 24 Hrs  T(C): 36.1 (27 Jan 2020 08:24), Max: 38.3 (27 Jan 2020 00:55)  T(F): 97 (27 Jan 2020 08:24), Max: 101 (27 Jan 2020 00:55)  HR: 62 (27 Jan 2020 08:24) (49 - 81)  BP: 135/63 (27 Jan 2020 08:24) (60/40 - 140/84)  BP(mean): --  RR: 18 (27 Jan 2020 04:58) (18 - 18)  SpO2: 100% (27 Jan 2020 08:14) (94% - 100%)    CONSTITUTIONAL:  Well nourished.  NAD    ENT:  + NGT  Airway patent,   Nasal mucosa clear.  Mouth with normal mucosa.   Throat has no vesicles,  no oropharyngeal exudates and uvula is midline.  No thrush    EYES:   Clear bilaterally,   pupils equal,   round and reactive to light.    CARDIAC:   Normal rate,   regular rhythm.    Heart sounds S1, S2.   normal  cardiac impulse  no edema      CAROTID:   normal systolic impulse  no bruits    RESPIRATORY:   decreased bibasilar breath sounds  normal chest expansion  not tachypneic,  no use of accessory muscles    GASTROINTESTINAL:  Abdomen soft,   non-tender,   no guarding,   + BS    GENITOURINARY  normal genitalia for sex  no edema    MUSCULOSKELETAL:   range of motion is not limited,  no muscle or joint tenderness  no clubbing, cyanosis    NEUROLOGICAL:   Alert and oriented   no focal deficits in cranial nerve areas  no motor or sensory deficits.  pertinent DTRs normal    SKIN:   Skin normal color for race,   warm,   dry and intact.   No evidence of rash.    PSYCHIATRIC:   Alert and oriented to person,   place, time/situation.   normal mood and affect.   no apparent risk to self or others.    HEME LYMPH:   no splenomegaly.  No cervical  lymphadenopathy.  no inguinal lymphadenopathy      01-26-20 @ 07:01  -  01-27-20 @ 07:00  --------------------------------------------------------  IN:    Enteral Tube Flush: 180 mL  Total IN: 180 mL    OUT:    Indwelling Catheter - Suprapubic: 1030 mL  Total OUT: 1030 mL    Total NET: -850 mL          LABS:                          9.8    6.22  )-----------( 327      ( 27 Jan 2020 06:36 )             28.9                                               01-27    142  |  104  |  9<L>  ----------------------------<  84  4.3   |  28  |  1.0    Ca    8.3<L>      27 Jan 2020 06:36  Phos  3.6     01-26  Mg     2.0     01-26    TPro  5.2<L>  /  Alb  2.5<L>  /  TBili  0.4  /  DBili  x   /  AST  71<H>  /  ALT  153<H>  /  AlkPhos  141<H>  01-27                                                                                           LIVER FUNCTIONS - ( 27 Jan 2020 06:36 )  Alb: 2.5 g/dL / Pro: 5.2 g/dL / ALK PHOS: 141 U/L / ALT: 153 U/L / AST: 71 U/L / GGT: x                                                  Culture - Blood (collected 26 Jan 2020 01:10)  Source: .Blood Blood  Preliminary Report (27 Jan 2020 09:01):    No growth to date.                                                ABG - ( 26 Jan 2020 07:54 )  pH, Arterial: 7.46  pH, Blood: x     /  pCO2: 45    /  pO2: 67    / HCO3: 31    / Base Excess: 6.7   /  SaO2: 95                  MEDICATIONS  (STANDING):  albuterol/ipratropium for Nebulization 3 milliLiter(s) Nebulizer every 6 hours  bisacodyl Suppository 10 milliGRAM(s) Rectal daily  calcium carbonate 1250 mG  + Vitamin D (OsCal 500 + D) 1 Tablet(s) Oral daily  chlorhexidine 4% Liquid 1 Application(s) Topical <User Schedule>  clonazePAM  Tablet 0.5 milliGRAM(s) Oral every 12 hours  enoxaparin Injectable 40 milliGRAM(s) SubCutaneous daily  influenza   Vaccine 0.5 milliLiter(s) IntraMuscular once  lamoTRIgine 75 milliGRAM(s) Oral daily  lamoTRIgine 100 milliGRAM(s) Oral at bedtime  levothyroxine 112 MICROGram(s) Oral daily  polyethylene glycol 3350 17 Gram(s) Oral daily  QUEtiapine 25 milliGRAM(s) Oral at bedtime  scopolamine   Patch 1 Patch Transdermal every 72 hours  sodium chloride 0.9% for Nebulization 3 milliLiter(s) Nebulizer every 4 hours    MEDICATIONS  (PRN):  acetaminophen   Tablet .. 650 milliGRAM(s) Oral every 6 hours PRN Temp greater or equal to 38C (100.4F)  albuterol/ipratropium for Nebulization. 3 milliLiter(s) Nebulizer every 4 hours PRN Shortness of Breath  diphenhydrAMINE 25 milliGRAM(s) Oral once PRN Rash and/or Itching Patient is a 54y old  Female who presents with a chief complaint of dysphagia (25 Jan 2020 12:30)        SUBJECTIVE:  No events overnight.  Now on [place] O2 requirement.  More awake     REVIEW OF SYSTEMS:    CONSTITUTIONAL:    fever   no chills.  no weight gain   no weight loss    EYES:   no discharge,   no pain  no redness,   no visual changes.    ENT:   Ears: no ear pain and no hearing problems.  Nose: no nasal congestion and no nasal drainage.  Mouth/Throat: no dysphagia,  no hoarseness and no throat pain.  Neck: no lumps, no pain, no stiffness and no swollen glands.     CARDIOVASCULAR:   no chest pain,   no swelling  no palpitaions  no syncope    RESPIRATORY:  Per HPI     GASTROINTESTINAL:   no abdominal pain,   no constipation,   no diarrhea,   no vomiting.    GENITOURINARY:  no dysuria,   no frequency,   no urgency  no hematuria.    MUSCULOSKELETAL:   no back pain,   no musculoskeletal pain,  no weakness.    SKIN:   no jaundice,   no lesions,   no pruritis,   no rashes.    NEURO:   HO MR    PSYCHIATRIC:   HO MR    ALLERGIC/IMMUNOLOGIC:   No active allergic or immunologic issues      PHYSICAL EXAM  Vital Signs Last 24 Hrs  T(C): 36.1 (27 Jan 2020 08:24), Max: 38.3 (27 Jan 2020 00:55)  T(F): 97 (27 Jan 2020 08:24), Max: 101 (27 Jan 2020 00:55)  HR: 62 (27 Jan 2020 08:24) (49 - 81)  BP: 135/63 (27 Jan 2020 08:24) (60/40 - 140/84)  BP(mean): --  RR: 18 (27 Jan 2020 04:58) (18 - 18)  SpO2: 100% (27 Jan 2020 08:14) (94% - 100%)    CONSTITUTIONAL:  Well nourished.  NAD    ENT:  + NGT  Airway patent,   No thrush    EYES:   Clear bilaterally,   pupils equal,   round and reactive to light.    CARDIAC:   Normal rate,   regular rhythm.    Heart sounds S1, S2.   normal  cardiac impulse  no edema      CAROTID:   normal systolic impulse  no bruits    RESPIRATORY:   decreased bibasilar breath sounds  normal chest expansion  not tachypneic,  no use of accessory muscles    GASTROINTESTINAL:  Abdomen soft,   non-tender,   no guarding,   + BS    GENITOURINARY  normal genitalia for sex  no edema    MUSCULOSKELETAL:   range of motion is not limited,  no muscle or joint tenderness  no clubbing, cyanosis    NEUROLOGICAL:   Alert   no focal deficits in cranial nerve areas    SKIN:   Skin normal color for race,   No evidence of rash.    PSYCHIATRIC:   no apparent risk to self or others.    HEME LYMPH:   No cervical  lymphadenopathy.  no inguinal lymphadenopathy      01-26-20 @ 07:01  -  01-27-20 @ 07:00  --------------------------------------------------------  IN:    Enteral Tube Flush: 180 mL  Total IN: 180 mL    OUT:    Indwelling Catheter - Suprapubic: 1030 mL  Total OUT: 1030 mL    Total NET: -850 mL          LABS:                          9.8    6.22  )-----------( 327      ( 27 Jan 2020 06:36 )             28.9                                               01-27    142  |  104  |  9<L>  ----------------------------<  84  4.3   |  28  |  1.0    Ca    8.3<L>      27 Jan 2020 06:36  Phos  3.6     01-26  Mg     2.0     01-26    TPro  5.2<L>  /  Alb  2.5<L>  /  TBili  0.4  /  DBili  x   /  AST  71<H>  /  ALT  153<H>  /  AlkPhos  141<H>  01-27                                                                                           LIVER FUNCTIONS - ( 27 Jan 2020 06:36 )  Alb: 2.5 g/dL / Pro: 5.2 g/dL / ALK PHOS: 141 U/L / ALT: 153 U/L / AST: 71 U/L / GGT: x                                                  Culture - Blood (collected 26 Jan 2020 01:10)  Source: .Blood Blood  Preliminary Report (27 Jan 2020 09:01):    No growth to date.                                                ABG - ( 26 Jan 2020 07:54 )  pH, Arterial: 7.46  pH, Blood: x     /  pCO2: 45    /  pO2: 67    / HCO3: 31    / Base Excess: 6.7   /  SaO2: 95                  MEDICATIONS  (STANDING):  albuterol/ipratropium for Nebulization 3 milliLiter(s) Nebulizer every 6 hours  bisacodyl Suppository 10 milliGRAM(s) Rectal daily  calcium carbonate 1250 mG  + Vitamin D (OsCal 500 + D) 1 Tablet(s) Oral daily  chlorhexidine 4% Liquid 1 Application(s) Topical <User Schedule>  clonazePAM  Tablet 0.5 milliGRAM(s) Oral every 12 hours  enoxaparin Injectable 40 milliGRAM(s) SubCutaneous daily  influenza   Vaccine 0.5 milliLiter(s) IntraMuscular once  lamoTRIgine 75 milliGRAM(s) Oral daily  lamoTRIgine 100 milliGRAM(s) Oral at bedtime  levothyroxine 112 MICROGram(s) Oral daily  polyethylene glycol 3350 17 Gram(s) Oral daily  QUEtiapine 25 milliGRAM(s) Oral at bedtime  scopolamine   Patch 1 Patch Transdermal every 72 hours  sodium chloride 0.9% for Nebulization 3 milliLiter(s) Nebulizer every 4 hours    MEDICATIONS  (PRN):  acetaminophen   Tablet .. 650 milliGRAM(s) Oral every 6 hours PRN Temp greater or equal to 38C (100.4F)  albuterol/ipratropium for Nebulization. 3 milliLiter(s) Nebulizer every 4 hours PRN Shortness of Breath  diphenhydrAMINE 25 milliGRAM(s) Oral once PRN Rash and/or Itching

## 2020-01-27 NOTE — PROGRESS NOTE ADULT - ASSESSMENT
IMPRESSION:  Acute hypoxemic respiratory failure; improving  Worsening pneumonia / ARDS  Left mucous plug with lung collapse, s/p repeat bronchoscopy with mucous plug extraction      RECOMMEND:    Wean off HHFNC  Aspiration precautions   Aggressive pulmonary toilet   Oral care   Nebs prn  HOB >45  DVT prophylaxis  Follow up family decision on advanced directives; family wishes DNI  Case discussed with the sister at the bedside. Sister is trying to get power of .    Prognosis poor IMPRESSION:  Acute hypoxemic respiratory failure; improving  Worsening pneumonia / ARDS  Left mucous plug with lung collapse, s/p repeat bronchoscopy with mucous plug extraction      RECOMMEND:    Wean O2 as tolerated   Aspiration precautions   Aggressive pulmonary toilet   Needs broad spectrum ABX   Oral care   Nebs prn  HOB >45  DVT prophylaxis  Follow up family decision on advanced directives; family wishes DNISister is trying to get power of .    Prognosis poor

## 2020-01-27 NOTE — CHART NOTE - NSCHARTNOTEFT_GEN_A_CORE
Registered Dietitian Follow-Up     ****Scroll for RD Recommendations at bottom of note****    Patient Profile Reviewed                           Yes [x]   No []     Nutrition History Previously Obtained        Yes [x]  No []       Pertinent Subjective Information: NGT placed, pending PEG placement when pulm status stable. TF currently being held as pt yesterday was desatting, suspected d/t aspiration. RN reports pt was previously tolerating TF regimen, pt last BM 1/25 diarrhea at that time, no BM today (pt was on bowel regimen, maybe need to decrease regimen? will monitor).     Pertinent Medical Interventions: Pt is s/p rapid response yesterday, status has improved from yest. L lung collapse, pulm follows, s/p bronchoscopies x2 this admit, weaning off high flow. PEG placement when pulm status improved. Family wishes DNI, to f/u GOC.     Diet order: Jevity 1.2 240ml x5 feeds daily (BEING HELD)     Anthropometrics:  - Ht.154.9 cm  - Wt. 87.8kg (1/27) likely bedscale error vs edema status  78.1kg (1/15) and (1/20)  - %wt change:   - BMI: 32.6  - IBW: 47.7 kg+/-10%     Pertinent Lab Data: (1/27) H/H 9.8/28.9, BUN 9, elevated LFTs    Pertinent Meds: lovenox, albuterol, dulcolax, miralax, oscal 500 + D, synthroid     Physical Findings:  - Appearance: Disoriented X4 per MD, non-verbal   - GI function: last BM 1/25, diarrhea, constipation throughout admission, LIP and RN aware  - Tubes: NGT placement  - Oral/Mouth cavity: 1/15  SLP recommended NPO w/ non-oral means of nutrition.   - Skin: healing stage II L buttocks     Nutrition Requirements: (As per previous RD assessment (1/13)  Weight Used: Ideal BW 47.7 kg     Estimated Energy Needs    Continue [X]  Adjust [] 1431 - 1669 kcals/day (Based on 30 - 35kcal/kg IBW)     Estimated Protein Needs    Continue [X]  Adjust [] 59 - 72g (1.25 - 1.5g/kg IBW) Increased needs d/t Pressure Ulcer stage II    Estimated Fluid Needs        Continue []  Adjust [] Fluid: 1mL/Kcal or per LIP    Nutrient Intake: pt previously meeting needs on NGT feeds, however, now being held        [] Previous Nutrition Diagnosis: inadequate energy intake            [x] Ongoing          [] Resolved    [] No active nutrition diagnosis identified at this time        Nutrition Intervention Enteral nutrition  RECOMMENDATION: When cleared to reinitiate, continue Jevity 1.2 @ 240mL q 4hrs (Hold 2AM feed for total 5 feeds/day). This provides 1440 kcal, 66gms protein and 972mL free water. Additional flushes per LIP.    Goal/Expected Outcome: Pt to tolerate/meet >85% and <105% of estimated needs within 3 days     Indicator/Monitoring: RD to monitor Energy Intake, Body Composition, Physical Focused Findings, Diet Order.     Pt at risk f/u 3 days.

## 2020-01-27 NOTE — PROGRESS NOTE ADULT - SUBJECTIVE AND OBJECTIVE BOX
SALVATORE STEELE 54y Female  MRN#: [id number]   CODE STATUS:_FULL_______      SUBJECTIVE  Patient is a 54y old Female who presents with a chief complaint of weakness (27 Jan 2020 09:38)  Currently admitted to medicine with the primary diagnosis of UTI (urinary tract infection)    No events overnight.  Patient being weaned off of high flow.     OBJECTIVE  PAST MEDICAL & SURGICAL HISTORY  Seizures  Intellectual disability  Hypothyroid  Impulse control disorder in adult  Down's syndrome  No significant past surgical history    ALLERGIES:  No Known Allergies    MEDICATIONS:  STANDING MEDICATIONS  albuterol/ipratropium for Nebulization 3 milliLiter(s) Nebulizer every 6 hours  bisacodyl Suppository 10 milliGRAM(s) Rectal daily  calcium carbonate 1250 mG  + Vitamin D (OsCal 500 + D) 1 Tablet(s) Oral daily  chlorhexidine 4% Liquid 1 Application(s) Topical <User Schedule>  clonazePAM  Tablet 0.5 milliGRAM(s) Oral every 12 hours  enoxaparin Injectable 40 milliGRAM(s) SubCutaneous daily  influenza   Vaccine 0.5 milliLiter(s) IntraMuscular once  lamoTRIgine 75 milliGRAM(s) Oral daily  lamoTRIgine 100 milliGRAM(s) Oral at bedtime  levothyroxine 112 MICROGram(s) Oral daily  polyethylene glycol 3350 17 Gram(s) Oral daily  QUEtiapine 25 milliGRAM(s) Oral at bedtime  scopolamine   Patch 1 Patch Transdermal every 72 hours  sodium chloride 0.9% for Nebulization 3 milliLiter(s) Nebulizer every 4 hours    PRN MEDICATIONS  acetaminophen   Tablet .. 650 milliGRAM(s) Oral every 6 hours PRN  albuterol/ipratropium for Nebulization. 3 milliLiter(s) Nebulizer every 4 hours PRN  diphenhydrAMINE 25 milliGRAM(s) Oral once PRN      VITAL SIGNS: Last 24 Hours  T(C): 36.1 (27 Jan 2020 08:24), Max: 38.3 (27 Jan 2020 00:55)  T(F): 97 (27 Jan 2020 08:24), Max: 101 (27 Jan 2020 00:55)  HR: 62 (27 Jan 2020 08:24) (49 - 81)  BP: 135/63 (27 Jan 2020 08:24) (60/40 - 140/84)  BP(mean): --  RR: 18 (27 Jan 2020 04:58) (18 - 18)  SpO2: 100% (27 Jan 2020 08:14) (94% - 100%)    LABS:                        9.8    6.22  )-----------( 327      ( 27 Jan 2020 06:36 )             28.9     01-27    142  |  104  |  9<L>  ----------------------------<  84  4.3   |  28  |  1.0    Ca    8.3<L>      27 Jan 2020 06:36  Phos  3.6     01-26  Mg     2.0     01-26    TPro  5.2<L>  /  Alb  2.5<L>  /  TBili  0.4  /  DBili  x   /  AST  71<H>  /  ALT  153<H>  /  AlkPhos  141<H>  01-27        ABG - ( 26 Jan 2020 07:54 )  pH, Arterial: 7.46  pH, Blood: x     /  pCO2: 45    /  pO2: 67    / HCO3: 31    / Base Excess: 6.7   /  SaO2: 95                    Culture - Blood (collected 26 Jan 2020 01:10)  Source: .Blood Blood  Preliminary Report (27 Jan 2020 09:01):    No growth to date.          RADIOLOGY:      PHYSICAL EXAM:    General: awake, opens eyes, nonverbal  Lungs: decreased breath sounds B/L, mild wheezing, no rhonchi  CVS: normal S1, S2, RRR, NO M/G/R  Abdomen: soft, bowel sounds present, non-tender, non-distended  Extremities: b/l upper ext. distal pitting edema present, no clubbing, no cyanosis, positive peripheral pulses b/l  Neuro: not AAO, non focal  Skin: no rash, no ecchymosis, sacral decub stage 2 (poa)      ASSESSMENT AND PLAN:  55 yo female with PMH Down's syndrome, nonverbal, Alzheimer's dementia, seizure d/o, hypothyroidism brought in from group home for evaluation of decreased energy.     #Left Lung Collapse 2/2 Mucous plugging- acute hypoxic resp failure  -s/p 2 bronchoscopies this admission for mucous plug extraction  -completed IV Vanco and Tomasa  -cont mucinex, chest PT, frequent suctioning  -wean high flow as tolerated  -scopolamine patch  -duonebs  -monitor pulse ox closely  -not medically stable for peg  -keep O2 sat > 85%  -pulm following    #Poor PO intake- s/p NGT insertion  -tolerating  NGT feedings well  -will need PEG when stable    # Acute Transaminitis  -LFTs improving  -liver US- negative  -hold statin    #Pseudomonas UTI   -resolved  -s/p cipro and tomasa    #Constipation   -bowel regimen    #Down Syndrome With Dementia, Seizure Disorder:  - non verbal at baseline  - Continue with Lamictal,   - restarted on olanzapine, and Klonopin     # Hypothyroidism:  - Continue with Synthroid 112  - TSH 0.25       DVT Prophylaxis: lovenox  GI ppx: not indicated  NG tube feeds  Disposition: Acute  from group home, will need SNF    **Called Group home this morning- they will be getting in touch with legal services to fill out MOLST form as [place]**

## 2020-01-27 NOTE — PROGRESS NOTE ADULT - ATTENDING COMMENTS
I saw and evaluated patient  by bedside, she is feeling better today , oxygen level remains stable, b/p normotensive, pt. restarted on NGT feedings today.    All labs, radiology studies, VS was reviewed  I have reviewed the resident's note and agree with documented findings and  plan of care.    55 yo female with PMH Down's syndrome, nonverbal, Alzheimer's dementia, seizure d/o, hypothyroidism brought in from group home for evaluation of decreased energy.     #Persistent Left Lung Collapse :  s/p 2 bronchoscopies this admission.  - completed IV Vanco  - completed IV Tomasa  mucinex, chest PT    - She is not medically stable for peg placement  - continue chest PT, started pt. on trial of scopolamine patch tx. to decrease secretions,  duonebs, NS nebs.  -close pulse ox monitoring  -repeat CXR today     #acute hypoxic respiratory failure due to above  - patient is on high flow  - needs continuous respir. hygiene with frequent suctioning      # Hypotension - resolved    # Poor PO intake- s/p NGT insertion- resume on NGT feedings.    # Acute Transaminitis- improved LFT's- liver US- normal    # Hypernatremia- resolved      #Metabolic encephalopathy:   due to above mentioned medical conditions.  correct underlying problems    # Pseudomonas UTI   - resolved (with initial Cipro and now Tomasa)    #Constipation   - On suppository  laxative tx.    # Down Syndrome With Dementia, Seizure Disorder:  - non verbal at baseline  - Continue with Lamictal,   - restarted on olanzapine, and Klonopin       # Hypothyroidism:  - Continue with Synthroid 112  - TSH 0.25     DVT Prophylaxis: lovenox  GI ppx: not indicated  Soft diet   Disposition: from group home, will need to go to SNF    All abnormal results  d/w patient's sister by bedside     HCP and legal guardian [phone] Madisyn pearl (Sister) .      patient carries very poor overall prognosis .  will need to submit MOLST form to Nelda Guzman,  [employer] Legal Service office tel [phone] fax [phone] . contacted our Legal dept. to initiate the process

## 2020-01-28 LAB
ALBUMIN SERPL ELPH-MCNC: 2.6 G/DL — LOW (ref 3.5–5.2)
ALP SERPL-CCNC: 141 U/L — HIGH (ref 30–115)
ALT FLD-CCNC: 101 U/L — HIGH (ref 0–41)
ANION GAP SERPL CALC-SCNC: 8 MMOL/L — SIGNIFICANT CHANGE UP (ref 7–14)
AST SERPL-CCNC: 45 U/L — HIGH (ref 0–41)
BASOPHILS # BLD AUTO: 0.11 K/UL — SIGNIFICANT CHANGE UP (ref 0–0.2)
BASOPHILS NFR BLD AUTO: 1.7 % — HIGH (ref 0–1)
BILIRUB SERPL-MCNC: 0.2 MG/DL — SIGNIFICANT CHANGE UP (ref 0.2–1.2)
BUN SERPL-MCNC: 9 MG/DL — LOW (ref 10–20)
CALCIUM SERPL-MCNC: 8 MG/DL — LOW (ref 8.5–10.1)
CHLORIDE SERPL-SCNC: 106 MMOL/L — SIGNIFICANT CHANGE UP (ref 98–110)
CO2 SERPL-SCNC: 28 MMOL/L — SIGNIFICANT CHANGE UP (ref 17–32)
CREAT SERPL-MCNC: 1 MG/DL — SIGNIFICANT CHANGE UP (ref 0.7–1.5)
EOSINOPHIL # BLD AUTO: 0.34 K/UL — SIGNIFICANT CHANGE UP (ref 0–0.7)
EOSINOPHIL NFR BLD AUTO: 5.3 % — SIGNIFICANT CHANGE UP (ref 0–8)
GLUCOSE SERPL-MCNC: 117 MG/DL — HIGH (ref 70–99)
HCT VFR BLD CALC: 30.3 % — LOW (ref 37–47)
HGB BLD-MCNC: 9.9 G/DL — LOW (ref 12–16)
IMM GRANULOCYTES NFR BLD AUTO: 0.6 % — HIGH (ref 0.1–0.3)
LYMPHOCYTES # BLD AUTO: 1.09 K/UL — LOW (ref 1.2–3.4)
LYMPHOCYTES # BLD AUTO: 17 % — LOW (ref 20.5–51.1)
MAGNESIUM SERPL-MCNC: 2.2 MG/DL — SIGNIFICANT CHANGE UP (ref 1.8–2.4)
MCHC RBC-ENTMCNC: 31.7 PG — HIGH (ref 27–31)
MCHC RBC-ENTMCNC: 32.7 G/DL — SIGNIFICANT CHANGE UP (ref 32–37)
MCV RBC AUTO: 97.1 FL — SIGNIFICANT CHANGE UP (ref 81–99)
MONOCYTES # BLD AUTO: 0.5 K/UL — SIGNIFICANT CHANGE UP (ref 0.1–0.6)
MONOCYTES NFR BLD AUTO: 7.8 % — SIGNIFICANT CHANGE UP (ref 1.7–9.3)
NEUTROPHILS # BLD AUTO: 4.34 K/UL — SIGNIFICANT CHANGE UP (ref 1.4–6.5)
NEUTROPHILS NFR BLD AUTO: 67.6 % — SIGNIFICANT CHANGE UP (ref 42.2–75.2)
NRBC # BLD: 0 /100 WBCS — SIGNIFICANT CHANGE UP (ref 0–0)
PHOSPHATE SERPL-MCNC: 3.9 MG/DL — SIGNIFICANT CHANGE UP (ref 2.1–4.9)
PLATELET # BLD AUTO: 293 K/UL — SIGNIFICANT CHANGE UP (ref 130–400)
POTASSIUM SERPL-MCNC: 4.1 MMOL/L — SIGNIFICANT CHANGE UP (ref 3.5–5)
POTASSIUM SERPL-SCNC: 4.1 MMOL/L — SIGNIFICANT CHANGE UP (ref 3.5–5)
PROT SERPL-MCNC: 5 G/DL — LOW (ref 6–8)
RBC # BLD: 3.12 M/UL — LOW (ref 4.2–5.4)
RBC # FLD: 15.1 % — HIGH (ref 11.5–14.5)
SODIUM SERPL-SCNC: 142 MMOL/L — SIGNIFICANT CHANGE UP (ref 135–146)
WBC # BLD: 6.42 K/UL — SIGNIFICANT CHANGE UP (ref 4.8–10.8)
WBC # FLD AUTO: 6.42 K/UL — SIGNIFICANT CHANGE UP (ref 4.8–10.8)

## 2020-01-28 PROCEDURE — 71045 X-RAY EXAM CHEST 1 VIEW: CPT | Mod: 26

## 2020-01-28 PROCEDURE — 99233 SBSQ HOSP IP/OBS HIGH 50: CPT

## 2020-01-28 RX ORDER — LAMOTRIGINE 25 MG/1
75 TABLET, ORALLY DISINTEGRATING ORAL DAILY
Refills: 0 | Status: DISCONTINUED | OUTPATIENT
Start: 2020-01-28 | End: 2020-02-04

## 2020-01-28 RX ORDER — POLYETHYLENE GLYCOL 3350 17 G/17G
17 POWDER, FOR SOLUTION ORAL DAILY
Refills: 0 | Status: DISCONTINUED | OUTPATIENT
Start: 2020-01-28 | End: 2020-01-29

## 2020-01-28 RX ORDER — LEVOTHYROXINE SODIUM 125 MCG
112 TABLET ORAL DAILY
Refills: 0 | Status: DISCONTINUED | OUTPATIENT
Start: 2020-01-28 | End: 2020-02-04

## 2020-01-28 RX ORDER — QUETIAPINE FUMARATE 200 MG/1
25 TABLET, FILM COATED ORAL AT BEDTIME
Refills: 0 | Status: DISCONTINUED | OUTPATIENT
Start: 2020-01-28 | End: 2020-02-04

## 2020-01-28 RX ORDER — CLONAZEPAM 1 MG
0.5 TABLET ORAL EVERY 12 HOURS
Refills: 0 | Status: DISCONTINUED | OUTPATIENT
Start: 2020-01-28 | End: 2020-02-04

## 2020-01-28 RX ORDER — LAMOTRIGINE 25 MG/1
100 TABLET, ORALLY DISINTEGRATING ORAL AT BEDTIME
Refills: 0 | Status: DISCONTINUED | OUTPATIENT
Start: 2020-01-28 | End: 2020-02-04

## 2020-01-28 RX ADMIN — Medication 10 MILLIGRAM(S): at 11:45

## 2020-01-28 RX ADMIN — CHLORHEXIDINE GLUCONATE 1 APPLICATION(S): 213 SOLUTION TOPICAL at 05:12

## 2020-01-28 RX ADMIN — QUETIAPINE FUMARATE 25 MILLIGRAM(S): 200 TABLET, FILM COATED ORAL at 21:12

## 2020-01-28 RX ADMIN — SCOPALAMINE 1 PATCH: 1 PATCH, EXTENDED RELEASE TRANSDERMAL at 07:35

## 2020-01-28 RX ADMIN — Medication 112 MICROGRAM(S): at 05:12

## 2020-01-28 RX ADMIN — SCOPALAMINE 1 PATCH: 1 PATCH, EXTENDED RELEASE TRANSDERMAL at 11:45

## 2020-01-28 RX ADMIN — Medication 3 MILLILITER(S): at 08:17

## 2020-01-28 RX ADMIN — Medication 0.5 MILLIGRAM(S): at 05:12

## 2020-01-28 RX ADMIN — SCOPALAMINE 1 PATCH: 1 PATCH, EXTENDED RELEASE TRANSDERMAL at 18:15

## 2020-01-28 RX ADMIN — POLYETHYLENE GLYCOL 3350 17 GRAM(S): 17 POWDER, FOR SOLUTION ORAL at 11:44

## 2020-01-28 RX ADMIN — LAMOTRIGINE 75 MILLIGRAM(S): 25 TABLET, ORALLY DISINTEGRATING ORAL at 11:44

## 2020-01-28 RX ADMIN — ENOXAPARIN SODIUM 40 MILLIGRAM(S): 100 INJECTION SUBCUTANEOUS at 11:45

## 2020-01-28 RX ADMIN — LAMOTRIGINE 100 MILLIGRAM(S): 25 TABLET, ORALLY DISINTEGRATING ORAL at 21:12

## 2020-01-28 RX ADMIN — Medication 0.5 MILLIGRAM(S): at 17:08

## 2020-01-28 RX ADMIN — Medication 3 MILLILITER(S): at 13:32

## 2020-01-28 RX ADMIN — Medication 3 MILLILITER(S): at 19:45

## 2020-01-28 RX ADMIN — Medication 1 TABLET(S): at 11:44

## 2020-01-28 RX ADMIN — SCOPALAMINE 1 PATCH: 1 PATCH, EXTENDED RELEASE TRANSDERMAL at 11:47

## 2020-01-28 NOTE — PROGRESS NOTE ADULT - SUBJECTIVE AND OBJECTIVE BOX
Patient is a 54y old  Female who presents with a chief complaint of poor po intake (28 Jan 2020 12:11)        SUBJECTIVE:  No events overnight    REVIEW OF SYSTEMS:    CONSTITUTIONAL:   no fever   no chills.  no weight gain   no weight loss    EYES:   no discharge,   no pain  no redness,   no visual changes.     CARDIOVASCULAR:   no chest pain,   no swelling  no palpitaions  no syncope    RESPIRATORY:  + SOB,  no wheezing ,  no respiratory difficulty  no sputum production    GASTROINTESTINAL:   no abdominal pain,   no constipation,   no diarrhea,   no vomiting.    GENITOURINARY:  no dysuria,   no frequency,   no urgency  no hematuria.    MUSCULOSKELETAL:   no back pain,   no musculoskeletal pain,  no weakness.    SKIN:   no jaundice,   no lesions,   no pruritis,   no rashes.    NEURO:   no loss of consciousness,   no gait abnormality,   no headache,   no sensory deficits,   no weakness.    PSYCHIATRIC:   + Alzheimer's disease  no anxiety  no depression    ALLERGIC/IMMUNOLOGIC:   No active allergic or immunologic issues      PHYSICAL EXAM  Vital Signs Last 24 Hrs  T(C): 36.2 (28 Jan 2020 11:43), Max: 37.1 (27 Jan 2020 21:07)  T(F): 97.2 (28 Jan 2020 11:43), Max: 98.7 (27 Jan 2020 21:07)  HR: 63 (28 Jan 2020 11:43) (59 - 65)  BP: 119/59 (28 Jan 2020 11:43) (119/58 - 135/60)  BP(mean): --  RR: 18 (28 Jan 2020 11:43) (18 - 18)  SpO2: 95% (28 Jan 2020 08:35) (95% - 95%)    CONSTITUTIONAL:  Well nourished.  NAD    ENT:   Airway patent,   Nasal mucosa clear.  Mouth with normal mucosa  No thrush    EYES:   Clear bilaterally,   pupils equal,   round and reactive to light.    CARDIAC:   Normal rate,   regular rhythm.    Heart sounds S1, S2.   normal  cardiac impulse  no edema    CAROTID:   normal systolic impulse  no bruits    RESPIRATORY:   decreased breath sounds over left lung field  normal chest expansion  not tachypneic,  no use of accessory muscles    GASTROINTESTINAL:  Abdomen soft,   non-tender,   no guarding,   + BS    MUSCULOSKELETAL:   range of motion is not limited,  no muscle or joint tenderness  no clubbing, cyanosis    NEUROLOGICAL:   Alert and oriented   no focal deficits in cranial nerve areas  no motor or sensory deficits.  pertinent DTRs normal    SKIN:   Skin normal color for race,   warm,   dry and intact.   No evidence of rash.    PSYCHIATRIC:   Alert and oriented to person,   place, time/situation.   normal mood and affect.   no apparent risk to self or others.    HEME LYMPH:   no splenomegaly.  No cervical  lymphadenopathy.  no inguinal lymphadenopathy      01-27-20 @ 07:01  -  01-28-20 @ 07:00  --------------------------------------------------------  IN:    Enteral Tube Flush: 120 mL    ns in tub fed  [id number]: 480 mL  Total IN: 600 mL    OUT:    Indwelling Catheter - Suprapubic: 2260 mL  Total OUT: 2260 mL    Total NET: -1660 mL      01-28-20 @ 07:01  -  01-28-20 @ 16:48  --------------------------------------------------------  IN:    Enteral Tube Flush: 120 mL    ns in tub fed  [id number]: 480 mL  Total IN: 600 mL    OUT:    Indwelling Catheter - Suprapubic: 200 mL  Total OUT: 200 mL    Total NET: 400 mL          LABS:                          9.9    6.42  )-----------( 293      ( 28 Jan 2020 07:54 )             30.3                                               01-28    142  |  106  |  9<L>  ----------------------------<  117<H>  4.1   |  28  |  1.0    Ca    8.0<L>      28 Jan 2020 07:54  Phos  3.9     01-28  Mg     2.2     01-28    TPro  5.0<L>  /  Alb  2.6<L>  /  TBili  0.2  /  DBili  x   /  AST  45<H>  /  ALT  101<H>  /  AlkPhos  141<H>  01-28                                                                                           LIVER FUNCTIONS - ( 28 Jan 2020 07:54 )  Alb: 2.6 g/dL / Pro: 5.0 g/dL / ALK PHOS: 141 U/L / ALT: 101 U/L / AST: 45 U/L / GGT: x                                                  Culture - Blood (collected 26 Jan 2020 05:16)  Source: .Blood None  Preliminary Report (27 Jan 2020 13:02):    No growth to date.    Culture - Blood (collected 26 Jan 2020 01:10)  Source: .Blood Blood  Preliminary Report (27 Jan 2020 09:01):    No growth to date.                                                    MEDICATIONS  (STANDING):  albuterol/ipratropium for Nebulization 3 milliLiter(s) Nebulizer every 6 hours  bisacodyl Suppository 10 milliGRAM(s) Rectal daily  calcium carbonate 1250 mG  + Vitamin D (OsCal 500 + D) 1 Tablet(s) Oral daily  chlorhexidine 4% Liquid 1 Application(s) Topical <User Schedule>  clonazePAM  Tablet 0.5 milliGRAM(s) Oral every 12 hours  enoxaparin Injectable 40 milliGRAM(s) SubCutaneous daily  influenza   Vaccine 0.5 milliLiter(s) IntraMuscular once  lamoTRIgine 100 milliGRAM(s) Oral at bedtime  lamoTRIgine 75 milliGRAM(s) Oral daily  levothyroxine 112 MICROGram(s) Oral daily  polyethylene glycol 3350 17 Gram(s) Oral daily  QUEtiapine 25 milliGRAM(s) Oral at bedtime  scopolamine   Patch 1 Patch Transdermal every 72 hours  sodium chloride 0.9% for Nebulization 3 milliLiter(s) Nebulizer every 4 hours    MEDICATIONS  (PRN):  acetaminophen   Tablet .. 650 milliGRAM(s) Oral every 6 hours PRN Temp greater or equal to 38C (100.4F)  albuterol/ipratropium for Nebulization. 3 milliLiter(s) Nebulizer every 4 hours PRN Shortness of Breath  diphenhydrAMINE 25 milliGRAM(s) Oral once PRN Rash and/or Itching

## 2020-01-28 NOTE — PROGRESS NOTE ADULT - ASSESSMENT
IMPRESSION:  Acute hypoxemic respiratory failure; improving  Left mucous plug with lung collapse, s/p 2x bronchoscopy with mucous plug extraction      RECOMMEND:    Wean O2 as tolerated   Aspiration precautions   Aggressive pulmonary toilet   Needs broad spectrum ABX   Oral care   Nebs prn  HOB >45  DVT prophylaxis  Prognosis poor   DNI/DNR

## 2020-01-28 NOTE — PROGRESS NOTE ADULT - SUBJECTIVE AND OBJECTIVE BOX
SALVATORE STEELE  [facility] 023 A ([facility])            Patient was evaluated and examined  by bedside, remains clinically stable, VS stable, pulse ox stable on high flow at 40%, tolerating NGT feedings well.         REVIEW OF SYSTEMS:  unable to obtain due to patient's chronic confused state.      T(C): , Max: 37.1 (01-27-20 @ 21:07)  HR: 63 (01-28-20 @ 11:43)  BP: 119/59 (01-28-20 @ 11:43)  RR: 18 (01-28-20 @ 11:43)  SpO2: 95% (01-28-20 @ 08:35)  CAPILLARY BLOOD GLUCOSE          PHYSICAL EXAM:  General: NAD, sleepy, patient is laying comfortably in bed  HEENT: AT, NC, Supple, NO JVD, NO CB  Lungs: b/l rhonchi present B/L, no wheezing  CVS: normal S1, S2, RRR, NO M/G/R  Abdomen: soft, bowel sounds present, non-tender, non-distended  Extremities: b/l upper ext. distal pitting  edema present, no clubbing, no cyanosis, positive peripheral pulses b/l  Neuro: diffuse generalized body weakness, if patient wakes up, she is having episodic moaning spells.   Skin: no rash, no ecchymosis, chronic sacral stage 2 wound present.       LAB  CBC  Date: 01-28-20 @ 07:54  Mean cell Yuafnyfhek88.7  Mean cell Hemoglobin Conc32.7  Mean cell Volum 97.1  Platelet count-Automate 293  RBC Count 3.12  Red Cell Distrib Width15.1  WBC Count6.42  % Albumin, Urine--  Hematocrit 30.3  Hemoglobin 9.9  CBC  Date: 01-27-20 @ 06:36  Mean cell Bdfnynynol89.7  Mean cell Hemoglobin Conc33.9  Mean cell Volum 96.3  Platelet count-Automate 327  RBC Count 3.00  Red Cell Distrib Width14.6  WBC Count6.22  % Albumin, Urine--  Hematocrit 28.9  Hemoglobin 9.8  CBC  Date: 01-26-20 @ 05:16  Mean cell Yfgwfphhdv07.6  Mean cell Hemoglobin Conc32.6  Mean cell Volum 97.1  Platelet count-Automate 292  RBC Count 3.10  Red Cell Distrib Width14.9  WBC Count4.09  % Albumin, Urine--  Hematocrit 30.1  Hemoglobin 9.8  CBC  Date: 01-26-20 @ 01:10  Mean cell Vdgqohhuht29.7  Mean cell Hemoglobin Conc33.7  Mean cell Volum 97.1  Platelet count-Automate 303  RBC Count 3.12  Red Cell Distrib Width14.8  WBC Count4.92  % Albumin, Urine--  Hematocrit 30.3  Hemoglobin 10.2  CBC  Date: 01-24-20 @ 05:45  Mean cell Vpmhbhwycp68.7  Mean cell Hemoglobin Conc33.2  Mean cell Volum 98.3  Platelet count-Automate 311  RBC Count 3.03  Red Cell Distrib Width14.3  WBC Count4.01  % Albumin, Urine--  Hematocrit 29.8  Hemoglobin 9.9  CBC  Date: 01-23-20 @ 06:05  Mean cell Nthzdumzac39.2  Mean cell Hemoglobin Conc32.3  Mean cell Volum 99.7  Platelet count-Automate 283  RBC Count 3.20  Red Cell Distrib Width14.3  WBC Count4.28  % Albumin, Urine--  Hematocrit 31.9  Hemoglobin 10.3  CBC  Date: 01-22-20 @ 06:40  Mean cell Oialrrqcpk91.7  Mean cell Hemoglobin Conc33.1  Mean cell Volum 98.7  Platelet count-Automate 272  RBC Count 3.12  Red Cell Distrib Width14.4  WBC Count3.42  % Albumin, Urine--  Hematocrit 30.8  Hemoglobin 10.2    [facility]  01-28-20 @ 07:54  Blood Gas Arterial-Calcium,Ionized--  Blood Urea Nitrogen, Serum 9 mg/dL<L> [10 - 20]  Carbon Dioxide, Serum28 mmol/L [17 - 32]  Chloride, Xqzhh723 mmol/L [98 - 110]  Creatinie, Serum1.0 mg/dL [0.7 - 1.5]  Glucose, Atsjd353 mg/dL<H> [70 - 99]  Potassium, Serum4.1 mmol/L [3.5 - 5.0]  Sodium, Serum 142 mmol/L [135 - 146]  [facility]  01-27-20 @ 06:36  Blood Gas Arterial-Calcium,Ionized--  Blood Urea Nitrogen, Serum 9 mg/dL<L> [10 - 20]  Carbon Dioxide, Serum28 mmol/L [17 - 32]  Chloride, Uckdh820 mmol/L [98 - 110]  Creatinie, Serum1.0 mg/dL [0.7 - 1.5]  Glucose, Serum84 mg/dL [70 - 99]  Potassium, Serum4.3 mmol/L [3.5 - 5.0]  Sodium, Serum 142 mmol/L [135 - 146]  [facility]  01-26-20 @ 07:54  Blood Gas Arterial-Calcium,Ionized1.18 mmoL/L [1.12 - 1.30]  Blood Urea Nitrogen, Serum --  Carbon Dioxide, Serum--  Chloride, Serum--  Creatinie, Serum--  Glucose, Serum--  Potassium, Serum--  Sodium, Serum --  [facility]  01-26-20 @ 05:16  Blood Gas Arterial-Calcium,Ionized--  Blood Urea Nitrogen, Serum 9 mg/dL<L> [10 - 20]  Carbon Dioxide, Serum27 mmol/L [17 - 32]  Chloride, Zfdot608 mmol/L [98 - 110]  Creatinie, Serum1.0 mg/dL [0.7 - 1.5]  Glucose, Serum85 mg/dL [70 - 99]  Potassium, Serum4.5 mmol/L [3.5 - 5.0]  Sodium, Serum 142 mmol/L [135 - 146]  [facility]  01-25-20 @ 06:13  Blood Gas Arterial-Calcium,Ionized--  Blood Urea Nitrogen, Serum 9 mg/dL<L> [10 - 20]  Carbon Dioxide, Serum28 mmol/L [17 - 32]  Chloride, Zuzku564 mmol/L [98 - 110]  Creatinie, Serum1.0 mg/dL [0.7 - 1.5]  Glucose, Serum91 mg/dL [70 - 99]  Potassium, Serum4.3 mmol/L [3.5 - 5.0]  Sodium, Serum 147 mmol/L<H> [135 - 146]  [facility]  01-24-20 @ 05:45  Blood Gas Arterial-Calcium,Ionized--  Blood Urea Nitrogen, Serum 7 mg/dL<L> [10 - 20]  Carbon Dioxide, Serum27 mmol/L [17 - 32]  Chloride, Bdyme848 mmol/L [98 - 110]  Creatinie, Serum0.9 mg/dL [0.7 - 1.5]  Glucose, Ewndr943 mg/dL<H> [70 - 99]  Potassium, Serum4.1 mmol/L [3.5 - 5.0]  Sodium, Serum 145 mmol/L [135 - 146]  [facility]  01-23-20 @ 22:15  Blood Gas Arterial-Calcium,Ionized--  Blood Urea Nitrogen, Serum 6 mg/dL<L> [10 - 20]  Carbon Dioxide, Serum25 mmol/L [17 - 32]  Chloride, Bkwrl367 mmol/L [98 - 110]  Creatinie, Serum0.9 mg/dL [0.7 - 1.5]  Glucose, Feeee798 mg/dL<H> [70 - 99]  Potassium, Serum5.2 mmol/L<H> [3.5 - 5.0] [Hemolyzed. Interpret with caution]  Sodium, Serum 144 mmol/L [135 - 146]              Microbiology:    Culture - Blood (collected 01-26-20 @ 05:16)  Source: .Blood None  Preliminary Report (01-27-20 @ 13:02):    No growth to date.    Culture - Blood (collected 01-26-20 @ 01:10)  Source: .Blood Blood  Preliminary Report (01-27-20 @ 09:01):    No growth to date.    Culture - Fungal, Bronchial (collected 01-20-20 @ 08:30)  Source: .Bronchial None  Preliminary Report (01-23-20 @ 11:05):    Few Yeast    Culture - Acid Fast - Bronchial w/Smear (collected 01-20-20 @ 08:30)  Source: .Bronchial None  Preliminary Report (01-22-20 @ 15:04):    Culture is being performed.    Culture - Bronchial (collected 01-20-20 @ 08:30)  Source: .Bronchial None  Gram Stain (01-21-20 @ 04:07):    Numerous polymorphonuclear leukocytes seen per low power field    Rare Squamous epithelial cells seen per low power field    Rare Yeast like cells seen per oil power field  Final Report (01-22-20 @ 16:58):    Normal Respiratory Paula present    Culture - Fungal, Bronchial (collected 01-15-20 @ 16:00)  Source: .Bronchial None  Preliminary Report (01-21-20 @ 09:22):    Few Yeast    Culture - Acid Fast - Bronchial w/Smear (collected 01-15-20 @ 16:00)  Source: .Bronchial None  Preliminary Report (01-25-20 @ 15:03):    No growth at 1 week.    Culture - Bronchial (collected 01-15-20 @ 16:00)  Source: .Bronchial None  Gram Stain (01-16-20 @ 13:53):    Moderate polymorphonuclear leukocytes per low power field    Few Squamous epithelial cells per low power field    No organisms seen per oil power field  Final Report (01-18-20 @ 07:19):    Normal Respiratory Paula present    Culture - Blood (collected 01-12-20 @ 00:30)  Source: .Blood Blood-Peripheral  Final Report (01-17-20 @ 08:00):    No growth at 5 days.    Culture - Urine (collected 01-07-20 @ 18:43)  Source: .Urine Catheterized  Final Report (01-11-20 @ 10:47):    >100,000 CFU/ml Pseudomonas aeruginosa  Organism: Pseudomonas aeruginosa (01-11-20 @ 10:47)  Organism: Pseudomonas aeruginosa (01-11-20 @ 10:47)      -  Amikacin: S <=16      -  Aztreonam: S <=4      -  Cefepime: S <=4      -  Ceftazidime: S 4      -  Ciprofloxacin: S <=1      -  Gentamicin: S <=4      -  Imipenem: S <=1      -  Levofloxacin: S <=2      -  Meropenem: S <=1      -  Piperacillin/Tazobactam: S <=16      -  Tobramycin: S <=4      Method Type: DREW        Medications:  acetaminophen   Tablet .. 650 milliGRAM(s) Oral every 6 hours PRN  albuterol/ipratropium for Nebulization 3 milliLiter(s) Nebulizer every 6 hours  albuterol/ipratropium for Nebulization. 3 milliLiter(s) Nebulizer every 4 hours PRN  bisacodyl Suppository 10 milliGRAM(s) Rectal daily  calcium carbonate 1250 mG  + Vitamin D (OsCal 500 + D) 1 Tablet(s) Oral daily  chlorhexidine 4% Liquid 1 Application(s) Topical <User Schedule>  clonazePAM  Tablet 0.5 milliGRAM(s) Oral every 12 hours  diphenhydrAMINE 25 milliGRAM(s) Oral once PRN  enoxaparin Injectable 40 milliGRAM(s) SubCutaneous daily  influenza   Vaccine 0.5 milliLiter(s) IntraMuscular once  lamoTRIgine 75 milliGRAM(s) Oral daily  lamoTRIgine 100 milliGRAM(s) Oral at bedtime  levothyroxine 112 MICROGram(s) Oral daily  polyethylene glycol 3350 17 Gram(s) Oral daily  QUEtiapine 25 milliGRAM(s) Oral at bedtime  scopolamine   Patch 1 Patch Transdermal every 72 hours  sodium chloride 0.9% for Nebulization 3 milliLiter(s) Nebulizer every 4 hours        Assessment and Plan:  55 yo female with PMH Down's syndrome, nonverbal, Alzheimer's dementia, seizure d/o, hypothyroidism brought in from group home for evaluation of decreased energy.     #Persistent Left Lung Collapse :  s/p 2 bronchoscopies this admission.  - completed IV Vanco  - completed IV Tomasa  mucinex, chest PT    - She is not medically stable for peg placement  - continue chest PT, scopolamine patch tx. to decrease secretions,  duonebs, NS nebs.  -close pulse ox monitoring  -repeat CXR tomorrow    #acute hypoxic respiratory failure due to above  - patient is on high flow- taper as tolerated  - needs continuous respir. hygiene with frequent suctioning  -pulmonary is on board      # Hypotension - resolved    # Poor PO intake- s/p NGT insertion- resumed on NGT feedings.    # Acute Transaminitis- improved LFT's- liver US- normal    # Hypernatremia- resolved      #Metabolic encephalopathy:   due to above mentioned medical conditions.  correct underlying problems    # Pseudomonas UTI   - resolved (with initial Cipro and now Tomasa)    #Constipation   - On suppository  laxative tx.    # Down Syndrome With Dementia, Seizure Disorder:  - non verbal at baseline  - Continue with Lamictal,   - restarted on olanzapine, and Klonopin       # Hypothyroidism:  - Continue with Synthroid 112  - TSH 0.25     DVT Prophylaxis: lovenox  GI ppx: not indicated  Soft diet   Disposition: from group home, will need to go to SNF    All abnormal results  d/w patient's sister by bedside     HCP and legal guardian [phone] Madisyn pearl (Sister) .      patient carries very poor overall prognosis .  we have submitted DNR/ DNI request  form to Nelda Guzman,  [facility] Legal Service office tel [phone] fax [phone] . We have received a response of no objection to DNR/DNI , will f/up with legal dept. for further proceeding in reference to documentation and implementation of DNR order.

## 2020-01-28 NOTE — PROGRESS NOTE ADULT - SUBJECTIVE AND OBJECTIVE BOX
SALVATORE STEELE 54y Female  MRN#: [id number]   CODE STATUS:___DNR/DNI_____      SUBJECTIVE  Patient is a 54y old Female who presents with a chief complaint of weakness (27 Jan 2020 09:38)  Currently admitted to medicine with the primary diagnosis of UTI (urinary tract infection)    Satting 95% on 40% High Flow      OBJECTIVE  PAST MEDICAL & SURGICAL HISTORY  Seizures  Intellectual disability  Hypothyroid  Impulse control disorder in adult  Down's syndrome  No significant past surgical history    ALLERGIES:  No Known Allergies    MEDICATIONS:  STANDING MEDICATIONS  albuterol/ipratropium for Nebulization 3 milliLiter(s) Nebulizer every 6 hours  bisacodyl Suppository 10 milliGRAM(s) Rectal daily  calcium carbonate 1250 mG  + Vitamin D (OsCal 500 + D) 1 Tablet(s) Oral daily  chlorhexidine 4% Liquid 1 Application(s) Topical <User Schedule>  clonazePAM  Tablet 0.5 milliGRAM(s) Oral every 12 hours  enoxaparin Injectable 40 milliGRAM(s) SubCutaneous daily  influenza   Vaccine 0.5 milliLiter(s) IntraMuscular once  lamoTRIgine 75 milliGRAM(s) Oral daily  lamoTRIgine 100 milliGRAM(s) Oral at bedtime  levothyroxine 112 MICROGram(s) Oral daily  polyethylene glycol 3350 17 Gram(s) Oral daily  QUEtiapine 25 milliGRAM(s) Oral at bedtime  scopolamine   Patch 1 Patch Transdermal every 72 hours  sodium chloride 0.9% for Nebulization 3 milliLiter(s) Nebulizer every 4 hours    PRN MEDICATIONS  acetaminophen   Tablet .. 650 milliGRAM(s) Oral every 6 hours PRN  albuterol/ipratropium for Nebulization. 3 milliLiter(s) Nebulizer every 4 hours PRN  diphenhydrAMINE 25 milliGRAM(s) Oral once PRN      VITAL SIGNS: Last 24 Hours  T(C): 36.2 (28 Jan 2020 11:43), Max: 37.1 (27 Jan 2020 21:07)  T(F): 97.2 (28 Jan 2020 11:43), Max: 98.7 (27 Jan 2020 21:07)  HR: 63 (28 Jan 2020 11:43) (59 - 65)  BP: 119/59 (28 Jan 2020 11:43) (119/58 - 135/60)  BP(mean): --  RR: 18 (28 Jan 2020 11:43) (18 - 18)  SpO2: 95% (28 Jan 2020 08:35) (92% - 95%)    LABS:                        9.9    6.42  )-----------( 293      ( 28 Jan 2020 07:54 )             30.3     01-28    142  |  106  |  9<L>  ----------------------------<  117<H>  4.1   |  28  |  1.0    Ca    8.0<L>      28 Jan 2020 07:54  Phos  3.9     01-28  Mg     2.2     01-28    TPro  5.0<L>  /  Alb  2.6<L>  /  TBili  0.2  /  DBili  x   /  AST  45<H>  /  ALT  101<H>  /  AlkPhos  141<H>  01-28              Culture - Blood (collected 26 Jan 2020 05:16)  Source: .Blood None  Preliminary Report (27 Jan 2020 13:02):    No growth to date.    Culture - Blood (collected 26 Jan 2020 01:10)  Source: .Blood Blood  Preliminary Report (27 Jan 2020 09:01):    No growth to date.          RADIOLOGY:      PHYSICAL EXAM:      General: awake, opens eyes, nonverbal  Lungs: decreased breath sounds B/L, mild wheezing, no rhonchi  CVS: normal S1, S2, RRR, NO M/G/R  Abdomen: soft, bowel sounds present, non-tender, non-distended  Extremities: b/l upper ext. distal pitting edema present, no clubbing, no cyanosis, positive peripheral pulses b/l  Neuro: not AAO, non focal  Skin: no rash, no ecchymosis, sacral decub stage 2 (poa)      ASSESSMENT AND PLAN:  53 yo female with PMH Down's syndrome, nonverbal, Alzheimer's dementia, seizure d/o, hypothyroidism brought in from group home for evaluation of decreased energy.     #Left Lung Collapse 2/2 Mucous plugging- acute hypoxic resp failure  -s/p 2 bronchoscopies this admission for mucous plug extraction  -completed IV Vanco and Tomasa  -cont mucinex, chest PT, frequent suctioning  -wean high flow as tolerated  -scopolamine patch  -duonebs  -monitor pulse ox closely  -not medically stable for peg  -keep O2 sat > 85%  -pulm following  -PEG once stable    #Poor PO intake- s/p NGT insertion  -tolerating  NGT feedings well  -will need PEG when stable    # Acute Transaminitis  -LFTs improving  -liver US- negative  -hold statin    #Pseudomonas UTI   -resolved  -s/p cipro and tomasa    #Constipation   -bowel regimen    #Down Syndrome With Dementia, Seizure Disorder:  - non verbal at baseline  - Continue with Lamictal,   - restarted on olanzapine, and Klonopin     # Hypothyroidism:  - Continue with Synthroid 112  - TSH 0.25       DVT Prophylaxis: lovenox  GI ppx: not indicated  NG tube feeds  Disposition: Acute  from group home, will need SNF    **today, received fax from patient's - now DNR/DNI** SALVATORE STEELE 54y Female  MRN#: [id number]   CODE STATUS:___DNR/DNI_____      SUBJECTIVE  Patient is a 54y old Female who presents with a chief complaint of weakness (27 Jan 2020 09:38)  Currently admitted to medicine with the primary diagnosis of UTI (urinary tract infection)    Satting 95% on 40% High Flow      OBJECTIVE  PAST MEDICAL & SURGICAL HISTORY  Seizures  Intellectual disability  Hypothyroid  Impulse control disorder in adult  Down's syndrome  No significant past surgical history    ALLERGIES:  No Known Allergies    MEDICATIONS:  STANDING MEDICATIONS  albuterol/ipratropium for Nebulization 3 milliLiter(s) Nebulizer every 6 hours  bisacodyl Suppository 10 milliGRAM(s) Rectal daily  calcium carbonate 1250 mG  + Vitamin D (OsCal 500 + D) 1 Tablet(s) Oral daily  chlorhexidine 4% Liquid 1 Application(s) Topical <User Schedule>  clonazePAM  Tablet 0.5 milliGRAM(s) Oral every 12 hours  enoxaparin Injectable 40 milliGRAM(s) SubCutaneous daily  influenza   Vaccine 0.5 milliLiter(s) IntraMuscular once  lamoTRIgine 75 milliGRAM(s) Oral daily  lamoTRIgine 100 milliGRAM(s) Oral at bedtime  levothyroxine 112 MICROGram(s) Oral daily  polyethylene glycol 3350 17 Gram(s) Oral daily  QUEtiapine 25 milliGRAM(s) Oral at bedtime  scopolamine   Patch 1 Patch Transdermal every 72 hours  sodium chloride 0.9% for Nebulization 3 milliLiter(s) Nebulizer every 4 hours    PRN MEDICATIONS  acetaminophen   Tablet .. 650 milliGRAM(s) Oral every 6 hours PRN  albuterol/ipratropium for Nebulization. 3 milliLiter(s) Nebulizer every 4 hours PRN  diphenhydrAMINE 25 milliGRAM(s) Oral once PRN      VITAL SIGNS: Last 24 Hours  T(C): 36.2 (28 Jan 2020 11:43), Max: 37.1 (27 Jan 2020 21:07)  T(F): 97.2 (28 Jan 2020 11:43), Max: 98.7 (27 Jan 2020 21:07)  HR: 63 (28 Jan 2020 11:43) (59 - 65)  BP: 119/59 (28 Jan 2020 11:43) (119/58 - 135/60)  BP(mean): --  RR: 18 (28 Jan 2020 11:43) (18 - 18)  SpO2: 95% (28 Jan 2020 08:35) (92% - 95%)    LABS:                        9.9    6.42  )-----------( 293      ( 28 Jan 2020 07:54 )             30.3     01-28    142  |  106  |  9<L>  ----------------------------<  117<H>  4.1   |  28  |  1.0    Ca    8.0<L>      28 Jan 2020 07:54  Phos  3.9     01-28  Mg     2.2     01-28    TPro  5.0<L>  /  Alb  2.6<L>  /  TBili  0.2  /  DBili  x   /  AST  45<H>  /  ALT  101<H>  /  AlkPhos  141<H>  01-28              Culture - Blood (collected 26 Jan 2020 05:16)  Source: .Blood None  Preliminary Report (27 Jan 2020 13:02):    No growth to date.    Culture - Blood (collected 26 Jan 2020 01:10)  Source: .Blood Blood  Preliminary Report (27 Jan 2020 09:01):    No growth to date.          RADIOLOGY:      PHYSICAL EXAM:      General: awake, opens eyes, nonverbal  Lungs: decreased breath sounds B/L, mild wheezing, no rhonchi  CVS: normal S1, S2, RRR, NO M/G/R  Abdomen: soft, bowel sounds present, non-tender, non-distended  Extremities: b/l upper ext. distal pitting edema present, no clubbing, no cyanosis, positive peripheral pulses b/l  Neuro: not AAO, non focal  Skin: no rash, no ecchymosis, sacral decub stage 2 (poa)      ASSESSMENT AND PLAN:  55 yo female with PMH Down's syndrome, nonverbal, Alzheimer's dementia, seizure d/o, hypothyroidism brought in from group home for evaluation of decreased energy.     #Left Lung Collapse 2/2 Mucous plugging- acute hypoxic resp failure  -s/p 2 bronchoscopies this admission for mucous plug extraction  -completed IV Vanco and Tomasa  -cont mucinex, chest PT, frequent suctioning  -wean high flow as tolerated  -scopolamine patch  -duonebs  -monitor pulse ox closely  -not medically stable for peg  -keep O2 sat > 85%  -pulm following  -PEG once stable    #Poor PO intake- s/p NGT insertion  -tolerating  NGT feedings well  -will need PEG when stable    # Acute Transaminitis  -LFTs improving  -liver US- negative  -hold statin    #Pseudomonas UTI   -resolved  -s/p cipro and tomasa    #Constipation   -bowel regimen    #Down Syndrome With Dementia, Seizure Disorder:  - non verbal at baseline  - Continue with Lamictal,   - restarted on olanzapine, and Klonopin     # Hypothyroidism:  - Continue with Synthroid 112  - TSH 0.25       DVT Prophylaxis: lovenox  GI ppx: not indicated  NG tube feeds  Disposition: Acute  from group home, will need SNF    **today, received fax from patient's , Megan Lutz, at [employer]- now DNR/DNI- document in the chart**

## 2020-01-29 DIAGNOSIS — R33.9 RETENTION OF URINE, UNSPECIFIED: ICD-10-CM

## 2020-01-29 LAB
ALBUMIN SERPL ELPH-MCNC: 2.3 G/DL — LOW (ref 3.5–5.2)
ALP SERPL-CCNC: 120 U/L — HIGH (ref 30–115)
ALT FLD-CCNC: 73 U/L — HIGH (ref 0–41)
ANION GAP SERPL CALC-SCNC: 8 MMOL/L — SIGNIFICANT CHANGE UP (ref 7–14)
AST SERPL-CCNC: 32 U/L — SIGNIFICANT CHANGE UP (ref 0–41)
BASOPHILS # BLD AUTO: 0.12 K/UL — SIGNIFICANT CHANGE UP (ref 0–0.2)
BASOPHILS NFR BLD AUTO: 2.8 % — HIGH (ref 0–1)
BILIRUB SERPL-MCNC: 0.2 MG/DL — SIGNIFICANT CHANGE UP (ref 0.2–1.2)
BUN SERPL-MCNC: 11 MG/DL — SIGNIFICANT CHANGE UP (ref 10–20)
CALCIUM SERPL-MCNC: 7.9 MG/DL — LOW (ref 8.5–10.1)
CHLORIDE SERPL-SCNC: 105 MMOL/L — SIGNIFICANT CHANGE UP (ref 98–110)
CO2 SERPL-SCNC: 29 MMOL/L — SIGNIFICANT CHANGE UP (ref 17–32)
CREAT SERPL-MCNC: 1.1 MG/DL — SIGNIFICANT CHANGE UP (ref 0.7–1.5)
EOSINOPHIL # BLD AUTO: 0.31 K/UL — SIGNIFICANT CHANGE UP (ref 0–0.7)
EOSINOPHIL NFR BLD AUTO: 7.3 % — SIGNIFICANT CHANGE UP (ref 0–8)
GLUCOSE SERPL-MCNC: 109 MG/DL — HIGH (ref 70–99)
HCT VFR BLD CALC: 26.3 % — LOW (ref 37–47)
HGB BLD-MCNC: 8.8 G/DL — LOW (ref 12–16)
IMM GRANULOCYTES NFR BLD AUTO: 0.5 % — HIGH (ref 0.1–0.3)
LYMPHOCYTES # BLD AUTO: 1.27 K/UL — SIGNIFICANT CHANGE UP (ref 1.2–3.4)
LYMPHOCYTES # BLD AUTO: 29.7 % — SIGNIFICANT CHANGE UP (ref 20.5–51.1)
MAGNESIUM SERPL-MCNC: 2.1 MG/DL — SIGNIFICANT CHANGE UP (ref 1.8–2.4)
MCHC RBC-ENTMCNC: 32.8 PG — HIGH (ref 27–31)
MCHC RBC-ENTMCNC: 33.5 G/DL — SIGNIFICANT CHANGE UP (ref 32–37)
MCV RBC AUTO: 98.1 FL — SIGNIFICANT CHANGE UP (ref 81–99)
MONOCYTES # BLD AUTO: 0.4 K/UL — SIGNIFICANT CHANGE UP (ref 0.1–0.6)
MONOCYTES NFR BLD AUTO: 9.4 % — HIGH (ref 1.7–9.3)
NEUTROPHILS # BLD AUTO: 2.15 K/UL — SIGNIFICANT CHANGE UP (ref 1.4–6.5)
NEUTROPHILS NFR BLD AUTO: 50.3 % — SIGNIFICANT CHANGE UP (ref 42.2–75.2)
NRBC # BLD: 0 /100 WBCS — SIGNIFICANT CHANGE UP (ref 0–0)
PHOSPHATE SERPL-MCNC: 3.8 MG/DL — SIGNIFICANT CHANGE UP (ref 2.1–4.9)
PLATELET # BLD AUTO: 276 K/UL — SIGNIFICANT CHANGE UP (ref 130–400)
POTASSIUM SERPL-MCNC: 4.1 MMOL/L — SIGNIFICANT CHANGE UP (ref 3.5–5)
POTASSIUM SERPL-SCNC: 4.1 MMOL/L — SIGNIFICANT CHANGE UP (ref 3.5–5)
PROT SERPL-MCNC: 4.6 G/DL — LOW (ref 6–8)
RBC # BLD: 2.68 M/UL — LOW (ref 4.2–5.4)
RBC # FLD: 15.4 % — HIGH (ref 11.5–14.5)
SODIUM SERPL-SCNC: 142 MMOL/L — SIGNIFICANT CHANGE UP (ref 135–146)
WBC # BLD: 4.27 K/UL — LOW (ref 4.8–10.8)
WBC # FLD AUTO: 4.27 K/UL — LOW (ref 4.8–10.8)

## 2020-01-29 PROCEDURE — 71045 X-RAY EXAM CHEST 1 VIEW: CPT | Mod: 26

## 2020-01-29 PROCEDURE — 99233 SBSQ HOSP IP/OBS HIGH 50: CPT

## 2020-01-29 RX ADMIN — Medication 112 MICROGRAM(S): at 05:09

## 2020-01-29 RX ADMIN — CHLORHEXIDINE GLUCONATE 1 APPLICATION(S): 213 SOLUTION TOPICAL at 05:15

## 2020-01-29 RX ADMIN — Medication 0.5 MILLIGRAM(S): at 05:15

## 2020-01-29 RX ADMIN — ENOXAPARIN SODIUM 40 MILLIGRAM(S): 100 INJECTION SUBCUTANEOUS at 11:05

## 2020-01-29 RX ADMIN — SCOPALAMINE 1 PATCH: 1 PATCH, EXTENDED RELEASE TRANSDERMAL at 18:57

## 2020-01-29 RX ADMIN — QUETIAPINE FUMARATE 25 MILLIGRAM(S): 200 TABLET, FILM COATED ORAL at 22:05

## 2020-01-29 RX ADMIN — Medication 3 MILLILITER(S): at 01:00

## 2020-01-29 RX ADMIN — LAMOTRIGINE 75 MILLIGRAM(S): 25 TABLET, ORALLY DISINTEGRATING ORAL at 11:05

## 2020-01-29 RX ADMIN — Medication 3 MILLILITER(S): at 13:25

## 2020-01-29 RX ADMIN — Medication 1 TABLET(S): at 11:05

## 2020-01-29 RX ADMIN — Medication 3 MILLILITER(S): at 19:36

## 2020-01-29 RX ADMIN — LAMOTRIGINE 100 MILLIGRAM(S): 25 TABLET, ORALLY DISINTEGRATING ORAL at 22:05

## 2020-01-29 RX ADMIN — Medication 0.5 MILLIGRAM(S): at 17:25

## 2020-01-29 RX ADMIN — SCOPALAMINE 1 PATCH: 1 PATCH, EXTENDED RELEASE TRANSDERMAL at 07:22

## 2020-01-29 RX ADMIN — Medication 3 MILLILITER(S): at 07:43

## 2020-01-29 NOTE — PROGRESS NOTE ADULT - ATTENDING COMMENTS
55 yo female with PMH Down's syndrome, nonverbal, Alzheimer's dementia, seizure d/o, hypothyroidism brought in from group home for evaluation of decreased energy.     #Left Lung Collapse 2/2 Mucous plugging- acute hypoxic resp failure  -s/p 2 bronchoscopies this admission for mucous plug extraction  -completed IV Vanco and Tomasa  -cont mucinex, chest PT, frequent suctioning  still dependent on high flow   -scopolamine patch  -duonebs    WILL ONLY BENEFIT FROM REPEAT BRONCH OR AGGRESSIVE CHEST PT.     -monitor pulse ox closely  -not medically stable for peg  -keep O2 sat > 85%  -pulm following  -PEG once stable    #Poor PO intake- s/p NGT insertion  -tolerating  NGT feedings well  -will need PEG when stable    # Acute Transaminitis  -LFTs improving  -liver US- negative  -hold statin    #Pseudomonas UTI   -resolved  -s/p cipro and tomasa    #Constipation   -bowel regimen    #Down Syndrome With Dementia, Seizure Disorder:  - non verbal at baseline  - continue with Lamictal  - restarted on olanzapine, and Klonopin     # Hypothyroidism:  - Continue with Synthroid 112  - TSH 0.25     DVT Prophylaxis: lovenox  GI ppx: not indicated  NG tube feeds  Disposition: Acute  from group home, will need SNF    **received fax from patient's , Megan Lutz, at [employer]- now DNR/DNI- document in the chart**  Got call back from Megan with verbal consent and team filled out MOLST.

## 2020-01-29 NOTE — PROGRESS NOTE ADULT - SUBJECTIVE AND OBJECTIVE BOX
SALVATORE STEELE 54y Female  MRN#: [id number]   CODE STATUS:___DNR__      SUBJECTIVE  Patient is a 54y old Female who presents with a chief complaint of weakness (28 Jan 2020 16:47)  Currently admitted to medicine with the primary diagnosis of UTI (urinary tract infection)    will fill out MOLST form- awaiting call back from  (Megan)    Requiring more oxygen (80% high flow)- increased overnight from 40%      OBJECTIVE  PAST MEDICAL & SURGICAL HISTORY  Seizures  Intellectual disability  Hypothyroid  Impulse control disorder in adult  Down's syndrome  No significant past surgical history    ALLERGIES:  No Known Allergies    MEDICATIONS:  STANDING MEDICATIONS  albuterol/ipratropium for Nebulization 3 milliLiter(s) Nebulizer every 6 hours  calcium carbonate 1250 mG  + Vitamin D (OsCal 500 + D) 1 Tablet(s) Oral daily  chlorhexidine 4% Liquid 1 Application(s) Topical <User Schedule>  clonazePAM  Tablet 0.5 milliGRAM(s) Oral every 12 hours  enoxaparin Injectable 40 milliGRAM(s) SubCutaneous daily  influenza   Vaccine 0.5 milliLiter(s) IntraMuscular once  lamoTRIgine 100 milliGRAM(s) Oral at bedtime  lamoTRIgine 75 milliGRAM(s) Oral daily  levothyroxine 112 MICROGram(s) Oral daily  QUEtiapine 25 milliGRAM(s) Oral at bedtime  scopolamine   Patch 1 Patch Transdermal every 72 hours  sodium chloride 0.9% for Nebulization 3 milliLiter(s) Nebulizer every 4 hours    PRN MEDICATIONS  acetaminophen   Tablet .. 650 milliGRAM(s) Oral every 6 hours PRN  albuterol/ipratropium for Nebulization. 3 milliLiter(s) Nebulizer every 4 hours PRN  diphenhydrAMINE 25 milliGRAM(s) Oral once PRN      VITAL SIGNS: Last 24 Hours  T(C): 37 (29 Jan 2020 06:16), Max: 37 (29 Jan 2020 06:16)  T(F): 98.6 (29 Jan 2020 06:16), Max: 98.6 (29 Jan 2020 06:16)  HR: 57 (29 Jan 2020 06:16) (51 - 63)  BP: 112/54 (29 Jan 2020 06:16) (90/50 - 119/59)  BP(mean): --  RR: 18 (29 Jan 2020 06:16) (18 - 18)  SpO2: 96% (29 Jan 2020 07:30) (88% - 99%)    LABS:                        8.8    4.27  )-----------( 276      ( 29 Jan 2020 07:34 )             26.3     01-29    142  |  105  |  11  ----------------------------<  109<H>  4.1   |  29  |  1.1    Ca    7.9<L>      29 Jan 2020 07:34  Phos  3.8     01-29  Mg     2.1     01-29    TPro  4.6<L>  /  Alb  2.3<L>  /  TBili  0.2  /  DBili  x   /  AST  32  /  ALT  73<H>  /  AlkPhos  120<H>  01-29                  RADIOLOGY:      PHYSICAL EXAM:      General: awake, opens eyes, nonverbal  Lungs: decreased breath sounds B/L, mild wheezing, no rhonchi  CVS: normal S1, S2, RRR, NO M/G/R  Abdomen: soft, bowel sounds present, non-tender, non-distended  Extremities: b/l upper ext. distal pitting edema present, no clubbing, no cyanosis, positive peripheral pulses b/l  Neuro: not AAO, non focal  Skin: no rash, no ecchymosis, sacral decub stage 2 (poa)      ASSESSMENT AND PLAN:  53 yo female with PMH Down's syndrome, nonverbal, Alzheimer's dementia, seizure d/o, hypothyroidism brought in from group home for evaluation of decreased energy.     #Left Lung Collapse 2/2 Mucous plugging- acute hypoxic resp failure  -s/p 2 bronchoscopies this admission for mucous plug extraction  -completed IV Vanco and Tomasa  -cont mucinex, chest PT, frequent suctioning  -wean high flow as tolerated  -scopolamine patch  -duonebs  -monitor pulse ox closely  -not medically stable for peg  -keep O2 sat > 85%  -pulm following  -PEG once stable    #Poor PO intake- s/p NGT insertion  -tolerating  NGT feedings well  -will need PEG when stable    # Acute Transaminitis  -LFTs improving  -liver US- negative  -hold statin    #Pseudomonas UTI   -resolved  -s/p cipro and tomasa    #Constipation   -bowel regimen    #Down Syndrome With Dementia, Seizure Disorder:  - non verbal at baseline  - continue with Lamictal  - restarted on olanzapine, and Klonopin     # Hypothyroidism:  - Continue with Synthroid 112  - TSH 0.25     DVT Prophylaxis: lovenox  GI ppx: not indicated  NG tube feeds  Disposition: Acute  from group home, will need SNF    **received fax from patient's , Megan Lutz, at [facility] [employer]- now DNR/DNI- document in the chart**  Awaiting call back from Megan, will then get verbal consent and fill out MOLST.

## 2020-01-30 LAB
ALBUMIN SERPL ELPH-MCNC: 2.6 G/DL — LOW (ref 3.5–5.2)
ALP SERPL-CCNC: 130 U/L — HIGH (ref 30–115)
ALT FLD-CCNC: 64 U/L — HIGH (ref 0–41)
ANION GAP SERPL CALC-SCNC: 10 MMOL/L — SIGNIFICANT CHANGE UP (ref 7–14)
AST SERPL-CCNC: 29 U/L — SIGNIFICANT CHANGE UP (ref 0–41)
BASOPHILS # BLD AUTO: 0.12 K/UL — SIGNIFICANT CHANGE UP (ref 0–0.2)
BASOPHILS NFR BLD AUTO: 2.8 % — HIGH (ref 0–1)
BILIRUB SERPL-MCNC: 0.2 MG/DL — SIGNIFICANT CHANGE UP (ref 0.2–1.2)
BUN SERPL-MCNC: 11 MG/DL — SIGNIFICANT CHANGE UP (ref 10–20)
CALCIUM SERPL-MCNC: 8.3 MG/DL — LOW (ref 8.5–10.1)
CHLORIDE SERPL-SCNC: 105 MMOL/L — SIGNIFICANT CHANGE UP (ref 98–110)
CO2 SERPL-SCNC: 28 MMOL/L — SIGNIFICANT CHANGE UP (ref 17–32)
CREAT SERPL-MCNC: 1 MG/DL — SIGNIFICANT CHANGE UP (ref 0.7–1.5)
EOSINOPHIL # BLD AUTO: 0.17 K/UL — SIGNIFICANT CHANGE UP (ref 0–0.7)
EOSINOPHIL NFR BLD AUTO: 4 % — SIGNIFICANT CHANGE UP (ref 0–8)
GLUCOSE SERPL-MCNC: 96 MG/DL — SIGNIFICANT CHANGE UP (ref 70–99)
HCT VFR BLD CALC: 32.3 % — LOW (ref 37–47)
HGB BLD-MCNC: 10.4 G/DL — LOW (ref 12–16)
IMM GRANULOCYTES NFR BLD AUTO: 0.9 % — HIGH (ref 0.1–0.3)
LYMPHOCYTES # BLD AUTO: 0.91 K/UL — LOW (ref 1.2–3.4)
LYMPHOCYTES # BLD AUTO: 21.6 % — SIGNIFICANT CHANGE UP (ref 20.5–51.1)
MAGNESIUM SERPL-MCNC: 2.2 MG/DL — SIGNIFICANT CHANGE UP (ref 1.8–2.4)
MCHC RBC-ENTMCNC: 31.3 PG — HIGH (ref 27–31)
MCHC RBC-ENTMCNC: 32.2 G/DL — SIGNIFICANT CHANGE UP (ref 32–37)
MCV RBC AUTO: 97.3 FL — SIGNIFICANT CHANGE UP (ref 81–99)
MONOCYTES # BLD AUTO: 0.34 K/UL — SIGNIFICANT CHANGE UP (ref 0.1–0.6)
MONOCYTES NFR BLD AUTO: 8.1 % — SIGNIFICANT CHANGE UP (ref 1.7–9.3)
NEUTROPHILS # BLD AUTO: 2.64 K/UL — SIGNIFICANT CHANGE UP (ref 1.4–6.5)
NEUTROPHILS NFR BLD AUTO: 62.6 % — SIGNIFICANT CHANGE UP (ref 42.2–75.2)
NRBC # BLD: 0 /100 WBCS — SIGNIFICANT CHANGE UP (ref 0–0)
PHOSPHATE SERPL-MCNC: 3.5 MG/DL — SIGNIFICANT CHANGE UP (ref 2.1–4.9)
PLATELET # BLD AUTO: 330 K/UL — SIGNIFICANT CHANGE UP (ref 130–400)
POTASSIUM SERPL-MCNC: 4.7 MMOL/L — SIGNIFICANT CHANGE UP (ref 3.5–5)
POTASSIUM SERPL-SCNC: 4.7 MMOL/L — SIGNIFICANT CHANGE UP (ref 3.5–5)
PROT SERPL-MCNC: 5.3 G/DL — LOW (ref 6–8)
RBC # BLD: 3.32 M/UL — LOW (ref 4.2–5.4)
RBC # FLD: 15.3 % — HIGH (ref 11.5–14.5)
SODIUM SERPL-SCNC: 143 MMOL/L — SIGNIFICANT CHANGE UP (ref 135–146)
WBC # BLD: 4.22 K/UL — LOW (ref 4.8–10.8)
WBC # FLD AUTO: 4.22 K/UL — LOW (ref 4.8–10.8)

## 2020-01-30 PROCEDURE — 99233 SBSQ HOSP IP/OBS HIGH 50: CPT

## 2020-01-30 RX ADMIN — Medication 0.5 MILLIGRAM(S): at 05:18

## 2020-01-30 RX ADMIN — ENOXAPARIN SODIUM 40 MILLIGRAM(S): 100 INJECTION SUBCUTANEOUS at 11:25

## 2020-01-30 RX ADMIN — QUETIAPINE FUMARATE 25 MILLIGRAM(S): 200 TABLET, FILM COATED ORAL at 22:11

## 2020-01-30 RX ADMIN — Medication 112 MICROGRAM(S): at 05:19

## 2020-01-30 RX ADMIN — LAMOTRIGINE 75 MILLIGRAM(S): 25 TABLET, ORALLY DISINTEGRATING ORAL at 11:25

## 2020-01-30 RX ADMIN — SCOPALAMINE 1 PATCH: 1 PATCH, EXTENDED RELEASE TRANSDERMAL at 16:27

## 2020-01-30 RX ADMIN — SCOPALAMINE 1 PATCH: 1 PATCH, EXTENDED RELEASE TRANSDERMAL at 21:22

## 2020-01-30 RX ADMIN — Medication 3 MILLILITER(S): at 06:50

## 2020-01-30 RX ADMIN — Medication 1 TABLET(S): at 11:25

## 2020-01-30 RX ADMIN — LAMOTRIGINE 100 MILLIGRAM(S): 25 TABLET, ORALLY DISINTEGRATING ORAL at 22:11

## 2020-01-30 RX ADMIN — Medication 3 MILLILITER(S): at 13:44

## 2020-01-30 RX ADMIN — Medication 3 MILLILITER(S): at 08:05

## 2020-01-30 RX ADMIN — Medication 0.5 MILLIGRAM(S): at 18:12

## 2020-01-30 RX ADMIN — CHLORHEXIDINE GLUCONATE 1 APPLICATION(S): 213 SOLUTION TOPICAL at 05:19

## 2020-01-30 RX ADMIN — Medication 3 MILLILITER(S): at 20:09

## 2020-01-30 NOTE — PROGRESS NOTE ADULT - SUBJECTIVE AND OBJECTIVE BOX
SALVATORE STEELE 54y Female  MRN#: [id number]   CODE STATUS:________      SUBJECTIVE  Patient is a 54y old Female who presents with a chief complaint of weakness (28 Jan 2020 16:47)  Currently admitted to medicine with the primary diagnosis of UTI (urinary tract infection)    Satting 93% on 80% High Flow.    OBJECTIVE  PAST MEDICAL & SURGICAL HISTORY  Seizures  Intellectual disability  Hypothyroid  Impulse control disorder in adult  Down's syndrome  No significant past surgical history    ALLERGIES:  No Known Allergies    MEDICATIONS:  STANDING MEDICATIONS  albuterol/ipratropium for Nebulization 3 milliLiter(s) Nebulizer every 6 hours  calcium carbonate 1250 mG  + Vitamin D (OsCal 500 + D) 1 Tablet(s) Oral daily  chlorhexidine 4% Liquid 1 Application(s) Topical <User Schedule>  clonazePAM  Tablet 0.5 milliGRAM(s) Oral every 12 hours  enoxaparin Injectable 40 milliGRAM(s) SubCutaneous daily  influenza   Vaccine 0.5 milliLiter(s) IntraMuscular once  lamoTRIgine 100 milliGRAM(s) Oral at bedtime  lamoTRIgine 75 milliGRAM(s) Oral daily  levothyroxine 112 MICROGram(s) Oral daily  QUEtiapine 25 milliGRAM(s) Oral at bedtime  scopolamine   Patch 1 Patch Transdermal every 72 hours  sodium chloride 0.9% for Nebulization 3 milliLiter(s) Nebulizer every 4 hours    PRN MEDICATIONS  acetaminophen   Tablet .. 650 milliGRAM(s) Oral every 6 hours PRN  albuterol/ipratropium for Nebulization. 3 milliLiter(s) Nebulizer every 4 hours PRN  diphenhydrAMINE 25 milliGRAM(s) Oral once PRN      VITAL SIGNS: Last 24 Hours  T(C): 36.3 (30 Jan 2020 05:11), Max: 36.3 (30 Jan 2020 05:11)  T(F): 97.4 (30 Jan 2020 05:11), Max: 97.4 (30 Jan 2020 05:11)  HR: 63 (30 Jan 2020 05:11) (58 - 63)  BP: 114/58 (30 Jan 2020 05:11) (103/52 - 114/58)  BP(mean): --  RR: 16 (30 Jan 2020 05:11) (16 - 18)  SpO2: 93% (30 Jan 2020 08:43) (93% - 96%)    LABS:                        10.4   4.22  )-----------( 330      ( 30 Jan 2020 05:25 )             32.3     01-30    143  |  105  |  11  ----------------------------<  96  4.7   |  28  |  1.0    Ca    8.3<L>      30 Jan 2020 05:25  Phos  3.5     01-30  Mg     2.2     01-30    TPro  5.3<L>  /  Alb  2.6<L>  /  TBili  0.2  /  DBili  x   /  AST  29  /  ALT  64<H>  /  AlkPhos  130<H>  01-30                  RADIOLOGY:      PHYSICAL EXAM:    General: awake, opens eyes, nonverbal  Lungs: decreased breath sounds B/L, mild wheezing, no rhonchi  CVS: normal S1, S2, RRR, NO M/G/R  Abdomen: soft, bowel sounds present, non-tender, non-distended  Extremities: b/l upper ext. distal pitting edema present, no clubbing, no cyanosis, positive peripheral pulses b/l  Neuro: not AAO, non focal  Skin: no rash, no ecchymosis, sacral decub stage 2 (poa)      ASSESSMENT AND PLAN:  53 yo female with PMH Down's syndrome, nonverbal, Alzheimer's dementia, seizure d/o, hypothyroidism brought in from group home for evaluation of decreased energy.     #Left Lung Collapse 2/2 Mucous plugging- acute hypoxic resp failure  -s/p 2 bronchoscopies this admission for mucous plug extraction. spoke with pulm today- as of now, no more plans for bronch.  -completed IV Vanco and Tomasa  -cont mucinex, chest PT, frequent suctioning  -wean high flow as tolerated  -scopolamine patch  -duonebs  -monitor pulse ox closely  -not medically stable for peg  -keep O2 sat > 85%  -pulm following  -PEG once stable    #Poor PO intake- s/p NGT insertion  -tolerating  NGT feedings well  -will need PEG when stable    # Acute Transaminitis  -LFTs improving  -liver US- negative  -hold statin    #Pseudomonas UTI   -resolved  -s/p cipro and tomasa    #Constipation   -bowel regimen    #Down Syndrome With Dementia, Seizure Disorder:  - non verbal at baseline  - continue with Lamictal  - restarted on olanzapine, and Klonopin     # Hypothyroidism:  - Continue with Synthroid 112  - TSH 0.25     DVT Prophylaxis: lovenox  GI ppx: not indicated  NG tube feeds  Disposition: Acute  from group home, will need SNF    **received fax from patient's , Megan Lutz, at [facility] [employer]- now DNR/DNI- document in the chart**  Verbal consent from Megan Lutz- MOLST form in chart.

## 2020-01-30 NOTE — CHART NOTE - NSCHARTNOTEFT_GEN_A_CORE
Registered Dietitian Follow-Up     Patient Profile Reviewed                           Yes [X]   No []     Nutrition History Previously Obtained        Yes [X]  No []       Pertinent Subjective Information: Spoke with RN who confirmed pt tolerating NGT feed. However, 1 feed was held last night at 10pm d/t secretions and aspiration risk. Pt received 4 feeds on 1/28 and 1/29 and 1 feed 1/30. RN reports that pt will likely be palliative care.      Pertinent Medical Interventions: P/w eval of decreased energy intake; s/p NGT insertion - tolerating  NGT feedings well - will need PEG when stable. Constipation on bowel regimen. Pseudomonas UTI -resolved; Acute Transaminitis per progress note 1/30.      Diet order: NPO with Tube Feed      Anthropometrics:  - Ht. 154.94cm   - Wt. 85.3 (1/30), 85.5 (1/28), 87.8 (1/27), 88.4 (1/26), 89.8 (1/25) - wt fluctuation possibly d/t ?? edema vs. bed scale error?   - %wt change  - BMI: 35.4  - IBW: 47.7kg      Pertinent Lab Data: (1/30) RBC: 3.32, H/H: 10.4/32.3, Ca: 9.42 (corrected for low albumin)  Serum Glucose Trend: (1/30) 96, (1/29) 109, (1/28) 117, (1/27) 84     Pertinent Meds: Enoxaparin, Sodium Chloride, Calcium Carbonate + Vitamin D      Physical Findings:  - Appearance: Non-verbal; 3+ right and left hand edema;   - GI function:  - Tubes:  - Oral/Mouth cavity:  - Skin:     Nutrition Requirements  Weight Used:     Estimated Energy Needs    Continue []  Adjust []  Adjusted Energy Recommendations:   kcal/day        Estimated Protein Needs    Continue []  Adjust []  Adjusted Protein Recommendations:   gm/day        Estimated Fluid Needs        Continue []  Adjust []  Adjusted Fluid Recommendations:   mL/day     Nutrient Intake:        [] Previous Nutrition Diagnosis:            [] Ongoing          [] Resolved    [] No active nutrition diagnosis identified at this time     Nutrition Diagnostic #1  Problem:  Etiology:  Statement:     Nutrition Diagnostic #2  Problem:  Etiology:  Statement:     Nutrition Intervention      Goal/Expected Outcome:     Indicator/Monitoring: Registered Dietitian Follow-Up     Patient Profile Reviewed                           Yes [X]   No []     Nutrition History Previously Obtained        Yes [X]  No []       Pertinent Subjective Information: Spoke with RN who confirmed pt tolerating NGT feed. However, 1 feed was held last night at 10pm d/t secretions and aspiration risk. Pt received 4 feeds on 1/28 and 1/29 and 1 feed 1/30. RN reports that pt will likely be palliative care.      Pertinent Medical Interventions: P/w eval of decreased energy intake; s/p NGT insertion - tolerating NGT feedings well - will need PEG when stable. Constipation - on bowel regimen. Pseudomonas UTI - resolved per progress note 1/30.      Diet order: NPO with Tube Feed      Anthropometrics:  - Ht. 154.94cm   - Wt. 85.3 (1/30), 85.5 (1/28), 87.8 (1/27), 88.4 (1/26), 89.8 (1/25) - wt fluctuation possibly d/t ?? edema vs. bed scale error?   - %wt change  - BMI: 35.4  - IBW: 47.7kg      Pertinent Lab Data: (1/30) RBC: 3.32, H/H: 10.4/32.3, Ca: 9.42 (corrected for low albumin)  Serum Glucose Trend: (1/30) 96, (1/29) 109, (1/28) 117, (1/27) 84     Pertinent Meds: Enoxaparin, Sodium Chloride, Calcium Carbonate + Vitamin D      Physical Findings:  - Appearance: Non-verbal; 3+ right and left hand edema   - GI function: Fecal Incontinence, constipation throughout admission, LBM 1/28  - Tubes: NGT   - Oral/Mouth cavity: No means of oral feed   - Skin: Stage II PI to sacrum; DTI of left 4th toe      Nutrition Requirements: (As per previous RD assessment (1/27)  Weight Used: Ideal BW 47.7 kg    Estimated Energy Needs    Continue [X]  Adjust [] 1431 - 1669 kcals/day (Based on 30 - 35kcal/kg IBW) - Stage II PI considered     Estimated Protein Needs    Continue [X]  Adjust [] 59 - 72g (1.25 - 1.5g/kg IBW) Increased needs d/t Pressure Ulcer stage II     Estimated Fluid Needs        Continue [X]  Adjust [] 1mL/Kcal or per LIP - consider 3+ edema     Nutrient Intake: pt meeting needs on NGT feeds     [] Previous Nutrition Diagnosis: Inadequate energy intake              [X] Ongoing          [] Resolved     Nutrition Intervention: Enteral Nutrition     Goal/Expected Outcome: Pt to tolerate/meet >85% and <105% of estimated needs in next 4 days.    Indicator/Monitoring: Rd to monitor Enteral Nutrition, Diet Order,  Body Composition (weight), Nutrition Focused Physical Findings (Constipation), Glucose profile    Recommendations: Continue Jevity 1.2 @ 240mL q 4hrs (Hold 2AM feed for total 5 feeds/day). This provides 1440 kcal, 66gms protein and 972mL free water. Additional flushes per LIP. Pt at risk, RD to f/u in 4 days. Registered Dietitian Follow-Up     Patient Profile Reviewed                           Yes [X]   No []     Nutrition History Previously Obtained        Yes [X]  No []       Pertinent Subjective Information: Spoke with RN who confirmed pt tolerating NGT feed. However, 1 feed was held last night at 10pm d/t secretions and aspiration risk. Pt received 4 feeds on 1/28 and 1/29 and 1 feed 1/30. RN reports that pt will likely be palliative care.      Pertinent Medical Interventions: P/w eval of decreased energy intake; s/p NGT insertion - tolerating NGT feedings well - will need PEG when stable. Constipation - on bowel regimen. Pseudomonas UTI - resolved per progress note 1/30.      Diet order: NPO with Tube Feed (Jevity 1.2 240ml x5 feeds daily)     Anthropometrics:  - Ht. 154.94cm   - Wt. 85.3 (1/30), 85.5 (1/28), 87.8 (1/27), 88.4 (1/26), 89.8 (1/25) - wt fluctuation possibly d/t ?? edema vs. bed scale error?   - %wt change  - BMI: 35.4  - IBW: 47.7kg      Pertinent Lab Data: (1/30) RBC: 3.32, H/H: 10.4/32.3, Ca: 9.42 (corrected for low albumin)  Serum Glucose Trend: (1/30) 96, (1/29) 109, (1/28) 117, (1/27) 84     Pertinent Meds: Enoxaparin, Sodium Chloride, Calcium Carbonate + Vitamin D      Physical Findings:  - Appearance: Non-verbal; 3+ right and left hand edema   - GI function: Fecal Incontinence, constipation throughout admission, LBM 1/28  - Tubes: NGT   - Oral/Mouth cavity: No means of oral feed   - Skin: Stage II PI to sacrum; DTI of left 4th toe      Nutrition Requirements: (As per previous RD assessment (1/27)  Weight Used: Ideal BW 47.7 kg    Estimated Energy Needs    Continue [X]  Adjust [] 1431 - 1669 kcals/day (Based on 30 - 35kcal/kg IBW) - Stage II PI considered     Estimated Protein Needs    Continue [X]  Adjust [] 59 - 72g (1.25 - 1.5g/kg IBW) Increased needs d/t Pressure Ulcer stage II     Estimated Fluid Needs        Continue [X]  Adjust [] 1mL/Kcal or per LIP - consider 3+ edema     Nutrient Intake: pt meeting needs on NGT feeds     [] Previous Nutrition Diagnosis: Inadequate energy intake              [X] Ongoing          [] Resolved     Nutrition Intervention: Enteral Nutrition     Goal/Expected Outcome: Pt to tolerate/meet >85% and <105% of estimated needs in next 4 days.    Indicator/Monitoring: Rd to monitor Enteral Nutrition, Diet Order,  Body Composition (weight), Nutrition Focused Physical Findings (Constipation), Glucose profile    Recommendations: Continue Jevity 1.2 @ 240mL q 4hrs (Hold 2AM feed for total 5 feeds/day). This provides 1440 kcal, 66gms protein and 972mL free water. Additional flushes per LIP. Pt at risk, RD to f/u in 4 days. Registered Dietitian Follow-Up     Patient Profile Reviewed                           Yes [X]   No []     Nutrition History Previously Obtained        Yes [X]  No []       Pertinent Subjective Information: Spoke with RN who confirmed pt tolerating NGT feed. However,1 feed was held last night at 10pm d/t secretions and aspiration risk. but, today pt is tolerating TF. Pt received 4 feeds on 1/28 and 1/29 and 1 feed 1/30.     Pertinent Medical Interventions: P/w eval of decreased energy intake; s/p NGT insertion - tolerating NGT feedings well - will need PEG when stable. Left Lung Collapse 2/2 Mucous plugging- acute hypoxic resp failure noted. Constipation - on bowel regimen. Pseudomonas UTI - resolved per progress note 1/30.      Diet order: NPO with Tube Feed (Jevity 1.2 240ml x5 feeds daily)     Anthropometrics:  - Ht. 154.94cm   - Wt. 85.3 (1/30), 85.5 (1/28), 87.8 (1/27), 88.4 (1/26), 89.8 (1/25) - wt fluctuation possibly d/t ??edema?? Wt is down trending now, however increased wt since (1/11) - 78.1kg.   - %wt change  - BMI: 35.4 (Latest wt used 85.3 1/30)  - IBW: 47.7kg      Pertinent Lab Data: (1/30) RBC: 3.32, H/H: 10.4/32.3, Ca: 9.42 (corrected for low albumin)  Serum Glucose Trend: (1/30) 96, (1/29) 109, (1/28) 117, (1/27) 84     Pertinent Meds: Enoxaparin, Sodium Chloride, Calcium Carbonate + Vitamin D      Physical Findings:  - Appearance: Non-verbal; 3+ right and left hand edema   - GI function: Fecal Incontinence, constipation noted throughout admission, unable clarify whether constipation persists now, no bowel regimen ordered at this time. Unable to reach resident to confirm.  LBM 1/28  - Tubes: NGT   - Oral/Mouth cavity: No means of oral feed   - Skin: Stage II PI to left Buttocks; Suspected DTI of left 4th toe      Nutrition Requirements: (As per previous RD assessment (1/27)  Weight Used: Ideal BW 47.7 kg    Estimated Energy Needs    Continue [X]  Adjust [] 1431 - 1669 kcals/day (Based on 30 - 35kcal/kg IBW) - Stage II PI considered     Estimated Protein Needs    Continue [X]  Adjust [] 59 - 72g (1.25 - 1.5g/kg IBW) Increased needs d/t Pressure Ulcer stage II     Estimated Fluid Needs        Continue [X]  Adjust [] 1mL/Kcal or per LIP - consider 3+ edema     Nutrient Intake: pt meeting needs on NGT feeds     [] Previous Nutrition Diagnosis: Inadequate energy intake              [X] Ongoing          [] Resolved     Nutrition Intervention: Enteral Nutrition, Vitamin and Mineral Supplement     Goal/Expected Outcome: Pt to tolerate/meet >85% and <105% of estimated needs in next 4 days.    Indicator/Monitoring: Rd to monitor Enteral Nutrition, Diet Order,  Body Composition (weight), Nutrition Focused Physical Findings (Constipation), Glucose profile, Micronutrient Intake    Recommendations: Continue Jevity 1.2 @ 240mL q 4hrs (Hold 2AM feed for total 5 feeds/day). This provides 1440 kcal, 66gms protein and 972mL free water. Additional flushes per LIP - will monitor fluid status and need for more concentrate formula. Attempted to review recommendation with LIP, but unable to reach at this time. Consider MVI q24hr unless medical contradicted. Pt at risk, RD to f/u in 4 days.

## 2020-01-30 NOTE — PROGRESS NOTE ADULT - ATTENDING COMMENTS
53 yo female with PMH Down's syndrome, nonverbal, Alzheimer's dementia, seizure d/o, hypothyroidism brought in from group home for evaluation of decreased energy.     #Left Lung Collapse 2/2 Mucous plugging- acute hypoxic resp failure  -s/p 2 bronchoscopies this admission for mucous plug extraction  -completed IV Vanco and Tomasa  -cont mucinex, chest PT, frequent suctioning  still dependent on high flow. wean as tolerated.  -scopolamine patch  -duonebs    WILL ONLY BENEFIT FROM REPEAT BRONCH OR AGGRESSIVE CHEST PT. REach out to pulm again if they will consider her for another bronch and removal of mucus plugs.   If not, and she is not coming off High flow with all the conservative measures, familly is willing to discuss palliative measures. Will give current treatment time until Feb 1st.       -monitor pulse ox closely  -not medically stable for peg  -keep O2 sat > 85%  -pulm following  -PEG once stable    #Poor PO intake- s/p NGT insertion  -tolerating  NGT feedings well  -will need PEG when stable    # Acute Transaminitis  -LFTs improving  -liver US- negative  -hold statin    #Pseudomonas UTI   -resolved  -s/p cipro and tomasa    #Constipation   -bowel regimen    #Down Syndrome With Dementia, Seizure Disorder:  - non verbal at baseline  - continue with Lamictal  - restarted on olanzapine, and Klonopin     # Hypothyroidism:  - Continue with Synthroid 112  - TSH 0.25     DVT Prophylaxis: lovenox  GI ppx: not indicated  NG tube feeds  Disposition: Acute  from group home, will need SNF    **received fax from patient's , Megan Lutz, at [facility] [employer]- now DNR/DNI- document in the chart**  Got call back from Megan with verbal consent and team filled out MOLST.

## 2020-01-31 LAB
ANION GAP SERPL CALC-SCNC: 10 MMOL/L — SIGNIFICANT CHANGE UP (ref 7–14)
BASOPHILS # BLD AUTO: 0.14 K/UL — SIGNIFICANT CHANGE UP (ref 0–0.2)
BASOPHILS NFR BLD AUTO: 3 % — HIGH (ref 0–1)
BUN SERPL-MCNC: 13 MG/DL — SIGNIFICANT CHANGE UP (ref 10–20)
CALCIUM SERPL-MCNC: 8.1 MG/DL — LOW (ref 8.5–10.1)
CHLORIDE SERPL-SCNC: 105 MMOL/L — SIGNIFICANT CHANGE UP (ref 98–110)
CO2 SERPL-SCNC: 29 MMOL/L — SIGNIFICANT CHANGE UP (ref 17–32)
CREAT SERPL-MCNC: 1.1 MG/DL — SIGNIFICANT CHANGE UP (ref 0.7–1.5)
CULTURE RESULTS: SIGNIFICANT CHANGE UP
CULTURE RESULTS: SIGNIFICANT CHANGE UP
EOSINOPHIL # BLD AUTO: 0.21 K/UL — SIGNIFICANT CHANGE UP (ref 0–0.7)
EOSINOPHIL NFR BLD AUTO: 4.5 % — SIGNIFICANT CHANGE UP (ref 0–8)
GLUCOSE SERPL-MCNC: 89 MG/DL — SIGNIFICANT CHANGE UP (ref 70–99)
HCT VFR BLD CALC: 30.1 % — LOW (ref 37–47)
HGB BLD-MCNC: 10 G/DL — LOW (ref 12–16)
IMM GRANULOCYTES NFR BLD AUTO: 0.9 % — HIGH (ref 0.1–0.3)
LYMPHOCYTES # BLD AUTO: 1.13 K/UL — LOW (ref 1.2–3.4)
LYMPHOCYTES # BLD AUTO: 24.5 % — SIGNIFICANT CHANGE UP (ref 20.5–51.1)
MAGNESIUM SERPL-MCNC: 2.2 MG/DL — SIGNIFICANT CHANGE UP (ref 1.8–2.4)
MCHC RBC-ENTMCNC: 32.6 PG — HIGH (ref 27–31)
MCHC RBC-ENTMCNC: 33.2 G/DL — SIGNIFICANT CHANGE UP (ref 32–37)
MCV RBC AUTO: 98 FL — SIGNIFICANT CHANGE UP (ref 81–99)
MONOCYTES # BLD AUTO: 0.38 K/UL — SIGNIFICANT CHANGE UP (ref 0.1–0.6)
MONOCYTES NFR BLD AUTO: 8.2 % — SIGNIFICANT CHANGE UP (ref 1.7–9.3)
NEUTROPHILS # BLD AUTO: 2.72 K/UL — SIGNIFICANT CHANGE UP (ref 1.4–6.5)
NEUTROPHILS NFR BLD AUTO: 58.9 % — SIGNIFICANT CHANGE UP (ref 42.2–75.2)
NRBC # BLD: 0 /100 WBCS — SIGNIFICANT CHANGE UP (ref 0–0)
PHOSPHATE SERPL-MCNC: 3.5 MG/DL — SIGNIFICANT CHANGE UP (ref 2.1–4.9)
PLATELET # BLD AUTO: 327 K/UL — SIGNIFICANT CHANGE UP (ref 130–400)
POTASSIUM SERPL-MCNC: 4.6 MMOL/L — SIGNIFICANT CHANGE UP (ref 3.5–5)
POTASSIUM SERPL-SCNC: 4.6 MMOL/L — SIGNIFICANT CHANGE UP (ref 3.5–5)
RBC # BLD: 3.07 M/UL — LOW (ref 4.2–5.4)
RBC # FLD: 15.5 % — HIGH (ref 11.5–14.5)
SODIUM SERPL-SCNC: 144 MMOL/L — SIGNIFICANT CHANGE UP (ref 135–146)
SPECIMEN SOURCE: SIGNIFICANT CHANGE UP
SPECIMEN SOURCE: SIGNIFICANT CHANGE UP
WBC # BLD: 4.62 K/UL — LOW (ref 4.8–10.8)
WBC # FLD AUTO: 4.62 K/UL — LOW (ref 4.8–10.8)

## 2020-01-31 PROCEDURE — 99233 SBSQ HOSP IP/OBS HIGH 50: CPT

## 2020-01-31 RX ORDER — SODIUM CHLORIDE 9 MG/ML
500 INJECTION INTRAMUSCULAR; INTRAVENOUS; SUBCUTANEOUS ONCE
Refills: 0 | Status: COMPLETED | OUTPATIENT
Start: 2020-01-31 | End: 2020-01-31

## 2020-01-31 RX ORDER — ROBINUL 0.2 MG/ML
2 INJECTION INTRAMUSCULAR; INTRAVENOUS
Refills: 0 | Status: DISCONTINUED | OUTPATIENT
Start: 2020-01-31 | End: 2020-02-01

## 2020-01-31 RX ORDER — MORPHINE SULFATE 50 MG/1
5 CAPSULE, EXTENDED RELEASE ORAL DAILY
Refills: 0 | Status: DISCONTINUED | OUTPATIENT
Start: 2020-01-31 | End: 2020-02-01

## 2020-01-31 RX ADMIN — MORPHINE SULFATE 5 MILLIGRAM(S): 50 CAPSULE, EXTENDED RELEASE ORAL at 13:12

## 2020-01-31 RX ADMIN — Medication 3 MILLILITER(S): at 07:50

## 2020-01-31 RX ADMIN — Medication 3 MILLILITER(S): at 13:54

## 2020-01-31 RX ADMIN — SODIUM CHLORIDE 3 MILLILITER(S): 9 INJECTION INTRAMUSCULAR; INTRAVENOUS; SUBCUTANEOUS at 07:51

## 2020-01-31 RX ADMIN — SCOPALAMINE 1 PATCH: 1 PATCH, EXTENDED RELEASE TRANSDERMAL at 08:21

## 2020-01-31 RX ADMIN — Medication 0.5 MILLIGRAM(S): at 05:56

## 2020-01-31 RX ADMIN — ROBINUL 2 MILLIGRAM(S): 0.2 INJECTION INTRAMUSCULAR; INTRAVENOUS at 17:48

## 2020-01-31 RX ADMIN — SCOPALAMINE 1 PATCH: 1 PATCH, EXTENDED RELEASE TRANSDERMAL at 23:00

## 2020-01-31 RX ADMIN — SCOPALAMINE 1 PATCH: 1 PATCH, EXTENDED RELEASE TRANSDERMAL at 11:26

## 2020-01-31 RX ADMIN — Medication 1 TABLET(S): at 12:11

## 2020-01-31 RX ADMIN — Medication 112 MICROGRAM(S): at 05:56

## 2020-01-31 RX ADMIN — SODIUM CHLORIDE 500 MILLILITER(S): 9 INJECTION INTRAMUSCULAR; INTRAVENOUS; SUBCUTANEOUS at 22:21

## 2020-01-31 RX ADMIN — MORPHINE SULFATE 5 MILLIGRAM(S): 50 CAPSULE, EXTENDED RELEASE ORAL at 13:42

## 2020-01-31 RX ADMIN — ENOXAPARIN SODIUM 40 MILLIGRAM(S): 100 INJECTION SUBCUTANEOUS at 12:11

## 2020-01-31 RX ADMIN — LAMOTRIGINE 100 MILLIGRAM(S): 25 TABLET, ORALLY DISINTEGRATING ORAL at 22:22

## 2020-01-31 RX ADMIN — Medication 0.5 MILLIGRAM(S): at 17:48

## 2020-01-31 RX ADMIN — CHLORHEXIDINE GLUCONATE 1 APPLICATION(S): 213 SOLUTION TOPICAL at 05:56

## 2020-01-31 RX ADMIN — SCOPALAMINE 1 PATCH: 1 PATCH, EXTENDED RELEASE TRANSDERMAL at 11:25

## 2020-01-31 RX ADMIN — SODIUM CHLORIDE 3 MILLILITER(S): 9 INJECTION INTRAMUSCULAR; INTRAVENOUS; SUBCUTANEOUS at 12:55

## 2020-01-31 RX ADMIN — Medication 3 MILLILITER(S): at 20:32

## 2020-01-31 RX ADMIN — QUETIAPINE FUMARATE 25 MILLIGRAM(S): 200 TABLET, FILM COATED ORAL at 22:22

## 2020-01-31 RX ADMIN — LAMOTRIGINE 75 MILLIGRAM(S): 25 TABLET, ORALLY DISINTEGRATING ORAL at 13:13

## 2020-01-31 NOTE — PROGRESS NOTE ADULT - ATTENDING COMMENTS
Pt seen and examined independently at bedside with sister present. Unable to obtain ROS.    PHYSICAL EXAM:  GENERAL: NAD, speaks in full sentences, no signs of respiratory distress  HEAD:  Atraumatic, Normocephalic  EYES: Anicteric  NECK: Supple, No JVD  CHEST/LUNG: Absent breath sounds on left  HEART: Regular rate and rhythm; No murmurs;   ABDOMEN: Soft, Nontender, Nondistended; Bowel sounds present; No guarding  EXTREMITIES:  2+ Peripheral Pulses, No cyanosis or edema  PSYCH: AAOx0  NEUROLOGY: nonparticipatory  SKIN: No rashes or lesions    Left bronchus mucous plug causing respiratory failure   - pt having difficulty handling secretions  - trial glycopyrrolate  - c/w scopolamine patch  - trial roxanol  - sister aware of poor prognosis, wishes for comfort measures - will have to clarify with legal  - still hypoxic 90% on high flow.    Progress Note Handoff:  Pending (specify):  either clinical improvement or hospice  Family discussion: discussed glycopyrrolate  Disposition: Home___/SNF___/Other________/Unknown at this time_____x___

## 2020-01-31 NOTE — PROGRESS NOTE ADULT - REASON FOR ADMISSION
Weakness
dysphagia
poor po intake
poor po intake
weakness

## 2020-01-31 NOTE — PROGRESS NOTE ADULT - SUBJECTIVE AND OBJECTIVE BOX
SALVATORE STEELE 54y Female  MRN#: [id number]   CODE STATUS:____DNR____      SUBJECTIVE  Patient is a 54y old Female who presents with a chief complaint of weakness (28 Jan 2020 16:47)  Currently admitted to medicine with the primary diagnosis of UTI (urinary tract infection)    will attempt to wean high flow.  roxanol for pain/discomfort    OBJECTIVE  PAST MEDICAL & SURGICAL HISTORY  Seizures  Intellectual disability  Hypothyroid  Impulse control disorder in adult  Down's syndrome  No significant past surgical history    ALLERGIES:  No Known Allergies    MEDICATIONS:  STANDING MEDICATIONS  albuterol/ipratropium for Nebulization 3 milliLiter(s) Nebulizer every 6 hours  calcium carbonate 1250 mG  + Vitamin D (OsCal 500 + D) 1 Tablet(s) Oral daily  chlorhexidine 4% Liquid 1 Application(s) Topical <User Schedule>  clonazePAM  Tablet 0.5 milliGRAM(s) Oral every 12 hours  enoxaparin Injectable 40 milliGRAM(s) SubCutaneous daily  glycopyrrolate 2 milliGRAM(s) Oral two times a day  influenza   Vaccine 0.5 milliLiter(s) IntraMuscular once  lamoTRIgine 100 milliGRAM(s) Oral at bedtime  lamoTRIgine 75 milliGRAM(s) Oral daily  levothyroxine 112 MICROGram(s) Oral daily  morphine Concentrate 5 milliGRAM(s) Oral daily  QUEtiapine 25 milliGRAM(s) Oral at bedtime  scopolamine   Patch 1 Patch Transdermal every 72 hours  sodium chloride 0.9% for Nebulization 3 milliLiter(s) Nebulizer every 4 hours    PRN MEDICATIONS  acetaminophen   Tablet .. 650 milliGRAM(s) Oral every 6 hours PRN  albuterol/ipratropium for Nebulization. 3 milliLiter(s) Nebulizer every 4 hours PRN  diphenhydrAMINE 25 milliGRAM(s) Oral once PRN      VITAL SIGNS: Last 24 Hours  T(C): 36.1 (31 Jan 2020 05:48), Max: 37.1 (30 Jan 2020 14:00)  T(F): 96.9 (31 Jan 2020 05:48), Max: 98.7 (30 Jan 2020 14:00)  HR: 57 (31 Jan 2020 05:48) (51 - 57)  BP: 115/58 (31 Jan 2020 05:48) (85/48 - 115/58)  BP(mean): --  RR: 16 (31 Jan 2020 05:48) (8 - 18)  SpO2: 94% (31 Jan 2020 03:00) (94% - 97%)    LABS:                        10.0   4.62  )-----------( 327      ( 31 Jan 2020 06:44 )             30.1     01-31    144  |  105  |  13  ----------------------------<  89  4.6   |  29  |  1.1    Ca    8.1<L>      31 Jan 2020 06:44  Phos  3.5     01-31  Mg     2.2     01-31    TPro  5.3<L>  /  Alb  2.6<L>  /  TBili  0.2  /  DBili  x   /  AST  29  /  ALT  64<H>  /  AlkPhos  130<H>  01-30                  RADIOLOGY:      PHYSICAL EXAM:    General: awake, opens eyes, nonverbal  Lungs: decreased breath sounds B/L, mild wheezing, no rhonchi  CVS: normal S1, S2, RRR, NO M/G/R  Abdomen: soft, bowel sounds present, non-tender, non-distended  Extremities: b/l upper ext. distal pitting edema present, no clubbing, no cyanosis, positive peripheral pulses b/l  Neuro: not AAO, non focal  Skin: no rash, no ecchymosis, sacral decub stage 2 (poa)      ASSESSMENT AND PLAN:  55 yo female with PMH Down's syndrome, nonverbal, Alzheimer's dementia, seizure d/o, hypothyroidism brought in from group home for evaluation of decreased energy.     #Left Lung Collapse 2/2 Mucous plugging- acute hypoxic resp failure  -s/p 2 bronchoscopies this admission for mucous plug extraction. spoke with pulm today- as of now, no more plans for bronch.  -completed IV Vanco and Tomasa  -cont mucinex, chest PT, frequent suctioning  -wean high flow as tolerated- if cannot wean, need to have palliative care discussion  -scopolamine patch  -started glycopyrrolate   -duonebs  -monitor pulse ox closely  -not medically stable for peg  -keep O2 sat > 85%  -pulm following  -PEG once stable  -roxanol for pain control prn    #Poor PO intake- s/p NGT insertion  -tolerating  NGT feedings well  -will need PEG when stable    # Acute Transaminitis  -LFTs improving  -liver US- negative  -hold statin    #Pseudomonas UTI   -resolved  -s/p cipro and tomasa    #Constipation   -bowel regimen    #Down Syndrome With Dementia, Seizure Disorder:  - non verbal at baseline  - continue with Lamictal  - restarted on olanzapine, and Klonopin     # Hypothyroidism:  - Continue with Synthroid 112  - TSH 0.25     DVT Prophylaxis: lovenox  GI ppx: not indicated  NG tube feeds  Disposition: Acute  from group home, will need SNF    **received fax from patient's , Megan Lutz, at [facility] [employer]- now DNR/DNI- document in the chart**  Verbal consent from Megan Lutz- MOLST form in chart.

## 2020-02-01 LAB
ANION GAP SERPL CALC-SCNC: 9 MMOL/L — SIGNIFICANT CHANGE UP (ref 7–14)
BASOPHILS # BLD AUTO: 0.17 K/UL — SIGNIFICANT CHANGE UP (ref 0–0.2)
BASOPHILS NFR BLD AUTO: 3.1 % — HIGH (ref 0–1)
BUN SERPL-MCNC: 15 MG/DL — SIGNIFICANT CHANGE UP (ref 10–20)
CALCIUM SERPL-MCNC: 8 MG/DL — LOW (ref 8.5–10.1)
CHLORIDE SERPL-SCNC: 106 MMOL/L — SIGNIFICANT CHANGE UP (ref 98–110)
CO2 SERPL-SCNC: 28 MMOL/L — SIGNIFICANT CHANGE UP (ref 17–32)
CREAT SERPL-MCNC: 1.1 MG/DL — SIGNIFICANT CHANGE UP (ref 0.7–1.5)
EOSINOPHIL # BLD AUTO: 0.28 K/UL — SIGNIFICANT CHANGE UP (ref 0–0.7)
EOSINOPHIL NFR BLD AUTO: 5 % — SIGNIFICANT CHANGE UP (ref 0–8)
GLUCOSE SERPL-MCNC: 92 MG/DL — SIGNIFICANT CHANGE UP (ref 70–99)
HCT VFR BLD CALC: 28.4 % — LOW (ref 37–47)
HGB BLD-MCNC: 9.3 G/DL — LOW (ref 12–16)
IMM GRANULOCYTES NFR BLD AUTO: 0.7 % — HIGH (ref 0.1–0.3)
LYMPHOCYTES # BLD AUTO: 0.92 K/UL — LOW (ref 1.2–3.4)
LYMPHOCYTES # BLD AUTO: 16.6 % — LOW (ref 20.5–51.1)
MCHC RBC-ENTMCNC: 32.1 PG — HIGH (ref 27–31)
MCHC RBC-ENTMCNC: 32.7 G/DL — SIGNIFICANT CHANGE UP (ref 32–37)
MCV RBC AUTO: 97.9 FL — SIGNIFICANT CHANGE UP (ref 81–99)
MONOCYTES # BLD AUTO: 0.52 K/UL — SIGNIFICANT CHANGE UP (ref 0.1–0.6)
MONOCYTES NFR BLD AUTO: 9.4 % — HIGH (ref 1.7–9.3)
NEUTROPHILS # BLD AUTO: 3.62 K/UL — SIGNIFICANT CHANGE UP (ref 1.4–6.5)
NEUTROPHILS NFR BLD AUTO: 65.2 % — SIGNIFICANT CHANGE UP (ref 42.2–75.2)
NRBC # BLD: 0 /100 WBCS — SIGNIFICANT CHANGE UP (ref 0–0)
PLATELET # BLD AUTO: 320 K/UL — SIGNIFICANT CHANGE UP (ref 130–400)
POTASSIUM SERPL-MCNC: 5.1 MMOL/L — HIGH (ref 3.5–5)
POTASSIUM SERPL-SCNC: 5.1 MMOL/L — HIGH (ref 3.5–5)
RBC # BLD: 2.9 M/UL — LOW (ref 4.2–5.4)
RBC # FLD: 15.6 % — HIGH (ref 11.5–14.5)
SODIUM SERPL-SCNC: 143 MMOL/L — SIGNIFICANT CHANGE UP (ref 135–146)
WBC # BLD: 5.55 K/UL — SIGNIFICANT CHANGE UP (ref 4.8–10.8)
WBC # FLD AUTO: 5.55 K/UL — SIGNIFICANT CHANGE UP (ref 4.8–10.8)

## 2020-02-01 PROCEDURE — 99233 SBSQ HOSP IP/OBS HIGH 50: CPT

## 2020-02-01 RX ORDER — MORPHINE SULFATE 50 MG/1
5 CAPSULE, EXTENDED RELEASE ORAL ONCE
Refills: 0 | Status: COMPLETED | OUTPATIENT
Start: 2020-02-01 | End: 2020-02-01

## 2020-02-01 RX ORDER — MORPHINE SULFATE 50 MG/1
5 CAPSULE, EXTENDED RELEASE ORAL DAILY
Refills: 0 | Status: DISCONTINUED | OUTPATIENT
Start: 2020-02-01 | End: 2020-02-03

## 2020-02-01 RX ORDER — ROBINUL 0.2 MG/ML
2 INJECTION INTRAMUSCULAR; INTRAVENOUS
Refills: 0 | Status: DISCONTINUED | OUTPATIENT
Start: 2020-02-01 | End: 2020-02-04

## 2020-02-01 RX ADMIN — Medication 1 TABLET(S): at 11:52

## 2020-02-01 RX ADMIN — MORPHINE SULFATE 5 MILLIGRAM(S): 50 CAPSULE, EXTENDED RELEASE ORAL at 13:55

## 2020-02-01 RX ADMIN — CHLORHEXIDINE GLUCONATE 1 APPLICATION(S): 213 SOLUTION TOPICAL at 06:31

## 2020-02-01 RX ADMIN — QUETIAPINE FUMARATE 25 MILLIGRAM(S): 200 TABLET, FILM COATED ORAL at 22:51

## 2020-02-01 RX ADMIN — LAMOTRIGINE 75 MILLIGRAM(S): 25 TABLET, ORALLY DISINTEGRATING ORAL at 11:53

## 2020-02-01 RX ADMIN — Medication 3 MILLILITER(S): at 13:57

## 2020-02-01 RX ADMIN — Medication 3 MILLILITER(S): at 08:23

## 2020-02-01 RX ADMIN — ENOXAPARIN SODIUM 40 MILLIGRAM(S): 100 INJECTION SUBCUTANEOUS at 11:57

## 2020-02-01 RX ADMIN — SCOPALAMINE 1 PATCH: 1 PATCH, EXTENDED RELEASE TRANSDERMAL at 20:06

## 2020-02-01 RX ADMIN — MORPHINE SULFATE 5 MILLIGRAM(S): 50 CAPSULE, EXTENDED RELEASE ORAL at 12:15

## 2020-02-01 RX ADMIN — Medication 112 MICROGRAM(S): at 06:30

## 2020-02-01 RX ADMIN — Medication 3 MILLILITER(S): at 01:25

## 2020-02-01 RX ADMIN — ROBINUL 2 MILLIGRAM(S): 0.2 INJECTION INTRAMUSCULAR; INTRAVENOUS at 18:48

## 2020-02-01 RX ADMIN — Medication 3 MILLILITER(S): at 19:58

## 2020-02-01 RX ADMIN — LAMOTRIGINE 100 MILLIGRAM(S): 25 TABLET, ORALLY DISINTEGRATING ORAL at 22:51

## 2020-02-01 RX ADMIN — Medication 0.5 MILLIGRAM(S): at 06:31

## 2020-02-01 RX ADMIN — SCOPALAMINE 1 PATCH: 1 PATCH, EXTENDED RELEASE TRANSDERMAL at 13:55

## 2020-02-01 RX ADMIN — ROBINUL 2 MILLIGRAM(S): 0.2 INJECTION INTRAMUSCULAR; INTRAVENOUS at 06:23

## 2020-02-01 NOTE — PROGRESS NOTE ADULT - ATTENDING COMMENTS
55 yo female with PMH Down's syndrome, nonverbal, Alzheimer's dementia, seizure d/o, hypothyroidism brought in from group home for evaluation of decreased energy.     #Left Lung Collapse 2/2 Mucous plugging- acute hypoxic resp failure  -s/p 2 bronchoscopies this admission for mucous plug extraction  -completed IV Vanco and Tomasa  -cont mucinex, chest PT, frequent suctioning  still dependent on high flow. not able to wean.   -scopolamine patch  -duonebs  Pulm doesn't think another bronch will help    Family agreeable for comfort measures since conservative treatment failed.   Palliative consult to discuss with her  (she is a haney of [facility] [employer])     -monitor pulse ox closely  -not medically stable for peg  -keep O2 sat > 85%  -pulm following    #Poor PO intake- s/p NGT insertion  -tolerating  NGT feedings well  -will need PEG when stable  Roxanol QD for comfort.    # Acute Transaminitis  -LFTs improving  -liver US- negative  -hold statin    #Pseudomonas UTI   -resolved  -s/p cipro and tomasa    #Constipation   -bowel regimen    #Down Syndrome With Dementia, Seizure Disorder:  - non verbal at baseline  - continue with Lamictal  - restarted on olanzapine, and Klonopin     # Hypothyroidism:  - Continue with Synthroid 112  - TSH 0.25     DVT Prophylaxis: lovenox  GI ppx: not indicated  NG tube feeds  Disposition: Acute  from group home, will need SNF    **received fax from patient's , Megan Lutz, at [facility] [employer]- now DNR/DNI- document in the chart**  Got call back from Megan with verbal consent and team filled out MOLST.

## 2020-02-01 NOTE — PROGRESS NOTE ADULT - SUBJECTIVE AND OBJECTIVE BOX
SALVATORE STEELE 54y Female  MRN#: [id number]   CODE STATUS:___DNR_____      SUBJECTIVE  Patient is a 54y old Female who presents with a chief complaint of weakness (31 Jan 2020 11:37)  Currently admitted to medicine with the primary diagnosis of UTI (urinary tract infection)    Satting mid 90's on High Flow 80%- unable to wean.  Palliative care consult placed.  Spoke with sister- she would like Salvatore comfortable.      OBJECTIVE  PAST MEDICAL & SURGICAL HISTORY  Seizures  Intellectual disability  Hypothyroid  Impulse control disorder in adult  Down's syndrome  No significant past surgical history    ALLERGIES:  No Known Allergies    MEDICATIONS:  STANDING MEDICATIONS  albuterol/ipratropium for Nebulization 3 milliLiter(s) Nebulizer every 6 hours  calcium carbonate 1250 mG  + Vitamin D (OsCal 500 + D) 1 Tablet(s) Oral daily  chlorhexidine 4% Liquid 1 Application(s) Topical <User Schedule>  clonazePAM  Tablet 0.5 milliGRAM(s) Oral every 12 hours  enoxaparin Injectable 40 milliGRAM(s) SubCutaneous daily  glycopyrrolate 2 milliGRAM(s) Oral two times a day  influenza   Vaccine 0.5 milliLiter(s) IntraMuscular once  lamoTRIgine 100 milliGRAM(s) Oral at bedtime  lamoTRIgine 75 milliGRAM(s) Oral daily  levothyroxine 112 MICROGram(s) Oral daily  morphine Concentrate 5 milliGRAM(s) Oral daily  QUEtiapine 25 milliGRAM(s) Oral at bedtime  scopolamine   Patch 1 Patch Transdermal every 72 hours  sodium chloride 0.9% for Nebulization 3 milliLiter(s) Nebulizer every 4 hours    PRN MEDICATIONS  acetaminophen   Tablet .. 650 milliGRAM(s) Oral every 6 hours PRN  albuterol/ipratropium for Nebulization. 3 milliLiter(s) Nebulizer every 4 hours PRN  diphenhydrAMINE 25 milliGRAM(s) Oral once PRN      VITAL SIGNS: Last 24 Hours  T(C): 37.2 (01 Feb 2020 06:16), Max: 37.2 (01 Feb 2020 06:16)  T(F): 98.9 (01 Feb 2020 06:16), Max: 98.9 (01 Feb 2020 06:16)  HR: 61 (01 Feb 2020 06:16) (53 - 61)  BP: 112/55 (01 Feb 2020 06:16) (80/43 - 112/55)  BP(mean): --  RR: 18 (01 Feb 2020 06:16) (18 - 18)  SpO2: 100% (01 Feb 2020 06:16) (93% - 100%)    LABS:                        9.3    5.55  )-----------( 320      ( 01 Feb 2020 05:38 )             28.4     02-01    143  |  106  |  15  ----------------------------<  92  5.1<H>   |  28  |  1.1    Ca    8.0<L>      01 Feb 2020 05:38  Phos  3.5     01-31  Mg     2.2     01-31                    RADIOLOGY:      PHYSICAL EXAM:    General: awake, opens eyes, nonverbal  Lungs: decreased breath sounds B/L, mild wheezing, no rhonchi  CVS: normal S1, S2, RRR, NO M/G/R  Abdomen: soft, bowel sounds present, non-tender, non-distended  Extremities: b/l upper ext. distal pitting edema present, no clubbing, no cyanosis, positive peripheral pulses b/l  Neuro: not AAO, non focal  Skin: no rash, no ecchymosis, sacral decub stage 2 (poa)      ASSESSMENT AND PLAN:  55 yo female with PMH Down's syndrome, nonverbal, Alzheimer's dementia, seizure d/o, hypothyroidism brought in from group home for evaluation of decreased energy.     #Left Lung Collapse 2/2 Mucous plugging- acute hypoxic resp failure  -s/p 2 bronchoscopies this admission for mucous plug extraction. spoke with pulm today- as of now, no more plans for bronch.  -completed IV Vanco and Tomasa  -cont mucinex, chest PT, frequent suctioning  -attempted to wean high flow- patient becomes hypoxic  -scopolamine patch  -cont glycopyrrolate   -duonebs  -monitor pulse ox closely  -not medically stable for peg  -keep O2 sat > 85%  -pulm following  -PEG once stable  -roxanol for pain control prn  -sister would like comfort measures: palliative care consult placed, will need to clarify with  monday    #Poor PO intake- s/p NGT insertion  -tolerating  NGT feedings well  -will need PEG when stable    # Acute Transaminitis  -LFTs improving  -liver US- negative  -hold statin    #Pseudomonas UTI   -resolved  -s/p cipro and tomasa    #Constipation   -bowel regimen    #Down Syndrome With Dementia, Seizure Disorder:  - non verbal at baseline  - continue with Lamictal  - restarted on olanzapine, and Klonopin     # Hypothyroidism:  - Continue with Synthroid 112  - TSH 0.25     DVT Prophylaxis: lovenox  GI ppx: not indicated  NG tube feeds  Disposition: Acute  from group home, will need SNF  Palliative care consult pending    **received fax from patient's , Megan Lutz, at [facility]- now DNR/DNI- document in the chart**  Verbal consent from Megan Lutz- MOLST form in chart. SALVATORE STEELE 54y Female  MRN#: [id number]   CODE STATUS:___DNR_____      SUBJECTIVE  Patient is a 54y old Female who presents with a chief complaint of weakness (31 Jan 2020 11:37)  Currently admitted to medicine with the primary diagnosis of UTI (urinary tract infection)    Satting mid 90's on High Flow 80%- unable to wean.  Palliative care consult placed.  Spoke with sister- she would like Salvatore comfortable.      OBJECTIVE  PAST MEDICAL & SURGICAL HISTORY  Seizures  Intellectual disability  Hypothyroid  Impulse control disorder in adult  Down's syndrome  No significant past surgical history    ALLERGIES:  No Known Allergies    MEDICATIONS:  STANDING MEDICATIONS  albuterol/ipratropium for Nebulization 3 milliLiter(s) Nebulizer every 6 hours  calcium carbonate 1250 mG  + Vitamin D (OsCal 500 + D) 1 Tablet(s) Oral daily  chlorhexidine 4% Liquid 1 Application(s) Topical <User Schedule>  clonazePAM  Tablet 0.5 milliGRAM(s) Oral every 12 hours  enoxaparin Injectable 40 milliGRAM(s) SubCutaneous daily  glycopyrrolate 2 milliGRAM(s) Oral two times a day  influenza   Vaccine 0.5 milliLiter(s) IntraMuscular once  lamoTRIgine 100 milliGRAM(s) Oral at bedtime  lamoTRIgine 75 milliGRAM(s) Oral daily  levothyroxine 112 MICROGram(s) Oral daily  morphine Concentrate 5 milliGRAM(s) Oral daily  QUEtiapine 25 milliGRAM(s) Oral at bedtime  scopolamine   Patch 1 Patch Transdermal every 72 hours  sodium chloride 0.9% for Nebulization 3 milliLiter(s) Nebulizer every 4 hours    PRN MEDICATIONS  acetaminophen   Tablet .. 650 milliGRAM(s) Oral every 6 hours PRN  albuterol/ipratropium for Nebulization. 3 milliLiter(s) Nebulizer every 4 hours PRN  diphenhydrAMINE 25 milliGRAM(s) Oral once PRN      VITAL SIGNS: Last 24 Hours  T(C): 37.2 (01 Feb 2020 06:16), Max: 37.2 (01 Feb 2020 06:16)  T(F): 98.9 (01 Feb 2020 06:16), Max: 98.9 (01 Feb 2020 06:16)  HR: 61 (01 Feb 2020 06:16) (53 - 61)  BP: 112/55 (01 Feb 2020 06:16) (80/43 - 112/55)  BP(mean): --  RR: 18 (01 Feb 2020 06:16) (18 - 18)  SpO2: 100% (01 Feb 2020 06:16) (93% - 100%)    LABS:                        9.3    5.55  )-----------( 320      ( 01 Feb 2020 05:38 )             28.4     02-01    143  |  106  |  15  ----------------------------<  92  5.1<H>   |  28  |  1.1    Ca    8.0<L>      01 Feb 2020 05:38  Phos  3.5     01-31  Mg     2.2     01-31                    RADIOLOGY:      PHYSICAL EXAM:    General: awake, opens eyes, nonverbal  Lungs: decreased breath sounds B/L, mild wheezing, no rhonchi  CVS: normal S1, S2, RRR, NO M/G/R  Abdomen: soft, bowel sounds present, non-tender, non-distended  Extremities: b/l upper ext. distal pitting edema present, no clubbing, no cyanosis, positive peripheral pulses b/l  Neuro: not AAO, non focal  Skin: no rash, no ecchymosis, sacral decub stage 2 (poa)      ASSESSMENT AND PLAN:  53 yo female with PMH Down's syndrome, nonverbal, Alzheimer's dementia, seizure d/o, hypothyroidism brought in from group home for evaluation of decreased energy.     **spoke with palliative care today; they also contacted patient's sister.  Palliative care will contact Dr. Sheikh and the  about making Salvatore comfort care.  They will then follow up here Monday at 11 AM.**    #Left Lung Collapse 2/2 Mucous plugging- acute hypoxic resp failure  -s/p 2 bronchoscopies this admission for mucous plug extraction. spoke with pulm today- as of now, no more plans for bronch.  -completed IV Vanco and Tomasa  -cont mucinex, chest PT, frequent suctioning  -attempted to wean high flow- patient becomes hypoxic  -scopolamine patch  -cont glycopyrrolate   -duonebs  -monitor pulse ox closely  -not medically stable for peg  -keep O2 sat > 85%  -pulm following  -PEG once stable  -roxanol for pain control prn  -sister would like comfort measures: palliative care consult placed, will need to clarify with  monday    #Poor PO intake- s/p NGT insertion  -tolerating  NGT feedings well  -will need PEG when stable    # Acute Transaminitis  -LFTs improving  -liver US- negative  -hold statin    #Pseudomonas UTI   -resolved  -s/p cipro and tomasa    #Constipation   -bowel regimen    #Down Syndrome With Dementia, Seizure Disorder:  - non verbal at baseline  - continue with Lamictal  - restarted on olanzapine, and Klonopin     # Hypothyroidism:  - Continue with Synthroid 112  - TSH 0.25     DVT Prophylaxis: lovenox  GI ppx: not indicated  NG tube feeds  Disposition: Acute  from group home, will need SNF    **received fax from patient's , Megan Lutz, at [facility] [employer]- now DNR/DNI- document in the chart**  Verbal consent from Megan Lutz- MOLST form in chart.

## 2020-02-02 LAB
ANION GAP SERPL CALC-SCNC: 9 MMOL/L — SIGNIFICANT CHANGE UP (ref 7–14)
BUN SERPL-MCNC: 15 MG/DL — SIGNIFICANT CHANGE UP (ref 10–20)
CALCIUM SERPL-MCNC: 8.2 MG/DL — LOW (ref 8.5–10.1)
CHLORIDE SERPL-SCNC: 101 MMOL/L — SIGNIFICANT CHANGE UP (ref 98–110)
CO2 SERPL-SCNC: 28 MMOL/L — SIGNIFICANT CHANGE UP (ref 17–32)
CREAT SERPL-MCNC: 1 MG/DL — SIGNIFICANT CHANGE UP (ref 0.7–1.5)
GLUCOSE SERPL-MCNC: 84 MG/DL — SIGNIFICANT CHANGE UP (ref 70–99)
HCT VFR BLD CALC: 28 % — LOW (ref 37–47)
HGB BLD-MCNC: 9.2 G/DL — LOW (ref 12–16)
MCHC RBC-ENTMCNC: 32.9 G/DL — SIGNIFICANT CHANGE UP (ref 32–37)
MCHC RBC-ENTMCNC: 32.9 PG — HIGH (ref 27–31)
MCV RBC AUTO: 100 FL — HIGH (ref 81–99)
NRBC # BLD: 0 /100 WBCS — SIGNIFICANT CHANGE UP (ref 0–0)
PLATELET # BLD AUTO: 250 K/UL — SIGNIFICANT CHANGE UP (ref 130–400)
POTASSIUM SERPL-MCNC: 5.1 MMOL/L — HIGH (ref 3.5–5)
POTASSIUM SERPL-SCNC: 5.1 MMOL/L — HIGH (ref 3.5–5)
RBC # BLD: 2.8 M/UL — LOW (ref 4.2–5.4)
RBC # FLD: 15.7 % — HIGH (ref 11.5–14.5)
SODIUM SERPL-SCNC: 138 MMOL/L — SIGNIFICANT CHANGE UP (ref 135–146)
WBC # BLD: 4.43 K/UL — LOW (ref 4.8–10.8)
WBC # FLD AUTO: 4.43 K/UL — LOW (ref 4.8–10.8)

## 2020-02-02 PROCEDURE — 99232 SBSQ HOSP IP/OBS MODERATE 35: CPT

## 2020-02-02 RX ADMIN — Medication 3 MILLILITER(S): at 08:23

## 2020-02-02 RX ADMIN — MORPHINE SULFATE 5 MILLIGRAM(S): 50 CAPSULE, EXTENDED RELEASE ORAL at 11:00

## 2020-02-02 RX ADMIN — LAMOTRIGINE 100 MILLIGRAM(S): 25 TABLET, ORALLY DISINTEGRATING ORAL at 21:57

## 2020-02-02 RX ADMIN — CHLORHEXIDINE GLUCONATE 1 APPLICATION(S): 213 SOLUTION TOPICAL at 05:12

## 2020-02-02 RX ADMIN — Medication 0.5 MILLIGRAM(S): at 05:10

## 2020-02-02 RX ADMIN — LAMOTRIGINE 75 MILLIGRAM(S): 25 TABLET, ORALLY DISINTEGRATING ORAL at 11:02

## 2020-02-02 RX ADMIN — Medication 112 MICROGRAM(S): at 05:10

## 2020-02-02 RX ADMIN — SCOPALAMINE 1 PATCH: 1 PATCH, EXTENDED RELEASE TRANSDERMAL at 20:33

## 2020-02-02 RX ADMIN — Medication 3 MILLILITER(S): at 20:24

## 2020-02-02 RX ADMIN — ENOXAPARIN SODIUM 40 MILLIGRAM(S): 100 INJECTION SUBCUTANEOUS at 11:02

## 2020-02-02 RX ADMIN — Medication 0.5 MILLIGRAM(S): at 18:54

## 2020-02-02 RX ADMIN — ROBINUL 2 MILLIGRAM(S): 0.2 INJECTION INTRAMUSCULAR; INTRAVENOUS at 05:10

## 2020-02-02 RX ADMIN — Medication 1 TABLET(S): at 11:02

## 2020-02-02 RX ADMIN — ROBINUL 2 MILLIGRAM(S): 0.2 INJECTION INTRAMUSCULAR; INTRAVENOUS at 18:53

## 2020-02-02 RX ADMIN — QUETIAPINE FUMARATE 25 MILLIGRAM(S): 200 TABLET, FILM COATED ORAL at 21:57

## 2020-02-02 NOTE — PROGRESS NOTE ADULT - SUBJECTIVE AND OBJECTIVE BOX
S: No new events  still cannot come off High flow.   only small amounts of phlegm on chest PT and suction\      All other pertinent ROS negative.      02-01-20 @ 07:01  -  02-02-20 @ 07:00  --------------------------------------------------------  IN: 2250 mL / OUT: 540 mL / NET: 1710 mL      Vital Signs Last 24 Hrs  T(C): 36.1 (02 Feb 2020 05:31), Max: 36.9 (01 Feb 2020 21:27)  T(F): 97 (02 Feb 2020 05:31), Max: 98.4 (01 Feb 2020 21:27)  HR: 59 (02 Feb 2020 05:31) (57 - 59)  BP: 105/52 (02 Feb 2020 05:31) (105/52 - 106/54)  BP(mean): --  RR: 18 (02 Feb 2020 05:31) (18 - 18)  SpO2: 86% (02 Feb 2020 07:22) (86% - 94%)  PHYSICAL EXAM:    Constitutional: NAD, sometimes awake and moaning.  HEENT: PERR, EOMI, Normal Hearing, MMM  Neck: Soft and supple, No LAD, No JVD  Respiratory: diminished breath sounds on left.   Cardiovascular: S1 and S2, regular rate and rhythm, no Murmurs, gallops or rubs  Gastrointestinal: Bowel Sounds present, soft, nontender, nondistended, no guarding, no rebound  Extremities: No peripheral edema      MEDICATIONS:  MEDICATIONS  (STANDING):  albuterol/ipratropium for Nebulization 3 milliLiter(s) Nebulizer every 6 hours  calcium carbonate 1250 mG  + Vitamin D (OsCal 500 + D) 1 Tablet(s) Oral daily  chlorhexidine 4% Liquid 1 Application(s) Topical <User Schedule>  clonazePAM  Tablet 0.5 milliGRAM(s) Oral every 12 hours  enoxaparin Injectable 40 milliGRAM(s) SubCutaneous daily  glycopyrrolate 2 milliGRAM(s) Oral two times a day  influenza   Vaccine 0.5 milliLiter(s) IntraMuscular once  lamoTRIgine 100 milliGRAM(s) Oral at bedtime  lamoTRIgine 75 milliGRAM(s) Oral daily  levothyroxine 112 MICROGram(s) Oral daily  morphine Concentrate 5 milliGRAM(s) Oral daily  QUEtiapine 25 milliGRAM(s) Oral at bedtime  scopolamine   Patch 1 Patch Transdermal every 72 hours  sodium chloride 0.9% for Nebulization 3 milliLiter(s) Nebulizer every 4 hours      LABS: All Labs Reviewed:                        9.2    4.43  )-----------( 250      ( 02 Feb 2020 06:45 )             28.0     02-02    138  |  101  |  15  ----------------------------<  84  5.1<H>   |  28  |  1.0    Ca    8.2<L>      02 Feb 2020 06:45            Blood Culture:     Radiology: reviewed

## 2020-02-02 NOTE — PROGRESS NOTE ADULT - ASSESSMENT
55 yo female with PMH Down's syndrome, nonverbal, Alzheimer's dementia, seizure d/o, hypothyroidism brought in from group home for evaluation of decreased energy.     #Left Lung Collapse 2/2 Mucous plugging- acute hypoxic resp failure  -s/p 2 bronchoscopies this admission for mucous plug extraction  -completed IV Vanco and Tomasa  -cont mucinex, chest PT, frequent suctioning  still dependent on high flow. not able to wean.   -scopolamine patch  -deepthi  Pulm doesn't think another bronch will help    Family agreeable for comfort measures since conservative treatment failed.   Awaiting Palliative consult to discuss with her  (she is a haney of [employer] [facility] [employer])     -monitor pulse ox closely  -not medically stable for peg  -keep O2 sat > 85%  Roxanol PRN for any undue distress.    #Poor PO intake- s/p NGT insertion  -tolerating  NGT feedings well  -original plan was PEG when stable but now will probably eventually become comfort measures only.   Roxanol QD for comfort.    # Acute Transaminitis  -LFTs improving  -liver US- negative  -hold statin    #Pseudomonas UTI   -resolved  -s/p cipro and tomasa    #Constipation   -bowel regimen    #Down Syndrome With Dementia, Seizure Disorder:  - non verbal at baseline  - continue with Lamictal  - restarted on olanzapine, and Klonopin     # Hypothyroidism:  - Continue with Synthroid 112  - TSH 0.25     DVT Prophylaxis: lovenox  GI ppx: not indicated  NG tube feeds  Disposition: Acute  from group home, will need SNF    **received fax from patient's , Megan Lutz, at [facility] [employer]- now DNR/DNI- document in the chart**  Got call back from Megan with verbal consent and team filled out MOLST.

## 2020-02-03 PROCEDURE — 99497 ADVNCD CARE PLAN 30 MIN: CPT | Mod: 25

## 2020-02-03 PROCEDURE — 99232 SBSQ HOSP IP/OBS MODERATE 35: CPT

## 2020-02-03 PROCEDURE — 99223 1ST HOSP IP/OBS HIGH 75: CPT

## 2020-02-03 RX ORDER — MORPHINE SULFATE 50 MG/1
2 CAPSULE, EXTENDED RELEASE ORAL EVERY 6 HOURS
Refills: 0 | Status: DISCONTINUED | OUTPATIENT
Start: 2020-02-03 | End: 2020-02-04

## 2020-02-03 RX ORDER — MORPHINE SULFATE 50 MG/1
2 CAPSULE, EXTENDED RELEASE ORAL
Refills: 0 | Status: DISCONTINUED | OUTPATIENT
Start: 2020-02-03 | End: 2020-02-04

## 2020-02-03 RX ADMIN — Medication 650 MILLIGRAM(S): at 22:33

## 2020-02-03 RX ADMIN — ENOXAPARIN SODIUM 40 MILLIGRAM(S): 100 INJECTION SUBCUTANEOUS at 11:29

## 2020-02-03 RX ADMIN — ROBINUL 2 MILLIGRAM(S): 0.2 INJECTION INTRAMUSCULAR; INTRAVENOUS at 17:39

## 2020-02-03 RX ADMIN — MORPHINE SULFATE 5 MILLIGRAM(S): 50 CAPSULE, EXTENDED RELEASE ORAL at 11:30

## 2020-02-03 RX ADMIN — Medication 0.5 MILLIGRAM(S): at 17:38

## 2020-02-03 RX ADMIN — MORPHINE SULFATE 2 MILLIGRAM(S): 50 CAPSULE, EXTENDED RELEASE ORAL at 17:37

## 2020-02-03 RX ADMIN — Medication 650 MILLIGRAM(S): at 20:19

## 2020-02-03 RX ADMIN — Medication 3 MILLILITER(S): at 07:58

## 2020-02-03 RX ADMIN — Medication 3 MILLILITER(S): at 19:41

## 2020-02-03 RX ADMIN — SCOPALAMINE 1 PATCH: 1 PATCH, EXTENDED RELEASE TRANSDERMAL at 20:19

## 2020-02-03 RX ADMIN — MORPHINE SULFATE 2 MILLIGRAM(S): 50 CAPSULE, EXTENDED RELEASE ORAL at 14:05

## 2020-02-03 RX ADMIN — MORPHINE SULFATE 2 MILLIGRAM(S): 50 CAPSULE, EXTENDED RELEASE ORAL at 13:50

## 2020-02-03 RX ADMIN — Medication 1 TABLET(S): at 11:30

## 2020-02-03 RX ADMIN — LAMOTRIGINE 75 MILLIGRAM(S): 25 TABLET, ORALLY DISINTEGRATING ORAL at 11:30

## 2020-02-03 RX ADMIN — Medication 3 MILLILITER(S): at 13:26

## 2020-02-03 RX ADMIN — ROBINUL 2 MILLIGRAM(S): 0.2 INJECTION INTRAMUSCULAR; INTRAVENOUS at 05:55

## 2020-02-03 RX ADMIN — Medication 112 MICROGRAM(S): at 05:55

## 2020-02-03 RX ADMIN — CHLORHEXIDINE GLUCONATE 1 APPLICATION(S): 213 SOLUTION TOPICAL at 05:55

## 2020-02-03 RX ADMIN — LAMOTRIGINE 100 MILLIGRAM(S): 25 TABLET, ORALLY DISINTEGRATING ORAL at 22:23

## 2020-02-03 RX ADMIN — Medication 0.5 MILLIGRAM(S): at 05:57

## 2020-02-03 RX ADMIN — QUETIAPINE FUMARATE 25 MILLIGRAM(S): 200 TABLET, FILM COATED ORAL at 22:23

## 2020-02-03 RX ADMIN — SODIUM CHLORIDE 3 MILLILITER(S): 9 INJECTION INTRAMUSCULAR; INTRAVENOUS; SUBCUTANEOUS at 22:21

## 2020-02-03 RX ADMIN — SCOPALAMINE 1 PATCH: 1 PATCH, EXTENDED RELEASE TRANSDERMAL at 11:29

## 2020-02-03 NOTE — PROGRESS NOTE ADULT - ATTENDING COMMENTS
Palliative care meeting with family today.   Family receptive. d/w multiple family friends at the bed side   Possible hospice care.

## 2020-02-03 NOTE — CHART NOTE - NSCHARTNOTEFT_GEN_A_CORE
Patient Profile Reviewed                           Yes [x]   No []     Nutrition History Previously Obtained        Yes [x]  No []       Pertinent Medical Information: s/p palliative consult today - notes the following: "Met with pt's sister, brother and extended family. Family requesting removal of NGT and high flow to pursue comfort measures only and end of life care." Per resident latest progress notes: "sister would like comfort measures: palliative care consult placed, will need to clarify with ". Will monitor confirmation for comfort care.     Diet order: NPO with Tube Feed (Jevity 1.2 240ml x5 feeds daily) - regimen at goal provides 1440 kcal, 67 g protein, 972 mL free H2O; per staff, pt was tolerating tube feeds over past few days, however no tube feed provided today given family requesting removal of NGT. Will continue to monitor.     Anthropometrics:  - Ht. 154.94cm   - Wt.  no new wt at this time; previous wt trends - 85.3 (1/30), 85.5 (1/28), 87.8 (1/27), 88.4 (1/26), 89.8 (1/25) significant increase from initial wt (1/11) - 78.1kg. Edema noted this admit.  - BMI: 35.4 (Latest wt used 85.3 1/30)  - IBW: 47.7kg      Pertinent Lab Data: 2/2: RBC-2.80, H/H-9.2/28.0; K-5.1 (vs. 5.1 on 2/1 vs. 4.6 on 1/31)     Pertinent Meds: Enoxaparin, calcium carbonate     Physical Findings:  - Appearance: Lethargic, disoriented at time of RD visit; 3+ right arm edema noted  - GI function: Last BM remains 1/28. Abd noted non-distended at this time.  - Tubes: NGT   - Oral/Mouth cavity: NPO  - Skin: Stage II PI to left Buttocks (noted healing); Suspected DTI of left 4th toe      Nutrition Requirements: (As per previous RD assessment (1/27)  Weight Used: Ideal BW 47.7 kg    Estimated Energy Needs    Continue [x]  Adjust [] 1431 - 1669 kcals/day (Based on 30 - 35kcal/kg IBW) - Stage II PI considered     Estimated Protein Needs    Continue [x]  Adjust [] 59 - 72g (1.25 - 1.5g/kg IBW) Increased needs d/t Pressure Ulcer stage II     Estimated Fluid Needs        Continue [x]  Adjust [] 1mL/Kcal or per LIP - consider 3+ edema     Nutrient Intake: Current EN regimen at goal meets 100% of estimated nutrient needs, however given discussion of comfort care, if comfort care initiated, goal of meeting estimated nutrient needs will be discontinued.     [x] Previous Nutrition Diagnosis: Inadequate energy intake              [x] Ongoing - however, this will be discontinued if comfort care initiated, RD will sign off.        [] Resolved     Nutrition Intervention: Enteral Nutrition     Goal/Expected Outcome: Goals of care confirmed & nutrition care plan to be implemented accordingly over next 3 days.    Indicator/Monitoring: Diet order, energy intake, glucose profile, renal profile, nutrition focused physical findings, body composition.    Recommendations: RD to monitor goals of care - if comfort care initiated, RD to sign off.

## 2020-02-03 NOTE — CONSULT NOTE ADULT - SUBJECTIVE AND OBJECTIVE BOX
REQUESTED OF: DR Rousseau    Chart reviewed, Hospital Day _______    SALVATORE STEELE 54yFemale  HPI:  Patient is a 53 yo female with PMH Down's syndrome, nonverbal, Alzheimer's dementia, seizure d/o, hypothyroidism brought in from group home for evaluation of decreased energy. Patient was recently brought in to the ER for revaluation for urinary symptoms on 1/7/2019. At the time, she was found to be positive for UTI and was discharged back to her group home on Macrobid. Since then, staff at the home have noticed she has been drinking less with decreased appetite and overall decreased energy. So, she was referred back to the ED.     In the ED, patient vitally was stable with BP of 133/90, HR 67, RR 18, T 98.3 8 (rectal), saturating 100% on room air. Her urine culture from the 7th of January shows pansensitive Pseudomonas. However, given her generalized weakness, both the group home staff and ED recommended admission for further management. (11 Jan 2020 15:58)        PAST MEDICAL & SURGICAL HISTORY:  Seizures  Intellectual disability  Hypothyroid  Impulse control disorder in adult  Down's syndrome  No significant past surgical history      Subjective and Objective:  Today,  Discussed with primary team and legal.    Focused Palliative Care Evaluation:                   Symptoms:                                      Pain                                     Dyspnea +                                     N/V                                     Appetite                                     Anxiety ++                                     Other _____________________                     Support Devices: high flow              PHYSICAL EXAM:  adult female w downs, lethargic, minimally responsive, on high flow, nad   PERRL  RRR  Abdom soft  +mild anasarca         T(C): 37.2, Max: 37.2 (05:25)  HR: 59 (55 - 59)  BP: 102/50 (102/50 - 132/56)  RR: 17 (17 - 18)  SpO2: --      LABS/STUDIES:  02-02    138  |  101  |  15  ----------------------------<  84  5.1<H>   |  28  |  1.0    Ca    8.2<L>      02 Feb 2020 06:45                              9.2    4.43  )-----------( 250      ( 02 Feb 2020 06:45 )             28.0       MEDICATIONS  (STANDING):  albuterol/ipratropium for Nebulization 3 milliLiter(s) Nebulizer every 6 hours  calcium carbonate 1250 mG  + Vitamin D (OsCal 500 + D) 1 Tablet(s) Oral daily  chlorhexidine 4% Liquid 1 Application(s) Topical <User Schedule>  clonazePAM  Tablet 0.5 milliGRAM(s) Oral every 12 hours  enoxaparin Injectable 40 milliGRAM(s) SubCutaneous daily  glycopyrrolate 2 milliGRAM(s) Oral two times a day  influenza   Vaccine 0.5 milliLiter(s) IntraMuscular once  lamoTRIgine 100 milliGRAM(s) Oral at bedtime  lamoTRIgine 75 milliGRAM(s) Oral daily  levothyroxine 112 MICROGram(s) Oral daily  LORazepam   Injectable 0.5 milliGRAM(s) IV Push every 6 hours  morphine  - Injectable 2 milliGRAM(s) IV Push every 6 hours  QUEtiapine 25 milliGRAM(s) Oral at bedtime  scopolamine   Patch 1 Patch Transdermal every 72 hours  sodium chloride 0.9% for Nebulization 3 milliLiter(s) Nebulizer every 4 hours    MEDICATIONS  (PRN):  acetaminophen   Tablet .. 650 milliGRAM(s) Oral every 6 hours PRN Temp greater or equal to 38C (100.4F)  albuterol/ipratropium for Nebulization. 3 milliLiter(s) Nebulizer every 4 hours PRN Shortness of Breath  diphenhydrAMINE 25 milliGRAM(s) Oral once PRN Rash and/or Itching  morphine  - Injectable 2 milliGRAM(s) IV Push every 2 hours PRN resp distress          iStop: NA        PPS  Level    __10________       Note PPS = Palliative Performance Scale; (c)2001, [employer] [facility] [employer]       Range from 100% meaning Full ambulation/self-care/intake/Level of Consciousness                                                                              to        10% meaning Bedbound/Unable to do any activity/extensive disease /Total Care/ No PO intake/ LOC=Full/drowsy/+/-confusion        (0% = death)

## 2020-02-03 NOTE — CONSULT NOTE ADULT - ASSESSMENT
54yFemale being evaluated for goals of care in setting of Down's syndrome, nonverbal, Alzheimer's dementia, seizure d/o, hypothyroidism brought in from group home for evaluation of decreased energy being treated for urosepsis further complicated by acute resp failure w mucus plugging s/p bronch now high flow O2 dependent with hypoxia despite high flow support.    Met with pt's sister, brother and extended family. Family requesting removal of NGT and high flow to pursue comfort measures only and end of life care.  DNR/DNI status confirmed via [facility] and Dr. Sheikh  Awaiting input from Community Advisory Board to advise on removal of high flow    All questions answered in detail  70minutes spent discussing advanced care planning      Recommendations:  start morphine 2mg IV q6h ATC  morphine 2mg IV q2h PRN resp distress  ativan 0.5mg IV q6h agitation

## 2020-02-03 NOTE — PROGRESS NOTE ADULT - SUBJECTIVE AND OBJECTIVE BOX
SALVATORE STEELE 54y Female  MRN#: [id number]   CODE STATUS:___DNR____      SUBJECTIVE  Patient is a 54y old Female who presents with a chief complaint of weakness (31 Jan 2020 11:37)  Currently admitted to medicine with the primary diagnosis of UTI (urinary tract infection)    no acute events overnight  satting 90% on High Flow  Palliative care meeting today regarding comfort measures    OBJECTIVE  PAST MEDICAL & SURGICAL HISTORY  Seizures  Intellectual disability  Hypothyroid  Impulse control disorder in adult  Down's syndrome  No significant past surgical history    ALLERGIES:  No Known Allergies    MEDICATIONS:  STANDING MEDICATIONS  albuterol/ipratropium for Nebulization 3 milliLiter(s) Nebulizer every 6 hours  calcium carbonate 1250 mG  + Vitamin D (OsCal 500 + D) 1 Tablet(s) Oral daily  chlorhexidine 4% Liquid 1 Application(s) Topical <User Schedule>  clonazePAM  Tablet 0.5 milliGRAM(s) Oral every 12 hours  enoxaparin Injectable 40 milliGRAM(s) SubCutaneous daily  glycopyrrolate 2 milliGRAM(s) Oral two times a day  influenza   Vaccine 0.5 milliLiter(s) IntraMuscular once  lamoTRIgine 100 milliGRAM(s) Oral at bedtime  lamoTRIgine 75 milliGRAM(s) Oral daily  levothyroxine 112 MICROGram(s) Oral daily  morphine Concentrate 5 milliGRAM(s) Oral daily  QUEtiapine 25 milliGRAM(s) Oral at bedtime  scopolamine   Patch 1 Patch Transdermal every 72 hours  sodium chloride 0.9% for Nebulization 3 milliLiter(s) Nebulizer every 4 hours    PRN MEDICATIONS  acetaminophen   Tablet .. 650 milliGRAM(s) Oral every 6 hours PRN  albuterol/ipratropium for Nebulization. 3 milliLiter(s) Nebulizer every 4 hours PRN  diphenhydrAMINE 25 milliGRAM(s) Oral once PRN      VITAL SIGNS: Last 24 Hours  T(C): 37.2 (03 Feb 2020 05:25), Max: 37.2 (02 Feb 2020 14:45)  T(F): 99 (03 Feb 2020 05:25), Max: 99 (02 Feb 2020 14:45)  HR: 58 (03 Feb 2020 05:25) (55 - 58)  BP: 132/56 (03 Feb 2020 05:25) (105/49 - 132/56)  BP(mean): --  RR: 18 (03 Feb 2020 05:25) (18 - 18)  SpO2: --    General: awake, opens eyes, nonverbal  Lungs: decreased breath sounds B/L, mild wheezing, no rhonchi  CVS: normal S1, S2, RRR, NO M/G/R  Abdomen: soft, bowel sounds present, non-tender, non-distended  Extremities: b/l upper ext. distal pitting edema present, no clubbing, no cyanosis, positive peripheral pulses b/l  Neuro: not AAO, non focal  Skin: no rash, no ecchymosis, sacral decub stage 2 (poa)      ASSESSMENT AND PLAN:  55 yo female with PMH Down's syndrome, nonverbal, Alzheimer's dementia, seizure d/o, hypothyroidism brought in from group home for evaluation of decreased energy.     **spoke with palliative care Saturday; they also contacted patient's sister.  Palliative care will contact Dr. Sheikh and the  about making Salvatore comfort care.  They will then follow up here today at 11 AM.**    #Left Lung Collapse 2/2 Mucous plugging- acute hypoxic resp failure  -s/p 2 bronchoscopies this admission for mucous plug extraction. spoke with pulm today- as of now, no more plans for bronch.  -completed IV Vanco and Tomasa  -cont mucinex, chest PT, frequent suctioning  -attempted to wean high flow- patient becomes hypoxic  -scopolamine patch  -cont glycopyrrolate   -duonebs  -monitor pulse ox closely  -not medically stable for peg  -keep O2 sat > 85%  -pulm following  -PEG once stable  -roxanol for pain control prn  -sister would like comfort measures: palliative care consult placed, will need to clarify with     #Poor PO intake- s/p NGT insertion  -tolerating  NGT feedings well  -will need PEG when stable    # Acute Transaminitis  -LFTs improving  -liver US- negative  -hold statin    #Pseudomonas UTI   -resolved  -s/p cipro and tomasa    #Constipation   -bowel regimen    #Down Syndrome With Dementia, Seizure Disorder:  - non verbal at baseline  - continue with Lamictal  - restarted on olanzapine, and Klonopin     # Hypothyroidism:  - Continue with Synthroid 112  - TSH 0.25     DVT Prophylaxis: lovenox  GI ppx: not indicated  NG tube feeds  Disposition: Acute    **received fax from patient's , Megan Lutz, at [facility] Legal Service- now DNR/DNI- document in the chart**  Verbal consent from Megan Lutz- MOLST form in chart

## 2020-02-03 NOTE — CHART NOTE - NSCHARTNOTEFT_GEN_A_CORE
Palliative team met family at bedside.  Pt is 55 y/o female with down syndrome admitted for respiratory failure, Lung collapse, pt currently on hi-flow.  Pt's sister at bedside requesting pt be removed from CPAP and be allowed to pass in her natural state.  Request put in to OPWDD  for review of case and sign off. Family distressed about having to wait on OPWDD for request.  Palliative will remain available to provide ongoing support, recommendations made for symptom management.  [phone]

## 2020-02-04 VITALS — OXYGEN SATURATION: 95 %

## 2020-02-04 PROCEDURE — 99233 SBSQ HOSP IP/OBS HIGH 50: CPT

## 2020-02-04 PROCEDURE — 99497 ADVNCD CARE PLAN 30 MIN: CPT

## 2020-02-04 PROCEDURE — 99498 ADVNCD CARE PLAN ADDL 30 MIN: CPT

## 2020-02-04 RX ORDER — LEVETIRACETAM 250 MG/1
500 TABLET, FILM COATED ORAL EVERY 12 HOURS
Refills: 0 | Status: DISCONTINUED | OUTPATIENT
Start: 2020-02-04 | End: 2020-02-05

## 2020-02-04 RX ORDER — MORPHINE SULFATE 50 MG/1
4 CAPSULE, EXTENDED RELEASE ORAL
Refills: 0 | Status: DISCONTINUED | OUTPATIENT
Start: 2020-02-04 | End: 2020-02-05

## 2020-02-04 RX ORDER — ROBINUL 0.2 MG/ML
0.4 INJECTION INTRAMUSCULAR; INTRAVENOUS ONCE
Refills: 0 | Status: COMPLETED | OUTPATIENT
Start: 2020-02-04 | End: 2020-02-04

## 2020-02-04 RX ORDER — MORPHINE SULFATE 50 MG/1
4 CAPSULE, EXTENDED RELEASE ORAL
Qty: 100 | Refills: 0 | Status: DISCONTINUED | OUTPATIENT
Start: 2020-02-04 | End: 2020-02-05

## 2020-02-04 RX ORDER — ROBINUL 0.2 MG/ML
0.2 INJECTION INTRAMUSCULAR; INTRAVENOUS EVERY 6 HOURS
Refills: 0 | Status: DISCONTINUED | OUTPATIENT
Start: 2020-02-04 | End: 2020-02-05

## 2020-02-04 RX ADMIN — SODIUM CHLORIDE 3 MILLILITER(S): 9 INJECTION INTRAMUSCULAR; INTRAVENOUS; SUBCUTANEOUS at 00:10

## 2020-02-04 RX ADMIN — SODIUM CHLORIDE 3 MILLILITER(S): 9 INJECTION INTRAMUSCULAR; INTRAVENOUS; SUBCUTANEOUS at 06:38

## 2020-02-04 RX ADMIN — MORPHINE SULFATE 2 MILLIGRAM(S): 50 CAPSULE, EXTENDED RELEASE ORAL at 00:25

## 2020-02-04 RX ADMIN — SCOPALAMINE 1 PATCH: 1 PATCH, EXTENDED RELEASE TRANSDERMAL at 19:48

## 2020-02-04 RX ADMIN — Medication 0.5 MILLIGRAM(S): at 00:09

## 2020-02-04 RX ADMIN — Medication 0.5 MILLIGRAM(S): at 06:34

## 2020-02-04 RX ADMIN — Medication 3 MILLILITER(S): at 20:06

## 2020-02-04 RX ADMIN — LEVETIRACETAM 420 MILLIGRAM(S): 250 TABLET, FILM COATED ORAL at 13:51

## 2020-02-04 RX ADMIN — CHLORHEXIDINE GLUCONATE 1 APPLICATION(S): 213 SOLUTION TOPICAL at 06:34

## 2020-02-04 RX ADMIN — LEVETIRACETAM 420 MILLIGRAM(S): 250 TABLET, FILM COATED ORAL at 18:05

## 2020-02-04 RX ADMIN — ROBINUL 2 MILLIGRAM(S): 0.2 INJECTION INTRAMUSCULAR; INTRAVENOUS at 06:35

## 2020-02-04 RX ADMIN — Medication 0.5 MILLIGRAM(S): at 12:02

## 2020-02-04 RX ADMIN — SCOPALAMINE 1 PATCH: 1 PATCH, EXTENDED RELEASE TRANSDERMAL at 06:38

## 2020-02-04 RX ADMIN — MORPHINE SULFATE 2 MILLIGRAM(S): 50 CAPSULE, EXTENDED RELEASE ORAL at 11:34

## 2020-02-04 RX ADMIN — Medication 112 MICROGRAM(S): at 06:35

## 2020-02-04 RX ADMIN — ROBINUL 0.4 MILLIGRAM(S): 0.2 INJECTION INTRAMUSCULAR; INTRAVENOUS at 11:48

## 2020-02-04 RX ADMIN — MORPHINE SULFATE 2 MILLIGRAM(S): 50 CAPSULE, EXTENDED RELEASE ORAL at 00:10

## 2020-02-04 RX ADMIN — ROBINUL 0.2 MILLIGRAM(S): 0.2 INJECTION INTRAMUSCULAR; INTRAVENOUS at 18:05

## 2020-02-04 RX ADMIN — MORPHINE SULFATE 4 MG/HR: 50 CAPSULE, EXTENDED RELEASE ORAL at 11:46

## 2020-02-04 RX ADMIN — MORPHINE SULFATE 2 MILLIGRAM(S): 50 CAPSULE, EXTENDED RELEASE ORAL at 06:33

## 2020-02-04 NOTE — PROGRESS NOTE ADULT - ASSESSMENT
53 yo female with PMH Down's syndrome, nonverbal, Alzheimer's dementia, seizure d/o, hypothyroidism brought in from group home for evaluation of decreased energy.       #Left Lung Collapse 2/2 Mucous plugging- acute hypoxic resp failure  -s/p 2 bronchoscopies this admission for mucous plug extraction. spoke with pulm today- as of now, no more plans for bronch.  -completed IV Vanco and Tomasa  -cont mucinex, chest PT, frequent suctioning  -attempted to wean high flow- patient becomes hypoxic  -scopolamine patch  -cont glycopyrrolate   -duonebs  -monitor pulse ox closely  -not medically stable for peg  -keep O2 sat > 85%  -pulm following  -PEG once stable  -roxanol for pain control prn  -sister would like comfort measures: palliative care consult placed, will need to clarify with     - Pallliative on board    - Patient DNR / NDI     - Palliative working with legal to have comfort care measures placed and for removal of high flow oxygen     #Poor PO intake- s/p NGT insertion  -tolerating  NGT feedings well  -will need PEG when stable    # Acute Transaminitis  -LFTs improving  -liver US- negative  -hold statin    #Pseudomonas UTI   -resolved  -s/p cipro and tomasa    #Constipation   -bowel regimen    #Down Syndrome With Dementia, Seizure Disorder:  - non verbal at baseline  - continue with Lamictal  - restarted on olanzapine, and Klonopin     # Hypothyroidism:  - Continue with Synthroid 112  - TSH 0.25     DVT Prophylaxis: lovenox  GI ppx: not indicated  NG tube feeds  Disposition: Acute    **received fax from patient's , Megan Lutz, at [facility] Legal Service- now DNR/DNI- document in the chart**  Verbal consent from Megan Lutz- MOLST form in chart

## 2020-02-04 NOTE — PROGRESS NOTE ADULT - ASSESSMENT
54yFemale being evaluated for goals of care in setting of Down Syndrome, dementia, UTI, recurrent mucous plugging with persistent respiratory failure.       Pt DNR/DNI status with 1750B surrogate approved by [facility].   As pt is suffering medically with no foreseeable improvement in her quality of life, family (sister and brother) present at bedside requesting comfort measures only at this time. After extensive discussion with [facility] and [facility] legal department, decision made to withdraw high flow and NGT in pursuit of comfort measures only with end of life care.  Pt is NOT under jurisdiction of [employer].     Family aware that by withdrawing life sustaining measures (ie high flow) pt will likely progress to resp failure, cardiac arrest and death.   All questions answered in detail    High flow removed at 12:10p. Morphine titrated for comfort as pt in mild resp distress with agitation.    Recommendations:  cont morphine gtt @4mg/hr  cont morphine 4mg IVP q30min pRN resp distress  ativan 0.5mg IV q4h PRN agitation  start keppra 500 mg IV q12h for seizure prophylaxis  robinul 0.2mg IV q6h ATC of secretions  dc all other meds not contributing to pt's comfort    No further blood draws, no fingersticks, no artificial hydration/nutrition  No pulse ox checking    70 minutes spent discussing advanced care planning and providing symptom management.    We will follow  [phone]

## 2020-02-04 NOTE — GOALS OF CARE CONVERSATION - ADVANCED CARE PLANNING - TREATMENT GUIDELINE COMMENT
DNR/DNI, Comfort measures only, no escalation of care.  No vitals, No labs, no artificial nutrition, no antibiotics, no IV fluids.  Palliative will remain available to provide ongoing support and recommendations for comfort. x [phone] 24/7

## 2020-02-04 NOTE — GOALS OF CARE CONVERSATION - ADVANCED CARE PLANNING - CONVERSATION DETAILS
53 y/o down syndrome female with respiratory failure, L lung collapse, mucous plugging admitting from group home.  Pt has been unable to wean off high flow oxygen.  Palliative team met with family.  Family understands patient diagnosis and prognosis at this time.  Family has decided on comfort measures only, removal of high.      Palliative team contacted Dr. Quiroz (carlos manuel BORJAS for Dr. Kori Copeland, [facility]) as well as [facility] [employer] to confirm family able to make decisions regarding patients ongoing medical treatment, including withdrawal of life sustaining measures.  [phone].  MOLST in chart.   All parties confirmed sister Emiliana Carver is patients decision maker.     Plan is for withdrawal of high flow today, comfort measures only.

## 2020-02-04 NOTE — PROGRESS NOTE ADULT - SUBJECTIVE AND OBJECTIVE BOX
SUBJECTIVE:    Patient is a 54y old  Female who presents with a chief complaint of weakness (31 Jan 2020 11:37)    Currently admitted to medicine with the primary diagnosis of UTI (urinary tract infection)     Today is hospital day 24d. Patient was seen and examined at bedside. This morning she is resting comfortably in bed and reports no new issues or overnight events. Patient was nonverbal when seen. Family was with her at bedside. Family denied any signs of respiratory discomfort or pain.     PAST MEDICAL & SURGICAL HISTORY  PAST MEDICAL & SURGICAL HISTORY:  Seizures  Intellectual disability  Hypothyroid  Impulse control disorder in adult  Down's syndrome  No significant past surgical history    SOCIAL HISTORY:    ALLERGIES:  No Known Allergies    MEDICATIONS:  STANDING MEDICATIONS  albuterol/ipratropium for Nebulization 3 milliLiter(s) Nebulizer every 6 hours  calcium carbonate 1250 mG  + Vitamin D (OsCal 500 + D) 1 Tablet(s) Oral daily  chlorhexidine 4% Liquid 1 Application(s) Topical <User Schedule>  clonazePAM  Tablet 0.5 milliGRAM(s) Oral every 12 hours  enoxaparin Injectable 40 milliGRAM(s) SubCutaneous daily  glycopyrrolate 2 milliGRAM(s) Oral two times a day  influenza   Vaccine 0.5 milliLiter(s) IntraMuscular once  lamoTRIgine 100 milliGRAM(s) Oral at bedtime  lamoTRIgine 75 milliGRAM(s) Oral daily  levothyroxine 112 MICROGram(s) Oral daily  LORazepam   Injectable 0.5 milliGRAM(s) IV Push every 6 hours  morphine  - Injectable 2 milliGRAM(s) IV Push every 6 hours  QUEtiapine 25 milliGRAM(s) Oral at bedtime  scopolamine   Patch 1 Patch Transdermal every 72 hours  sodium chloride 0.9% for Nebulization 3 milliLiter(s) Nebulizer every 4 hours    PRN MEDICATIONS  acetaminophen   Tablet .. 650 milliGRAM(s) Oral every 6 hours PRN  albuterol/ipratropium for Nebulization. 3 milliLiter(s) Nebulizer every 4 hours PRN  diphenhydrAMINE 25 milliGRAM(s) Oral once PRN  morphine  - Injectable 2 milliGRAM(s) IV Push every 2 hours PRN    VITALS:   T(F): 98.7  HR: 65  BP: 98/50  RR: 18  SpO2: 95%    LABS:                        RADIOLOGY:  < from: Xray Chest 1 View- PORTABLE-Urgent (01.29.20 @ 08:18) >  Impression:      Worseningright base opacity.    Stable white out of the left lung.    < end of copied text >    PHYSICAL EXAM:  GENERAL: NAD, speaks in full sentences, high flow O2   HEAD: Atraumatic  NECK: Supple, no JVD   CHEST/LUNG: Clear to auscultation bilaterally   HEART: S1, S2; RRR; No murmurs, rubs, or gallops  ABDOMEN: BS+; Soft, Non-tender, Non-distended  EXTREMITIES:  2+ Peripheral Pulses, No clubbing, cyanosis, or edema  PSYCH: AAOx3  NEUROLOGY: non-focal

## 2020-02-04 NOTE — GOALS OF CARE CONVERSATION - ADVANCED CARE PLANNING - TREATMENT GUIDELINES
Comfort measures only/No antibiotics/No blood draws/No artificial nutrition/Do not re-hospitalize/No IV fluids

## 2020-02-04 NOTE — PROGRESS NOTE ADULT - NSHPATTENDINGPLANDISCUSS_GEN_ALL_CORE
house staff
Housestaff and Hospitalist, Legal, MHLS
house staff

## 2020-02-04 NOTE — PROGRESS NOTE ADULT - SUBJECTIVE AND OBJECTIVE BOX
54yFemale with diagnosis: URINARY TRACT INFECTION      Patient seen, chart reviewed, no improvement in status.       PHYSICAL EXAM    T(C): , Max: 38.1 (20:17)  T(F): 98.7  HR: 65 (59 - 65)  BP: 98/50 (98/50 - 102/50)  RR: 18 (17 - 18)  SpO2: 95% (72% - 95%)              LABS:                                                                                                                                               MEDICATIONS  (STANDING):  albuterol/ipratropium for Nebulization 3 milliLiter(s) Nebulizer every 6 hours  calcium carbonate 1250 mG  + Vitamin D (OsCal 500 + D) 1 Tablet(s) Oral daily  chlorhexidine 4% Liquid 1 Application(s) Topical <User Schedule>  clonazePAM  Tablet 0.5 milliGRAM(s) Oral every 12 hours  enoxaparin Injectable 40 milliGRAM(s) SubCutaneous daily  glycopyrrolate 2 milliGRAM(s) Oral two times a day  influenza   Vaccine 0.5 milliLiter(s) IntraMuscular once  lamoTRIgine 100 milliGRAM(s) Oral at bedtime  lamoTRIgine 75 milliGRAM(s) Oral daily  levothyroxine 112 MICROGram(s) Oral daily  LORazepam   Injectable 0.5 milliGRAM(s) IV Push every 6 hours  morphine  - Injectable 2 milliGRAM(s) IV Push every 6 hours  morphine  Infusion 4 mG/Hr (4 mL/Hr) IV Continuous <Continuous>  QUEtiapine 25 milliGRAM(s) Oral at bedtime  scopolamine   Patch 1 Patch Transdermal every 72 hours  sodium chloride 0.9% for Nebulization 3 milliLiter(s) Nebulizer every 4 hours    MEDICATIONS  (PRN):  acetaminophen   Tablet .. 650 milliGRAM(s) Oral every 6 hours PRN Temp greater or equal to 38C (100.4F)  albuterol/ipratropium for Nebulization. 3 milliLiter(s) Nebulizer every 4 hours PRN Shortness of Breath  diphenhydrAMINE 25 milliGRAM(s) Oral once PRN Rash and/or Itching  morphine  - Injectable 2 milliGRAM(s) IV Push every 2 hours PRN resp distress 54yFemale with diagnosis: URINARY TRACT INFECTION      Patient seen, chart reviewed, no improvement in status, remains high flow dependent, lethargic, intermittently agitated.       PHYSICAL EXAM  unchanged     T(C): , Max: 38.1 (20:17)  T(F): 98.7  HR: 65 (59 - 65)  BP: 98/50 (98/50 - 102/50)  RR: 18 (17 - 18)  SpO2: 95% (72% - 95%)              LABS:                                                                                                                                               MEDICATIONS  (STANDING):  albuterol/ipratropium for Nebulization 3 milliLiter(s) Nebulizer every 6 hours  calcium carbonate 1250 mG  + Vitamin D (OsCal 500 + D) 1 Tablet(s) Oral daily  chlorhexidine 4% Liquid 1 Application(s) Topical <User Schedule>  glycopyrrolate 2 milliGRAM(s) Oral two times a day  influenza   Vaccine 0.5 milliLiter(s) IntraMuscular once  lamoTRIgine 100 milliGRAM(s) Oral at bedtime  lamoTRIgine 75 milliGRAM(s) Oral daily  levothyroxine 112 MICROGram(s) Oral daily  LORazepam   Injectable 0.5 milliGRAM(s) IV Push every 6 hours  morphine  - Injectable 2 milliGRAM(s) IV Push every 6 hours  morphine  Infusion 4 mG/Hr (4 mL/Hr) IV Continuous <Continuous>  QUEtiapine 25 milliGRAM(s) Oral at bedtime  scopolamine   Patch 1 Patch Transdermal every 72 hours  sodium chloride 0.9% for Nebulization 3 milliLiter(s) Nebulizer every 4 hours    MEDICATIONS  (PRN):  acetaminophen   Tablet .. 650 milliGRAM(s) Oral every 6 hours PRN Temp greater or equal to 38C (100.4F)  albuterol/ipratropium for Nebulization. 3 milliLiter(s) Nebulizer every 4 hours PRN Shortness of Breath  diphenhydrAMINE 25 milliGRAM(s) Oral once PRN Rash and/or Itching  morphine  - Injectable 2 milliGRAM(s) IV Push every 2 hours PRN resp distress

## 2020-02-05 PROCEDURE — 99239 HOSP IP/OBS DSCHRG MGMT >30: CPT

## 2020-02-05 RX ADMIN — LEVETIRACETAM 420 MILLIGRAM(S): 250 TABLET, FILM COATED ORAL at 06:05

## 2020-02-05 RX ADMIN — CHLORHEXIDINE GLUCONATE 1 APPLICATION(S): 213 SOLUTION TOPICAL at 06:05

## 2020-02-05 RX ADMIN — SCOPALAMINE 1 PATCH: 1 PATCH, EXTENDED RELEASE TRANSDERMAL at 06:12

## 2020-02-05 RX ADMIN — Medication 3 MILLILITER(S): at 08:22

## 2020-02-05 RX ADMIN — ROBINUL 0.2 MILLIGRAM(S): 0.2 INJECTION INTRAMUSCULAR; INTRAVENOUS at 06:06

## 2020-02-05 RX ADMIN — ROBINUL 0.2 MILLIGRAM(S): 0.2 INJECTION INTRAMUSCULAR; INTRAVENOUS at 00:04

## 2020-02-05 NOTE — PROGRESS NOTE ADULT - SUBJECTIVE AND OBJECTIVE BOX
SALVATORE STEELE 54y Female  MRN#: [id number]       SUBJECTIVE  Patient is a 54y old Female who presents with a chief complaint of weakness (31 Jan 2020 11:37)  Currently admitted to medicine with the primary diagnosis of UTI, found to have acute hypoxic respiratory failure w/ mucus plugs s/p bronchoscopic extraction x 2, transitioned to comfort care by family. In the AM the patient was resting apparently comfortably but unarousable.      OBJECTIVE  PAST MEDICAL & SURGICAL HISTORY  Seizures  Intellectual disability  Hypothyroid  Impulse control disorder in adult  Down's syndrome  No significant past surgical history    ALLERGIES:  No Known Allergies    MEDICATIONS:  STANDING MEDICATIONS  albuterol/ipratropium for Nebulization 3 milliLiter(s) Nebulizer every 6 hours  calcium carbonate 1250 mG  + Vitamin D (OsCal 500 + D) 1 Tablet(s) Oral daily  chlorhexidine 4% Liquid 1 Application(s) Topical <User Schedule>  glycopyrrolate Injectable 0.2 milliGRAM(s) IV Push every 6 hours  influenza   Vaccine 0.5 milliLiter(s) IntraMuscular once  levETIRAcetam  IVPB 500 milliGRAM(s) IV Intermittent every 12 hours  morphine  Infusion 4 mG/Hr IV Continuous <Continuous>  scopolamine   Patch 1 Patch Transdermal every 72 hours  sodium chloride 0.9% for Nebulization 3 milliLiter(s) Nebulizer every 4 hours    PRN MEDICATIONS  acetaminophen   Tablet .. 650 milliGRAM(s) Oral every 6 hours PRN  albuterol/ipratropium for Nebulization. 3 milliLiter(s) Nebulizer every 4 hours PRN  diphenhydrAMINE 25 milliGRAM(s) Oral once PRN  LORazepam   Injectable 0.5 milliGRAM(s) IV Push every 6 hours PRN  morphine  - Injectable 4 milliGRAM(s) IV Push every 30 minutes PRN      VITAL SIGNS: Last 24 Hours  T(C): --  T(F): --  HR: --  BP: --  BP(mean): --  RR: --  SpO2: --      RADIOLOGY:    < from: Xray Chest 1 View- PORTABLE-Urgent (01.29.20 @ 08:18) >    EXAM:  XR CHEST PORTABLE URGENT 1V            PROCEDURE DATE:  01/29/2020        INTERPRETATION:  Clinical History / Reason for exam: Follow-up    Comparison : Chest radiograph 1/28/2020.    Technique/Positioning: Portable AP.    Findings:    Support devices: Stable enteric tube    Cardiac/mediastinum/hilum: Stable    Lung parenchyma/Pleura: Stable complete white out of the left hemithorax. Worsening right base airspace opacity    Skeleton/soft tissues: Stable.    Impression:      Worseningright base opacity.    Stable white out of the left lung.      PHYSICAL EXAM:    GENERAL: NAD, well-developed, unarousable this AM; in apparently NAD  HEENT:  Atraumatic, Normocephalic. EOMI, PERRLA, conjunctiva and sclera clear, No JVD  PULMONARY: severe congestion b/l; non-tachypneic; non-cyanotic  CARDIOVASCULAR: Regular rate and rhythm; No murmurs, rubs, or gallops  GASTROINTESTINAL: Soft, Nontender, Nondistended; Bowel sounds present  MUSCULOSKELETAL: No clubbing, cyanosis, or edema  NEUROLOGY: non-focal  SKIN: No rashes or lesions

## 2020-02-05 NOTE — DISCHARGE NOTE FOR THE EXPIRED PATIENT - HOSPITAL COURSE
Patient is a 55 yo female with PMH Down's syndrome, nonverbal, Alzheimer's dementia, seizure d/o, hypothyroidism brought in from group home for evaluation of decreased energy. Patient was recently brought in to the ER for revaluation for urinary symptoms on 2019. At the time, she was found to be positive for UTI and was discharged back to her group home on Macrobid. Since then, staff at the home have noticed she has been drinking less with decreased appetite and overall decreased energy. So, she was referred back to the ED. In the ED, patient vitally was stable with BP of 133/90, HR 67, RR 18, T 98.3 8 (rectal), saturating 100% on room air. Her urine culture from the  shows pansensitive Pseudomonas. However, given her generalized weakness, both the group home staff and ED recommended admission for further management. On admission, the patient was started on IV meropenem for pansensitive Pseudomonas. A chest xray revealed complete left lung collapse, after which she underwent a bronchoscopy to remove 2 mucus plugs; vancomycin was added. She was given an NG tube for poor PO intake; a PEG tube was discusses but ultimately decided against. The patient's sister (decision maker) decided to transition the patient to comfort care; the patient was started on a morphine drip and PRN Ativan for agitation; the high-flow oxygen and NG tube were discontinued. Per the sister's wishes, nasal cannula for 4L oxygen were discontinued in the morning 20, shortly after which the patient ; the cause of death was cardiopulmonary arrest secondary to respiratory failure with an underlying medical history of Down's syndrome.

## 2020-02-05 NOTE — CHART NOTE - NSCHARTNOTEFT_GEN_A_CORE
Called by RN that the patient's sister wanted the patient to be off oxygen completely and for vital signs to be checked x 1. NC d/c'd. Endorsed to RN.

## 2020-02-05 NOTE — DISCHARGE NOTE FOR THE EXPIRED PATIENT - NS PATIENT DEATH CRITERIA
cardiopulmonary arrest/Patient is dead based on Cardiopulmonary criteria including absent breath sounds, pulselessness and/or asystole

## 2020-02-05 NOTE — PROGRESS NOTE ADULT - ASSESSMENT
53 yo female with PMH Down's syndrome, nonverbal, Alzheimer's dementia, seizure d/o, hypothyroidism brought in from group home for evaluation of decreased energy.       #Left Lung Collapse 2/2 Mucous plugging w/ hypoxic respiratory failure  -s/p 2 bronchoscopies this admission for mucous plug extraction  -completed IV Vanco and Tomasa  -cont mucinex, chest PT, frequent suctioning  -attempted to wean high flow- patient becomes hypoxic  -scopolamine patch  -cont glycopyrrolate   -duonebs  -monitor pulse ox closely  -not medically stable for peg  -keep O2 sat > 85%  -pulm following  -PEG once stable  -roxanol for pain control prn  -comfort care in place per sister; Palliative care on board w/ morphine drip; transitioned off high-flow O2    #Poor PO intake- s/p NGT insertion  -NG tube removed; NPO w/ comfort care in place    # Acute Transaminitis  -LFTs improving  -liver US- negative  -hold statin    #Pseudomonas UTI   -resolved  -s/p cipro and tomasa    #Constipation   -bowel regimen    #Down Syndrome With Dementia, Seizure Disorder:  - non verbal at baseline  - continue with Lamictal  - restarted on olanzapine, and Klonopin     # Hypothyroidism:  - Continue with Synthroid 112  - TSH 0.25     DVT Prophylaxis: lovenox  GI ppx: not indicated  NPO  Disposition: Acute    **received fax from patient's , Megan Lutz, at [facility] Legal Service- now DNR/DNI- document in the chart**  Verbal consent from Megan Lutz- MOLST form in chart

## 2020-02-13 DIAGNOSIS — Z51.5 ENCOUNTER FOR PALLIATIVE CARE: ICD-10-CM

## 2020-02-13 DIAGNOSIS — J96.01 ACUTE RESPIRATORY FAILURE WITH HYPOXIA: ICD-10-CM

## 2020-02-13 DIAGNOSIS — L89.322 PRESSURE ULCER OF LEFT BUTTOCK, STAGE 2: ICD-10-CM

## 2020-02-13 DIAGNOSIS — R33.9 RETENTION OF URINE, UNSPECIFIED: ICD-10-CM

## 2020-02-13 DIAGNOSIS — R62.7 ADULT FAILURE TO THRIVE: ICD-10-CM

## 2020-02-13 DIAGNOSIS — G40.909 EPILEPSY, UNSPECIFIED, NOT INTRACTABLE, WITHOUT STATUS EPILEPTICUS: ICD-10-CM

## 2020-02-13 DIAGNOSIS — E78.5 HYPERLIPIDEMIA, UNSPECIFIED: ICD-10-CM

## 2020-02-13 DIAGNOSIS — J98.19 OTHER PULMONARY COLLAPSE: ICD-10-CM

## 2020-02-13 DIAGNOSIS — B96.5 PSEUDOMONAS (AERUGINOSA) (MALLEI) (PSEUDOMALLEI) AS THE CAUSE OF DISEASES CLASSIFIED ELSEWHERE: ICD-10-CM

## 2020-02-13 DIAGNOSIS — N39.0 URINARY TRACT INFECTION, SITE NOT SPECIFIED: ICD-10-CM

## 2020-02-13 DIAGNOSIS — T17.590A OTHER FOREIGN OBJECT IN BRONCHUS CAUSING ASPHYXIATION, INITIAL ENCOUNTER: ICD-10-CM

## 2020-02-13 DIAGNOSIS — J15.6 PNEUMONIA DUE TO OTHER GRAM-NEGATIVE BACTERIA: ICD-10-CM

## 2020-02-13 DIAGNOSIS — R74.8 ABNORMAL LEVELS OF OTHER SERUM ENZYMES: ICD-10-CM

## 2020-02-13 DIAGNOSIS — E87.0 HYPEROSMOLALITY AND HYPERNATREMIA: ICD-10-CM

## 2020-02-13 DIAGNOSIS — R13.10 DYSPHAGIA, UNSPECIFIED: ICD-10-CM

## 2020-02-13 DIAGNOSIS — G30.9 ALZHEIMER'S DISEASE, UNSPECIFIED: ICD-10-CM

## 2020-02-13 DIAGNOSIS — E03.9 HYPOTHYROIDISM, UNSPECIFIED: ICD-10-CM

## 2020-02-13 DIAGNOSIS — Q90.9 DOWN SYNDROME, UNSPECIFIED: ICD-10-CM

## 2020-02-13 DIAGNOSIS — Z66 DO NOT RESUSCITATE: ICD-10-CM

## 2020-02-13 DIAGNOSIS — I95.9 HYPOTENSION, UNSPECIFIED: ICD-10-CM

## 2020-02-13 DIAGNOSIS — F02.80 DEMENTIA IN OTHER DISEASES CLASSIFIED ELSEWHERE, UNSPECIFIED SEVERITY, WITHOUT BEHAVIORAL DISTURBANCE, PSYCHOTIC DISTURBANCE, MOOD DISTURBANCE, AND ANXIETY: ICD-10-CM

## 2020-02-13 DIAGNOSIS — F63.9 IMPULSE DISORDER, UNSPECIFIED: ICD-10-CM

## 2020-02-13 DIAGNOSIS — K59.00 CONSTIPATION, UNSPECIFIED: ICD-10-CM

## 2020-02-13 DIAGNOSIS — F79 UNSPECIFIED INTELLECTUAL DISABILITIES: ICD-10-CM
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
## 2020-03-01 NOTE — ED CDU PROVIDER DISPOSITION NOTE - CLINICAL COURSE
Pt placed into observation for injury to the right foot. Discussed with orthopedic surgery>> pt should use splint and follow up in the office this week. Charge nurse was able to obtain new walker. Pt ambulating well with a walker with wheels. Due to stable walk will discharge home. detailed exam warm and dry/color normal

## 2020-03-19 ENCOUNTER — APPOINTMENT (OUTPATIENT)
Dept: GASTROENTEROLOGY | Facility: CLINIC | Age: 55
End: 2020-03-19

## 2020-03-27 NOTE — CDI QUERY NOTE - NSCDIOTHERTXTBX_GEN_ALL_CORE_HH
Pt is s/p Bronchoscopy on 1/20.20  H&P Attending Attestation: # Dysphagia with poor po intake- continue dysphagia diet as tolerated with aspiration precautions, get. speech eval.    1/13 Speech Eval: PO trials not appropriate at this time 2' pt lethargic, unable to sustain arousal >5 second intervals despite max cues    1/12 Progress Note Adult-Internal Medicine Resident/Attending: # Decreased po intake and Weakness in the setting of Pseudomonas UTI     1/13 Progress Note Adult-Internal Medicine Resident: # Decreased PO intake and Weakness in the setting of Pseudomonas UTI. Will cont meropenem for now since failed S+S but can be switched to PO Cipro based on sensitivities    1/14 Progress Note Adult-Internal Medicine Resident/Attending Attestation: # new onset cough - repeat portable CXR today to r/o aspiration pneumonitis    1/14 Pulmonary Consult: Left mucous plug with lung collapse likely due to pneumonia. Plan for bronchoscopy tomorrow    1/20  Progress Note Adult-Internal Medicine Resident/Attending: cont absx for possible GNR pna    1/22 Progress Note Adult-Internal Medicine Resident/Attending Attending Attestation: Now getting better on Vanc (day 8)/Tomasa (day 12) [suspected GNR PNA]    1/11 ED CXR: Lung parenchyma/Pleura: Linear opacity in left midlung field, atelectasis. No evidence of pneumothorax. Right lung is unremarkable.    1/14 @16:11 CXR: Complete collapse of the left lung with mediastinal shift to the left, new since 1/11/2020, suspicious for obstruction of the left main bronchus possibly related to mucus plugging.    1/14 @21:12 CXR: Impression:  Redemonstration of complete left lung collapse with mediastinal shift into left hemithorax, likely secondary to central airway obstruction/mucus plugging.    1/15 CXR: Impression:  Persistent opacification of the left hemithorax now with new minimal air bronchograms, indicative of minimal aeration. Right midlung zone and basilar linear atelectasis.    1/16 CXR: Impression:  Worse appearance of both lungs in comparison to the prior study.    1/18 CXR: Impression: Increased aeration left lung (upper lobe). No pneumothorax    1/19 CXR: Impression:   Stable patchy right basal opacities. Stable large left mid to lower lung consolidation and pleural effusion. No pneumothorax. No significant change.    Clinical Indicators: The bronchoscope was advanced into the left lung, where examination of the left upper and lower lobe was performed in all segments to the subsegmental level without endobronchial lesion identified. The bronchoscope was advanced into the right lung, where examination of the left [sic] upper and lower lobe was performed in all segments to the subsegmental level without endobronchial lesion identified. Mucous plugging suctioned out of the left upper and lower lobes. The patient tolerated the procedure well.    Based on the clinical indicators and your professional judgment, please clarify the furthest anatomical site reached for mucus plug removal:  -Alveoli  -Bronchiole  -Bronchus  Other (please specify)            1/15 CXR: Impression:  Persistent opacification of the left hemithorax now with new minimal air bronchograms, indicative of minimal aeration. Right midlung zone and basilar linear atelectasis.    1/16 CXR: Impression:  Worse appearance of both lungs in comparison to the prior study.    1/18 CXR: Impression: Increased aeration left lung (upper lobe). No pneumothorax    1/19 CXR: Impression:   Stable patchy right basal opacities. Stable large left mid to lower lung consolidation and pleural effusion. No pneumothorax. No significant change.    Clinical Indicators: The bronchoscope was advanced into the left lung, where examination of the left upper and lower lobe was performed in all segments to the subsegmental level without endobronchial lesion identified. The bronchoscope was advanced into the right lung, where examination of the left [sic] upper and lower lobe was performed in all segments to the subsegmental level without endobronchial lesion identified. Mucous plugging suctioned out of the left upper and lower lobes. The patient tolerated the procedure well.    Based on the clinical indicators and your professional judgment, please clarify the furthest anatomical site reached for mucus plug removal:  -Alveoli  -Bronchiole  -Bronchus  Other (please specify)        1/19 CXR: Impression:   Stable patchy right basal opacities. Stable large left mid to lower lung consolidation and pleural effusion. No pneumothorax. No significant change.    Clinical Indicators: The bronchoscope was advanced into the left lung, where examination of the left upper and lower lobe was performed in all segments to the subsegmental level without endobronchial lesion identified. The bronchoscope was advanced into the right lung, where examination of the left [sic] upper and lower lobe was performed in all segments to the subsegmental level without endobronchial lesion identified. Mucous plugging suctioned out of the left upper and lower lobes. The patient tolerated the procedure well.    Based on the clinical indicators and your professional judgment, please clarify the furthest anatomical site reached for mucus plug removal:  -Alveoli  -Bronchiole  -Bronchus  Other (please specify)      1/15 CXR: Impression:  Persistent opacification of the left hemithorax now with new minimal air bronchograms, indicative of minimal aeration. Right midlung zone and basilar linear atelectasis.    1/16 CXR: Impression:  Worse appearance of both lungs in comparison to the prior study.    1/18 CXR: Impression: Increased aeration left lung (upper lobe). No pneumothorax    1/19 CXR: Impression:   Stable patchy right basal opacities. Stable large left mid to lower lung consolidation and pleural effusion. No pneumothorax. No significant change.    Clinical Indicators: The bronchoscope was advanced into the left lung, where examination of the left upper and lower lobe was performed in all segments to the subsegmental level without endobronchial lesion identified. The bronchoscope was advanced into the right lung, where examination of the left [sic] upper and lower lobe was performed in all segments to the subsegmental level without endobronchial lesion identified. Mucous plugging suctioned out of the left upper and lower lobes. The patient tolerated the procedure well.    Based on the clinical indicators and your professional judgment, please clarify the furthest anatomical site reached for mucus plug removal:  -Alveoli  -Bronchiole  -Bronchus  Other (please specify)      1/14 @16:11 CXR: Complete collapse of the left lung with mediastinal shift to the left, new since 1/11/2020, suspicious for obstruction of the left main bronchus possibly related to mucus plugging.    1/14 @21:12 CXR: Impression:  Redemonstration of complete left lung collapse with mediastinal shift into left hemithorax, likely secondary to central airway obstruction/mucus plugging.    1/15 CXR: Impression:  Persistent opacification of the left hemithorax now with new minimal air bronchograms, indicative of minimal aeration. Right midlung zone and basilar linear atelectasis.    1/16 CXR: Impression:  Worse appearance of both lungs in comparison to the prior study.    1/18 CXR: Impression: Increased aeration left lung (upper lobe). No pneumothorax    1/19 CXR: Impression:   Stable patchy right basal opacities. Stable large left mid to lower lung consolidation and pleural effusion. No pneumothorax. No significant change.    Clinical Indicators: The bronchoscope was advanced into the left lung, where examination of the left upper and lower lobe was performed in all segments to the subsegmental level without endobronchial lesion identified. The bronchoscope was advanced into the right lung, where examination of the left [sic] upper and lower lobe was performed in all segments to the subsegmental level without endobronchial lesion identified. Mucous plugging suctioned out of the left upper and lower lobes. The patient tolerated the procedure well.    Based on the clinical indicators and your professional judgment, please clarify the furthest anatomical site reached for mucus plug removal:  -Alveoli  -Bronchiole  -Bronchus  Other (please specify)    1/14 @16:11 CXR: Complete collapse of the left lung with mediastinal shift to the left, new since 1/11/2020, suspicious for obstruction of the left main bronchus possibly related to mucus plugging.    1/14 @21:12 CXR: Impression:  Redemonstration of complete left lung collapse with mediastinal shift into left hemithorax, likely secondary to central airway obstruction/mucus plugging.    1/15 CXR: Impression:  Persistent opacification of the left hemithorax now with new minimal air bronchograms, indicative of minimal aeration. Right midlung zone and basilar linear atelectasis.    1/16 CXR: Impression:  Worse appearance of both lungs in comparison to the prior study.    1/18 CXR: Impression: Increased aeration left lung (upper lobe). No pneumothorax    1/19 CXR: Impression:   Stable patchy right basal opacities. Stable large left mid to lower lung consolidation and pleural effusion. No pneumothorax. No significant change.    Clinical Indicators: The bronchoscope was advanced into the left lung, where examination of the left upper and lower lobe was performed in all segments to the subsegmental level without endobronchial lesion identified. The bronchoscope was advanced into the right lung, where examination of the left [sic] upper and lower lobe was performed in all segments to the subsegmental level without endobronchial lesion identified. Mucous plugging suctioned out of the left upper and lower lobes. The patient tolerated the procedure well.    Based on the clinical indicators and your professional judgment, please clarify the furthest anatomical site reached for mucus plug removal:  -Alveoli  -Bronchiole  -Bronchus  Other (please specify)  H&P Attending Attestation: # Dysphagia with poor po intake- continue dysphagia diet as tolerated with aspiration precautions, get. speech eval.    1/13 Speech Eval: PO trials not appropriate at this time 2' pt lethargic, unable to sustain arousal >5 second intervals despite max cues    1/12 Progress Note Adult-Internal Medicine Resident/Attending: # Decreased po intake and Weakness in the setting of Pseudomonas UTI     1/13 Progress Note Adult-Internal Medicine Resident: # Decreased PO intake and Weakness in the setting of Pseudomonas UTI. Will cont meropenem for now since failed S+S but can be switched to PO Cipro based on sensitivities    1/14 Progress Note Adult-Internal Medicine Resident/Attending Attestation: # new onset cough - repeat portable CXR today to r/o aspiration pneumonitis    1/14 Pulmonary Consult: Left mucous plug with lung collapse likely due to pneumonia. Plan for bronchoscopy tomorrow    1/20  Progress Note Adult-Internal Medicine Resident/Attending: cont absx for possible GNR pna    1/22 Progress Note Adult-Internal Medicine Resident/Attending Attending Attestation: Now getting better on Vanc (day 8)/Tomasa (day 12) [suspected GNR PNA]    1/11 ED CXR: Lung parenchyma/Pleura: Linear opacity in left midlung field, atelectasis. No evidence of pneumothorax. Right lung is unremarkable.    1/14 @16:11 CXR: Complete collapse of the left lung with mediastinal shift to the left, new since 1/11/2020, suspicious for obstruction of the left main bronchus possibly related to mucus plugging.    1/14 @21:12 CXR: Impression:  Redemonstration of complete left lung collapse with mediastinal shift into left hemithorax, likely secondary to central airway obstruction/mucus plugging.    1/15 CXR: Impression:  Persistent opacification of the left hemithorax now with new minimal air bronchograms, indicative of minimal aeration. Right midlung zone and basilar linear atelectasis.    1/16 CXR: Impression:  Worse appearance of both lungs in comparison to the prior study.    1/18 CXR: Impression: Increased aeration left lung (upper lobe). No pneumothorax    1/19 CXR: Impression:   Stable patchy right basal opacities. Stable large left mid to lower lung consolidation and pleural effusion. No pneumothorax. No significant change.    Clinical Indicators: The bronchoscope was advanced into the left lung, where examination of the left upper and lower lobe was performed in all segments to the subsegmental level without endobronchial lesion identified. The bronchoscope was advanced into the right lung, where examination of the left [sic] upper and lower lobe was performed in all segments to the subsegmental level without endobronchial lesion identified. Mucous plugging suctioned out of the left upper and lower lobes. The patient tolerated the procedure well.    Based on the clinical indicators and your professional judgment, please clarify the furthest anatomical site reached for mucus plug removal:  -Alveoli  -Bronchiole  -Bronchus  Other (please specify)  H&P Attending Attestation: # Dysphagia with poor po intake- continue dysphagia diet as tolerated with aspiration precautions, get. speech eval.    1/13 Speech Eval: PO trials not appropriate at this time 2' pt lethargic, unable to sustain arousal >5 second intervals despite max cues    1/12 Progress Note Adult-Internal Medicine Resident/Attending: # Decreased po intake and Weakness in the setting of Pseudomonas UTI     1/13 Progress Note Adult-Internal Medicine Resident: # Decreased PO intake and Weakness in the setting of Pseudomonas UTI. Will cont meropenem for now since failed S+S but can be switched to PO Cipro based on sensitivities    1/14 Progress Note Adult-Internal Medicine Resident/Attending Attestation: # new onset cough - repeat portable CXR today to r/o aspiration pneumonitis    1/14 Pulmonary Consult: Left mucous plug with lung collapse likely due to pneumonia. Plan for bronchoscopy tomorrow    1/20  Progress Note Adult-Internal Medicine Resident/Attending: cont absx for possible GNR pna    1/22 Progress Note Adult-Internal Medicine Resident/Attending Attending Attestation: Now getting better on Vanc (day 8)/Tomasa (day 12) [suspected GNR PNA]    1/11 ED CXR: Lung parenchyma/Pleura: Linear opacity in left midlung field, atelectasis. No evidence of pneumothorax. Right lung is unremarkable.    1/14 @16:11 CXR: Complete collapse of the left lung with mediastinal shift to the left, new since 1/11/2020, suspicious for obstruction of the left main bronchus possibly related to mucus plugging.    1/14 @21:12 CXR: Impression:  Redemonstration of complete left lung collapse with mediastinal shift into left hemithorax, likely secondary to central airway obstruction/mucus plugging.    1/15 CXR: Impression:  Persistent opacification of the left hemithorax now with new minimal air bronchograms, indicative of minimal aeration. Right midlung zone and basilar linear atelectasis.    1/16 CXR: Impression:  Worse appearance of both lungs in comparison to the prior study.    1/18 CXR: Impression: Increased aeration left lung (upper lobe). No pneumothorax    1/19 CXR: Impression:   Stable patchy right basal opacities. Stable large left mid to lower lung consolidation and pleural effusion. No pneumothorax. No significant change.    Clinical Indicators: The bronchoscope was advanced into the left lung, where examination of the left upper and lower lobe was performed in all segments to the subsegmental level without endobronchial lesion identified. The bronchoscope was advanced into the right lung, where examination of the left [sic] upper and lower lobe was performed in all segments to the subsegmental level without endobronchial lesion identified. Mucous plugging suctioned out of the left upper and lower lobes. The patient tolerated the procedure well.    Based on the clinical indicators and your professional judgment, please clarify the furthest anatomical site reached for mucus plug removal:  -Alveoli  -Bronchiole  -Bronchus  Other (please specify)

## 2020-04-01 NOTE — CHART NOTE - NSCHARTNOTEFT_GEN_A_CORE
Pt underwent bronchoscopy in Left main bronchus to evacuate mucous plug that was causing complete left lung collapse

## 2020-12-16 PROBLEM — Z87.09 HISTORY OF ACUTE SINUSITIS: Status: RESOLVED | Noted: 2018-01-11 | Resolved: 2020-12-16

## 2021-02-08 NOTE — ED ADULT TRIAGE NOTE - STATUS:
MD LOIS [facility]gs [facility] ([facility] call patient and tell her that her carotid ultrasound demonstrated no significant blockages in the arteries of her neck to her brain  LM to call back about results  Intact

## 2021-03-07 NOTE — ED ADULT NURSE NOTE - NEURO SENSATION
Pulmonary Critical Care Progress Note  Mars Collado MD     Patient seen for the follow up of  Acute hypoxemic respiratory failure ([facility] [address]     Subjective:  Significant overnight events noted. She is sitting up at the edge of the bed, working with physical therapy. She remains pleasantly confused. She is cooperative with exam.  She does not have any complaints of cough or shortness of breath or chest pain. Remains on oxygen by nasal cannula. Examination:  Vitals: BP (!) 129/93   Pulse 59   Temp 97.6 °F (36.4 °C) (Axillary)   Resp 20   Ht 5' 6\" (1.676 m)   Wt 134 lb 9.6 oz (61.1 kg)   SpO2 92%   BMI 21.73 kg/m²   General appearance: Awake, pleasantly confused, sitting at edge of bed with PT   Neck: No JVD  Lungs: Moderate air exchange, no crackles or wheezing  Heart: regular rate and rhythm, S1, S2 normal, no gallop  Abdomen: Soft, non tender, + BS  Extremities: no cyanosis or clubbing. No significant edema    LABs:  CBC:   Recent Labs     03/06/21  0757 03/07/21  0347   WBC 12.8* 9.7   HGB 11.5* 11.6*   HCT 37.1 38.2    286     BMP:   Recent Labs     03/06/21  2150 03/07/21  0347    144   K 4.0 4.1   CO2 20 21   BUN 39* 40*   CREATININE 1.64* 1.56*   LABGLOM 31* 32*   GLUCOSE 163* 126*        Ref. Range 3/4/2021 05:31   Procalcitonin Latest Ref Range: <0.09 ng/mL 0.11 (H)     Radiology:  3/6/2021      Impression:  · Acute hypoxic respiratory failure  · Bilateral pulmonary infiltrate/limb edema/pleural effusions,? Pneumonia  · Decompensated CHF/severe cardiomyopathy with EF 20% on 2/8/2021  · Acute exacerbation of COPD  · DM  · Confusion,?   Metabolic encephalopathy  · Hyperkalemia/DEONTE    Recommendations:  · Continue IV antibiotics, cefepime  · Prednisone taper  · Albuterol and Ipratropium Q 4 hours and prn  · Brovana aerosol treatment  · Budesonide aerosol treatment  · Seroquel 25 mg twice daily  · Fluid management/diuresis per nephrology  · Labs: CBC and BMP in am  · BiPAP as needed  · Oxygen via nasal cannula, keep SPO2 90% or greater  · DVT prophylaxis with SQ heparin  · PT/OT  · Discharge planning back to [facility] when okay with all. No objection from pulmonary standpoint.       Electronically signed by     Ruben Valdovinos MD on 3/7/2021 at 11:40 AM  Pulmonary Critical Care and Sleep Medicine,  [facility]  Cell: [phone]  Office: [phone] sensory intact

## 2021-03-08 NOTE — CHART NOTE - NSCHARTNOTESELECT_GEN_ALL_CORE
Event Note/Calorie Count Results Surgeon (Optional): Dr. Lai Biopsy Photograph Reviewed: Yes Accession #: [id number] Date Of Previous Biopsy (Optional): 12/15/2020 Size Of Lesion In Cm: 2.5 X Size Of Lesion In Cm (Optional): 0 Size Of Margin In Cm: 0.3 Excision Method: Fusiform Did You Provide Opioid Counseling: No Repair Type: Intermediate Suturegard Retention Suture: 2-0 Nylon Retention Suture Bite Size: 3 mm Length To Time In Minutes Device Was In Place: 10 Number Of Hemigard Strips Per Side: 1 Intermediate / Complex Repair - Final Wound Length In Cm: 4.5 Undermining Type: Entire Wound Debridement Text: The wound edges were debrided prior to proceeding with the closure to facilitate wound healing. Helical Rim Text: The closure involved the helical rim. Vermilion Border Text: The closure involved the vermilion border. Nostril Rim Text: The closure involved the nostril rim. Retention Suture Text: Retention sutures were placed to support the closure and prevent dehiscence. Suture Removal: 14 days Lab: [id number] Lab Facility: [id number] Graft Donor Site Bandage (Optional-Leave Blank If You Don't Want In Note): Steri-strips and a pressure bandage were applied to the donor site. Epidermal Closure Graft Donor Site (Optional): simple interrupted Billing Type: Third-Party Bill Excision Depth: adipose tissue Scalpel Size: 15 blade Anesthesia Type: 1% lidocaine with epinephrine Additional Anesthesia Volume In Cc: 6 Hemostasis: Electrocautery Estimated Blood Loss (Cc): minimal Detail Level: Detailed Anesthesia Volume In Cc: 9 Deep Sutures: 3-0 Vicryl Epidermal Sutures: 4-0 Prolene Number Of Epidermal Sutures (Optional): 2 Epidermal Closure: running Wound Care: Petrolatum Dressing: dry sterile dressing Suturegard Intro: Intraoperative tissue expansion was performed, utilizing the SUTUREGARD device, in order to reduce wound tension. Suturegard Body: The suture ends were repeatedly re-tightened and re-clamped to achieve the desired tissue expansion. Hemigard Intro: Due to skin fragility and wound tension, it was decided to use HEMIGARD adhesive retention suture devices to permit a linear closure. The skin was cleaned and dried for a 6cm distance away from the wound. Excessive hair, if present, was removed to allow for adhesion. Hemigard Postcare Instructions: The HEMIGARD strips are to remain completely dry for at least 5-7 days. Positioning (Leave Blank If You Do Not Want): The patient was placed in a comfortable position exposing the surgical site. Pre-Excision Curettage Text (Leave Blank If You Do Not Want): Prior to drawing the surgical margin the visible lesion was removed with electrodesiccation and curettage to clearly define the lesion size. Complex Repair Preamble Text (Leave Blank If You Do Not Want): Extensive wide undermining was performed. Intermediate Repair Preamble Text (Leave Blank If You Do Not Want): Undermining was performed with blunt dissection. Curvilinear Excision Additional Text (Leave Blank If You Do Not Want): The margin was drawn around the clinically apparent lesion.  A curvilinear shape was then drawn on the skin incorporating the lesion and margins.  Incisions were then made along these lines to the appropriate tissue plane and the lesion was extirpated. Fusiform Excision Additional Text (Leave Blank If You Do Not Want): The margin was drawn around the clinically apparent lesion.  A fusiform shape was then drawn on the skin incorporating the lesion and margins.  Incisions were then made along these lines to the appropriate tissue plane and the lesion was extirpated. Elliptical Excision Additional Text (Leave Blank If You Do Not Want): The margin was drawn around the clinically apparent lesion.  An elliptical shape was then drawn on the skin incorporating the lesion and margins.  Incisions were then made along these lines to the appropriate tissue plane and the lesion was extirpated. Saucerization Excision Additional Text (Leave Blank If You Do Not Want): The margin was drawn around the clinically apparent lesion.  Incisions were then made along these lines, in a tangential fashion, to the appropriate tissue plane and the lesion was extirpated. Slit Excision Additional Text (Leave Blank If You Do Not Want): A linear line was drawn on the skin overlying the lesion. An incision was made slowly until the lesion was visualized.  Once visualized, the lesion was removed with blunt dissection. Excisional Biopsy Additional Text (Leave Blank If You Do Not Want): The margin was drawn around the clinically apparent lesion. An elliptical shape was then drawn on the skin incorporating the lesion and margins.  Incisions were then made along these lines to the appropriate tissue plane and the lesion was extirpated. Perilesional Excision Additional Text (Leave Blank If You Do Not Want): The margin was drawn around the clinically apparent lesion. Incisions were then made along these lines to the appropriate tissue plane and the lesion was extirpated. Repair Performed By Another Provider Text (Leave Blank If You Do Not Want): After the tissue was excised the defect was repaired by another provider. No Repair - Repaired With Adjacent Surgical Defect Text (Leave Blank If You Do Not Want): After the excision the defect was repaired concurrently with another surgical defect which was in close approximation. Advancement Flap (Single) Text: The defect edges were debeveled with a #15 scalpel blade.  Given the location of the defect and the proximity to free margins a single advancement flap was deemed most appropriate.  Using a sterile surgical marker, an appropriate advancement flap was drawn incorporating the defect and placing the expected incisions within the relaxed skin tension lines where possible.    The area thus outlined was incised deep to adipose tissue with a #15 scalpel blade.  The skin margins were undermined to an appropriate distance in all directions utilizing iris scissors. Advancement Flap (Double) Text: The defect edges were debeveled with a #15 scalpel blade.  Given the location of the defect and the proximity to free margins a double advancement flap was deemed most appropriate.  Using a sterile surgical marker, the appropriate advancement flaps were drawn incorporating the defect and placing the expected incisions within the relaxed skin tension lines where possible.    The area thus outlined was incised deep to adipose tissue with a #15 scalpel blade.  The skin margins were undermined to an appropriate distance in all directions utilizing iris scissors. Burow's Advancement Flap Text: The defect edges were debeveled with a #15 scalpel blade.  Given the location of the defect and the proximity to free margins a Burow's advancement flap was deemed most appropriate.  Using a sterile surgical marker, the appropriate advancement flap was drawn incorporating the defect and placing the expected incisions within the relaxed skin tension lines where possible.    The area thus outlined was incised deep to adipose tissue with a #15 scalpel blade.  The skin margins were undermined to an appropriate distance in all directions utilizing iris scissors. Chonodrocutaneous Helical Advancement Flap Text: The defect edges were debeveled with a #15 scalpel blade.  Given the location of the defect and the proximity to free margins a chondrocutaneous helical advancement flap was deemed most appropriate.  Using a sterile surgical marker, the appropriate advancement flap was drawn incorporating the defect and placing the expected incisions within the relaxed skin tension lines where possible.    The area thus outlined was incised deep to adipose tissue with a #15 scalpel blade.  The skin margins were undermined to an appropriate distance in all directions utilizing iris scissors. Crescentic Advancement Flap Text: The defect edges were debeveled with a #15 scalpel blade.  Given the location of the defect and the proximity to free margins a crescentic advancement flap was deemed most appropriate.  Using a sterile surgical marker, the appropriate advancement flap was drawn incorporating the defect and placing the expected incisions within the relaxed skin tension lines where possible.    The area thus outlined was incised deep to adipose tissue with a #15 scalpel blade.  The skin margins were undermined to an appropriate distance in all directions utilizing iris scissors. A-T Advancement Flap Text: The defect edges were debeveled with a #15 scalpel blade.  Given the location of the defect, shape of the defect and the proximity to free margins an A-T advancement flap was deemed most appropriate.  Using a sterile surgical marker, an appropriate advancement flap was drawn incorporating the defect and placing the expected incisions within the relaxed skin tension lines where possible.    The area thus outlined was incised deep to adipose tissue with a #15 scalpel blade.  The skin margins were undermined to an appropriate distance in all directions utilizing iris scissors. O-T Advancement Flap Text: The defect edges were debeveled with a #15 scalpel blade.  Given the location of the defect, shape of the defect and the proximity to free margins an O-T advancement flap was deemed most appropriate.  Using a sterile surgical marker, an appropriate advancement flap was drawn incorporating the defect and placing the expected incisions within the relaxed skin tension lines where possible.    The area thus outlined was incised deep to adipose tissue with a #15 scalpel blade.  The skin margins were undermined to an appropriate distance in all directions utilizing iris scissors. O-L Flap Text: The defect edges were debeveled with a #15 scalpel blade.  Given the location of the defect, shape of the defect and the proximity to free margins an O-L flap was deemed most appropriate.  Using a sterile surgical marker, an appropriate advancement flap was drawn incorporating the defect and placing the expected incisions within the relaxed skin tension lines where possible.    The area thus outlined was incised deep to adipose tissue with a #15 scalpel blade.  The skin margins were undermined to an appropriate distance in all directions utilizing iris scissors. O-Z Flap Text: The defect edges were debeveled with a #15 scalpel blade.  Given the location of the defect, shape of the defect and the proximity to free margins an O-Z flap was deemed most appropriate.  Using a sterile surgical marker, an appropriate transposition flap was drawn incorporating the defect and placing the expected incisions within the relaxed skin tension lines where possible. The area thus outlined was incised deep to adipose tissue with a #15 scalpel blade.  The skin margins were undermined to an appropriate distance in all directions utilizing iris scissors. Double O-Z Flap Text: The defect edges were debeveled with a #15 scalpel blade.  Given the location of the defect, shape of the defect and the proximity to free margins a Double O-Z flap was deemed most appropriate.  Using a sterile surgical marker, an appropriate transposition flap was drawn incorporating the defect and placing the expected incisions within the relaxed skin tension lines where possible. The area thus outlined was incised deep to adipose tissue with a #15 scalpel blade.  The skin margins were undermined to an appropriate distance in all directions utilizing iris scissors. V-Y Flap Text: The defect edges were debeveled with a #15 scalpel blade.  Given the location of the defect, shape of the defect and the proximity to free margins a V-Y flap was deemed most appropriate.  Using a sterile surgical marker, an appropriate advancement flap was drawn incorporating the defect and placing the expected incisions within the relaxed skin tension lines where possible.    The area thus outlined was incised deep to adipose tissue with a #15 scalpel blade.  The skin margins were undermined to an appropriate distance in all directions utilizing iris scissors. Advancement-Rotation Flap Text: The defect edges were debeveled with a #15 scalpel blade.  Given the location of the defect, shape of the defect and the proximity to free margins an advancement-rotation flap was deemed most appropriate.  Using a sterile surgical marker, an appropriate flap was drawn incorporating the defect and placing the expected incisions within the relaxed skin tension lines where possible. The area thus outlined was incised deep to adipose tissue with a #15 scalpel blade.  The skin margins were undermined to an appropriate distance in all directions utilizing iris scissors. Mercedes Flap Text: The defect edges were debeveled with a #15 scalpel blade.  Given the location of the defect, shape of the defect and the proximity to free margins a Mercedes flap was deemed most appropriate.  Using a sterile surgical marker, an appropriate advancement flap was drawn incorporating the defect and placing the expected incisions within the relaxed skin tension lines where possible. The area thus outlined was incised deep to adipose tissue with a #15 scalpel blade.  The skin margins were undermined to an appropriate distance in all directions utilizing iris scissors. Modified Advancement Flap Text: The defect edges were debeveled with a #15 scalpel blade.  Given the location of the defect, shape of the defect and the proximity to free margins a modified advancement flap was deemed most appropriate.  Using a sterile surgical marker, an appropriate advancement flap was drawn incorporating the defect and placing the expected incisions within the relaxed skin tension lines where possible.    The area thus outlined was incised deep to adipose tissue with a #15 scalpel blade.  The skin margins were undermined to an appropriate distance in all directions utilizing iris scissors. Mucosal Advancement Flap Text: Given the location of the defect, shape of the defect and the proximity to free margins a mucosal advancement flap was deemed most appropriate. Incisions were made with a 15 blade scalpel in the appropriate fashion along the cutaneous vermilion border and the mucosal lip. The remaining actinically damaged mucosal tissue was excised.  The mucosal advancement flap was then elevated to the gingival sulcus with care taken to preserve the neurovascular structures and advanced into the primary defect. Care was taken to ensure that precise realignment of the vermilion border was achieved. Peng Advancement Flap Text: The defect edges were debeveled with a #15 scalpel blade.  Given the location of the defect, shape of the defect and the proximity to free margins a Peng advancement flap was deemed most appropriate.  Using a sterile surgical marker, an appropriate advancement flap was drawn incorporating the defect and placing the expected incisions within the relaxed skin tension lines where possible. The area thus outlined was incised deep to adipose tissue with a #15 scalpel blade.  The skin margins were undermined to an appropriate distance in all directions utilizing iris scissors. Hatchet Flap Text: The defect edges were debeveled with a #15 scalpel blade.  Given the location of the defect, shape of the defect and the proximity to free margins a hatchet flap was deemed most appropriate.  Using a sterile surgical marker, an appropriate hatchet flap was drawn incorporating the defect and placing the expected incisions within the relaxed skin tension lines where possible.    The area thus outlined was incised deep to adipose tissue with a #15 scalpel blade.  The skin margins were undermined to an appropriate distance in all directions utilizing iris scissors. Rotation Flap Text: The defect edges were debeveled with a #15 scalpel blade.  Given the location of the defect, shape of the defect and the proximity to free margins a rotation flap was deemed most appropriate.  Using a sterile surgical marker, an appropriate rotation flap was drawn incorporating the defect and placing the expected incisions within the relaxed skin tension lines where possible.    The area thus outlined was incised deep to adipose tissue with a #15 scalpel blade.  The skin margins were undermined to an appropriate distance in all directions utilizing iris scissors. Spiral Flap Text: The defect edges were debeveled with a #15 scalpel blade.  Given the location of the defect, shape of the defect and the proximity to free margins a spiral flap was deemed most appropriate.  Using a sterile surgical marker, an appropriate rotation flap was drawn incorporating the defect and placing the expected incisions within the relaxed skin tension lines where possible. The area thus outlined was incised deep to adipose tissue with a #15 scalpel blade.  The skin margins were undermined to an appropriate distance in all directions utilizing iris scissors. Star Wedge Flap Text: The defect edges were debeveled with a #15 scalpel blade.  Given the location of the defect, shape of the defect and the proximity to free margins a star wedge flap was deemed most appropriate.  Using a sterile surgical marker, an appropriate rotation flap was drawn incorporating the defect and placing the expected incisions within the relaxed skin tension lines where possible. The area thus outlined was incised deep to adipose tissue with a #15 scalpel blade.  The skin margins were undermined to an appropriate distance in all directions utilizing iris scissors. Transposition Flap Text: The defect edges were debeveled with a #15 scalpel blade.  Given the location of the defect and the proximity to free margins a transposition flap was deemed most appropriate.  Using a sterile surgical marker, an appropriate transposition flap was drawn incorporating the defect.    The area thus outlined was incised deep to adipose tissue with a #15 scalpel blade.  The skin margins were undermined to an appropriate distance in all directions utilizing iris scissors. Muscle Hinge Flap Text: The defect edges were debeveled with a #15 scalpel blade.  Given the size, depth and location of the defect and the proximity to free margins a muscle hinge flap was deemed most appropriate.  Using a sterile surgical marker, an appropriate hinge flap was drawn incorporating the defect. The area thus outlined was incised with a #15 scalpel blade.  The skin margins were undermined to an appropriate distance in all directions utilizing iris scissors. Nasal Turnover Hinge Flap Text: The defect edges were debeveled with a #15 scalpel blade.  Given the size, depth, location of the defect and the defect being full thickness a nasal turnover hinge flap was deemed most appropriate.  Using a sterile surgical marker, an appropriate hinge flap was drawn incorporating the defect. The area thus outlined was incised with a #15 scalpel blade. The flap was designed to recreate the nasal mucosal lining and the alar rim. The skin margins were undermined to an appropriate distance in all directions utilizing iris scissors. Nasalis-Muscle-Based Myocutaneous Island Pedicle Flap Text: Using a #15 blade, an incision was made around the donor flap to the level of the nasalis muscle. Wide lateral undermining was then performed in both the subcutaneous plane above the nasalis muscle, and in a submuscular plane just above periosteum. This allowed the formation of a free nasalis muscle axial pedicle (based on the angular artery) which was still attached to the actual cutaneous flap, increasing its mobility and vascular viability. Hemostasis was obtained with pinpoint electrocoagulation. The flap was mobilized into position and the pivotal anchor points positioned and stabilized with buried interrupted sutures. Subcutaneous and dermal tissues were closed in a multilayered fashion with sutures. Tissue redundancies were excised, and the epidermal edges were apposed without significant tension and sutured with sutures. Orbicularis Oris Muscle Flap Text: The defect edges were debeveled with a #15 scalpel blade.  Given that the defect affected the competency of the oral sphincter an obicularis oris muscle flap was deemed most appropriate to restore this competency and normal muscle function.  Using a sterile surgical marker, an appropriate flap was drawn incorporating the defect. The area thus outlined was incised with a #15 scalpel blade. Melolabial Transposition Flap Text: The defect edges were debeveled with a #15 scalpel blade.  Given the location of the defect and the proximity to free margins a melolabial flap was deemed most appropriate.  Using a sterile surgical marker, an appropriate melolabial transposition flap was drawn incorporating the defect.    The area thus outlined was incised deep to adipose tissue with a #15 scalpel blade.  The skin margins were undermined to an appropriate distance in all directions utilizing iris scissors. Rhombic Flap Text: The defect edges were debeveled with a #15 scalpel blade.  Given the location of the defect and the proximity to free margins a rhombic flap was deemed most appropriate.  Using a sterile surgical marker, an appropriate rhombic flap was drawn incorporating the defect.    The area thus outlined was incised deep to adipose tissue with a #15 scalpel blade.  The skin margins were undermined to an appropriate distance in all directions utilizing iris scissors. Rhomboid Transposition Flap Text: The defect edges were debeveled with a #15 scalpel blade.  Given the location of the defect and the proximity to free margins a rhomboid transposition flap was deemed most appropriate.  Using a sterile surgical marker, an appropriate rhomboid flap was drawn incorporating the defect.    The area thus outlined was incised deep to adipose tissue with a #15 scalpel blade.  The skin margins were undermined to an appropriate distance in all directions utilizing iris scissors. Bi-Rhombic Flap Text: The defect edges were debeveled with a #15 scalpel blade.  Given the location of the defect and the proximity to free margins a bi-rhombic flap was deemed most appropriate.  Using a sterile surgical marker, an appropriate rhombic flap was drawn incorporating the defect. The area thus outlined was incised deep to adipose tissue with a #15 scalpel blade.  The skin margins were undermined to an appropriate distance in all directions utilizing iris scissors. Helical Rim Advancement Flap Text: The defect edges were debeveled with a #15 blade scalpel.  Given the location of the defect and the proximity to free margins (helical rim) a double helical rim advancement flap was deemed most appropriate.  Using a sterile surgical marker, the appropriate advancement flaps were drawn incorporating the defect and placing the expected incisions between the helical rim and antihelix where possible.  The area thus outlined was incised through and through with a #15 scalpel blade.  With a skin hook and iris scissors, the flaps were gently and sharply undermined and freed up. Bilateral Helical Rim Advancement Flap Text: The defect edges were debeveled with a #15 blade scalpel.  Given the location of the defect and the proximity to free margins (helical rim) a bilateral helical rim advancement flap was deemed most appropriate.  Using a sterile surgical marker, the appropriate advancement flaps were drawn incorporating the defect and placing the expected incisions between the helical rim and antihelix where possible.  The area thus outlined was incised through and through with a #15 scalpel blade.  With a skin hook and iris scissors, the flaps were gently and sharply undermined and freed up. Ear Star Wedge Flap Text: The defect edges were debeveled with a #15 blade scalpel.  Given the location of the defect and the proximity to free margins (helical rim) an ear star wedge flap was deemed most appropriate.  Using a sterile surgical marker, the appropriate flap was drawn incorporating the defect and placing the expected incisions between the helical rim and antihelix where possible.  The area thus outlined was incised through and through with a #15 scalpel blade. Banner Transposition Flap Text: The defect edges were debeveled with a #15 scalpel blade.  Given the location of the defect and the proximity to free margins a Banner transposition flap was deemed most appropriate.  Using a sterile surgical marker, an appropriate flap drawn around the defect. The area thus outlined was incised deep to adipose tissue with a #15 scalpel blade.  The skin margins were undermined to an appropriate distance in all directions utilizing iris scissors. Bilobed Flap Text: The defect edges were debeveled with a #15 scalpel blade.  Given the location of the defect and the proximity to free margins a bilobe flap was deemed most appropriate.  Using a sterile surgical marker, an appropriate bilobe flap drawn around the defect.    The area thus outlined was incised deep to adipose tissue with a #15 scalpel blade.  The skin margins were undermined to an appropriate distance in all directions utilizing iris scissors. Bilobed Transposition Flap Text: The defect edges were debeveled with a #15 scalpel blade.  Given the location of the defect and the proximity to free margins a bilobed transposition flap was deemed most appropriate.  Using a sterile surgical marker, an appropriate bilobe flap drawn around the defect.    The area thus outlined was incised deep to adipose tissue with a #15 scalpel blade.  The skin margins were undermined to an appropriate distance in all directions utilizing iris scissors. Trilobed Flap Text: The defect edges were debeveled with a #15 scalpel blade.  Given the location of the defect and the proximity to free margins a trilobed flap was deemed most appropriate.  Using a sterile surgical marker, an appropriate trilobed flap drawn around the defect.    The area thus outlined was incised deep to adipose tissue with a #15 scalpel blade.  The skin margins were undermined to an appropriate distance in all directions utilizing iris scissors. Dorsal Nasal Flap Text: The defect edges were debeveled with a #15 scalpel blade.  Given the location of the defect and the proximity to free margins a dorsal nasal flap was deemed most appropriate.  Using a sterile surgical marker, an appropriate dorsal nasal flap was drawn around the defect.    The area thus outlined was incised deep to adipose tissue with a #15 scalpel blade.  The skin margins were undermined to an appropriate distance in all directions utilizing iris scissors. Island Pedicle Flap Text: The defect edges were debeveled with a #15 scalpel blade.  Given the location of the defect, shape of the defect and the proximity to free margins an island pedicle advancement flap was deemed most appropriate.  Using a sterile surgical marker, an appropriate advancement flap was drawn incorporating the defect, outlining the appropriate donor tissue and placing the expected incisions within the relaxed skin tension lines where possible.    The area thus outlined was incised deep to adipose tissue with a #15 scalpel blade.  The skin margins were undermined to an appropriate distance in all directions around the primary defect and laterally outward around the island pedicle utilizing iris scissors.  There was minimal undermining beneath the pedicle flap. Island Pedicle Flap With Canthal Suspension Text: The defect edges were debeveled with a #15 scalpel blade.  Given the location of the defect, shape of the defect and the proximity to free margins an island pedicle advancement flap was deemed most appropriate.  Using a sterile surgical marker, an appropriate advancement flap was drawn incorporating the defect, outlining the appropriate donor tissue and placing the expected incisions within the relaxed skin tension lines where possible. The area thus outlined was incised deep to adipose tissue with a #15 scalpel blade.  The skin margins were undermined to an appropriate distance in all directions around the primary defect and laterally outward around the island pedicle utilizing iris scissors.  There was minimal undermining beneath the pedicle flap. A suspension suture was placed in the canthal tendon to prevent tension and prevent ectropion. Alar Island Pedicle Flap Text: The defect edges were debeveled with a #15 scalpel blade.  Given the location of the defect, shape of the defect and the proximity to the alar rim an island pedicle advancement flap was deemed most appropriate.  Using a sterile surgical marker, an appropriate advancement flap was drawn incorporating the defect, outlining the appropriate donor tissue and placing the expected incisions within the nasal ala running parallel to the alar rim. The area thus outlined was incised with a #15 scalpel blade.  The skin margins were undermined minimally to an appropriate distance in all directions around the primary defect and laterally outward around the island pedicle utilizing iris scissors.  There was minimal undermining beneath the pedicle flap. Double Island Pedicle Flap Text: The defect edges were debeveled with a #15 scalpel blade.  Given the location of the defect, shape of the defect and the proximity to free margins a double island pedicle advancement flap was deemed most appropriate.  Using a sterile surgical marker, an appropriate advancement flap was drawn incorporating the defect, outlining the appropriate donor tissue and placing the expected incisions within the relaxed skin tension lines where possible.    The area thus outlined was incised deep to adipose tissue with a #15 scalpel blade.  The skin margins were undermined to an appropriate distance in all directions around the primary defect and laterally outward around the island pedicle utilizing iris scissors.  There was minimal undermining beneath the pedicle flap. Island Pedicle Flap-Requiring Vessel Identification Text: The defect edges were debeveled with a #15 scalpel blade.  Given the location of the defect, shape of the defect and the proximity to free margins an island pedicle advancement flap was deemed most appropriate.  Using a sterile surgical marker, an appropriate advancement flap was drawn, based on the axial vessel mentioned above, incorporating the defect, outlining the appropriate donor tissue and placing the expected incisions within the relaxed skin tension lines where possible.    The area thus outlined was incised deep to adipose tissue with a #15 scalpel blade.  The skin margins were undermined to an appropriate distance in all directions around the primary defect and laterally outward around the island pedicle utilizing iris scissors.  There was minimal undermining beneath the pedicle flap. Keystone Flap Text: The defect edges were debeveled with a #15 scalpel blade.  Given the location of the defect, shape of the defect a keystone flap was deemed most appropriate.  Using a sterile surgical marker, an appropriate keystone flap was drawn incorporating the defect, outlining the appropriate donor tissue and placing the expected incisions within the relaxed skin tension lines where possible. The area thus outlined was incised deep to adipose tissue with a #15 scalpel blade.  The skin margins were undermined to an appropriate distance in all directions around the primary defect and laterally outward around the flap utilizing iris scissors. O-T Plasty Text: The defect edges were debeveled with a #15 scalpel blade.  Given the location of the defect, shape of the defect and the proximity to free margins an O-T plasty was deemed most appropriate.  Using a sterile surgical marker, an appropriate O-T plasty was drawn incorporating the defect and placing the expected incisions within the relaxed skin tension lines where possible.    The area thus outlined was incised deep to adipose tissue with a #15 scalpel blade.  The skin margins were undermined to an appropriate distance in all directions utilizing iris scissors. O-Z Plasty Text: The defect edges were debeveled with a #15 scalpel blade.  Given the location of the defect, shape of the defect and the proximity to free margins an O-Z plasty (double transposition flap) was deemed most appropriate.  Using a sterile surgical marker, the appropriate transposition flaps were drawn incorporating the defect and placing the expected incisions within the relaxed skin tension lines where possible.    The area thus outlined was incised deep to adipose tissue with a #15 scalpel blade.  The skin margins were undermined to an appropriate distance in all directions utilizing iris scissors.  Hemostasis was achieved with electrocautery.  The flaps were then transposed into place, one clockwise and the other counterclockwise, and anchored with interrupted buried subcutaneous sutures. Double O-Z Plasty Text: The defect edges were debeveled with a #15 scalpel blade.  Given the location of the defect, shape of the defect and the proximity to free margins a Double O-Z plasty (double transposition flap) was deemed most appropriate.  Using a sterile surgical marker, the appropriate transposition flaps were drawn incorporating the defect and placing the expected incisions within the relaxed skin tension lines where possible. The area thus outlined was incised deep to adipose tissue with a #15 scalpel blade.  The skin margins were undermined to an appropriate distance in all directions utilizing iris scissors.  Hemostasis was achieved with electrocautery.  The flaps were then transposed into place, one clockwise and the other counterclockwise, and anchored with interrupted buried subcutaneous sutures. V-Y Plasty Text: The defect edges were debeveled with a #15 scalpel blade.  Given the location of the defect, shape of the defect and the proximity to free margins an V-Y advancement flap was deemed most appropriate.  Using a sterile surgical marker, an appropriate advancement flap was drawn incorporating the defect and placing the expected incisions within the relaxed skin tension lines where possible.    The area thus outlined was incised deep to adipose tissue with a #15 scalpel blade.  The skin margins were undermined to an appropriate distance in all directions utilizing iris scissors. H Plasty Text: Given the location of the defect, shape of the defect and the proximity to free margins a H-plasty was deemed most appropriate for repair.  Using a sterile surgical marker, the appropriate advancement arms of the H-plasty were drawn incorporating the defect and placing the expected incisions within the relaxed skin tension lines where possible. The area thus outlined was incised deep to adipose tissue with a #15 scalpel blade. The skin margins were undermined to an appropriate distance in all directions utilizing iris scissors.  The opposing advancement arms were then advanced into place in opposite direction and anchored with interrupted buried subcutaneous sutures. W Plasty Text: The lesion was extirpated to the level of the fat with a #15 scalpel blade.  Given the location of the defect, shape of the defect and the proximity to free margins a W-plasty was deemed most appropriate for repair.  Using a sterile surgical marker, the appropriate transposition arms of the W-plasty were drawn incorporating the defect and placing the expected incisions within the relaxed skin tension lines where possible.    The area thus outlined was incised deep to adipose tissue with a #15 scalpel blade.  The skin margins were undermined to an appropriate distance in all directions utilizing iris scissors.  The opposing transposition arms were then transposed into place in opposite direction and anchored with interrupted buried subcutaneous sutures. Z Plasty Text: The lesion was extirpated to the level of the fat with a #15 scalpel blade.  Given the location of the defect, shape of the defect and the proximity to free margins a Z-plasty was deemed most appropriate for repair.  Using a sterile surgical marker, the appropriate transposition arms of the Z-plasty were drawn incorporating the defect and placing the expected incisions within the relaxed skin tension lines where possible.    The area thus outlined was incised deep to adipose tissue with a #15 scalpel blade.  The skin margins were undermined to an appropriate distance in all directions utilizing iris scissors.  The opposing transposition arms were then transposed into place in opposite direction and anchored with interrupted buried subcutaneous sutures. Zygomaticofacial Flap Text: Given the location of the defect, shape of the defect and the proximity to free margins a zygomaticofacial flap was deemed most appropriate for repair.  Using a sterile surgical marker, the appropriate flap was drawn incorporating the defect and placing the expected incisions within the relaxed skin tension lines where possible. The area thus outlined was incised deep to adipose tissue with a #15 scalpel blade with preservation of a vascular pedicle.  The skin margins were undermined to an appropriate distance in all directions utilizing iris scissors.  The flap was then placed into the defect and anchored with interrupted buried subcutaneous sutures. Cheek Interpolation Flap Text: A decision was made to reconstruct the defect utilizing an interpolation axial flap and a staged reconstruction.  A telfa template was made of the defect.  This telfa template was then used to outline the Cheek Interpolation flap.  The donor area for the pedicle flap was then injected with anesthesia.  The flap was excised through the skin and subcutaneous tissue down to the layer of the underlying musculature.  The interpolation flap was carefully excised within this deep plane to maintain its blood supply.  The edges of the donor site were undermined.   The donor site was closed in a primary fashion.  The pedicle was then rotated into position and sutured.  Once the tube was sutured into place, adequate blood supply was confirmed with blanching and refill.  The pedicle was then wrapped with xeroform gauze and dressed appropriately with a telfa and gauze bandage to ensure continued blood supply and protect the attached pedicle. Cheek-To-Nose Interpolation Flap Text: A decision was made to reconstruct the defect utilizing an interpolation axial flap and a staged reconstruction.  A telfa template was made of the defect.  This telfa template was then used to outline the Cheek-To-Nose Interpolation flap.  The donor area for the pedicle flap was then injected with anesthesia.  The flap was excised through the skin and subcutaneous tissue down to the layer of the underlying musculature.  The interpolation flap was carefully excised within this deep plane to maintain its blood supply.  The edges of the donor site were undermined.   The donor site was closed in a primary fashion.  The pedicle was then rotated into position and sutured.  Once the tube was sutured into place, adequate blood supply was confirmed with blanching and refill.  The pedicle was then wrapped with xeroform gauze and dressed appropriately with a telfa and gauze bandage to ensure continued blood supply and protect the attached pedicle. Interpolation Flap Text: A decision was made to reconstruct the defect utilizing an interpolation axial flap and a staged reconstruction.  A telfa template was made of the defect.  This telfa template was then used to outline the interpolation flap.  The donor area for the pedicle flap was then injected with anesthesia.  The flap was excised through the skin and subcutaneous tissue down to the layer of the underlying musculature.  The interpolation flap was carefully excised within this deep plane to maintain its blood supply.  The edges of the donor site were undermined.   The donor site was closed in a primary fashion.  The pedicle was then rotated into position and sutured.  Once the tube was sutured into place, adequate blood supply was confirmed with blanching and refill.  The pedicle was then wrapped with xeroform gauze and dressed appropriately with a telfa and gauze bandage to ensure continued blood supply and protect the attached pedicle. Melolabial Interpolation Flap Text: A decision was made to reconstruct the defect utilizing an interpolation axial flap and a staged reconstruction.  A telfa template was made of the defect.  This telfa template was then used to outline the melolabial interpolation flap.  The donor area for the pedicle flap was then injected with anesthesia.  The flap was excised through the skin and subcutaneous tissue down to the layer of the underlying musculature.  The pedicle flap was carefully excised within this deep plane to maintain its blood supply.  The edges of the donor site were undermined.   The donor site was closed in a primary fashion.  The pedicle was then rotated into position and sutured.  Once the tube was sutured into place, adequate blood supply was confirmed with blanching and refill.  The pedicle was then wrapped with xeroform gauze and dressed appropriately with a telfa and gauze bandage to ensure continued blood supply and protect the attached pedicle. Mastoid Interpolation Flap Text: A decision was made to reconstruct the defect utilizing an interpolation axial flap and a staged reconstruction.  A telfa template was made of the defect.  This telfa template was then used to outline the mastoid interpolation flap.  The donor area for the pedicle flap was then injected with anesthesia.  The flap was excised through the skin and subcutaneous tissue down to the layer of the underlying musculature.  The pedicle flap was carefully excised within this deep plane to maintain its blood supply.  The edges of the donor site were undermined.   The donor site was closed in a primary fashion.  The pedicle was then rotated into position and sutured.  Once the tube was sutured into place, adequate blood supply was confirmed with blanching and refill.  The pedicle was then wrapped with xeroform gauze and dressed appropriately with a telfa and gauze bandage to ensure continued blood supply and protect the attached pedicle. Posterior Auricular Interpolation Flap Text: A decision was made to reconstruct the defect utilizing an interpolation axial flap and a staged reconstruction.  A telfa template was made of the defect.  This telfa template was then used to outline the posterior auricular interpolation flap.  The donor area for the pedicle flap was then injected with anesthesia.  The flap was excised through the skin and subcutaneous tissue down to the layer of the underlying musculature.  The pedicle flap was carefully excised within this deep plane to maintain its blood supply.  The edges of the donor site were undermined.   The donor site was closed in a primary fashion.  The pedicle was then rotated into position and sutured.  Once the tube was sutured into place, adequate blood supply was confirmed with blanching and refill.  The pedicle was then wrapped with xeroform gauze and dressed appropriately with a telfa and gauze bandage to ensure continued blood supply and protect the attached pedicle. Paramedian Forehead Flap Text: A decision was made to reconstruct the defect utilizing an interpolation axial flap and a staged reconstruction.  A telfa template was made of the defect.  This telfa template was then used to outline the paramedian forehead pedicle flap.  The donor area for the pedicle flap was then injected with anesthesia.  The flap was excised through the skin and subcutaneous tissue down to the layer of the underlying musculature.  The pedicle flap was carefully excised within this deep plane to maintain its blood supply.  The edges of the donor site were undermined.   The donor site was closed in a primary fashion.  The pedicle was then rotated into position and sutured.  Once the tube was sutured into place, adequate blood supply was confirmed with blanching and refill.  The pedicle was then wrapped with xeroform gauze and dressed appropriately with a telfa and gauze bandage to ensure continued blood supply and protect the attached pedicle. Lip Wedge Excision Repair Text: Given the location of the defect and the proximity to free margins a full thickness wedge repair was deemed most appropriate.  Using a sterile surgical marker, the appropriate repair was drawn incorporating the defect and placing the expected incisions perpendicular to the vermilion border.  The vermilion border was also meticulously outlined to ensure appropriate reapproximation during the repair.  The area thus outlined was incised through and through with a #15 scalpel blade.  The muscularis and dermis were reaproximated with deep sutures following hemostasis. Care was taken to realign the vermilion border before proceeding with the superficial closure.  Once the vermilion was realigned the superfical and mucosal closure was finished. Ftsg Text: The defect edges were debeveled with a #15 scalpel blade.  Given the location of the defect, shape of the defect and the proximity to free margins a full thickness skin graft was deemed most appropriate.  Using a sterile surgical marker, the primary defect shape was transferred to the donor site. The area thus outlined was incised deep to adipose tissue with a #15 scalpel blade.  The harvested graft was then trimmed of adipose tissue until only dermis and epidermis was left.  The skin margins of the secondary defect were undermined to an appropriate distance in all directions utilizing iris scissors.  The secondary defect was closed with interrupted buried subcutaneous sutures.  The skin edges were then re-apposed with running  sutures.  The skin graft was then placed in the primary defect and oriented appropriately. Split-Thickness Skin Graft Text: The defect edges were debeveled with a #15 scalpel blade.  Given the location of the defect, shape of the defect and the proximity to free margins a split thickness skin graft was deemed most appropriate.  Using a sterile surgical marker, the primary defect shape was transferred to the donor site. The split thickness graft was then harvested.  The skin graft was then placed in the primary defect and oriented appropriately. Burow's Graft Text: The defect edges were debeveled with a #15 scalpel blade.  Given the location of the defect, shape of the defect, the proximity to free margins and the presence of a standing cone deformity a Burow's skin graft was deemed most appropriate. The standing cone was removed and this tissue was then trimmed to the shape of the primary defect. The adipose tissue was also removed until only dermis and epidermis were left.  The skin margins of the secondary defect were undermined to an appropriate distance in all directions utilizing iris scissors.  The secondary defect was closed with interrupted buried subcutaneous sutures.  The skin edges were then re-apposed with running  sutures.  The skin graft was then placed in the primary defect and oriented appropriately. Cartilage Graft Text: The defect edges were debeveled with a #15 scalpel blade.  Given the location of the defect, shape of the defect, the fact the defect involved a full thickness cartilage defect a cartilage graft was deemed most appropriate.  An appropriate donor site was identified, cleansed, and anesthetized. The cartilage graft was then harvested and transferred to the recipient site, oriented appropriately and then sutured into place.  The secondary defect was then repaired using a primary closure. Composite Graft Text: The defect edges were debeveled with a #15 scalpel blade.  Given the location of the defect, shape of the defect, the proximity to free margins and the fact the defect was full thickness a composite graft was deemed most appropriate.  The defect was outline and then transferred to the donor site.  A full thickness graft was then excised from the donor site. The graft was then placed in the primary defect, oriented appropriately and then sutured into place.  The secondary defect was then repaired using a primary closure. Epidermal Autograft Text: The defect edges were debeveled with a #15 scalpel blade.  Given the location of the defect, shape of the defect and the proximity to free margins an epidermal autograft was deemed most appropriate.  Using a sterile surgical marker, the primary defect shape was transferred to the donor site. The epidermal graft was then harvested.  The skin graft was then placed in the primary defect and oriented appropriately. Dermal Autograft Text: The defect edges were debeveled with a #15 scalpel blade.  Given the location of the defect, shape of the defect and the proximity to free margins a dermal autograft was deemed most appropriate.  Using a sterile surgical marker, the primary defect shape was transferred to the donor site. The area thus outlined was incised deep to adipose tissue with a #15 scalpel blade.  The harvested graft was then trimmed of adipose and epidermal tissue until only dermis was left.  The skin graft was then placed in the primary defect and oriented appropriately. Skin Substitute Text: The defect edges were debeveled with a #15 scalpel blade.  Given the location of the defect, shape of the defect and the proximity to free margins a skin substitute graft was deemed most appropriate.  The graft material was trimmed to fit the size of the defect. The graft was then placed in the primary defect and oriented appropriately. Tissue Cultured Epidermal Autograft Text: The defect edges were debeveled with a #15 scalpel blade.  Given the location of the defect, shape of the defect and the proximity to free margins a tissue cultured epidermal autograft was deemed most appropriate.  The graft was then trimmed to fit the size of the defect.  The graft was then placed in the primary defect and oriented appropriately. Xenograft Text: The defect edges were debeveled with a #15 scalpel blade.  Given the location of the defect, shape of the defect and the proximity to free margins a xenograft was deemed most appropriate.  The graft was then trimmed to fit the size of the defect.  The graft was then placed in the primary defect and oriented appropriately. Purse String (Intermediate) Text: Given the location of the defect and the characteristics of the surrounding skin a purse string intermediate closure was deemed most appropriate.  Undermining was performed circumfirentially around the surgical defect.  A purse string suture was then placed and tightened. Purse String (Simple) Text: Given the location of the defect and the characteristics of the surrounding skin a purse string simple closure was deemed most appropriate.  Undermining was performed circumferentially around the surgical defect.  A purse string suture was then placed and tightened. Partial Purse String (Intermediate) Text: Given the location of the defect and the characteristics of the surrounding skin an intermediate purse string closure was deemed most appropriate.  Undermining was performed circumferentially around the surgical defect.  A purse string suture was then placed and tightened. Wound tension of the circular defect prevented complete closure of the wound. Partial Purse String (Simple) Text: Given the location of the defect and the characteristics of the surrounding skin a simple purse string closure was deemed most appropriate.  Undermining was performed circumferentially around the surgical defect.  A purse string suture was then placed and tightened. Wound tension of the circular defect prevented complete closure of the wound. Complex Repair And Single Advancement Flap Text: The defect edges were debeveled with a #15 scalpel blade.  The primary defect was closed partially with a complex linear closure.  Given the location of the remaining defect, shape of the defect and the proximity to free margins a single advancement flap was deemed most appropriate for complete closure of the defect.  Using a sterile surgical marker, an appropriate advancement flap was drawn incorporating the defect and placing the expected incisions within the relaxed skin tension lines where possible.    The area thus outlined was incised deep to adipose tissue with a #15 scalpel blade.  The skin margins were undermined to an appropriate distance in all directions utilizing iris scissors. Complex Repair And Double Advancement Flap Text: The defect edges were debeveled with a #15 scalpel blade.  The primary defect was closed partially with a complex linear closure.  Given the location of the remaining defect, shape of the defect and the proximity to free margins a double advancement flap was deemed most appropriate for complete closure of the defect.  Using a sterile surgical marker, an appropriate advancement flap was drawn incorporating the defect and placing the expected incisions within the relaxed skin tension lines where possible.    The area thus outlined was incised deep to adipose tissue with a #15 scalpel blade.  The skin margins were undermined to an appropriate distance in all directions utilizing iris scissors. Complex Repair And Modified Advancement Flap Text: The defect edges were debeveled with a #15 scalpel blade.  The primary defect was closed partially with a complex linear closure.  Given the location of the remaining defect, shape of the defect and the proximity to free margins a modified advancement flap was deemed most appropriate for complete closure of the defect.  Using a sterile surgical marker, an appropriate advancement flap was drawn incorporating the defect and placing the expected incisions within the relaxed skin tension lines where possible.    The area thus outlined was incised deep to adipose tissue with a #15 scalpel blade.  The skin margins were undermined to an appropriate distance in all directions utilizing iris scissors. Complex Repair And A-T Advancement Flap Text: The defect edges were debeveled with a #15 scalpel blade.  The primary defect was closed partially with a complex linear closure.  Given the location of the remaining defect, shape of the defect and the proximity to free margins an A-T advancement flap was deemed most appropriate for complete closure of the defect.  Using a sterile surgical marker, an appropriate advancement flap was drawn incorporating the defect and placing the expected incisions within the relaxed skin tension lines where possible.    The area thus outlined was incised deep to adipose tissue with a #15 scalpel blade.  The skin margins were undermined to an appropriate distance in all directions utilizing iris scissors. Complex Repair And O-T Advancement Flap Text: The defect edges were debeveled with a #15 scalpel blade.  The primary defect was closed partially with a complex linear closure.  Given the location of the remaining defect, shape of the defect and the proximity to free margins an O-T advancement flap was deemed most appropriate for complete closure of the defect.  Using a sterile surgical marker, an appropriate advancement flap was drawn incorporating the defect and placing the expected incisions within the relaxed skin tension lines where possible.    The area thus outlined was incised deep to adipose tissue with a #15 scalpel blade.  The skin margins were undermined to an appropriate distance in all directions utilizing iris scissors. Complex Repair And O-L Flap Text: The defect edges were debeveled with a #15 scalpel blade.  The primary defect was closed partially with a complex linear closure.  Given the location of the remaining defect, shape of the defect and the proximity to free margins an O-L flap was deemed most appropriate for complete closure of the defect.  Using a sterile surgical marker, an appropriate flap was drawn incorporating the defect and placing the expected incisions within the relaxed skin tension lines where possible.    The area thus outlined was incised deep to adipose tissue with a #15 scalpel blade.  The skin margins were undermined to an appropriate distance in all directions utilizing iris scissors. Complex Repair And Bilobe Flap Text: The defect edges were debeveled with a #15 scalpel blade.  The primary defect was closed partially with a complex linear closure.  Given the location of the remaining defect, shape of the defect and the proximity to free margins a bilobe flap was deemed most appropriate for complete closure of the defect.  Using a sterile surgical marker, an appropriate advancement flap was drawn incorporating the defect and placing the expected incisions within the relaxed skin tension lines where possible.    The area thus outlined was incised deep to adipose tissue with a #15 scalpel blade.  The skin margins were undermined to an appropriate distance in all directions utilizing iris scissors. Complex Repair And Melolabial Flap Text: The defect edges were debeveled with a #15 scalpel blade.  The primary defect was closed partially with a complex linear closure.  Given the location of the remaining defect, shape of the defect and the proximity to free margins a melolabial flap was deemed most appropriate for complete closure of the defect.  Using a sterile surgical marker, an appropriate advancement flap was drawn incorporating the defect and placing the expected incisions within the relaxed skin tension lines where possible.    The area thus outlined was incised deep to adipose tissue with a #15 scalpel blade.  The skin margins were undermined to an appropriate distance in all directions utilizing iris scissors. Complex Repair And Rotation Flap Text: The defect edges were debeveled with a #15 scalpel blade.  The primary defect was closed partially with a complex linear closure.  Given the location of the remaining defect, shape of the defect and the proximity to free margins a rotation flap was deemed most appropriate for complete closure of the defect.  Using a sterile surgical marker, an appropriate advancement flap was drawn incorporating the defect and placing the expected incisions within the relaxed skin tension lines where possible.    The area thus outlined was incised deep to adipose tissue with a #15 scalpel blade.  The skin margins were undermined to an appropriate distance in all directions utilizing iris scissors. Complex Repair And Rhombic Flap Text: The defect edges were debeveled with a #15 scalpel blade.  The primary defect was closed partially with a complex linear closure.  Given the location of the remaining defect, shape of the defect and the proximity to free margins a rhombic flap was deemed most appropriate for complete closure of the defect.  Using a sterile surgical marker, an appropriate advancement flap was drawn incorporating the defect and placing the expected incisions within the relaxed skin tension lines where possible.    The area thus outlined was incised deep to adipose tissue with a #15 scalpel blade.  The skin margins were undermined to an appropriate distance in all directions utilizing iris scissors. Complex Repair And Transposition Flap Text: The defect edges were debeveled with a #15 scalpel blade.  The primary defect was closed partially with a complex linear closure.  Given the location of the remaining defect, shape of the defect and the proximity to free margins a transposition flap was deemed most appropriate for complete closure of the defect.  Using a sterile surgical marker, an appropriate advancement flap was drawn incorporating the defect and placing the expected incisions within the relaxed skin tension lines where possible.    The area thus outlined was incised deep to adipose tissue with a #15 scalpel blade.  The skin margins were undermined to an appropriate distance in all directions utilizing iris scissors. Complex Repair And V-Y Plasty Text: The defect edges were debeveled with a #15 scalpel blade.  The primary defect was closed partially with a complex linear closure.  Given the location of the remaining defect, shape of the defect and the proximity to free margins a V-Y plasty was deemed most appropriate for complete closure of the defect.  Using a sterile surgical marker, an appropriate advancement flap was drawn incorporating the defect and placing the expected incisions within the relaxed skin tension lines where possible.    The area thus outlined was incised deep to adipose tissue with a #15 scalpel blade.  The skin margins were undermined to an appropriate distance in all directions utilizing iris scissors. Complex Repair And M Plasty Text: The defect edges were debeveled with a #15 scalpel blade.  The primary defect was closed partially with a complex linear closure.  Given the location of the remaining defect, shape of the defect and the proximity to free margins an M plasty was deemed most appropriate for complete closure of the defect.  Using a sterile surgical marker, an appropriate advancement flap was drawn incorporating the defect and placing the expected incisions within the relaxed skin tension lines where possible.    The area thus outlined was incised deep to adipose tissue with a #15 scalpel blade.  The skin margins were undermined to an appropriate distance in all directions utilizing iris scissors. Complex Repair And Double M Plasty Text: The defect edges were debeveled with a #15 scalpel blade.  The primary defect was closed partially with a complex linear closure.  Given the location of the remaining defect, shape of the defect and the proximity to free margins a double M plasty was deemed most appropriate for complete closure of the defect.  Using a sterile surgical marker, an appropriate advancement flap was drawn incorporating the defect and placing the expected incisions within the relaxed skin tension lines where possible.    The area thus outlined was incised deep to adipose tissue with a #15 scalpel blade.  The skin margins were undermined to an appropriate distance in all directions utilizing iris scissors. Complex Repair And W Plasty Text: The defect edges were debeveled with a #15 scalpel blade.  The primary defect was closed partially with a complex linear closure.  Given the location of the remaining defect, shape of the defect and the proximity to free margins a W plasty was deemed most appropriate for complete closure of the defect.  Using a sterile surgical marker, an appropriate advancement flap was drawn incorporating the defect and placing the expected incisions within the relaxed skin tension lines where possible.    The area thus outlined was incised deep to adipose tissue with a #15 scalpel blade.  The skin margins were undermined to an appropriate distance in all directions utilizing iris scissors. Complex Repair And Z Plasty Text: The defect edges were debeveled with a #15 scalpel blade.  The primary defect was closed partially with a complex linear closure.  Given the location of the remaining defect, shape of the defect and the proximity to free margins a Z plasty was deemed most appropriate for complete closure of the defect.  Using a sterile surgical marker, an appropriate advancement flap was drawn incorporating the defect and placing the expected incisions within the relaxed skin tension lines where possible.    The area thus outlined was incised deep to adipose tissue with a #15 scalpel blade.  The skin margins were undermined to an appropriate distance in all directions utilizing iris scissors. Complex Repair And Dorsal Nasal Flap Text: The defect edges were debeveled with a #15 scalpel blade.  The primary defect was closed partially with a complex linear closure.  Given the location of the remaining defect, shape of the defect and the proximity to free margins a dorsal nasal flap was deemed most appropriate for complete closure of the defect.  Using a sterile surgical marker, an appropriate flap was drawn incorporating the defect and placing the expected incisions within the relaxed skin tension lines where possible.    The area thus outlined was incised deep to adipose tissue with a #15 scalpel blade.  The skin margins were undermined to an appropriate distance in all directions utilizing iris scissors. Complex Repair And Ftsg Text: The defect edges were debeveled with a #15 scalpel blade.  The primary defect was closed partially with a complex linear closure.  Given the location of the defect, shape of the defect and the proximity to free margins a full thickness skin graft was deemed most appropriate to repair the remaining defect.  The graft was trimmed to fit the size of the remaining defect.  The graft was then placed in the primary defect, oriented appropriately, and sutured into place. Complex Repair And Burow's Graft Text: The defect edges were debeveled with a #15 scalpel blade.  The primary defect was closed partially with a complex linear closure.  Given the location of the defect, shape of the defect, the proximity to free margins and the presence of a standing cone deformity a Burow's graft was deemed most appropriate to repair the remaining defect.  The graft was trimmed to fit the size of the remaining defect.  The graft was then placed in the primary defect, oriented appropriately, and sutured into place. Complex Repair And Split-Thickness Skin Graft Text: The defect edges were debeveled with a #15 scalpel blade.  The primary defect was closed partially with a complex linear closure.  Given the location of the defect, shape of the defect and the proximity to free margins a split thickness skin graft was deemed most appropriate to repair the remaining defect.  The graft was trimmed to fit the size of the remaining defect.  The graft was then placed in the primary defect, oriented appropriately, and sutured into place. Complex Repair And Epidermal Autograft Text: The defect edges were debeveled with a #15 scalpel blade.  The primary defect was closed partially with a complex linear closure.  Given the location of the defect, shape of the defect and the proximity to free margins an epidermal autograft was deemed most appropriate to repair the remaining defect.  The graft was trimmed to fit the size of the remaining defect.  The graft was then placed in the primary defect, oriented appropriately, and sutured into place. Complex Repair And Dermal Autograft Text: The defect edges were debeveled with a #15 scalpel blade.  The primary defect was closed partially with a complex linear closure.  Given the location of the defect, shape of the defect and the proximity to free margins an dermal autograft was deemed most appropriate to repair the remaining defect.  The graft was trimmed to fit the size of the remaining defect.  The graft was then placed in the primary defect, oriented appropriately, and sutured into place. Complex Repair And Tissue Cultured Epidermal Autograft Text: The defect edges were debeveled with a #15 scalpel blade.  The primary defect was closed partially with a complex linear closure.  Given the location of the defect, shape of the defect and the proximity to free margins an tissue cultured epidermal autograft was deemed most appropriate to repair the remaining defect.  The graft was trimmed to fit the size of the remaining defect.  The graft was then placed in the primary defect, oriented appropriately, and sutured into place. Complex Repair And Xenograft Text: The defect edges were debeveled with a #15 scalpel blade.  The primary defect was closed partially with a complex linear closure.  Given the location of the defect, shape of the defect and the proximity to free margins a xenograft was deemed most appropriate to repair the remaining defect.  The graft was trimmed to fit the size of the remaining defect.  The graft was then placed in the primary defect, oriented appropriately, and sutured into place. Complex Repair And Skin Substitute Graft Text: The defect edges were debeveled with a #15 scalpel blade.  The primary defect was closed partially with a complex linear closure.  Given the location of the remaining defect, shape of the defect and the proximity to free margins a skin substitute graft was deemed most appropriate to repair the remaining defect.  The graft was trimmed to fit the size of the remaining defect.  The graft was then placed in the primary defect, oriented appropriately, and sutured into place. Path Notes (To The Dermatopathologist): Please check margins. Consent was obtained from the patient. The risks and benefits to therapy were discussed in detail. Specifically, the risks of infection, scarring, bleeding, prolonged wound healing, incomplete removal, allergy to anesthesia, nerve injury and recurrence were addressed. Prior to the procedure, the treatment site was clearly identified and confirmed by the patient. All components of Universal Protocol/PAUSE Rule completed. Post-Care Instructions: I reviewed with the patient in detail post-care instructions. Patient is not to engage in any heavy lifting, exercise, or swimming for the next 14 days. Should the patient develop any fevers, chills, bleeding, severe pain patient will contact the office immediately. Home Suture Removal Text: Patient was provided a home suture removal kit and will remove their sutures at home.  If they have any questions or difficulties they will call the office. Where Do You Want The Question To Include Opioid Counseling Located?: Case Summary Tab Information: Selecting Yes will display possible errors in your note based on the variables you have selected. This validation is only offered as a suggestion for you. PLEASE NOTE THAT THE VALIDATION TEXT WILL BE REMOVED WHEN YOU FINALIZE YOUR NOTE. IF YOU WANT TO FAX A PRELIMINARY NOTE YOU WILL NEED TO TOGGLE THIS TO 'NO' IF YOU DO NOT WANT IT IN YOUR FAXED NOTE.

## 2021-03-30 NOTE — SWALLOW BEDSIDE ASSESSMENT ADULT - SLP GENERAL OBSERVATIONS
1. Acute hypoxemic respiratory failure secondary to COVID-19 pneumonia, s/p Toci 3/23, on dexamethasone, requiring high flow O2  2. Acute kidney injury, likely pre-renal azotemia vs contrast nephropathy  3. CKD III, baseline creatinine 1.2-1.5.  4. Hx of Bladder Ca, s/p cystoprostatectomy, urostomy  5. Hyperkalemia  6. HTN, SBP below 100 at times    Plan:    Agree with trial of IVF  Hold amlodipine, can resume once SBP consistently above 130  Continue Lokelma  Daily BMP Pt received on O2 FM, restless in bed, wet cough, unproductive, no po trials provided

## 2021-06-10 NOTE — ED ADULT TRIAGE NOTE - AS TEMP SITE
Pt received from ED. Safety transfer performed. Belongings at bedside. Skin assessment done with JUAN R Herrera. Notified Dr. Alberto about pt arrival to unit. Awaiting on orders. Will continue to monitor.   oral

## 2021-07-20 NOTE — CONSULT NOTE ADULT - ASSESSMENT
53 y/o F  with recent hx of urinary retention unable to do CIC. She lives in a home and they are unable to do CIC.  Pt was given a trial of voiding and failed sent to ED for Gallo. Dr. Holt spoke with nurse at home pt with repetitive cycle of  Gallo then UTI. They are requesting intervention.    A) urinary retention with recurrent UTI cysle 2nd to Gallo catheter.    P) IR consult for SP tube.  to f/u as op for monthly tube changes  no acute urologic intervention at this time  will follow   d/w Dr. Chavarria lumbar spine

## 2021-08-06 NOTE — ED ADULT NURSE NOTE - NSFALLRSKASSESASSIST_ED_ALL_ED
[FreeTextEntry1] : 52 y/o M pt, referred by Dr. Helen Chambers, presents today for evaluation of low testosterone level. \par Other PMHx: Herniated disc (during college), Bleeding ulcer (2000), Staph (2008). \par Denies Hx of MVA, testicular trauma and infection, and STDs. \par FHx: Throat CA (sister). Denies FHx of brain tumor and thyroid disorder. \par SHx: Non smoker. No EtOH use. No recreational drug use. Works as an actor. Exercises almost daily for 1-2 hrs. \par Follows with urologist regularly for fluctuating PSA levels. \par Drug Allergies: Augmentin, Bactrim, Clindamycin, Doxycycline \par \par 08/04/2021\par Pt went to his PCP with complaints of low libido and and absence of nocturnal penile tumescence. Labs drawn showed low testosterone level and pt was advised to see an endocrinologist. \par \par Pt presents today with /86 and BMI 28.27, feeling relatively healthy, with c/o low libido and no nocturnal penile tumescence. He has noticed shrinking in size of testicles for past few months. He reports of HA sometimes which he assumes is due to sinusitis. \par Pt shaves every other day. \par Denies visual disturbances, polyuria, weight changes and gynecomastia. \par Denies Hx of marijuana, amphetamine, testosterone and antidepressants use. \par \par Current Medications: Omeprazole 20 mg prn, Claritin D (allergies) \par \par Labs: \par - 06/17/21: Sex Hormone Binding Globulin 30, Free Testosterone 39.1 (46.0-224.0), Total Testosterone 287
yes

## 2021-10-22 NOTE — DIETITIAN INITIAL EVALUATION ADULT. - OTHER INFO
Pertinent Medical Information: Pt p/w evaluation of decreased energy, loss of appetite and drinking less at group home.    Pertinent Subjective Information: Pt admitted d/t decreased PO intake and Weakness. Pt placed on Dysphagia 1 diet (1/11), however failed S+S test 1/13. PO trials not appropriate 2/2 pt being lethargic. Will discuss alternate modes of feeding with family, IV hydration continued as per speech consult. When she eats, food falls out of patient's mouth and she is not swallowing it at the facility. Pertinent Medical Information: Pt p/w evaluation of decreased energy, loss of appetite and drinking less at group home. Pseudomonas UTI - on abx. No apparent metabolic encephalopathy, on IV hydration. Failed S+ S test 1/13. PO trials not appropriate 2/2 to pt being lethargic.    Pertinent Subjective Information: Pt placed on Dysphagia 1 diet Pureed Nectar consistency per SLP (1/11). Pt was on a dysphagia 1 pureed diet in group home. Will discuss alternate modes of feeding with family. When she eats, food falls out of patient's mouth and she is not swallowing it at the facility as reported by caregiver per H&P and RD observed pt refusing feeds by aid . Pt's sister reports wt of 210 lbs about a year ago, and use of oral supplement Ensure at group home. NKFA. Pertinent Medical Information: Pt p/w evaluation of decreased energy, loss of appetite and drinking less at group home. Pseudomonas UTI - on abx. No apparent metabolic encephalopathy, on IV hydration. Failed S+ S test 1/13. PO trials not appropriate 2/2 to pt being lethargic. MD will discuss alternate modes of feeding with family.    Pertinent Subjective Information: Pt nonverbal. Spoke to pt's sister at b/s. Pt's po intake varies based on mood. Some days she can eat really well, others not. Pt placed on Dysphagia 1 diet Pureed Nectar consistency per SLP (1/11). Pt was also on a dysphagia 1 pureed nectar diet in group home.  When she eats, food falls out of patient's mouth and she is not swallowing it at the facility as reported by caregiver per H&P. RD also observed pt refusing feeds by aid at lunch time. Pt's sister reports wt of 210 lbs about a year ago (18% wt loss in 1 year), and use of oral supplement Ensure at group home. NKFA. Tumor Depth: Less than 6mm from granular layer and no invasion beyond the subcutaneous fat

## 2022-01-07 NOTE — ED PROVIDER NOTE - NS ED MD DISPO ISOLATION TYPES
Patient states that she uses Novolog on a sliding scale, has been injecting about 15 units tid, and is now out of insulin. She is asking for a refill with adjustment to accommodate. Please advise.     [facility] [place]   None

## 2022-02-02 NOTE — PATIENT PROFILE ADULT - ABILITY TO HEAR (WITH HEARING AID OR HEARING APPLIANCE IF NORMALLY USED):
Patient in office to follow up with wound right second toe. CC:    Follow-up diabetic wound right second toe      HPI:   Presents today follow-up diabetic wound right second toe. Has completed oral antibiotic. Denies any nausea vomiting fever chills shortness of breath. Denies any foot or ankle pain. Does have regular shoes on today. States she has been using just a Band-Aid once daily. States he has noticed improvement since last visit. ROS:  Const: Denies constitutional symptoms  Musculo: Denies symptoms other than stated above  Skin: Denies symptoms other than stated above       Current Outpatient Medications:     Metoprolol Tartrate 75 MG TABS, take 1 tablet by mouth twice a day, Disp: 240 tablet, Rfl: 0    B-D INS SYR ULTRAFINE 1CC/30G 30G X 1/2\" 1 ML MISC, use as directed three times a day, Disp: , Rfl:     FreeStyle Lancets MISC, use 1 LANCET to TEST BLOOD SUGAR three to four times a day, Disp: , Rfl:     doxycycline hyclate (VIBRAMYCIN) 100 MG capsule, Take 1 capsule by mouth 2 times daily for 7 days, Disp: 28 capsule, Rfl: 0    blood glucose test strips (ASCENSIA AUTODISC VI;ONE TOUCH ULTRA TEST VI) strip, 1 each by In Vitro route daily As needed. , Disp: 100 each, Rfl: 3    Azelastine HCl 137 MCG/SPRAY SOLN, instill 2 sprays into each nostril twice a day, Disp: 30 mL, Rfl: 2    insulin glargine (LANTUS SOLOSTAR) 100 UNIT/ML injection pen, Inject 68 Units into the skin nightly, Disp: 7 pen, Rfl: 3    blood glucose test strips (FREESTYLE INSULINX TEST) strip, 1 each by In Vitro route 3 times daily As needed. , Disp: 1 each, Rfl: 5    insulin aspart (NOVOLOG) 100 UNIT/ML injection vial, Inject 14 units three times a day plus sliding scale, Disp: 6 each, Rfl: 3    vitamin D (ERGOCALCIFEROL) 1.25 MG (02040 UT) CAPS capsule, take 1 capsule by mouth every week, Disp: 5 capsule, Rfl: 3    metFORMIN (GLUCOPHAGE) 850 MG tablet, Take 1 tablet by mouth 3 times daily, Disp: 90 tablet, Rfl: 3   Liraglutide (VICTOZA) 18 MG/3ML SOPN SC injection, Inject 1.8 mg into the skin daily X 1 week, inject 1.2 mg into the skin daily (Patient taking differently: Inject 1.8 mg into the skin daily ), Disp: 3 pen, Rfl: 3    DULERA 100-5 MCG/ACT inhaler, Inhale 2 puffs into the lungs 2 times daily, Disp: 1 each, Rfl: 3    atorvastatin (LIPITOR) 80 MG tablet, take 1 tablet by mouth once daily (Patient taking differently: Take 80 mg by mouth nightly ), Disp: 30 tablet, Rfl: 3    albuterol sulfate  (90 Base) MCG/ACT inhaler, Inhale 2 puffs into the lungs every 6 hours as needed for Wheezing, Disp: 1 each, Rfl: 3    dapagliflozin (FARXIGA) 5 MG tablet, Take 1 tablet by mouth every morning, Disp: 30 tablet, Rfl: 5    aspirin EC 81 MG EC tablet, Take 1 tablet by mouth daily, Disp: 90 tablet, Rfl: 3    Insulin Pen Needle (PEN NEEDLES 29GX1/2\") 29G X 12MM MISC, USE AS DIRECTED, Disp: 100 each, Rfl: 5    BD PEN NEEDLE PRAMOD U/F 32G X 4 MM MISC, Inject 1 each as directed 2 times daily, Disp: 100 each, Rfl: 5  Allergies   Allergen Reactions    Sulfa Antibiotics Other (See Comments)     Unknown reaction       Past Medical History:   Diagnosis Date    CAD (coronary artery disease)     Dr Feliberto Mosley COPD (chronic obstructive pulmonary disease) ([facility] [facility]     Diabetes mellitus ([facility] [facility]     Hyperlipidemia     Hypertension     Internal carotid artery occlusion, right 03/16/2020    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  Posterior tibialis weakly palpable bilateral.    Capillary Refill Time:  Immediate return  Hair growth:  Symmetrical and bilateral   Skin:  Not atrophic  Edema: No edema bilateral feet or ankles.   Decreased edema Adequate: hears normal conversation without difficulty right second toe. Neurologic:  Light touch diminished bilateral.      Musculoskeletal/ Orthopedic examination:    Equinis: Absent bilateral  Dorsiflexion, plantarflexion, inversion, eversion bilateral 5 out of 5 muscle strength  Wiggling toes  Negative Homans  No pain right second toe. Dermatology examination:    No significant erythema right second toe today. Decreased overall edema. Superficial wound distal plantar second digit right foot measure approximately 0.1 cm x 0.1 cm x 0.1 cm.  100% granular. No surrounding erythema. No drainage. Wound does not track or probe to bone. Assessment and Plan:  Amelia Pineda was seen today for follow-up and wound check. Diagnoses and all orders for this visit:    Open wound of right foot, subsequent encounter    Uncontrolled type 2 diabetes mellitus with hypoglycemia and coma ([facility] [address]    Hereditary sensory neuropathy          Follow-up wound right second toe  Significant improvement from last visit. We did review radiographs from last week again today as well as blood work. 1.  No osteomyelitis or acute disease.       2.  2nd and 3rd mallet toe deformities suggested and please correlate   clinically.       3.  Atherosclerotic calcifications.         Blood work reviewed from 1/26/2022  Sedimentation rate 30  Wound culture Staphylococcus coagulase-negative  CBC reviewed white blood cell count 9.5. C-reactive protein 1.1. Any increased redness or drainage go to the emergency room. I will follow-up 1 week. Still did recommend offloading padding bacitracin and Band-Aid once daily right second toe. Does present with regular shoes today I did recommend surgical shoe to avoid pressure. Follow-up 1 week. Return in about 1 week (around 2/9/2022). Seen By:  Yesenia Parham DPM      Document was created using voice recognition software. Note was reviewed, however may contain grammatical errors.

## 2022-10-12 NOTE — ED CDU PROVIDER DISPOSITION NOTE - CARE PROVIDER_API CALL
Rajat Chavarria)  [facility]  [address] [place] [address], [address]  [place] [address]  Phone: [phone]  Fax: [phone]  Follow Up Time:
No

## 2022-11-11 NOTE — DISCHARGE NOTE PROVIDER - PROVIDER TOKENS
Odomzo Counseling- I discussed with the patient the risks of Odomzo including but not limited to nausea, vomiting, diarrhea, constipation, weight loss, changes in the sense of taste, decreased appetite, muscle spasms, and hair loss.  The patient verbalized understanding of the proper use and possible adverse effects of Odomzo.  All of the patient's questions and concerns were addressed. FREE:[LAST:[Your primary care doctor],PHONE:[(   )    -],FAX:[(   )    -],FOLLOWUP:[1 week]]

## 2023-01-25 NOTE — PATIENT PROFILE ADULT - NSPROGENANESREACTION_GEN_A_NUR
Hypertension  -- Continue to monitor HR and BP   --SBP goal < 160   -continue amlodipine and amiodarone      unable to assess, pt nonverbal

## 2023-02-28 NOTE — ED ADULT TRIAGE NOTE - RESPIRATORY RATE (BREATHS/MIN)
no increased work of breathing or signs of respiratory distress, clear to auscultation bilaterally 
18

## 2023-04-01 NOTE — ED CDU PROVIDER SUBSEQUENT DAY NOTE - MEDICAL DECISION MAKING DETAILS
Bed: 20  Expected date:   Expected time:   Means of arrival:   Comments:  1st triage   53yo woman h/o hypothyroid, down syndrome with developmental delay, dementia, recent admission for urinary retention and UTI, recurrent doherty catheters sent in from SNF for IR placement of suprapubic catheter, as pt is unable to self cath and due to dementia and agitation, the facility where she lives is also unable to cath; she is nonambulatory at this point. Plan is for IR and likely d/c. VS, exam as noted, family at bedside to sign consent.

## 2023-05-19 NOTE — DISCHARGE NOTE PROVIDER - NSDCQMPCI_CARD_ALL_CORE
Head, normocephalic, atraumatic, Face, Face within normal limits, Ears, External ears within normal limits, Nose/Nasopharynx, External nose normal appearance, nares patent, no nasal discharge, Mouth and Throat, Oral cavity appearance normal, Lips, Appearance normal
No

## 2023-07-16 NOTE — DISCHARGE NOTE PROVIDER - CARE PROVIDER_API CALL
Pt to CT with parents and CT tech.   Your primary care doctor,   Phone: (   )    -  Fax: (   )    -  Follow Up Time: 1 week

## 2023-10-26 NOTE — DISCHARGE NOTE PROVIDER - NSDCCPGOAL_GEN_ALL_CORE_FT
To get better and follow your care plan as instructed. How Severe Are They?: moderate Is This A New Presentation, Or A Follow-Up?: Follow Up Actinic Keratoses Additional History: Pt has been using blister with spf 15 only.

## 2023-11-22 NOTE — DISCHARGE NOTE NURSING/CASE MANAGEMENT/SOCIAL WORK - NSCORESITESY/N_GEN_A_CORE_RD
Found out she was pregnant 2 weeks ago, was on depo so is not aware of LMP, comes today with lower abd pain that started 2-3 hours PTA, denies any pain or blood with urine, describes the pain as hard cramping that is worse to the right side   No 7

## 2024-04-26 NOTE — ED PROVIDER NOTE - PMH
Referral    Dr. Blake-ENT    Surgery follow up and hearing test    Appt is on Monday of next week.     Callback number:        Down's syndrome    Hypothyroid    Impulse control disorder in adult    Intellectual disability    Seizures

## 2024-08-26 NOTE — ED PROVIDER NOTE - PHYSICAL EXAMINATION
VITAL SIGNS: I have reviewed nursing notes and confirm.  CONSTITUTIONAL: Well-developed; well-nourished; in no acute distress.  SKIN: Skin exam is warm and dry, no acute rash.  HEAD: Normocephalic; atraumatic.  EYES: PERRL, EOM intact; conjunctiva and sclera clear.  ENT: No nasal discharge; airway clear. TMs clear.  NECK: Supple; non tender.  CARD: S1, S2 normal; no murmurs, gallops, or rubs. Regular rate and rhythm.  RESP: No wheezes, rales or rhonchi.  ABD: Normal bowel sounds; soft; non-distended; non-tender; no hepatosplenomegaly.  EXT: Normal ROM. No clubbing, cyanosis or edema.  LYMPH: No acute cervical adenopathy.  NEURO: Alert, oriented. Grossly unremarkable. No focal deficits.  PSYCH: Cooperative, appropriate. VITAL SIGNS: I have reviewed nursing notes and confirm.  CONSTITUTIONAL: Well-developed; well-nourished; Pt shaking anxious and screaming at times.   SKIN: Skin exam is warm and dry, no acute rash.  HEAD: Normocephalic; atraumatic.  EYES: PERRL, EOM intact; conjunctiva and sclera clear.  ENT: No nasal discharge; airway clear. TMs clear.  NECK: Supple; non tender.  CARD: S1, S2 normal; no murmurs, gallops, or rubs. Regular rate and rhythm.  RESP: No wheezes, rales or rhonchi.  ABD: Normal bowel sounds; soft; non-distended; non-tender; no hepatosplenomegaly.  EXT: Normal ROM. No clubbing, cyanosis or edema.  LYMPH: No acute cervical adenopathy.  NEURO: Alert, moving all exterminates following commands significant other

## 2024-11-27 NOTE — ED ADULT NURSE NOTE - PERIPHERAL VASCULAR WDL
Patient is seen for 1-year follow-up after PFO Closure     For the purposes of chart review, Ms. Castelan is a 62-year-old female with past medical history of hypothyroidism and TIAs.   Her first TIA dates back to 2012 which involved dizziness and headache at this time MRIs were negative and she was started on Plavix. Her next episode of TIA was in 2016 which involved right hemiparesis that lasted few minutes. She had another recent episode in April 2023 which involved left hemiparesis for 30 minutes. Patient says she has had CT in the past which showed fetal PTCA. As per the patient, her last MRI shows recent strokes which are new compared to 2016. She is currently on Eliquis and atorvastatin.   JANNA showed normal ejection fraction with aneurysmal intra-atrial septum and a PFO, bubble study was positive   Initial transthoracic echo showed ejection fraction of 65%, aneurysmal interatrial septum with fenestration and right-to-left shunting, bubble study was strongly positive.              Patient is s/p PFO closure on October 17, 2023 with a 25 mm Amplatz PFO occluder device. Patient has seemingly been doing well this past year. She continues treatment on aspirin alone.               She continues to take aspirin and Plavix    Physical Exam:    Visit Vitals  /55   Pulse 64   Temp 36.1 °C (96.9 °F)   Resp 16   Ht 1.524 m (5')   Wt 45.4 kg (100 lb)   SpO2 98%   BMI 19.53 kg/m²   OB Status Postmenopausal   Smoking Status Never   BSA 1.39 m²        Constitutional: alert and in no acute distress.   Eyes: no erythema, swelling or discharge from the eye .   Ears, Nose, Mouth, and Throat: external inspection of ears and nose is normal , lips, teeth, and gums are normal with good dentition  and oropharynx normal with no erythema, edema, exudate or lesions .   Neck: neck is supple, symmetric, trachea midline, no masses  and no thyromegaly .   Pulmonary: no increased work of breathing or signs of respiratory distress ,  "lungs clear to auscultation. , normal percussion of chest  and chest palpation normal .   Cardiovascular: RRR, no murmur,  no leg edema, non-displaced PMI, no S3 or S4  Abdomen: abdomen non-tender, no masses  and no hepatomegaly .           Skin:  no skin lesions          Neurologic: non-focal neurologic examination.      Psychiatric judgment and insight is normal , oriented to person, place and time , normal mood and affect .       Labs:  Results for orders placed or performed during the hospital encounter of 03/21/24   Transthoracic Echo (TTE) Complete    Collection Time: 03/21/24 11:25 AM   Result Value Ref Range    AV pk michelle 1.65 m/s    LVOT diam 1.79 cm    LV EF 69 %    MV avg E/e' ratio 9.22     MV E/A ratio 1.47     LA vol index A/L 24.9 ml/m2    Tricuspid annular plane systolic excursion 2.1 cm    RV free wall pk S' 14.00 cm/s    LVIDd 4.04 cm    RVSP 29.5 mmHg    Aortic Valve Area by Continuity of Peak Velocity 2.29 cm2    AV pk grad 10.9 mmHg    LV A4C EF 65.6           Meds:    Current Outpatient Medications   Medication Instructions    aspirin 81 mg, oral, Daily    estradiol (VAGIFEM) 10 mcg, vaginal, 2 times weekly, Insert vaginally 2 times a week    levothyroxine (SYNTHROID) 75 mcg, oral, Daily before breakfast      Reviewed Echo images from study today which demonstrate a well-seated PFO occluder device with no leak around the device and a negative \"bubble\" study.    Assessment:  This is a 62-year-old female with history of TIA and stroke, who is now status post PFO closure with 25 mm Amplatz PFO occluder device and here for 1-year follow-up.  Patient is doing well with no further neurological events.      Patient will continue aspirin for life. She does not require any further cardiac follow-up with regard to her PFO device.         Devendra Kaufman MD  , Interventional Cardiology Fellowship Program   [facility]   [facility]" [facility]  Medicine  Office Phone Number: [phone]     Pulses equal bilaterally, no edema present.

## 2025-05-19 NOTE — PHYSICAL THERAPY INITIAL EVALUATION ADULT - THERAPY FREQUENCY, PT EVAL
Health Maintenance       COVID-19 Vaccine (3 - 2024-25 season)  Overdue since 9/1/2024    Annual Physical (ages 3 - 21) (Yearly)  Never done    Depression Screening (Yearly)  Never done           Following review of the above:  Patient is not proceeding with: COVID-19    Note: Refer to final orders and clinician documentation.       3-5x/week